# Patient Record
Sex: MALE | Race: OTHER | NOT HISPANIC OR LATINO | Employment: FULL TIME | URBAN - METROPOLITAN AREA
[De-identification: names, ages, dates, MRNs, and addresses within clinical notes are randomized per-mention and may not be internally consistent; named-entity substitution may affect disease eponyms.]

---

## 2021-07-17 ENCOUNTER — APPOINTMENT (OUTPATIENT)
Dept: RADIOLOGY | Facility: MEDICAL CENTER | Age: 56
End: 2021-07-17
Attending: EMERGENCY MEDICINE
Payer: COMMERCIAL

## 2021-07-17 ENCOUNTER — HOSPITAL ENCOUNTER (EMERGENCY)
Facility: MEDICAL CENTER | Age: 56
End: 2021-07-17
Attending: EMERGENCY MEDICINE
Payer: COMMERCIAL

## 2021-07-17 VITALS
HEIGHT: 73 IN | TEMPERATURE: 98 F | DIASTOLIC BLOOD PRESSURE: 99 MMHG | WEIGHT: 213.85 LBS | BODY MASS INDEX: 28.34 KG/M2 | HEART RATE: 71 BPM | SYSTOLIC BLOOD PRESSURE: 133 MMHG | OXYGEN SATURATION: 95 % | RESPIRATION RATE: 16 BRPM

## 2021-07-17 DIAGNOSIS — E86.0 DEHYDRATION: ICD-10-CM

## 2021-07-17 DIAGNOSIS — R10.10 PAIN OF UPPER ABDOMEN: ICD-10-CM

## 2021-07-17 LAB
ALBUMIN SERPL BCP-MCNC: 3.9 G/DL (ref 3.2–4.9)
ALBUMIN/GLOB SERPL: 1.1 G/DL
ALP SERPL-CCNC: 112 U/L (ref 30–99)
ALT SERPL-CCNC: 83 U/L (ref 2–50)
ANION GAP SERPL CALC-SCNC: 11 MMOL/L (ref 7–16)
APPEARANCE UR: CLEAR
AST SERPL-CCNC: 71 U/L (ref 12–45)
BASOPHILS # BLD AUTO: 0.5 % (ref 0–1.8)
BASOPHILS # BLD: 0.04 K/UL (ref 0–0.12)
BILIRUB SERPL-MCNC: 0.9 MG/DL (ref 0.1–1.5)
BILIRUB UR QL STRIP.AUTO: NEGATIVE
BUN SERPL-MCNC: 7 MG/DL (ref 8–22)
CALCIUM SERPL-MCNC: 9.4 MG/DL (ref 8.4–10.2)
CHLORIDE SERPL-SCNC: 96 MMOL/L (ref 96–112)
CO2 SERPL-SCNC: 26 MMOL/L (ref 20–33)
COLOR UR: YELLOW
CREAT SERPL-MCNC: 1.01 MG/DL (ref 0.5–1.4)
CRP SERPL HS-MCNC: 0.7 MG/DL (ref 0–0.75)
EKG IMPRESSION: NORMAL
EOSINOPHIL # BLD AUTO: 0.13 K/UL (ref 0–0.51)
EOSINOPHIL NFR BLD: 1.6 % (ref 0–6.9)
ERYTHROCYTE [DISTWIDTH] IN BLOOD BY AUTOMATED COUNT: 47.7 FL (ref 35.9–50)
ERYTHROCYTE [SEDIMENTATION RATE] IN BLOOD BY WESTERGREN METHOD: 4 MM/HOUR (ref 0–20)
GLOBULIN SER CALC-MCNC: 3.5 G/DL (ref 1.9–3.5)
GLUCOSE SERPL-MCNC: 109 MG/DL (ref 65–99)
GLUCOSE UR STRIP.AUTO-MCNC: NEGATIVE MG/DL
HAV IGM SERPL QL IA: NORMAL
HBV CORE IGM SER QL: NORMAL
HBV SURFACE AG SER QL: NORMAL
HCT VFR BLD AUTO: 49.1 % (ref 42–52)
HCV AB SER QL: NORMAL
HGB BLD-MCNC: 17 G/DL (ref 14–18)
IMM GRANULOCYTES # BLD AUTO: 0.02 K/UL (ref 0–0.11)
IMM GRANULOCYTES NFR BLD AUTO: 0.2 % (ref 0–0.9)
KETONES UR STRIP.AUTO-MCNC: 15 MG/DL
LEUKOCYTE ESTERASE UR QL STRIP.AUTO: NEGATIVE
LIPASE SERPL-CCNC: 59 U/L (ref 7–58)
LYMPHOCYTES # BLD AUTO: 0.82 K/UL (ref 1–4.8)
LYMPHOCYTES NFR BLD: 10.2 % (ref 22–41)
MCH RBC QN AUTO: 33.7 PG (ref 27–33)
MCHC RBC AUTO-ENTMCNC: 34.6 G/DL (ref 33.7–35.3)
MCV RBC AUTO: 97.4 FL (ref 81.4–97.8)
MICRO URNS: ABNORMAL
MONOCYTES # BLD AUTO: 0.9 K/UL (ref 0–0.85)
MONOCYTES NFR BLD AUTO: 11.2 % (ref 0–13.4)
NEUTROPHILS # BLD AUTO: 6.16 K/UL (ref 1.82–7.42)
NEUTROPHILS NFR BLD: 76.3 % (ref 44–72)
NITRITE UR QL STRIP.AUTO: NEGATIVE
NRBC # BLD AUTO: 0 K/UL
NRBC BLD-RTO: 0 /100 WBC
PH UR STRIP.AUTO: 7 [PH] (ref 5–8)
PLATELET # BLD AUTO: 267 K/UL (ref 164–446)
PMV BLD AUTO: 9 FL (ref 9–12.9)
POTASSIUM SERPL-SCNC: 4.1 MMOL/L (ref 3.6–5.5)
PROT SERPL-MCNC: 7.4 G/DL (ref 6–8.2)
PROT UR QL STRIP: NEGATIVE MG/DL
RBC # BLD AUTO: 5.04 M/UL (ref 4.7–6.1)
RBC UR QL AUTO: NEGATIVE
SODIUM SERPL-SCNC: 133 MMOL/L (ref 135–145)
SP GR UR STRIP.AUTO: 1.02
TROPONIN T SERPL-MCNC: 7 NG/L (ref 6–19)
WBC # BLD AUTO: 8.1 K/UL (ref 4.8–10.8)

## 2021-07-17 PROCEDURE — 36415 COLL VENOUS BLD VENIPUNCTURE: CPT

## 2021-07-17 PROCEDURE — A9270 NON-COVERED ITEM OR SERVICE: HCPCS | Performed by: EMERGENCY MEDICINE

## 2021-07-17 PROCEDURE — 80074 ACUTE HEPATITIS PANEL: CPT

## 2021-07-17 PROCEDURE — 86140 C-REACTIVE PROTEIN: CPT

## 2021-07-17 PROCEDURE — 700105 HCHG RX REV CODE 258: Performed by: EMERGENCY MEDICINE

## 2021-07-17 PROCEDURE — 80053 COMPREHEN METABOLIC PANEL: CPT

## 2021-07-17 PROCEDURE — 85652 RBC SED RATE AUTOMATED: CPT

## 2021-07-17 PROCEDURE — 71045 X-RAY EXAM CHEST 1 VIEW: CPT

## 2021-07-17 PROCEDURE — 93005 ELECTROCARDIOGRAM TRACING: CPT

## 2021-07-17 PROCEDURE — 700102 HCHG RX REV CODE 250 W/ 637 OVERRIDE(OP): Performed by: EMERGENCY MEDICINE

## 2021-07-17 PROCEDURE — 84484 ASSAY OF TROPONIN QUANT: CPT

## 2021-07-17 PROCEDURE — 83690 ASSAY OF LIPASE: CPT

## 2021-07-17 PROCEDURE — 96376 TX/PRO/DX INJ SAME DRUG ADON: CPT

## 2021-07-17 PROCEDURE — 700117 HCHG RX CONTRAST REV CODE 255: Performed by: EMERGENCY MEDICINE

## 2021-07-17 PROCEDURE — 85025 COMPLETE CBC W/AUTO DIFF WBC: CPT

## 2021-07-17 PROCEDURE — 81003 URINALYSIS AUTO W/O SCOPE: CPT

## 2021-07-17 PROCEDURE — 700111 HCHG RX REV CODE 636 W/ 250 OVERRIDE (IP): Performed by: EMERGENCY MEDICINE

## 2021-07-17 PROCEDURE — 93005 ELECTROCARDIOGRAM TRACING: CPT | Performed by: EMERGENCY MEDICINE

## 2021-07-17 PROCEDURE — 96375 TX/PRO/DX INJ NEW DRUG ADDON: CPT

## 2021-07-17 PROCEDURE — 99285 EMERGENCY DEPT VISIT HI MDM: CPT

## 2021-07-17 PROCEDURE — 96374 THER/PROPH/DIAG INJ IV PUSH: CPT

## 2021-07-17 PROCEDURE — 74177 CT ABD & PELVIS W/CONTRAST: CPT

## 2021-07-17 RX ORDER — TESTOSTERONE CYPIONATE 200 MG/ML
200 INJECTION, SOLUTION INTRAMUSCULAR
COMMUNITY

## 2021-07-17 RX ORDER — IBUPROFEN 200 MG
400 TABLET ORAL EVERY 8 HOURS PRN
Status: ON HOLD | COMMUNITY
End: 2023-04-04

## 2021-07-17 RX ORDER — ACETAMINOPHEN 500 MG
1000 TABLET ORAL EVERY 8 HOURS PRN
Status: SHIPPED | COMMUNITY
End: 2023-05-19

## 2021-07-17 RX ORDER — CETIRIZINE HYDROCHLORIDE 10 MG/1
10 TABLET ORAL
Status: ON HOLD | COMMUNITY
End: 2023-04-04

## 2021-07-17 RX ORDER — CALCIUM CARBONATE 750 MG/1
2-4 TABLET ORAL
Status: SHIPPED | COMMUNITY
End: 2022-02-02

## 2021-07-17 RX ORDER — ZINC 25 MG
25 TABLET ORAL DAILY
Status: ON HOLD | COMMUNITY
End: 2023-04-04

## 2021-07-17 RX ORDER — SODIUM CHLORIDE, SODIUM LACTATE, POTASSIUM CHLORIDE, CALCIUM CHLORIDE 600; 310; 30; 20 MG/100ML; MG/100ML; MG/100ML; MG/100ML
1000 INJECTION, SOLUTION INTRAVENOUS ONCE
Status: COMPLETED | OUTPATIENT
Start: 2021-07-17 | End: 2021-07-17

## 2021-07-17 RX ORDER — MORPHINE SULFATE 4 MG/ML
4 INJECTION, SOLUTION INTRAMUSCULAR; INTRAVENOUS ONCE
Status: COMPLETED | OUTPATIENT
Start: 2021-07-17 | End: 2021-07-17

## 2021-07-17 RX ORDER — ONDANSETRON 4 MG/1
4 TABLET, ORALLY DISINTEGRATING ORAL EVERY 6 HOURS PRN
Qty: 10 TABLET | Refills: 0 | Status: SHIPPED | OUTPATIENT
Start: 2021-07-17 | End: 2022-02-02

## 2021-07-17 RX ORDER — OMEPRAZOLE 20 MG/1
20-40 CAPSULE, DELAYED RELEASE ORAL
Status: SHIPPED | COMMUNITY
End: 2023-12-21 | Stop reason: SDUPTHER

## 2021-07-17 RX ORDER — ANASTROZOLE 1 MG/1
1-2 TABLET ORAL
Status: SHIPPED | COMMUNITY
End: 2022-02-02

## 2021-07-17 RX ORDER — USTEKINUMAB 90 MG/ML
90 INJECTION, SOLUTION SUBCUTANEOUS
Status: SHIPPED | COMMUNITY
End: 2023-01-31 | Stop reason: SDUPTHER

## 2021-07-17 RX ORDER — ONDANSETRON 2 MG/ML
4 INJECTION INTRAMUSCULAR; INTRAVENOUS ONCE
Status: COMPLETED | OUTPATIENT
Start: 2021-07-17 | End: 2021-07-17

## 2021-07-17 RX ORDER — HYDROCODONE BITARTRATE AND ACETAMINOPHEN 5; 325 MG/1; MG/1
1 TABLET ORAL EVERY 6 HOURS PRN
Qty: 16 TABLET | Refills: 0 | Status: SHIPPED | OUTPATIENT
Start: 2021-07-17 | End: 2021-07-21

## 2021-07-17 RX ADMIN — MORPHINE SULFATE 4 MG: 4 INJECTION INTRAVENOUS at 14:38

## 2021-07-17 RX ADMIN — SODIUM CHLORIDE, POTASSIUM CHLORIDE, SODIUM LACTATE AND CALCIUM CHLORIDE 1000 ML: 600; 310; 30; 20 INJECTION, SOLUTION INTRAVENOUS at 14:37

## 2021-07-17 RX ADMIN — ONDANSETRON 4 MG: 2 INJECTION INTRAMUSCULAR; INTRAVENOUS at 17:25

## 2021-07-17 RX ADMIN — MORPHINE SULFATE 4 MG: 4 INJECTION INTRAVENOUS at 17:24

## 2021-07-17 RX ADMIN — ONDANSETRON 4 MG: 2 INJECTION INTRAMUSCULAR; INTRAVENOUS at 14:38

## 2021-07-17 RX ADMIN — SODIUM CHLORIDE, POTASSIUM CHLORIDE, SODIUM LACTATE AND CALCIUM CHLORIDE 1000 ML: 600; 310; 30; 20 INJECTION, SOLUTION INTRAVENOUS at 17:24

## 2021-07-17 RX ADMIN — LIDOCAINE HYDROCHLORIDE 30 ML: 20 SOLUTION OROPHARYNGEAL at 17:23

## 2021-07-17 RX ADMIN — IOHEXOL 100 ML: 350 INJECTION, SOLUTION INTRAVENOUS at 15:16

## 2021-07-17 ASSESSMENT — PAIN DESCRIPTION - DESCRIPTORS: DESCRIPTORS: ACHING;SHARP

## 2021-07-17 NOTE — ED PROVIDER NOTES
ED Provider Note    CHIEF COMPLAINT  Chief Complaint   Patient presents with   • Chest Pain       HPI  Tom Quan is a 55 y.o. male with a history of ulcerative colitis, currently on Stelara, who presents with complaints of epigastric and left upper abdominal pain, along with nausea, vomiting, diarrhea for the past week.  Patient says he has been having for 5 episodes of watery diarrhea with occasional blood.  He says this is similar to when he said flares of his ulcerative colitis.  He says he was switched to Stelara about 2 months ago but has not has not been helping much.  In triage he was noted be complaining of chest pain, but it appears that the pain is mainly in his abdomen and radiates into his lower chest area.  He denies any fever, shaking chills, sore throat, cough, congestion, any difficulty breathing.  He has had no urinary symptoms.  He has had some watery diarrhea as noted above.  [facility] is [facility]      REVIEW OF SYSTEMS  See HPI for further details. All other systems are negative.     PAST MEDICAL HISTORY  History reviewed. No pertinent past medical history.    FAMILY HISTORY  History reviewed. No pertinent family history.    SOCIAL HISTORY  Social History     Tobacco Use   • Smoking status: Never Smoker   • Smokeless tobacco: Never Used   Substance Use Topics   • Alcohol use: Yes     Comment: Occasionally   • Drug use: Never      Social History     Substance and Sexual Activity   Drug Use Never       SURGICAL HISTORY  History reviewed. No pertinent surgical history.    CURRENT MEDICATIONS  Home Medications     Reviewed by Jennifer Yang (Pharmacy Tech) on 07/17/21 at 1555  Med List Status: Complete    Medication Last Dose Status    acetaminophen (TYLENOL) 500 MG Tab 7/14/2021 Active    anastrozole (ARIMIDEX) 1 MG Tab 7/7/2021 Active    calcium carbonate (TUMS E-X 750) 750 MG chewable tablet 7/14/2021 Active    cetirizine (KLS ALLER-GABRIELLA) 10 MG Tab  ">2 weeks Active    Cholecalciferol (VITAMIN D3) 125 MCG (5000 UT) Cap 7/16/2021 Active    ibuprofen (MOTRIN) 200 MG Tab 7/14/2021 Active    Omega-3 Fatty Acids (FISH OIL PO) >5 days Active    omeprazole (PRILOSEC) 20 MG delayed-release capsule 7/15/2021 Active    Probiotic Cap 7/15/2021 Active    testosterone cypionate (DEPO-TESTOSTERONE) 200 MG/ML Solution injection 7/7/2021 Active    Ustekinumab (STELARA) 90 MG/ML Solution Prefilled Syringe 6/27/2021 Active    Zinc 25 MG Tab >5 days Active                ALLERGIES  No Known Allergies    PHYSICAL EXAM0  VITAL SIGNS: Blood Pressure 133/99   Pulse 71   Temperature 36.7 °C (98 °F) (Temporal)   Respiration 16   Height 1.854 m (6' 1\")   Weight 97 kg (213 lb 13.5 oz)   Oxygen Saturation 95%   Body Mass Index 28.21 kg/m²   Constitutional: Awake, alert, in no acute distress, Non-toxic appearance.   HENT: Atraumatic. Bilateral external ears normal, mucous membranes mildly dry, throat nonerythematous without exudates, nose is normal.  Eyes: PERRL, EOMI, conjunctiva moist, noninjected.  Neck: Nontender, Normal range of motion, No nuchal rigidity, No stridor.   Lymphatic: No lymphadenopathy noted.   Cardiovascular: Regular rate and rhythm, no murmurs, rubs, gallops.  Thorax & Lungs:  Good breath sounds bilaterally, no wheezes, rales, or retractions.  No chest tenderness.  Abdomen: Bowel sounds normal, Soft, nondistended, there is mild tenderness to the epigastrium, increase tenderness to the left upper quadrant, no tenderness to the lower abdomen, no rebound, guarding, or masses.  Back: No CVA or spinal tenderness.  Extremities: Intact distal pulses, No edema, No tenderness.   Skin: Warm, Dry, No rashes.   Musculoskeletal: No joint swelling or tenderness.  Neurologic: Alert & oriented x 3, sensory and motor function normal. No focal deficits.   Psychiatric: Affect normal, Judgment normal, Mood normal.     EKG  EKG Interpretation:  Interpreted by me    Rhythm:  Normal " sinus rhythm   Rate: 74  Intervals: normal  Axis: normal  Ectopy: none  Conduction: normal  ST Segments: no evidence of elevation or depression  T Waves: no acute change  Q Waves: none  Clinical Impression: No acute injury or ischemic pattern.   Comparison to previous EKG: None available      LABS  Labs Reviewed   CBC WITH DIFFERENTIAL - Abnormal; Notable for the following components:       Result Value    MCH 33.7 (*)     Neutrophils-Polys 76.30 (*)     Lymphocytes 10.20 (*)     Lymphs (Absolute) 0.82 (*)     Monos (Absolute) 0.90 (*)     All other components within normal limits   COMP METABOLIC PANEL - Abnormal; Notable for the following components:    Sodium 133 (*)     Glucose 109 (*)     Bun 7 (*)     AST(SGOT) 71 (*)     ALT(SGPT) 83 (*)     Alkaline Phosphatase 112 (*)     All other components within normal limits   URINALYSIS - Abnormal; Notable for the following components:    Ketones 15 (*)     All other components within normal limits   LIPASE - Abnormal; Notable for the following components:    Lipase 59 (*)     All other components within normal limits   TROPONIN   ESTIMATED GFR   SED RATE   CRP QUANTITIVE (NON-CARDIAC)   HEPATITIS PANEL ACUTE(4 COMPONENTS)       All labs reviewed by me.      RADIOLOGY/PROCEDURES  CT-ABDOMEN-PELVIS WITH   Final Result      1.  Hepatic steatosis.      2.  Aortic atherosclerosis without aneurysm.      3.  Appendix not visualized      DX-CHEST-PORTABLE (1 VIEW)   Final Result      No evidence of acute cardiopulmonary process.          The radiologist's interpretations of all radiological studies have been reviewed by me.        COURSE & MEDICAL DECISION MAKING  Pertinent Labs & Imaging studies reviewed. (See chart for details)  The patient presents with above complaints.  Pain seems to be in the epigastrium and left upper quadrant, suspect this more likely gastritis or early ulcer disease rather than a flare of his ulcerative colitis.  Clinically patient appears  dehydrated with dry mucous membranes and poor p.o. intake.  IV is placed, he is given a bolus of lactated Ringer's, morphine, and Zofran.  CBC is normal with white to 76% polys, sedimentation rate 4, C-reactive protein 0.7, chemistry shows a sodium of 133, glucose 109, BUN 7, SGOT 71, SGPT 83, total bilirubin 0.9, lipase 59.  CT scan of the abdomen/pelvis with IV contrast was obtained which shows hepatic steatosis, but no acute inflammatory changes or abnormalities.  The patient does not show any acute flare of his ulcerative colitis.  His white count is normal, inflammatory markers are negative, and no evidence on CAT scan.  He does have some mild elevation in his liver function test, which I think is likely secondary to his hepatic steatosis.  He did have some ketones in his urine, consistent with dehydration.  Patient reported relief with the morphine but said the pain was coming back.  He was given a GI cocktail without much improvement.  He was given additional dose of morphine and Zofran and a second liter of lactated Ringer's.  Patient was able to tolerate oral fluids.  I suspect the patient has gastritis or early peptic ulcer disease.  He is already on Prilosec.  I have asked him to follow-up with Dr. Sylvester of gastroenterology on Monday.  He is given a prescription for Zofran and short course of Norco.  He is told to eat bland foods, avoid any alcohol, or spicy foods.  He is to return to ER for worsening pain, fever, vomiting, bloody stools or black stools, or any other problems.    FINAL IMPRESSION  1. Pain of upper abdomen    2. Dehydration          Electronically signed by: Jesse Perez M.D., 7/17/2021 3:33 PM

## 2021-07-17 NOTE — ED NOTES
Med rec completed per Pt at bedside and Pt's pharmacy [facility] [address] [facility] ([phone]).  Allergies reviewed with Pt. No known drug allergies.  Per Cher, Pt is prescribed anastrozole 1 mg with directions to take 1 tablet 2 times per week; Pt reports that he takes 1 to 2 tablets (amount varies) once per week, with his testosterone injections.  No oral antibiotics in last 30 days.

## 2021-07-17 NOTE — ED TRIAGE NOTES
"Presents complaining of acute onset of left chest pain without any associated dyspnea, and worsening for approximately a week.  Chief Complaint   Patient presents with   • Chest Pain     /98   Pulse 90   Temp 36.1 °C (96.9 °F) (Temporal)   Resp 20   Ht 1.854 m (6' 1\")   Wt 97 kg (213 lb 13.5 oz)   SpO2 99%   BMI 28.21 kg/m²      "

## 2021-07-18 NOTE — ED NOTES
Discharge instructions given and discussed. RX for norco and zofran  given and pt educated. Pt educated to come back to ER for new or worsening symptoms and follow up with PCP as instructed. Pt verbalized understanding. VSS. Pt  Discharged in stable condition.

## 2021-09-14 ENCOUNTER — TELEPHONE (OUTPATIENT)
Dept: SCHEDULING | Facility: IMAGING CENTER | Age: 56
End: 2021-09-14

## 2022-01-17 ENCOUNTER — TELEPHONE (OUTPATIENT)
Dept: SCHEDULING | Facility: IMAGING CENTER | Age: 57
End: 2022-01-17

## 2022-02-02 ENCOUNTER — OFFICE VISIT (OUTPATIENT)
Dept: MEDICAL GROUP | Facility: LAB | Age: 57
End: 2022-02-02
Payer: COMMERCIAL

## 2022-02-02 VITALS
DIASTOLIC BLOOD PRESSURE: 86 MMHG | TEMPERATURE: 97.1 F | BODY MASS INDEX: 29.03 KG/M2 | HEART RATE: 90 BPM | HEIGHT: 73 IN | OXYGEN SATURATION: 97 % | SYSTOLIC BLOOD PRESSURE: 116 MMHG | RESPIRATION RATE: 12 BRPM | WEIGHT: 219 LBS

## 2022-02-02 DIAGNOSIS — Z78.9 ALCOHOL USE: ICD-10-CM

## 2022-02-02 DIAGNOSIS — K51.00 ULCERATIVE PANCOLITIS WITHOUT COMPLICATION (HCC): ICD-10-CM

## 2022-02-02 DIAGNOSIS — F90.2 ATTENTION DEFICIT HYPERACTIVITY DISORDER (ADHD), COMBINED TYPE: ICD-10-CM

## 2022-02-02 PROBLEM — M87.051 AVASCULAR NECROSIS OF BONES OF BOTH HIPS (HCC): Status: ACTIVE | Noted: 2019-08-05

## 2022-02-02 PROBLEM — M87.052 AVASCULAR NECROSIS OF BONES OF BOTH HIPS (HCC): Status: ACTIVE | Noted: 2019-08-05

## 2022-02-02 PROBLEM — I10 ESSENTIAL (PRIMARY) HYPERTENSION: Status: ACTIVE | Noted: 2019-08-05

## 2022-02-02 PROBLEM — M16.0 BILATERAL PRIMARY OSTEOARTHRITIS OF HIP: Status: ACTIVE | Noted: 2018-07-03

## 2022-02-02 PROBLEM — K21.9 GERD (GASTROESOPHAGEAL REFLUX DISEASE): Status: ACTIVE | Noted: 2019-08-05

## 2022-02-02 PROCEDURE — 99204 OFFICE O/P NEW MOD 45 MIN: CPT | Performed by: FAMILY MEDICINE

## 2022-02-02 RX ORDER — SYRINGE WITH NEEDLE, 1 ML 25GX5/8"
SYRINGE, EMPTY DISPOSABLE MISCELLANEOUS
COMMUNITY
Start: 2021-12-24 | End: 2023-05-19

## 2022-02-02 RX ORDER — LISDEXAMFETAMINE DIMESYLATE 40 MG/1
40 CAPSULE ORAL DAILY
Qty: 30 CAPSULE | Refills: 0 | Status: SHIPPED | OUTPATIENT
Start: 2022-02-02 | End: 2022-03-02 | Stop reason: SDUPTHER

## 2022-02-02 RX ORDER — NALTREXONE HYDROCHLORIDE 50 MG/1
50 TABLET, FILM COATED ORAL DAILY
Qty: 30 TABLET | Refills: 0 | Status: SHIPPED | OUTPATIENT
Start: 2022-02-02 | End: 2022-03-03

## 2022-02-02 ASSESSMENT — PATIENT HEALTH QUESTIONNAIRE - PHQ9
CLINICAL INTERPRETATION OF PHQ2 SCORE: 4
5. POOR APPETITE OR OVEREATING: 2 - MORE THAN HALF THE DAYS
SUM OF ALL RESPONSES TO PHQ QUESTIONS 1-9: 11

## 2022-02-02 ASSESSMENT — ANXIETY QUESTIONNAIRES
7. FEELING AFRAID AS IF SOMETHING AWFUL MIGHT HAPPEN: SEVERAL DAYS
5. BEING SO RESTLESS THAT IT IS HARD TO SIT STILL: NOT AT ALL
1. FEELING NERVOUS, ANXIOUS, OR ON EDGE: MORE THAN HALF THE DAYS
GAD7 TOTAL SCORE: 8
2. NOT BEING ABLE TO STOP OR CONTROL WORRYING: MORE THAN HALF THE DAYS
6. BECOMING EASILY ANNOYED OR IRRITABLE: NOT AT ALL
4. TROUBLE RELAXING: SEVERAL DAYS
3. WORRYING TOO MUCH ABOUT DIFFERENT THINGS: MORE THAN HALF THE DAYS

## 2022-02-02 ASSESSMENT — FIBROSIS 4 INDEX: FIB4 SCORE: 1.61

## 2022-02-02 NOTE — PROGRESS NOTES
Tom Quan is a 56 y.o. male here to establish care and discuss   Depression, ADHD, alcohol use.    Previously followed with psychiatrist in the  [facility] for depression but psychiatrist has since retired.  He had tried multiple SSRIs as well as Wellbutrin in the past with no improvement or intolerable side effects.  He ended up on Vyvanse 40 mg daily which helps with ADHD and seem to control his depression symptoms as well.  He wants to try to come off of all medications as and has been off of this for over 6 months but noticing increasingly worsening symptoms with depression and ADHD.    He is also concerned about increased alcohol use that he noted during Covid.  Does not drink every night but would find that some nights he would end up drinking more than planned.  At this point he would like to avoid drinking most nights of the week or stop drinking completely.  He saw an advertisement for naltrexone has questions regarding starting this for help with alcohol cessation.    [facility], has been stable on Stelara.    Current medicines (including changes today)  Current Outpatient Medications   Medication Sig Dispense Refill   • Ustekinumab (STELARA) 90 MG/ML Solution Prefilled Syringe Inject 90 mg under the skin Every 60 days.     • cetirizine (KLS ALLER-GABRIELLA) 10 MG Tab Take 10 mg by mouth 1 time a day as needed for Allergies.     • ibuprofen (MOTRIN) 200 MG Tab Take 400 mg by mouth every 8 hours as needed for Mild Pain. 2 tablets = 400 mg.     • acetaminophen (TYLENOL) 500 MG Tab Take 1,000 mg by mouth every 8 hours as needed for Mild Pain. 2 tablets = 1,000 mg.     • Probiotic Cap Take 1 capsule by mouth every day.     • omeprazole (PRILOSEC) 20 MG delayed-release capsule Take 20 mg by mouth 1 time a day as needed (Heartburn).     • testosterone cypionate (DEPO-TESTOSTERONE) 200 MG/ML Solution injection Inject 200 mg into the shoulder, thigh, or buttocks  "every 7 days.     • Omega-3 Fatty Acids (FISH OIL PO) Take 2 Capsules by mouth every day.     • Zinc 25 MG Tab Take 25 mg by mouth every day.     • Cholecalciferol (VITAMIN D3) 125 MCG (5000 UT) Cap Take 5,000 Units by mouth every day.     • B-D 3CC LUER-RONNA SYR 22GX1\" 22G X 1\" 3 ML Misc USE FOR TESTOSTERONE INJECTIONS       No current facility-administered medications for this visit.     He  has a past medical history of Depression and Hyperlipidemia.  He  has a past surgical history that includes hip replacement, partial.  Social History     Tobacco Use   • Smoking status: Never Smoker   • Smokeless tobacco: Never Used   Vaping Use   • Vaping Use: Former   Substance Use Topics   • Alcohol use: Yes     Comment: Occasionally   • Drug use: Never     Social History     Social History Narrative   • Not on file     Family History   Problem Relation Age of Onset   • Diabetes Mother    • Cancer Paternal Uncle         Prostate     Family Status   Relation Name Status   • Mo  (Not Specified)   • PUnc  (Not Specified)       ROS  See HPI     Objective:     Physical Exam:  /86 (BP Location: Right arm, Patient Position: Sitting, BP Cuff Size: Adult)   Pulse 90   Temp 36.2 °C (97.1 °F)   Resp 12   Ht 1.854 m (6' 1\")   Wt 99.3 kg (219 lb)   SpO2 97%  Body mass index is 28.89 kg/m².  Constitutional: Alert. Well appearing. No distress.  Skin: Warm, dry, good turgor, no visible rashes.  Eye: Equal, round and reactive to light, conjunctiva clear, lids normal.  ENMT: Moist mucous membranes. .  Neck: Trachea midline, no masses, no thyromegaly  Respiratory: Normal effort. Lungs are clear to auscultation bilaterally.  Cardiovascular: Regular rate and rhythm. Normal S1/S2. No murmurs, rubs or gallops.   Neuro: Moves all four extremities. No facial droop.  Psych: Answers questions appropriately. Normal affect and mood.    Assessment and Plan:     1. Attention deficit hyperactivity disorder (ADHD), combined type  Previously " followed with psychiatry for depression and ADHD.  Psychiatrist retired and he would like referral to establish with a new psychiatrist here.  Did not see improvement with multiple different antidepressants and ended up seeing improvement in both ADHD and depression symptoms with Vyvanse.  Have been off of this for 6 months but did notice that symptoms have been worsening.  Will restart Vyvanse, PDMP reviewed, referral sent to establish with psychiatry.  Follow-up 1 month.  - Lisdexamfetamine Dimesylate (VYVANSE) 40 MG Cap; Take 1 Capsule by mouth every day for 30 days.  Dispense: 30 Capsule; Refill: 0  - Controlled Substance Treatment Agreement  - Referral to Psychiatry    2. Alcohol use  Noted increasing alcohol use during Covid, not drinking nightly but he would like to cut back or completely stop use.  He request starting naltrexone to help with alcohol cravings, will start 50 mg, can increase to 100 mg if needed.  - naltrexone (DEPADE) 50 MG Tab; Take 1 Tablet by mouth every day.  Dispense: 30 Tablet; Refill: 0    3. Ulcerative pancolitis without complication (HCC)  Stable on Stelara, [facility].    Records requested from previous PCP.  Follow up: Return in about 4 weeks (around 3/2/2022).         PLEASE NOTE: This note was created using voice recognition software.

## 2022-02-02 NOTE — LETTER
[facility]  Arvind Geiger M.D.  [address]  [facility] [address]  Fax: [phone]   Authorization for Release/Disclosure of   Protected Health Information   Name: TOM WITT : 1965 SSN: [id number]   Address: [address] [place] [id number] Phone:    [phone] (home)    I authorize the entity listed below to release/disclose the PHI below to:   [facility]/Arvind Geiger M.D. and Arvind Geiger M.D.   Provider or Entity Name:  [facility]   Address   City, State, [facility]   Phone:      Fax:  [phone]   Reason for request: continuity of care   Information to be released:    [  ] LAST COLONOSCOPY,  including any PATH REPORT and follow-up  [  ] LAST FIT/COLOGUARD RESULT [  ] LAST DEXA  [  ] LAST MAMMOGRAM  [  ] LAST PAP  [  ] LAST LABS [  ] RETINA EXAM REPORT  [  ] IMMUNIZATION RECORDS  [XXXXX] Release all info      [XXXXX] Check here and initial the line next to each item to release ALL health information INCLUDING  _XXXXX_ Care and treatment for drug and / or alcohol abuse  _XXXXX_ HIV testing, infection status, or AIDS  _XXXXX_ Genetic Testing    DATES OF SERVICE OR TIME PERIOD TO BE DISCLOSED: _____________  I understand and acknowledge that:  * This Authorization may be revoked at any time by you in writing, except if your health information has already been used or disclosed.  * Your health information that will be used or disclosed as a result of you signing this authorization could be re-disclosed by the recipient. If this occurs, your re-disclosed health information may no longer be protected by State or Federal laws.  * You may refuse to sign this Authorization. Your refusal will not affect your ability to obtain treatment.  * This Authorization becomes effective upon signing and will  on (date) __________.      If no date is indicated, this Authorization will  one (1) year from the signature date.    Name: Tom BUNDY  Avelina    Signature: Continuity of care   Date:     2/2/2022       PLEASE FAX REQUESTED RECORDS BACK TO: [phone]

## 2022-02-02 NOTE — LETTER
[facility]  Arvind Geiger M.D.  [address]  [facility] [address]  Fax: [phone]   Authorization for Release/Disclosure of   Protected Health Information   Name: TOM WITT : 1965 SSN: [id number]   Address: [address] [place] [address] Phone:    [phone] (home)    I authorize the entity listed below to release/disclose the PHI below to:   [facility]/Arvind Geiger M.D. and Arvind Geiger M.D.   Provider or Entity Name:  [facility]   Address   City, [facility]   Phone:      Fax:  [phone]   Reason for request: continuity of care   Information to be released:    [  ] LAST COLONOSCOPY,  including any PATH REPORT and follow-up  [  ] LAST FIT/COLOGUARD RESULT [  ] LAST DEXA  [  ] LAST MAMMOGRAM  [  ] LAST PAP  [  ] LAST LABS [  ] RETINA EXAM REPORT  [  ] IMMUNIZATION RECORDS  [XXXXX] Release all info      [XXXXX] Check here and initial the line next to each item to release ALL health information INCLUDING  _XXXXX_ Care and treatment for drug and / or alcohol abuse  _XXXXX_ HIV testing, infection status, or AIDS  _XXXXX_ Genetic Testing    DATES OF SERVICE OR TIME PERIOD TO BE DISCLOSED: _____________  I understand and acknowledge that:  * This Authorization may be revoked at any time by you in writing, except if your health information has already been used or disclosed.  * Your health information that will be used or disclosed as a result of you signing this authorization could be re-disclosed by the recipient. If this occurs, your re-disclosed health information may no longer be protected by State or Federal laws.  * You may refuse to sign this Authorization. Your refusal will not affect your ability to obtain treatment.  * This Authorization becomes effective upon signing and will  on (date) __________.      If no date is indicated, this Authorization will  one (1) year from the signature date.    Name: Tom BUNDY  Avelina    Signature: Continuity of care   Date:     2/2/2022       PLEASE FAX REQUESTED RECORDS BACK TO: [phone]

## 2022-03-02 ENCOUNTER — OFFICE VISIT (OUTPATIENT)
Dept: MEDICAL GROUP | Facility: LAB | Age: 57
End: 2022-03-02
Payer: COMMERCIAL

## 2022-03-02 VITALS
OXYGEN SATURATION: 97 % | WEIGHT: 221 LBS | TEMPERATURE: 96.9 F | DIASTOLIC BLOOD PRESSURE: 76 MMHG | RESPIRATION RATE: 12 BRPM | HEIGHT: 73 IN | SYSTOLIC BLOOD PRESSURE: 114 MMHG | BODY MASS INDEX: 29.29 KG/M2 | HEART RATE: 100 BPM

## 2022-03-02 DIAGNOSIS — Z78.9 ALCOHOL USE: ICD-10-CM

## 2022-03-02 DIAGNOSIS — F90.2 ATTENTION DEFICIT HYPERACTIVITY DISORDER (ADHD), COMBINED TYPE: ICD-10-CM

## 2022-03-02 DIAGNOSIS — E78.00 PURE HYPERCHOLESTEROLEMIA: ICD-10-CM

## 2022-03-02 PROCEDURE — 99214 OFFICE O/P EST MOD 30 MIN: CPT | Performed by: FAMILY MEDICINE

## 2022-03-02 RX ORDER — LISDEXAMFETAMINE DIMESYLATE 40 MG/1
40 CAPSULE ORAL DAILY
Qty: 30 CAPSULE | Refills: 0 | Status: SHIPPED | OUTPATIENT
Start: 2022-03-02 | End: 2022-04-01

## 2022-03-02 ASSESSMENT — FIBROSIS 4 INDEX: FIB4 SCORE: 1.61

## 2022-03-02 NOTE — PROGRESS NOTES
"Subjective:     CC: Follow up ADHD, alcohol use    HPI:   Tom BUNDY presents today to follow-up after establishing care 1 month ago.  Started naltrexone for alcohol cravings 1 month ago as he is attempted decrease alcohol use and not drink most days of the week.  He does think this has been helpful but has noticed some mild nausea with this.    He also has a history of ADHD.  He had previously followed with psychiatry for depression and ADHD and ended up not seeing much improvement with multiple antidepressants but saw improvement in both mood and ADHD symptoms with Vyvanse.  This was restarted at last visit he has been doing relatively well.  No issues with sleep or agitation.  He has not been able to set up a new psychiatrist yet.    Medications, past medical history, allergies, and social history have been reviewed and updated.      Objective:       Exam:  /76 (BP Location: Right arm, Patient Position: Sitting, BP Cuff Size: Adult)   Pulse 100   Temp 36.1 °C (96.9 °F)   Resp 12   Ht 1.854 m (6' 1\")   Wt 100 kg (221 lb)   SpO2 97%   BMI 29.16 kg/m²  Body mass index is 29.16 kg/m².    Constitutional: Alert. Well appearing. No distress.  Skin: Warm, dry, good turgor, no visible rashes.  Eye: Equal, round and reactive to light, conjunctiva clear, lids normal.  ENMT: Moist mucous membranes. Normal dentition.  Respiratory: Normal effort.   Neuro: Moves all four extremities. No facial droop.  Psych: Answers questions appropriately. Normal affect and mood.      Assessment & Plan:     56 y.o. male with the following -     1. Attention deficit hyperactivity disorder (ADHD), combined type  Chronic issue, still trying to establish with psychiatry here.  PDMP reviewed.  Doing well on Vyvanse 40 mg daily and will continue this dose for now.  - Lisdexamfetamine Dimesylate (VYVANSE) 40 MG Cap; Take 1 Capsule by mouth every day for 30 days.  Dispense: 30 Capsule; Refill: 0    2. Alcohol use  Naltrexone is helping with " cravings, he is having some mild nausea.  He may try taking 25 mg twice a day instead of 50 mg once daily.    3. Pure hypercholesterolemia  History of significantly elevated LDL, records show LDL greater than 180.  Recheck labs.  Would likely consider statin.  - CBC WITH DIFFERENTIAL; Future  - Comp Metabolic Panel; Future  - Lipid Profile; Future           Please note that this note was created using voice recognition software.

## 2022-03-04 ENCOUNTER — TELEPHONE (OUTPATIENT)
Dept: MEDICAL GROUP | Facility: LAB | Age: 57
End: 2022-03-04
Payer: COMMERCIAL

## 2022-03-04 NOTE — TELEPHONE ENCOUNTER
1. Caller Name: Tom Quan                        Call Back Number: [phone]      How would the patient prefer to be contacted with a response: Phone call do NOT leave a detailed message    Patient called and stated that he needs MMR for his job but is not sure if and when he got it. Patient also asked about Tdap and will be coming in today to get this completed. Can patient do a lab test for MMR or can we give him the MMR here?     Please advise.

## 2022-04-11 ENCOUNTER — PATIENT MESSAGE (OUTPATIENT)
Dept: MEDICAL GROUP | Facility: LAB | Age: 57
End: 2022-04-11
Payer: COMMERCIAL

## 2022-04-11 DIAGNOSIS — F90.2 ATTENTION DEFICIT HYPERACTIVITY DISORDER (ADHD), COMBINED TYPE: ICD-10-CM

## 2022-04-13 RX ORDER — LISDEXAMFETAMINE DIMESYLATE 40 MG/1
40 CAPSULE ORAL DAILY
Qty: 30 CAPSULE | Refills: 0 | Status: SHIPPED | OUTPATIENT
Start: 2022-05-13 | End: 2022-04-27

## 2022-04-13 RX ORDER — LISDEXAMFETAMINE DIMESYLATE 40 MG/1
40 CAPSULE ORAL DAILY
Qty: 30 CAPSULE | Refills: 0 | Status: SHIPPED | OUTPATIENT
Start: 2022-04-13 | End: 2022-04-27

## 2022-04-27 ENCOUNTER — TELEMEDICINE (OUTPATIENT)
Dept: BEHAVIORAL HEALTH | Facility: CLINIC | Age: 57
End: 2022-04-27
Payer: COMMERCIAL

## 2022-04-27 DIAGNOSIS — F33.1 MAJOR DEPRESSIVE DISORDER, RECURRENT EPISODE, MODERATE (HCC): ICD-10-CM

## 2022-04-27 DIAGNOSIS — F90.2 ADHD (ATTENTION DEFICIT HYPERACTIVITY DISORDER), COMBINED TYPE: ICD-10-CM

## 2022-04-27 PROCEDURE — 99204 OFFICE O/P NEW MOD 45 MIN: CPT | Mod: 95 | Performed by: PSYCHIATRY & NEUROLOGY

## 2022-04-27 RX ORDER — LISDEXAMFETAMINE DIMESYLATE 50 MG
50 CAPSULE ORAL EVERY MORNING
Qty: 30 CAPSULE | Refills: 0 | Status: SHIPPED | OUTPATIENT
Start: 2022-04-27 | End: 2022-05-27

## 2022-04-27 NOTE — PROGRESS NOTES
[facility]   Therapy Progress Note      This visit was conducted via Zoom using secure and encrypted videoconferencing technology.  The patient was in a private location in the [facility].  The patient's identity was confirmed and verbal consent was obtained for this virtual visit.    This provider informed the patient their medical records are totally confidential except for the use by other providers involved in their care, or if the patient signs a release, or to report instances of child or elder abuse, or if it is determined they are an immediate risk to harm themselves or others.      Name: Tom Quan  MRN: [id number]  : 1965  Age: 56 y.o.  Date of assessment: 2022  PCP: Arvind Geiger M.D.      Present Illness:   Chart reviewed prior to the virtual visit in his car.  He is 56,  a second time for 1-1/2 years, and has 2 sons 5 and 15, and 2 daughters 19 and 21.  He moved from [place] to [place] in 2020.  He works in sales for orthopedic equipment.  He is referred for evaluation of depression and ADHD.  His depressive symptoms include insomnia with frequent awakening, and decreased appetite and energy.  Short-term memory is okay.  He denies being suicidal and has never made an attempt.  He is not sure if he is restless but is very distractible and at times impulsive.  He has trouble completing projects.  He is currently on Vyvanse 40 mg a.m. but would like to try a stronger dose.    Past Psych History: He has never been in a psychiatric hospital.  He has seen for 5 other psychiatrists, but no one in the last year.  His PCP has been providing his Vyvanse.      Substance Abuse History:  He drinks wine with dinner every night.  He denies any substance abuse.    Family History:   His oldest daughter has bipolar disorder and ADHD.  Additional information will need to be obtained about his family.    Medical History:  Ulcerative colitis, GERD, both hips have been  replaced because of osteoarthritis, hypertension has been treated.    Psych Medications:  He previously tried Wellbutrin, Strattera, Ritalin, and Adderall.    Allergies:   Thimerosol    Mental Status:   He is alert, oriented, and cooperative.  Relatedness is good.  Grooming is good.  Speech is normal rate.  Anxious.  Memory is fairly good.  Insight and judgment are fairly good.  No indication of psychotic thinking.    Current Risk:       Suicidal: Not suicidal       Homicidal: Not homicidal       Self-Harm: No plan to harm self       Relapse (Low/Moderate/High): Moderate       Crisis Safety Plan Reviewed: Discussed with patient    Diagnosis:  ADHD  Major depressive disorder, recurrent    Treatment Plan:  The current treatment plan consists of monthly psychiatric and psychotherapy sessions designed to evaluate his ADHD and depression.    Duration will be for a minimum of 12 months and will be reviewed at each visit.    Improvement in ADHD symptoms and remission of depression in order to prevent relapse due to the chronic nature of his behavioral health problems and mental illness.  Vyvanse will be increased to 50 mg daily.      Tom Medina M.D.     This note was created using voice recognition software (Dragon). The accuracy of the dictation is limited by the abilities of the software. I have reviewed the note prior to signing, however some errors in grammar and context are still possible. If you have any questions related to this note please do not hesitate to contact our office.

## 2022-05-03 ENCOUNTER — PATIENT MESSAGE (OUTPATIENT)
Dept: MEDICAL GROUP | Facility: LAB | Age: 57
End: 2022-05-03
Payer: COMMERCIAL

## 2022-05-03 DIAGNOSIS — R11.0 NAUSEA: ICD-10-CM

## 2022-05-04 RX ORDER — ONDANSETRON 4 MG/1
4 TABLET, FILM COATED ORAL EVERY 8 HOURS PRN
Qty: 30 TABLET | Refills: 1 | Status: SHIPPED | OUTPATIENT
Start: 2022-05-04 | End: 2022-06-03

## 2022-06-16 ENCOUNTER — TELEMEDICINE (OUTPATIENT)
Dept: BEHAVIORAL HEALTH | Facility: CLINIC | Age: 57
End: 2022-06-16
Payer: COMMERCIAL

## 2022-06-16 DIAGNOSIS — F90.2 ADHD (ATTENTION DEFICIT HYPERACTIVITY DISORDER), COMBINED TYPE: ICD-10-CM

## 2022-06-16 DIAGNOSIS — F33.1 MAJOR DEPRESSIVE DISORDER, RECURRENT EPISODE, MODERATE (HCC): ICD-10-CM

## 2022-06-16 PROCEDURE — 99214 OFFICE O/P EST MOD 30 MIN: CPT | Mod: 95 | Performed by: PSYCHIATRY & NEUROLOGY

## 2022-06-16 RX ORDER — LISDEXAMFETAMINE DIMESYLATE 50 MG
50 CAPSULE ORAL EVERY MORNING
Qty: 30 CAPSULE | Refills: 0 | Status: SHIPPED | OUTPATIENT
Start: 2022-06-16 | End: 2022-08-12 | Stop reason: SDUPTHER

## 2022-06-16 NOTE — PROGRESS NOTES
This evaluation was conducted via Zoom using secure and encrypted videoconferencing technology. The patient was in their home in the [facility].    The patient's identity was confirmed and verbal consent was obtained for this virtual visit.        [facility]   Follow Up Assessment  This visit was conducted via Zoom using secure and encrypted videoconferencing technology.  The patient was in a private location in the [facility].  The patient's identity was confirmed and verbal consent was obtained for this virtual visit.    This provider informed the patient their medical records are totally confidential except for the use by other providers involved in their care, or if the patient signs a release, or to report instances of child or elder abuse, or if it is determined they are an immediate risk to harm themselves or others.    Name: Tom Quan  MRN: [id number]  : 1965  Age: 56 y.o.  Date of assessment: 2022  PCP: Arvind Geiger M.D.    Subjective:  Chart reviewed before the virtual interview at his home.  Vyvanse 50 mg daily is helping his ADHD symptoms and he prefers not to change the dosage.  We again talked about the need for him to contact one of my assistants before he runs out of medication.    Objective:  He is alert, oriented, and cooperative.  Relatedness is good.  Grooming is good.  Speech is normal rate.  Memory is fairly good.  Insight and judgment are fairly good.  No indication of psychotic thinking.    Current Risk:       Suicidal: Not suicide       homicidal: Not homicidal.       Self-Harm: No plan to harm       Relapse(Low/Moderate/High):: Moderate       Crisis Safety Plan Reviewed: Discussed with patient    Diagnosis:   ADHD  Major depressive disorder, recurrent    Treatment Plan:  The current treatment plan consists of quarterly psychiatric sessions designed to evaluate his ADHD and depression.    Duration will be for a minimum of 12 months and will be  reviewed at each visit.    Goals: Improvement in ADHD symptoms and remission of depression in order to prevent relapse due to the chronic nature of his behavioral health problems and mental illness.  Continue Vyvanse 50 mg a.m.      Tom Medina M.D.    This note was created using voice recognition software (Dragon). The accuracy of the dictation is limited by the abilities of the software. I have reviewed the note prior to signing, however some errors in grammar and context are still possible. If you have any questions related to this note please do not hesitate to contact our office.

## 2022-08-12 DIAGNOSIS — F90.2 ADHD (ATTENTION DEFICIT HYPERACTIVITY DISORDER), COMBINED TYPE: ICD-10-CM

## 2022-08-12 RX ORDER — LISDEXAMFETAMINE DIMESYLATE 50 MG
50 CAPSULE ORAL EVERY MORNING
Qty: 30 CAPSULE | Refills: 0 | Status: SHIPPED | OUTPATIENT
Start: 2022-08-12 | End: 2022-09-28 | Stop reason: SDUPTHER

## 2022-09-26 DIAGNOSIS — F90.2 ADHD (ATTENTION DEFICIT HYPERACTIVITY DISORDER), COMBINED TYPE: ICD-10-CM

## 2022-09-26 RX ORDER — LISDEXAMFETAMINE DIMESYLATE 50 MG
50 CAPSULE ORAL EVERY MORNING
Qty: 30 CAPSULE | Refills: 0 | OUTPATIENT
Start: 2022-09-26 | End: 2022-10-26

## 2022-09-26 NOTE — TELEPHONE ENCOUNTER
Patient has appointment 10/12/22.    Received request via: Pharmacy    Was the patient seen in the last year in this department? Yes    Does the patient have an active prescription (recently filled or refills available) for medication(s) requested? No

## 2022-09-27 DIAGNOSIS — F90.2 ADHD (ATTENTION DEFICIT HYPERACTIVITY DISORDER), COMBINED TYPE: ICD-10-CM

## 2022-09-27 RX ORDER — LISDEXAMFETAMINE DIMESYLATE 50 MG
50 CAPSULE ORAL EVERY MORNING
Qty: 30 CAPSULE | Refills: 0 | OUTPATIENT
Start: 2022-09-27 | End: 2022-10-27

## 2022-09-27 NOTE — TELEPHONE ENCOUNTER
Patient is asking for a 15 day supply while he waits for his appointment on 10/12/22. Patient states he is not doing well without his medication. Please advise.

## 2022-09-28 DIAGNOSIS — F90.2 ADHD (ATTENTION DEFICIT HYPERACTIVITY DISORDER), COMBINED TYPE: ICD-10-CM

## 2022-09-28 RX ORDER — LISDEXAMFETAMINE DIMESYLATE 50 MG
50 CAPSULE ORAL EVERY MORNING
Qty: 30 CAPSULE | Refills: 0 | OUTPATIENT
Start: 2022-09-28 | End: 2022-10-28

## 2022-09-28 RX ORDER — LISDEXAMFETAMINE DIMESYLATE 50 MG
50 CAPSULE ORAL EVERY MORNING
Qty: 30 CAPSULE | Refills: 0 | Status: SHIPPED | OUTPATIENT
Start: 2022-09-28 | End: 2022-10-12 | Stop reason: SDUPTHER

## 2022-09-29 ENCOUNTER — OFFICE VISIT (OUTPATIENT)
Dept: BEHAVIORAL HEALTH | Facility: CLINIC | Age: 57
End: 2022-09-29
Payer: COMMERCIAL

## 2022-09-29 DIAGNOSIS — F90.2 ADHD (ATTENTION DEFICIT HYPERACTIVITY DISORDER), COMBINED TYPE: ICD-10-CM

## 2022-09-29 DIAGNOSIS — F34.1 PERSISTENT DEPRESSIVE DISORDER: ICD-10-CM

## 2022-09-29 PROCEDURE — 90791 PSYCH DIAGNOSTIC EVALUATION: CPT | Performed by: PSYCHOLOGIST

## 2022-09-30 PROBLEM — F34.1 PERSISTENT DEPRESSIVE DISORDER: Status: ACTIVE | Noted: 2022-09-30

## 2022-09-30 ASSESSMENT — ANXIETY QUESTIONNAIRES
6. BECOMING EASILY ANNOYED OR IRRITABLE: SEVERAL DAYS
5. BEING SO RESTLESS THAT IT IS HARD TO SIT STILL: SEVERAL DAYS
3. WORRYING TOO MUCH ABOUT DIFFERENT THINGS: SEVERAL DAYS
1. FEELING NERVOUS, ANXIOUS, OR ON EDGE: SEVERAL DAYS
7. FEELING AFRAID AS IF SOMETHING AWFUL MIGHT HAPPEN: SEVERAL DAYS
GAD7 TOTAL SCORE: 7
2. NOT BEING ABLE TO STOP OR CONTROL WORRYING: SEVERAL DAYS
4. TROUBLE RELAXING: SEVERAL DAYS
IF YOU CHECKED OFF ANY PROBLEMS ON THIS QUESTIONNAIRE, HOW DIFFICULT HAVE THESE PROBLEMS MADE IT FOR YOU TO DO YOUR WORK, TAKE CARE OF THINGS AT HOME, OR GET ALONG WITH OTHER PEOPLE: SOMEWHAT DIFFICULT

## 2022-09-30 ASSESSMENT — PATIENT HEALTH QUESTIONNAIRE - PHQ9
SUM OF ALL RESPONSES TO PHQ QUESTIONS 1-9: 15
CLINICAL INTERPRETATION OF PHQ2 SCORE: 4
5. POOR APPETITE OR OVEREATING: 2 - MORE THAN HALF THE DAYS
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
## 2022-10-12 ENCOUNTER — TELEMEDICINE (OUTPATIENT)
Dept: BEHAVIORAL HEALTH | Facility: CLINIC | Age: 57
End: 2022-10-12
Payer: COMMERCIAL

## 2022-10-12 DIAGNOSIS — F90.2 ADHD (ATTENTION DEFICIT HYPERACTIVITY DISORDER), COMBINED TYPE: ICD-10-CM

## 2022-10-12 DIAGNOSIS — F33.1 MAJOR DEPRESSIVE DISORDER, RECURRENT EPISODE, MODERATE (HCC): ICD-10-CM

## 2022-10-12 PROCEDURE — 99214 OFFICE O/P EST MOD 30 MIN: CPT | Mod: 95 | Performed by: PSYCHIATRY & NEUROLOGY

## 2022-10-12 RX ORDER — DEXTROAMPHETAMINE SACCHARATE, AMPHETAMINE ASPARTATE, DEXTROAMPHETAMINE SULFATE AND AMPHETAMINE SULFATE 2.5; 2.5; 2.5; 2.5 MG/1; MG/1; MG/1; MG/1
10 TABLET ORAL DAILY
Qty: 30 TABLET | Refills: 0 | Status: SHIPPED | OUTPATIENT
Start: 2022-10-12 | End: 2023-04-20

## 2022-10-12 RX ORDER — LISDEXAMFETAMINE DIMESYLATE 50 MG
50 CAPSULE ORAL EVERY MORNING
Qty: 30 CAPSULE | Refills: 0 | Status: SHIPPED | OUTPATIENT
Start: 2022-10-12 | End: 2022-11-11

## 2022-10-12 NOTE — PROGRESS NOTES
[facility]   Follow Up Assessment  This evaluation was conducted via Zoom using secure and encrypted videoconferencing technology. The patient was in their home in the [facility].    The patient's identity was confirmed and verbal consent was obtained for this virtual visit..t  This visit was conducted via Zoom using secure and encrypted videoconferencing technology.  The patient was in a private location in the [facility].  The patient's identity was confirmed and verbal consent was obtained for this virtual visit.    This provider informed the patient their medical records are totally confidential except for the use by other providers involved in their care, or if the patient signs a release, or to report instances of child or elder abuse, or if it is determined they are an immediate risk to harm themselves or others.    Name: Tom Quan  MRN: [id number]  : 1965  Age: 57 y.o.  Date of assessment: 10/12/2022  PCP: Arvind Geiger M.D.    Subjective: Prior to the virtual visit in his home.  He was last seen on .  He is currently on Vyvanse 50 mg a.m. but indicates that it wears off in the afternoon and would like to have plain Adderall in the afternoon.  We discussed dosage options.  He prefers to not take Adderall on weekends.    Objective: He is alert, oriented, and cooperative.  Relatedness is good.  Grooming is good.  Speech is normal rate.  Anxious.  Memory is fairly good.  Insight and judgment are fairly good.  No indication of psychotic thinking.    Current Risk:       Suicidal: Not suicidal       Homicidal: Not homicidal       Self-Harm: No plan to harm self       Relapse(Low/Moderate/High):: Moderate       Crisis Safety Plan Reviewed: Discussed with patient    Diagnosis:   ADHD, combined  Major depressive disorder, recurrent    Treatment Plan:  The current treatment plan consists of quarterly psychiatric sessions designed to evaluate his ADHD and  depression.    Duration will be for a minimum of 12 months and will be reviewed at each visit.    Goals: Improvement in ADHD symptoms with remission of depression in order to prevent relapse due to the chronic nature of his behavioral health problems and mental illness.  Continue Vyvanse 50 mg a.m. add plain Adderall 10 mg in afternoon.      Tom Medina M.D.    This note was created using voice recognition software (Dragon). The accuracy of the dictation is limited by the abilities of the software. I have reviewed the note prior to signing, however some errors in grammar and context are still possible. If you have any questions related to this note please do not hesitate to contact our office.

## 2022-10-27 ENCOUNTER — OFFICE VISIT (OUTPATIENT)
Dept: BEHAVIORAL HEALTH | Facility: CLINIC | Age: 57
End: 2022-10-27
Payer: COMMERCIAL

## 2022-10-27 DIAGNOSIS — F34.1 PERSISTENT DEPRESSIVE DISORDER: ICD-10-CM

## 2022-10-27 DIAGNOSIS — F90.2 ADHD (ATTENTION DEFICIT HYPERACTIVITY DISORDER), COMBINED TYPE: ICD-10-CM

## 2022-10-27 PROCEDURE — 90834 PSYTX W PT 45 MINUTES: CPT | Performed by: PSYCHOLOGIST

## 2022-10-31 NOTE — PROGRESS NOTES
Name: Tom Quan Date: 10/27/22  [birth year] Time in session 45 minutes   [] Initial Dx Eval [] Family [] Cancelled/Rescheduled [] Office visit   [x] Individual [] Group [] Late Cancellation [] Virtual Session   [] Couple [] Testing [] No call/No show [x] Late: 2:15 PM   [] Discharge [] Phone [] On time:  []  (2)   Attended: Mr. Quan (He wore a mask)   Observations/ Appearance/ Affect: Mr. Quan appeared clean, well-groomed, and dressed in casual clothes. He was oriented to person, place, time, and purpose, was pleasant and cooperative, lucid, and articulate. His affect was anxious, and mood was sad. No suicidal or homicidal ideation described or reported. No reports of hallucinations or confusions. He participated well in this session.   Content/ Topics: Mr. Quan reported that he was experienced marked stress. He reported that he got busy and did not return to [facility] after a few weeks. He reported that his son Karsten has been belligerent and defiant toward his stepmother. He reported that they used to get along well. He reported that the divorce was hard on him. He reported that his mother tried to coerce him to side with her, but it did not work. He reported that she did coerce his daughters and that they do not talk to him at all. He reported that she still wanted to use EMDR. Discussed how difficult it was to manage Karsten's behavior. Discussed how he may benefit from a supportive psychotherapy. This psychologist recommended [facility]. Discussed how he may require a residential treatment program. Discussed how it might take some time to research an appropriate program. Discussed how in the meantime he will need to intervene, try to connect with Karsten, to discover what might trigger his particularly destructive behavior over the recent months.    Risk issues assessed: Discussed with Mr. Quan how there are shifts in physiological and behavioral states that distort social awareness and  establish habitual defensive reactions that replace appropriate spontaneous social behavior. Discussed with him how over the next several sessions he will learn how the body is wired for survival and connection. Discussed how the autonomic nervous system works as our personal surveillance system, always on guard, always alert to the question: “is it safe?” The work of the autonomic nervous system is to protect us by looking for cues safety and risk, sensing moment to moment of what is happening in and around our bodies and in the connections, we have to others. Discussed how over the next several sessions we will talk more about how this system works and can inform us about how the state of the nervous system sets the table for how we will respond, act, and feel. He reported that he would not harm himself and did not intend to harm himself or another.   Psychosocial and environmental problems addressed: Mr. Quan agreed to keep a journal during the week to document any related material that may arise and to remind him of the self-calming activities that he would use. Discussed creating a “safe place” in his mind where he could safely imagine ridding himself of any disturbance where he would feel 100% safe. He will provide a word that would identify this place. Discussed how EMDR treatment ensures processing of all related historical events, current incidents that elicit distress and future scenarios that will require different responses. The overall goal is producing the most comprehensive and profound treatment effects in the shortest period of time, while simultaneously maintaining a stable client within a balanced system. Discussed how after EMDR processing, clients generally report that the emotional distress related to the memory has been eliminated, or greatly decreased, and that they have gained important cognitive insights. Importantly, these emotional and cognitive changes usually result in spontaneous  behavioral and personal change, which are further enhanced with standard EMDR procedures.   Current GAF: 55   Current medications: Vyvanse, Depade, Stelara, Zyrtec, Motrin, Tylenol, Prilosec, Depo-Testosterone, Fish Oil PO, Zinc 25, Vitamin D3   Significant/ Recent Events: Discussed how the work of the autonomic nervous system is to protect us by looking for cues safety and risk, sensing moment to moment of what is happening in and around our bodies and in the connections, we have to others. Discussed how this system works and can inform us about how the state of the nervous system sets the table for how we will respond, act, and feel. Discussed how the vagus nerve helps to regulate the heart and works to speed up or slow down our heart rate and effectively respond to any given moment. Discussed how this is called the vagal brake. It is this interaction between the parasympathetic and sympathetic nervous systems. The parasympathetic keeps the nervous system charged and the brake allows for more of the sympathetic energy into the system. Discussed how our breath helps us manage this brake. Using guided imagery to find and access a Safe Place is a way of exercising control of the vagal brake through relaxing breathing. Shaneka Armando reminds us that every breath cycle is the vagal brake at work. By using your Safe Place, you are connecting to your connection with this brake and its built-in pattern of relaxation on the inhalation and reengagement on the exhalation. This braking action brings flexibility to our responses and a sense of ease to transitions. Discussed with Mr. Quan how affirmations were helpful and skillful thoughts that constructively and effectively change the tone and purpose of an action. Described how affirmations were brief, positively worded statements in the “present tense.” Discussed how by staying calm he was able to safely connect to others. The work of the autonomic nervous system is to protect  us by looking for cues safety and risk, sensing moment to moment of what is happening in and around our bodies and in the connections, we have to others. Discussed the work of Kylah Suarez and encouraged him to think of food as medicine and encouraged him to look at some of her recipes in her book, “Eat Right, Feel Right.” Discussed the work of Joann Walker and encourage him to read her workbook, “Mindful Self-Compassion” and discussed strategies to exercise mindful self-compassion as needed. Encouraged him to look at some of the writers describing Polyvagal Theory and to consider the work of Shaneka Armando. Encouraged him to contain his anxious and depressive thoughts and set them aside to discuss in these sessions and to engage in positive and creative activities to distract him from any disturbing material. Discussed looking at the [employer] and consider nutritional supplements to increase the nutritional values in his diet. Discussed how Polyvagal Theory, Positive Psychology (Mindful Self-Compassion), Information Processing Theory and nutritional health would inform this psychotherapy and how he will develop Cognitive Behavioral Skills and Strategies to decrease and manage anxieties and fears, lift depression, increase motivation and action and help him to regain control over his life, develop a healthier lifestyle, increase restful sleep and find meaning and balance in his life. He asked to return to [facility] weekly to develop this supportive psychotherapy. Discussed how this psychotherapy would focus on his health, wellbeing, and progress at his pace and in a positive direction.   Informed consent issues discussed: Mr. Quan reported that he understood the process of this evaluation agreed to return to [facility]. Discussed how he expressed his desire to change and was willing to start the process. He reported that he was willing to make changes to his life toward a healthy lifestyle. He reported that he was aware of the  problems he experienced and expressed the desire to solve those problems safely. He reported that he had a positive outlook for change. He reported that he had family and friends that were supportive and knew he needed to gain and maintain a healthy network of support.    Assignments/ Homework: Mr. Quan agreed to initiate some of the strategies discussed today to decrease anxieties and increase restful sleep and increase nutritional values in his diet.    Plan: Mr. Quan agreed to monitor closely his mood and behavior.    Diagnoses: F34.1 Persistent Depressive Disorder; F90.0 Attention-Deficit/ Hyperactivity Disorder [] Provisional   Treatment Plan: [x] Continued [] New [] Replaced Referral:   Suicidal [] Yes [x] No [] Medical:    Violence: [] Yes [x] No [] Psychiatric:    Next session:     [] Neurological:            Lele Dos Santos Psy.D.  Clinical Psychologist  [facility]  NPI: [id number]

## 2022-11-09 ENCOUNTER — OFFICE VISIT (OUTPATIENT)
Dept: BEHAVIORAL HEALTH | Facility: CLINIC | Age: 57
End: 2022-11-09
Payer: COMMERCIAL

## 2022-11-09 DIAGNOSIS — F90.2 ADHD (ATTENTION DEFICIT HYPERACTIVITY DISORDER), COMBINED TYPE: ICD-10-CM

## 2022-11-09 DIAGNOSIS — F34.1 PERSISTENT DEPRESSIVE DISORDER: ICD-10-CM

## 2022-11-09 PROCEDURE — 90834 PSYTX W PT 45 MINUTES: CPT | Performed by: PSYCHOLOGIST

## 2022-11-14 NOTE — PROGRESS NOTES
Name: Tom Quan. Date: 22  [birth year] Time in session 45 minutes   [] Initial Dx Eval [] Family [] Cancelled/Rescheduled [] Office visit   [x] Individual [] Group [] Late Cancellation [] Virtual Session   [] Couple [] Testing [] No call/No show [x] Late: 11:15 AM   [] Discharge [] Phone [] On time:  []  (3)   Attended: Mr. Quan (He wore a mask)   Observations/ Appearance/ Affect: Mr. Quan appeared clean, well-groomed, and dressed in casual clothes. He was oriented to person, place, time, and purpose, was pleasant and cooperative, lucid, and articulate. His affect was anxious, and mood was sad. No suicidal or homicidal ideation described or reported. No reports of hallucinations or confusions. He participated well in this session.   Content/ Topics: Mr. Quan reported that the stress at home between his son and wife continues. He reported that he struggled to connect with his son. Discussed how difficult it was to manage Karsten's behavior. Discussed how he may benefit from a supportive psychotherapy. This psychologist recommended [facility]. He reported that he still wanted to use EMDR.    Risk issues assessed: Discussed with Mr. Quan how there are shifts in physiological and behavioral states that distort social awareness and establish habitual defensive reactions that replace appropriate spontaneous social behavior. Discussed with him how the body is wired for survival and connection. Discussed how the autonomic nervous system works as our personal surveillance system, always on guard, always alert to the question: “is it safe?” The work of the autonomic nervous system is to protect us by looking for cues safety and risk, sensing moment to moment of what is happening in and around our bodies and in the connections, we have to others. Discussed how this system works and can inform us about how the state of the nervous system sets the table for how we will respond, act, and feel.  He reported that he would not harm himself and did not intend to harm himself or another.   Psychosocial and environmental problems addressed: He expressed his interest in using Eye Movement Desensitization and Reprocessing (EMDR). Discussed his readiness to use EMDR. He reported that his life was stable and secure. He reported that he had close friends and felt safe in his community. He reported that he is able to identify and express feelings confidently. He reported that he has an easy temperament, was flexible and has self-confidence and self-worth. He reported that he felt safe and generally zane well with adversity. He reported that he does use alcohol socially and sometimes uses alcohol to cope with problems. He reported that he was in good health. Using the Negative Cognition Questionnaire, he identified what Negative Cognitions describe negative beliefs he had about himself. He identified several: “I am a disappointment,” “I am insignificant,” “I am permanently damaged,” “I am not good enough,” “I cannot succeed,” “I cannot stand it,” and “I cannot let it out.” He reported that when he thought about losing his contract, the words that go best with that incident that expresses his negative beliefs about himself now were, “I am a disappointment” and “I am insignificant.” He reported that the emotions he feels now when he brings up that incident and those words are being devalued and used. He reported that when he brings up that incident and those words and those emotions, he feels them in his head and heart. He reported that on a scale of 0 to 10, where 0 is no disturbance or neutral and 10 is the highest disturbance that he can imagine, how disturbing this feels to him right now, he reported that it was a 10. He reported that those words, “I am a disappointment” and “I am insignificant” reminded him of how he is not the provider he wanted to be. He reported that the emotions he feels now when he brings up  that feeling, and those words were feelings of sadness and worthlessness. He reported that when he brings up that up and those words and those emotions, he feels them all over. He reported that on a scale of 0 to 10, where 0 is no disturbance or neutral and 10 is the highest disturbance that he can imagine, how disturbing this feels to him right now, he reported that it was a 10. He reported that when he thinks about his divorce, the words that go best with getting  that expresses his negative belief about himself now were, “I am permanently damaged.” He reported that the emotions he feels now when he thinks about the divorce and those words are those of anger, frustration, the need for redemption, embarrassment, sadness, and shame. He reported that when he thinks about the divorce and those words and those emotions, he feels them in his gut. He reported that on a scale of 0 to 10, where 0 is no disturbance or neutral and 10 is the highest disturbance that he can imagine, how disturbing this feels to him right now, he reported that it was a 10. He reported that those words, “I am permanently damaged,” reminded him of his ex-wife's sense of entitlement. He reported that the emotions he feels now when he brings that up and those words are those of anger, disrespect and wronged. He reported that when he brings up that incident and those words and those emotions, he feels them all over. He reported that on a scale of 0 to 10, where 0 is no disturbance or neutral and 10 is the highest disturbance that he can imagine, how disturbing this feels to him right now, he reported that it was a 10. He asked to stop here. Discussed how he worked hard to identify these events.   Current GAF: 55   Current medications: Vyvanse, Depade, Stelara, Zyrtec, Motrin, Tylenol, Prilosec, Depo-Testosterone, Fish Oil PO, Zinc 25, Vitamin D3   Significant/ Recent Events: Discussed a list of activities of self-soothing, calming, relaxing  activities he can engage in to effectively distract himself from any disturbing material. He reported that he would exercise, go snowboarding, practice jujitsu, watch [employer], and watch a movie. He reported that he had a beach in mind that would be his safe place. He used bilateral stimulation to install that imagery. Discussed with Mr. Quan how affirmations were helpful and skillful thoughts that constructively and effectively change the tone and purpose of an action. Discussed the work of Kylah Suarez and encouraged him to think of food as medicine and encouraged him to look at some of her recipes in her book, “Eat Right, Feel Right.” Discussed the work of Joann Walker and encourage him to read her workbook, “Mindful Self-Compassion” and discussed strategies to exercise mindful self-compassion as needed. Encouraged him to look at some of the writers describing Polyvagal Theory and to consider the work of Shaneka Armando. Encouraged him to contain his anxious and depressive thoughts and set them aside to discuss in these sessions and to engage in positive and creative activities to distract him from any disturbing material. Discussed looking at the [employer] and consider nutritional supplements to increase the nutritional values in his diet. Discussed how Polyvagal Theory, Positive Psychology (Mindful Self-Compassion), Information Processing Theory and nutritional health would inform this psychotherapy and how he will develop Cognitive Behavioral Skills and Strategies to decrease and manage anxieties and fears, lift depression, increase motivation and action and help him to regain control over his life, develop a healthier lifestyle, increase restful sleep and find meaning and balance in his life. Discussed how this psychotherapy would focus on his health, wellbeing, and progress at his pace and in a positive direction.   Informed consent issues discussed: Mr. Quan reported that he understood the process of this  evaluation agreed to return to [facility]. Discussed how he expressed his desire to change and was willing to start the process. He reported that he was willing to make changes to his life toward a healthy lifestyle. He reported that he was aware of the problems he experienced and expressed the desire to solve those problems safely. He reported that he had a positive outlook for change. He reported that he had family and friends that were supportive and knew he needed to gain and maintain a healthy network of support.    Assignments/ Homework: Mr. Quan agreed to initiate some of the strategies discussed today to decrease anxieties and increase restful sleep and increase nutritional values in his diet.    Plan: Mr. Quan agreed to monitor closely his mood and behavior.    Diagnoses: F34.1 Persistent Depressive Disorder; F90.0 Attention-Deficit/ Hyperactivity Disorder [] Provisional   Treatment Plan: [x] Continued [] New [] Replaced Referral:   Suicidal [] Yes [x] No [] Medical:    Violence: [] Yes [x] No [] Psychiatric:    Next session:     [] Neurological:            Lele Dos Santos Psy.D.  Clinical Psychologist  [facility]  NPI: [id number]

## 2022-11-16 ENCOUNTER — APPOINTMENT (OUTPATIENT)
Dept: BEHAVIORAL HEALTH | Facility: CLINIC | Age: 57
End: 2022-11-16
Payer: COMMERCIAL

## 2022-11-23 ENCOUNTER — APPOINTMENT (OUTPATIENT)
Dept: BEHAVIORAL HEALTH | Facility: CLINIC | Age: 57
End: 2022-11-23
Payer: COMMERCIAL

## 2022-12-14 ENCOUNTER — OFFICE VISIT (OUTPATIENT)
Dept: BEHAVIORAL HEALTH | Facility: CLINIC | Age: 57
End: 2022-12-14
Payer: COMMERCIAL

## 2022-12-14 DIAGNOSIS — F34.1 PERSISTENT DEPRESSIVE DISORDER: ICD-10-CM

## 2022-12-14 DIAGNOSIS — F90.2 ADHD (ATTENTION DEFICIT HYPERACTIVITY DISORDER), COMBINED TYPE: ICD-10-CM

## 2022-12-14 PROCEDURE — 90834 PSYTX W PT 45 MINUTES: CPT | Performed by: PSYCHOLOGIST

## 2022-12-15 LAB
ALBUMIN SERPL-MCNC: 4.6 G/DL (ref 3.8–4.9)
ALBUMIN/GLOB SERPL: 1.5 {RATIO} (ref 1.2–2.2)
ALP SERPL-CCNC: 103 IU/L (ref 44–121)
ALT SERPL-CCNC: 125 IU/L (ref 0–44)
AST SERPL-CCNC: 137 IU/L (ref 0–40)
BASOPHILS # BLD AUTO: 0.1 X10E3/UL (ref 0–0.2)
BASOPHILS NFR BLD AUTO: 1 %
BILIRUB SERPL-MCNC: 1 MG/DL (ref 0–1.2)
BUN SERPL-MCNC: 11 MG/DL (ref 6–24)
BUN/CREAT SERPL: 11 (ref 9–20)
CALCIUM SERPL-MCNC: 9.6 MG/DL (ref 8.7–10.2)
CHLORIDE SERPL-SCNC: 98 MMOL/L (ref 96–106)
CHOLEST SERPL-MCNC: 328 MG/DL (ref 100–199)
CO2 SERPL-SCNC: 25 MMOL/L (ref 20–29)
CREAT SERPL-MCNC: 1.04 MG/DL (ref 0.76–1.27)
EGFRCR SERPLBLD CKD-EPI 2021: 84 ML/MIN/1.73
EOSINOPHIL # BLD AUTO: 0.2 X10E3/UL (ref 0–0.4)
EOSINOPHIL NFR BLD AUTO: 3 %
ERYTHROCYTE [DISTWIDTH] IN BLOOD BY AUTOMATED COUNT: 15.7 % (ref 11.6–15.4)
GLOBULIN SER CALC-MCNC: 3 G/DL (ref 1.5–4.5)
GLUCOSE SERPL-MCNC: 102 MG/DL (ref 70–99)
HCT VFR BLD AUTO: 51.2 % (ref 37.5–51)
HDLC SERPL-MCNC: 81 MG/DL
HGB BLD-MCNC: 17 G/DL (ref 13–17.7)
IMM GRANULOCYTES # BLD AUTO: 0 X10E3/UL (ref 0–0.1)
IMM GRANULOCYTES NFR BLD AUTO: 0 %
IMMATURE CELLS  115398: ABNORMAL
LABORATORY COMMENT REPORT: ABNORMAL
LDLC SERPL CALC-MCNC: 226 MG/DL (ref 0–99)
LYMPHOCYTES # BLD AUTO: 1.3 X10E3/UL (ref 0.7–3.1)
LYMPHOCYTES NFR BLD AUTO: 18 %
MCH RBC QN AUTO: 29.5 PG (ref 26.6–33)
MCHC RBC AUTO-ENTMCNC: 33.2 G/DL (ref 31.5–35.7)
MCV RBC AUTO: 89 FL (ref 79–97)
MONOCYTES # BLD AUTO: 0.8 X10E3/UL (ref 0.1–0.9)
MONOCYTES NFR BLD AUTO: 11 %
MORPHOLOGY BLD-IMP: ABNORMAL
NEUTROPHILS # BLD AUTO: 4.6 X10E3/UL (ref 1.4–7)
NEUTROPHILS NFR BLD AUTO: 67 %
NRBC BLD AUTO-RTO: ABNORMAL %
PLATELET # BLD AUTO: 281 X10E3/UL (ref 150–450)
POTASSIUM SERPL-SCNC: 4 MMOL/L (ref 3.5–5.2)
PROT SERPL-MCNC: 7.6 G/DL (ref 6–8.5)
RBC # BLD AUTO: 5.76 X10E6/UL (ref 4.14–5.8)
SODIUM SERPL-SCNC: 139 MMOL/L (ref 134–144)
TRIGL SERPL-MCNC: 121 MG/DL (ref 0–149)
VLDLC SERPL CALC-MCNC: 21 MG/DL (ref 5–40)
WBC # BLD AUTO: 6.9 X10E3/UL (ref 3.4–10.8)

## 2022-12-16 NOTE — PROGRESS NOTES
Name: Tom Quan. Date: [birth year]  [birth year] Time in session 45 minutes   [] Initial Dx Eval [] Family [] Cancelled/Rescheduled [] Office visit   [x] Individual [] Group [] Late Cancellation [] Virtual Session   [] Couple [] Testing [] No call/No show [] Late:    [] Discharge [] Phone [] On time: 8:00 AM []  (5)   Attended: Mr. Quan (He wore a mask)   Observations/ Appearance/ Affect: Mr. Quan appeared clean, well-groomed, and dressed in warm clothes. He was oriented to person, place, time, and purpose, was pleasant and cooperative, lucid, and articulate. His affect was anxious, and mood was sad. No suicidal or homicidal ideation described or reported. No reports of hallucinations or confusions. He participated well in this session.   Content/ Topics: Mr. Quan reported that the stress at home between his son and wife continues. He reported that he struggled to connect with his son. Discussed how difficult it was to manage Karsten's behavior. He reported that he was trying to get Karsten into the [facility].    Risk issues assessed: Discussed with Mr. Quan how there are shifts in physiological and behavioral states that distort social awareness and establish habitual defensive reactions that replace appropriate spontaneous social behavior. Discussed with him how the body is wired for survival and connection. Discussed how the autonomic nervous system works as our personal surveillance system, always on guard, always alert to the question: “is it safe?” The work of the autonomic nervous system is to protect us by looking for cues safety and risk, sensing moment to moment of what is happening in and around our bodies and in the connections, we have to others. Discussed how this system works and can inform us about how the state of the nervous system sets the table for how we will respond, act, and feel. He reported that he would not harm himself and did not intend to harm himself or another.    Psychosocial and environmental problems addressed: Mr. Quan reported that he wanted to use Eye Movement Desensitization and Reprocessing (EMDR). He followed the EMDR worksheet protocol. He was reminded to use his hand to signal and say stop and to use his safe place (his beach) to rid himself of any disturbance and feel 100% safe. He indicated that he wanted to use EMDR to rid himself of the disturbance associated with losing contracts that he felt he should not have lost. He identified the picture or image that represented the worst part of this incident was looking at the people who did not support him. He identified the negative cognition that expressed his negative beliefs about himself now as, “I am a disappointment.” He identified what he would like to believe about himself now (Positive Cognition) was, “I am okay the way I am.” He indicated that the Validation of Cognition, (on a scale from 1 to 7 where one felt completely false and a seven felt completely true) the positive cognitions felt completely false: 2.  He identified the emotions he felt now, when he brings up that picture and those negative cognitions were betrayal, depressed, sad, and disrespected. He reported that on a scale of 0 to 10, where 0 is no disturbance or neutral and 10 is the highest disturbance that he can imagine (Subjective Unit of Disturbance), how disturbing does this feel to him right now, he reported that it was a 7. He reported that when he brings it up, he feels it in his chest. Mr. Quan completed several sets using EMDR. He reported that he realized that there were many episodes of disappointment. He reported that he realized he was disappointed in how he managed his stress. He reported that his FRANCIS rating went down to a 5. He reported that the Validity of his positive cognition, “I am okay just the way I am,” went from a 2 to a 5. This issue remains unresolved. He completed a Body Scan to rid himself of any tension,  tightness, or unusual sensations. As a relaxation exercise, we used the imagery of the cleansing vortex and of his safe place. The closure debriefing of the experiences was recited and discussed. He agreed to stay safe. He reported that he felt good about using EMDR today. Discussed how well he did in this session.   Current GAF: 55   Current medications: Vyvanse, Depade, Stelara, Zyrtec, Motrin, Tylenol, Prilosec, Depo-Testosterone, Fish Oil PO, Zinc 25, Vitamin D3   Significant/ Recent Events: He reported that he would exercise, go snowboarding, practice [employer], watch You-tube, and watch a movie. Discussed with Mr. Quan how affirmations were helpful and skillful thoughts that constructively and effectively change the tone and purpose of an action. Discussed the work of Kylah Suarez and encouraged him to think of food as medicine and encouraged him to look at some of her recipes in her book, “Eat Right, Feel Right.” Discussed the work of Joann Walker and encourage him to read her workbook, “Mindful Self-Compassion” and discussed strategies to exercise mindful self-compassion as needed. Encouraged him to look at some of the writers describing Polyvagal Theory and to consider the work of Shaneka Armando. Encouraged him to contain his anxious and depressive thoughts and set them aside to discuss in these sessions and to engage in positive and creative activities to distract him from any disturbing material. Discussed looking at the [employer] and consider nutritional supplements to increase the nutritional values in his diet. Discussed how Polyvagal Theory, Positive Psychology (Mindful Self-Compassion), Information Processing Theory and nutritional health would inform this psychotherapy and how he will develop Cognitive Behavioral Skills and Strategies to decrease and manage anxieties and fears, lift depression, increase motivation and action and help him to regain control over his life, develop a healthier lifestyle,  increase restful sleep and find meaning and balance in his life. Discussed how this psychotherapy would focus on his health, wellbeing, and progress at his pace and in a positive direction.   Informed consent issues discussed: Mr. Quan reported that he understood the process of this evaluation agreed to return to [facility]. Discussed how he expressed his desire to change and was willing to start the process. He reported that he was willing to make changes to his life toward a healthy lifestyle. He reported that he was aware of the problems he experienced and expressed the desire to solve those problems safely. He reported that he had a positive outlook for change. He reported that he had family and friends that were supportive and knew he needed to gain and maintain a healthy network of support.    Assignments/ Homework: Mr. Quan agreed to initiate some of the strategies discussed today to decrease anxieties and increase restful sleep and increase nutritional values in his diet.    Plan: Mr. Quan agreed to monitor closely his mood and behavior.    Diagnoses: F34.1 Persistent Depressive Disorder; F90.0 Attention-Deficit/ Hyperactivity Disorder [] Provisional   Treatment Plan: [x] Continued [] New [] Replaced Referral:   Suicidal [] Yes [x] No [] Medical:    Violence: [] Yes [x] No [] Psychiatric:    Next session:     [] Neurological:            Lele Dos Santos Psy.D.  Clinical Psychologist  [facility]  NPI: [id number]

## 2022-12-21 ENCOUNTER — OFFICE VISIT (OUTPATIENT)
Dept: MEDICAL GROUP | Facility: LAB | Age: 57
End: 2022-12-21
Payer: COMMERCIAL

## 2022-12-21 VITALS
TEMPERATURE: 97.5 F | SYSTOLIC BLOOD PRESSURE: 100 MMHG | WEIGHT: 222.2 LBS | HEART RATE: 84 BPM | RESPIRATION RATE: 12 BRPM | HEIGHT: 73 IN | DIASTOLIC BLOOD PRESSURE: 76 MMHG | OXYGEN SATURATION: 95 % | BODY MASS INDEX: 29.45 KG/M2

## 2022-12-21 DIAGNOSIS — R79.89 ELEVATED LFTS: ICD-10-CM

## 2022-12-21 DIAGNOSIS — Z91.89 OTHER SPECIFIED PERSONAL RISK FACTORS, NOT ELSEWHERE CLASSIFIED: ICD-10-CM

## 2022-12-21 DIAGNOSIS — E78.2 MIXED HYPERLIPIDEMIA: ICD-10-CM

## 2022-12-21 PROCEDURE — 99214 OFFICE O/P EST MOD 30 MIN: CPT | Performed by: FAMILY MEDICINE

## 2022-12-21 RX ORDER — LISDEXAMFETAMINE DIMESYLATE 50 MG
50 CAPSULE ORAL EVERY MORNING
COMMUNITY
Start: 2022-11-18 | End: 2023-01-31

## 2022-12-21 ASSESSMENT — FIBROSIS 4 INDEX: FIB4 SCORE: 2.44

## 2022-12-21 NOTE — PROGRESS NOTES
"Subjective:     CC: LFTs, cholesterol    HPI:   Tom BUNDY is a 57-year-old male with a history of ulcerative colitis who presents to follow-up on recent labs notable for elevated LFTs and LDL at 226.    Per chart review his LFTs have been an intermittent issue for years, he has had work-up with gastroenterology in the past including ultrasound, iron/hepatitis screenings. Recurrent epigastric pain, not R sided.  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 Alcohol - 3 drinks daily    Chart review also shows history of intermittent significant LDL elevation that did seem to respond to diet in the past.  LDL had been over 200.  No specific medications for this in the past.    Medications, past medical history, allergies, and social history have been reviewed and updated.      Objective:       Exam:  /76 (BP Location: Left arm, Patient Position: Sitting, BP Cuff Size: Large adult)   Pulse 84   Temp 36.4 °C (97.5 °F)   Resp 12   Ht 1.854 m (6' 1\")   Wt 101 kg (222 lb 3.2 oz)   SpO2 95%   BMI 29.32 kg/m²  Body mass index is 29.32 kg/m².    Constitutional: Alert. Well appearing. No distress.  Skin: Warm, dry, good turgor, no visible rashes.  Eye: Equal, round and reactive to light, conjunctiva clear, lids normal.  Respiratory: Normal effort. Lungs are clear to auscultation bilaterally.  Cardiovascular: Regular rate and rhythm. Normal S1/S2. No murmurs, rubs or gallops.   Abdomen: Soft, no significant tenderness.  Nondistended.  Neuro: Moves all four extremities. No facial droop.  Psych: Answers questions appropriately. Normal affect and mood.      Assessment & Plan:     57 y.o. male with the following -     1. Elevated LFTs  Per chart review his LFTs have been an intermittent issue for years, he has had work-up with gastroenterology in the past including ultrasound, iron/hepatitis screenings. 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  Does have about 3 drinks daily but does not think this " is the sole cause.  Certainly could be secondary to ulcerative colitis.  He is following with [facility] with has not had regular follow-up recently, I encouraged him to schedule a visit with them and ensure that there is no further work-up needed for his LFTs.    2. Mixed hyperlipidemia  3. Other specified personal risk factors, not elsewhere classified  .  On chart review has been intermittent for years and has seemed to respond to diet/lifestyle measures in the past.  He is hesitant to try medications and unclear if statin is a good idea with his LFTs.  We will do CT cardiac scoring.  Zetia might be an option.  - CT-CARDIAC SCORING; Future      Please note that this note was created using voice recognition software.

## 2022-12-22 ENCOUNTER — HOSPITAL ENCOUNTER (OUTPATIENT)
Dept: RADIOLOGY | Facility: MEDICAL CENTER | Age: 57
End: 2022-12-22
Attending: FAMILY MEDICINE
Payer: COMMERCIAL

## 2022-12-22 ENCOUNTER — PATIENT MESSAGE (OUTPATIENT)
Dept: MEDICAL GROUP | Facility: LAB | Age: 57
End: 2022-12-22
Payer: COMMERCIAL

## 2022-12-22 DIAGNOSIS — Z91.89 OTHER SPECIFIED PERSONAL RISK FACTORS, NOT ELSEWHERE CLASSIFIED: ICD-10-CM

## 2022-12-22 DIAGNOSIS — G43.809 OTHER MIGRAINE WITHOUT STATUS MIGRAINOSUS, NOT INTRACTABLE: ICD-10-CM

## 2022-12-22 PROCEDURE — 4410556 CT-CARDIAC SCORING (SELF PAY ONLY)

## 2022-12-22 RX ORDER — RIZATRIPTAN BENZOATE 5 MG/1
5-10 TABLET ORAL
Qty: 20 TABLET | Refills: 3 | Status: SHIPPED | OUTPATIENT
Start: 2022-12-22 | End: 2023-09-28 | Stop reason: SDUPTHER

## 2022-12-23 ENCOUNTER — PATIENT MESSAGE (OUTPATIENT)
Dept: MEDICAL GROUP | Facility: LAB | Age: 57
End: 2022-12-23
Payer: COMMERCIAL

## 2022-12-23 DIAGNOSIS — I71.21 ANEURYSM OF ASCENDING AORTA WITHOUT RUPTURE (HCC): ICD-10-CM

## 2022-12-23 DIAGNOSIS — E78.2 MIXED HYPERLIPIDEMIA: ICD-10-CM

## 2023-01-03 ENCOUNTER — PATIENT MESSAGE (OUTPATIENT)
Dept: MEDICAL GROUP | Facility: LAB | Age: 58
End: 2023-01-03
Payer: COMMERCIAL

## 2023-01-03 DIAGNOSIS — E78.2 MIXED HYPERLIPIDEMIA: ICD-10-CM

## 2023-01-03 DIAGNOSIS — I71.21 ANEURYSM OF ASCENDING AORTA WITHOUT RUPTURE (HCC): ICD-10-CM

## 2023-01-04 ENCOUNTER — TELEPHONE (OUTPATIENT)
Dept: VASCULAR LAB | Facility: MEDICAL CENTER | Age: 58
End: 2023-01-04
Payer: COMMERCIAL

## 2023-01-05 NOTE — TELEPHONE ENCOUNTER
"Pt called back in regards to seeing Vascular MD soon then scheduled time. Pt states he wants theses comments documented:    \" I have been to the [facility] for High BP/ HR. I don't want to keep going there so they can tell me the same thing. I need to be seeing sooner. This is URGENT \".    Advised pt to talk to PCP to send URGENT referral. Advised he is on waiting list and will call if any sooner available day.      "

## 2023-01-06 ENCOUNTER — OFFICE VISIT (OUTPATIENT)
Dept: MEDICAL GROUP | Facility: LAB | Age: 58
End: 2023-01-06
Payer: COMMERCIAL

## 2023-01-06 VITALS
DIASTOLIC BLOOD PRESSURE: 78 MMHG | TEMPERATURE: 97.4 F | HEART RATE: 109 BPM | BODY MASS INDEX: 29.32 KG/M2 | RESPIRATION RATE: 14 BRPM | OXYGEN SATURATION: 98 % | HEIGHT: 73 IN | SYSTOLIC BLOOD PRESSURE: 110 MMHG

## 2023-01-06 DIAGNOSIS — R07.9 CHEST PAIN, UNSPECIFIED TYPE: ICD-10-CM

## 2023-01-06 DIAGNOSIS — K51.00 ULCERATIVE PANCOLITIS WITHOUT COMPLICATION (HCC): ICD-10-CM

## 2023-01-06 PROCEDURE — 99214 OFFICE O/P EST MOD 30 MIN: CPT | Performed by: FAMILY MEDICINE

## 2023-01-07 NOTE — PROGRESS NOTES
"Subjective:   Tom Quan is a 57 y.o. male here today for   No chief complaint on file.      #Chest pain   -Patient presents today with concerns regarding chest pain that has been ongoing for the last 3 to 5 days.  He states that the pain is an \"strange sensation\" but located specifically on chest slightly right of midline.  He states that it is worse with deep breaths.  He is also noticed recently that his heart rate will occasionally get up into the 90s to 100s.  He is also noticed that his resting heart rate, according to his fitness device, is slightly higher than normal.  -Patient recently had CT calcium score done due to elevated cholesterol.  CT calcium score was 0; however, they did find a small a sending thoracic aorta measuring 4.8 cm.  Patient has been very nervous about this aorta and the fact that his blood pressure could be having on the aorta.  -Patient did state during interview that he is very anxious and generally and stated \"I am a little bit of a hypochondriac\".  He is very concerned that yearly screening of aortic aneurysm is not sufficient especially given these new symptoms.  -Patient does have significant comorbidities of ADHD, hypergonadism but has discontinued testosterone injections as well as Vyvanse given concerns for elevated blood pressure and cardiac effects.  -Significant family history of elevated cholesterol.  No significant family history of aneurysm.  -Denies any shortness of breath, neck pain, shoulder pain, arm pain, sweating, dizziness, lightheadedness, changes in vision.  -Patient is very active, exercising regularly.  Has not noted any chest pain or worsening of chest pain with exercise.    #Ulcerative colitis:  -Patient has history of ulcerative colitis currently treating with Chris Quinones.  Is compliant with medication. Patient denies any significant symptoms at this time.  Denies any abdominal pain, fevers, diarrhea, blood in stool, changes in stool.    Allergies " "  Allergen Reactions    Thimerosal Unspecified     Eye medication reaction  Eye medication reaction           Current medicines (including changes today)  Current Outpatient Medications   Medication Sig Dispense Refill    naltrexone (DEPADE) 50 MG Tab TAKE 1 TABLET BY MOUTH EVERY DAY (Patient not taking: Reported on 12/21/2022) 30 Tablet 5    rizatriptan (MAXALT) 5 MG tablet Take 1-2 Tablets by mouth one time as needed for Migraine for up to 1 dose. 20 Tablet 3    VYVANSE 50 MG capsule Take 50 mg by mouth every morning.      B-D 3CC LUER-RONNA SYR 22GX1\" 22G X 1\" 3 ML Misc USE FOR TESTOSTERONE INJECTIONS      Ustekinumab (STELARA) 90 MG/ML Solution Prefilled Syringe Inject 90 mg under the skin Every 60 days.      cetirizine (ZYRTEC) 10 MG Tab Take 10 mg by mouth 1 time a day as needed for Allergies.      ibuprofen (MOTRIN) 200 MG Tab Take 400 mg by mouth every 8 hours as needed for Mild Pain. 2 tablets = 400 mg.      acetaminophen (TYLENOL) 500 MG Tab Take 1,000 mg by mouth every 8 hours as needed for Mild Pain. 2 tablets = 1,000 mg.      Probiotic Cap Take 1 capsule by mouth every day.      omeprazole (PRILOSEC) 20 MG delayed-release capsule Take 20 mg by mouth 1 time a day as needed (Heartburn).      testosterone cypionate (DEPO-TESTOSTERONE) 200 MG/ML Solution injection Inject 200 mg into the shoulder, thigh, or buttocks every 7 days.      Omega-3 Fatty Acids (FISH OIL PO) Take 2 Capsules by mouth every day.      Zinc 25 MG Tab Take 25 mg by mouth every day.      Cholecalciferol (VITAMIN D3) 125 MCG (5000 UT) Cap Take 5,000 Units by mouth every day.       No current facility-administered medications for this visit.     He  has a past medical history of Depression and Hyperlipidemia.    ROS   -See HPI       Objective:     Physical Exam:  /78   Pulse (!) 109   Temp 36.3 °C (97.4 °F) (Temporal)   Resp 14   Ht 1.854 m (6' 0.99\")   SpO2 98%  Body mass index is 29.32 kg/m².   Constitutional: Alert, no " distress.  Skin: Warm, dry, good turgor, no rashes in visible areas.  Eye: Equal, round and reactive, conjunctiva clear, lids normal.  Respiratory: Unlabored respiratory effort, lungs clear to auscultation, no wheezes, no rhonchi.  Cardiovascular: Normal S1, S2, no murmur, no edema.  Abdomen: Soft, non-tender, no masses, no hepatosplenomegaly.  Psych: Alert and oriented x3, slightly pressured speech, occasional tangential thinking.  Very anxious and fidgety during interview.    Assessment and Plan:     1. Chest pain, unspecified type  -Spent a significant time going over patient's symptoms.  He is extremely anxious about the symptoms and findings on his test.  We went over the studies and typical screening criteria for small a sending aortic aneurysms like the one found on his CT calcium score.  We will plan on yearly evaluations with ultrasound of aneurysm.  I do not believe this chest pain however is cardiac in nature given that it is in a specific area, slightly tender with palpation on physical exam.  I did discuss the role that anxiety could be playing in his chest pain and does note some worsening of chest pain with increased anxiety.  At this time I think continued watchful monitoring of symptoms is needed.  If symptoms worsen or persist he will follow-up for further extensive work-up.  Patient understood and agree with current treatment plan.    2. Ulcerative pancolitis without complication (HCC)  -Status: Stable, no concerns.  Will refer to gastroenterology for continued monitoring of patient's symptoms, and treatment.  - Referral to Gastroenterology    My total time spent caring for the patient on the day of the encounter was 30 minutes.   This does not include time spent on separately billable procedures/tests.    Followup: No follow-ups on file.         PLEASE NOTE: This dictation was created using voice recognition software. I have made every reasonable attempt to correct obvious errors, but I expect  that there are errors of grammar and possibly content that I did not discover before finalizing the note.

## 2023-01-11 ENCOUNTER — PATIENT MESSAGE (OUTPATIENT)
Dept: MEDICAL GROUP | Facility: LAB | Age: 58
End: 2023-01-11
Payer: COMMERCIAL

## 2023-01-11 DIAGNOSIS — F41.9 ANXIETY: ICD-10-CM

## 2023-01-12 ENCOUNTER — PATIENT MESSAGE (OUTPATIENT)
Dept: MEDICAL GROUP | Facility: LAB | Age: 58
End: 2023-01-12

## 2023-01-12 ENCOUNTER — OFFICE VISIT (OUTPATIENT)
Dept: VASCULAR LAB | Facility: MEDICAL CENTER | Age: 58
End: 2023-01-12
Attending: FAMILY MEDICINE
Payer: COMMERCIAL

## 2023-01-12 VITALS
WEIGHT: 221 LBS | DIASTOLIC BLOOD PRESSURE: 76 MMHG | SYSTOLIC BLOOD PRESSURE: 131 MMHG | HEART RATE: 107 BPM | HEIGHT: 73 IN | BODY MASS INDEX: 29.29 KG/M2

## 2023-01-12 DIAGNOSIS — F41.9 ANXIETY: ICD-10-CM

## 2023-01-12 DIAGNOSIS — I10 ESSENTIAL (PRIMARY) HYPERTENSION: ICD-10-CM

## 2023-01-12 DIAGNOSIS — K51.00 ULCERATIVE PANCOLITIS WITHOUT COMPLICATION (HCC): ICD-10-CM

## 2023-01-12 DIAGNOSIS — K76.0 HEPATIC STEATOSIS: ICD-10-CM

## 2023-01-12 DIAGNOSIS — Z79.890 LONG-TERM CURRENT USE OF TESTOSTERONE REPLACEMENT THERAPY: ICD-10-CM

## 2023-01-12 DIAGNOSIS — E78.01 FAMILIAL HYPERCHOLESTEREMIA: ICD-10-CM

## 2023-01-12 DIAGNOSIS — I71.21 ANEURYSM OF ASCENDING AORTA WITHOUT RUPTURE (HCC): ICD-10-CM

## 2023-01-12 DIAGNOSIS — E29.1 TESTICULAR HYPOFUNCTION: ICD-10-CM

## 2023-01-12 DIAGNOSIS — I70.0 ABDOMINAL AORTIC ATHEROSCLEROSIS (HCC): ICD-10-CM

## 2023-01-12 PROCEDURE — 99205 OFFICE O/P NEW HI 60 MIN: CPT | Performed by: FAMILY MEDICINE

## 2023-01-12 PROCEDURE — 99417 PROLNG OP E/M EACH 15 MIN: CPT | Performed by: FAMILY MEDICINE

## 2023-01-12 PROCEDURE — 99212 OFFICE O/P EST SF 10 MIN: CPT

## 2023-01-12 RX ORDER — ROSUVASTATIN CALCIUM 20 MG/1
20 TABLET, COATED ORAL EVERY EVENING
Qty: 90 TABLET | Refills: 3 | Status: SHIPPED | OUTPATIENT
Start: 2023-01-12 | End: 2023-12-21 | Stop reason: SDUPTHER

## 2023-01-12 RX ORDER — LOSARTAN POTASSIUM 25 MG/1
25 TABLET ORAL DAILY
Qty: 90 TABLET | Refills: 3 | Status: SHIPPED | OUTPATIENT
Start: 2023-01-12 | End: 2023-05-19

## 2023-01-12 RX ORDER — SYRINGE WITH NEEDLE, 1 ML 25GX5/8"
SYRINGE, EMPTY DISPOSABLE MISCELLANEOUS
COMMUNITY
Start: 2022-12-22 | End: 2023-04-02

## 2023-01-12 RX ORDER — NEEDLES, DISPOSABLE 25GX5/8"
NEEDLE, DISPOSABLE MISCELLANEOUS
COMMUNITY
Start: 2022-12-17 | End: 2023-04-02

## 2023-01-12 ASSESSMENT — ENCOUNTER SYMPTOMS
COUGH: 0
HEMOPTYSIS: 0
TREMORS: 0
PALPITATIONS: 0
HEADACHES: 0
ORTHOPNEA: 0
DIZZINESS: 0
CLAUDICATION: 0
SEIZURES: 0
FEVER: 0
CHILLS: 0
WEAKNESS: 0
WHEEZING: 0
SHORTNESS OF BREATH: 0
FOCAL WEAKNESS: 0

## 2023-01-12 ASSESSMENT — FIBROSIS 4 INDEX: FIB4 SCORE: 2.44

## 2023-01-12 NOTE — PROGRESS NOTES
[facility] - Initial Visit  Date of Service: 01/12/23     Tom Quan has been referred for evaluation and management of dyslipidemia    Referral Source: No ref. provider found     Subjective   Current/interval symptoms or concerns:  severe elevated LDL and recent CACS showing AsAA   Change in weight: Stable  BMI Readings from Last 5 Encounters:   01/12/23 29.16 kg/m²   01/06/23 29.32 kg/m²   12/21/22 29.32 kg/m²   03/02/22 29.16 kg/m²   02/02/22 28.89 kg/m²     Exercise habits: moderate regular exercise program  Dietary patterns: Med style diet in general   Etoh: Yes, Details: limited   Reported barriers to care: none   History of ASCVD: None  Secondary causes of dyslipidemia:  Endocrine/Hypothyroidism:  none reported   Liver disease: none reported   Renal disease/nephrotic syndrome:  none reported  Medications: Testosterone   Other Established (non-atherosclerotic) Vascular Disease, if Present:     # Ascending AA - no current sx  _____Pertinent pmhx:  Initially noted on Noted on CACS 12/2022     Hx of HTN: mild high   hx of anabolic steroid use - testosterone due to testicular hypofunction   Hx of tobacco:   reports that he has never smoked. He has never used smokeless tobacco.  No hx of Connective tissue disease such as Marfans, Joselito-Danlos, Loeys-Em  No hx of inflammatory/autoimmune vasculitis (Takayasu's arteritis, Giant Cell arteritis)  No hx of infective aortitis, HIV, syphilis   No hx of MVA, chest wall trauma, deceleration injuries  _____Pertinent fhx:  Son has Takayasu's arteritis age 17yo - dx at [facility]   No fhx of connective tissue disease   No fhx of aneurysmal disease or known familial thoracic aortic aneurysm syndrome     Age at Initial Diagnosis of Dyslipidemia: whole life     Current Prescription Lipid Lowering Medications - including dose:   Statin: None  Non-Statin: None  Current Lipid Lowering and Related Supplements: None  Any Current Side Effects Potentially Related to Lipid  "Lowering therapy? No  Current Adherence to Lipid Lowering Therapies: Complete  Previously Attempted Interventions for Lipids - including outcome  Statin: None   Outcome: N/A  Non-Statin: None  Outcome: N/A  Any Previous History of Statin Intolerance? No  Baseline Lipids Prior to Treatment:   Lab Results   Component Value Date/Time    CHOLSTRLTOT 328 (H) 12/14/2022 07:31 AM    HDL 81 12/14/2022 07:31 AM    TRIGLYCERIDE 121 12/14/2022 07:31 AM        Latest Reference Range & Units Most Recent   LDL Chol Calc ([facility]) 0 - 99 mg/dL 226 (H)  12/14/22 07:31     Anticoagulation/antiplats: No    HTN:  No prior dx or meds   Home BP log: normal     T2D: No     PAST MEDICAL HISTORY:  has a past medical history of Depression and Hyperlipidemia.    PAST SURGICAL HISTORY:  has a past surgical history that includes hip replacement, partial.      Current Outpatient Medications:     BD Hypodermic Needle, USE AS DIRECTED, Taking    B-D 3CC LUER-RONNA SYR 04FK7-3/2, USE AS DIRECTED, Taking    rosuvastatin, 20 mg, Oral, Q EVENING    losartan, 25 mg, Oral, DAILY    rizatriptan, 5-10 mg, Oral, Once PRN, Taking    Vyvanse, 50 mg, Oral, QAM, Taking    B-D 3CC LUER-RONNA SYR 22GX1\", USE FOR TESTOSTERONE INJECTIONS, Taking    Stelara, 90 mg, Subcutaneous, Q60 DAYS, Taking    cetirizine, 10 mg, Oral, QDAY PRN, Taking    ibuprofen, 400 mg, Oral, Q8HRS PRN, Taking    acetaminophen, 1,000 mg, Oral, Q8HRS PRN, Taking    Probiotic, 1 Capsule, Oral, DAILY, Taking    omeprazole, 20 mg, Oral, QDAY PRN, Taking    testosterone cypionate, 200 mg, Intramuscular, Q7 DAYS, Taking    Omega-3 Fatty Acids (FISH OIL PO), 2 Capsule, Oral, DAILY, Taking    Zinc, 25 mg, Oral, DAILY, Taking    vitamin D3, 5,000 Units, Oral, DAILY, Taking    ALLERGIES: Thimerosal    Family History   Problem Relation Age of Onset    Diabetes Mother     Other Mother         smoker+, unclear cause of death    Hyperlipidemia Sister     Hyperlipidemia Brother     Cancer Paternal Uncle         " "Prostate    Heart Attack Maternal Grandfather          55, tobacco+    Other Son         takayasu's arteritis    Abdominal aortic aneurysm Neg Hx     Arterial Aneurysm Neg Hx        Social History     Tobacco Use    Smoking status: Never    Smokeless tobacco: Never   Vaping Use    Vaping Use: Former   Substance Use Topics    Alcohol use: Yes     Comment: Occasionally    Drug use: Never       Review of Systems   Constitutional:  Negative for chills, fever and malaise/fatigue.   Respiratory:  Negative for cough, hemoptysis, shortness of breath and wheezing.    Cardiovascular:  Negative for chest pain, palpitations, orthopnea, claudication and leg swelling.   Neurological:  Negative for dizziness, tremors, focal weakness, seizures, weakness and headaches.       Objective    Vitals:    23 1019   BP: 131/76   BP Location: Left arm   Patient Position: Sitting   BP Cuff Size: Adult   Pulse: (!) 107   Weight: 100 kg (221 lb)   Height: 1.854 m (6' 1\")      BP Readings from Last 5 Encounters:   23 131/76   23 110/78   22 100/76   22 114/76   22 116/86     Physical Exam  Constitutional:       Appearance: Normal appearance. He is well-developed.   HENT:      Head: Normocephalic and atraumatic.   Eyes:      Conjunctiva/sclera: Conjunctivae normal.      Pupils: Pupils are equal, round, and reactive to light.      Comments: No corneal arcus   Neck:      Thyroid: No thyromegaly.      Vascular: No JVD.   Cardiovascular:      Rate and Rhythm: Normal rate and regular rhythm.      Pulses:           Radial pulses are 2+ on the right side and 2+ on the left side.        Dorsalis pedis pulses are 2+ on the right side and 2+ on the left side.        Posterior tibial pulses are 2+ on the right side and 2+ on the left side.      Heart sounds: Normal heart sounds. No murmur heard.    No friction rub. No gallop.   Pulmonary:      Effort: Pulmonary effort is normal. No respiratory distress.      Breath " sounds: Normal breath sounds.   Musculoskeletal:      Cervical back: Normal range of motion and neck supple.      Comments: No achilles tendon thickening  No extensor surface xanthomas at dorsal hands, patellae, achilles   Lymphadenopathy:      Cervical: No cervical adenopathy.   Skin:     General: Skin is warm and dry.      Comments: No periocular xanthelasma   Neurological:      General: No focal deficit present.      Mental Status: He is alert and oriented to person, place, and time.      Cranial Nerves: No cranial nerve deficit.       DATA REVIEW:  Most Recent Lipid Panel:   Lab Results   Component Value Date    CHOLSTRLTOT 328 (H) 12/14/2022    TRIGLYCERIDE 121 12/14/2022    HDL 81 12/14/2022      Latest Reference Range & Units 12/14/22 07:31   Cholesterol,Tot 100 - 199 mg/dL 328 (H)   Triglycerides 0 - 149 mg/dL 121   HDL >39 mg/dL 81   LDL Chol Calc ([facility]) 0 - 99 mg/dL 226 (H)   VLDL Cholesterol Calc 5 - 40 mg/dL 21     No results found for: LIPOPROTA   No results found for: APOB      Other Pertinent Blood Work:   Lab Results   Component Value Date    SODIUM 139 12/14/2022    SODIUM 133 (L) 07/17/2021    POTASSIUM 4.0 12/14/2022    POTASSIUM 4.1 07/17/2021    CHLORIDE 98 12/14/2022    CHLORIDE 96 07/17/2021    CO2 25 12/14/2022    CO2 26 07/17/2021    ANION 11.0 07/17/2021    GLUCOSE 102 (H) 12/14/2022    GLUCOSE 109 (H) 07/17/2021    BUN 11 12/14/2022    BUN 7 (L) 07/17/2021    CREATININE 1.04 12/14/2022    CREATININE 1.01 07/17/2021    CALCIUM 9.6 12/14/2022    CALCIUM 9.4 07/17/2021    ASTSGOT 137 (H) 12/14/2022    ASTSGOT 71 (H) 07/17/2021    ALTSGPT 125 (H) 12/14/2022    ALTSGPT 83 (H) 07/17/2021    ALKPHOSPHAT 103 12/14/2022    ALKPHOSPHAT 112 (H) 07/17/2021    TBILIRUBIN 1.0 12/14/2022    TBILIRUBIN 0.9 07/17/2021    ALBUMIN 4.6 12/14/2022    ALBUMIN 3.9 07/17/2021    AGRATIO 1.5 12/14/2022    AGRATIO 1.1 07/17/2021    CREACTPROT 0.70 07/17/2021     VASCULAR IMAGING:       CT abd/pelvis  2021  Vasculature: The mineralization coils identified in the left retroperitoneal region extending to the left renal region. There are some calcified plaque in the aorta without aneurysm.  IMPRESSION:   1.  Hepatic steatosis.   2.  Aortic atherosclerosis without aneurysm.   3.  Appendix not visualized    CACS 12/2022  Coronary calcification:  LMA - 0.0  LCX - 0.0  LAD - 0.0  RCA - 0.0  PDA - 0.0   Total Calcium Score: 0.0   Percentile: Calcium score is below the 25th percentile for the patient's age and sex.   Other findings:  Heart: Normal size.  Lungs: Clear.  Mediastinum: Ascending thoracic aorta measures 4.8 cm in diameter.  Upper abdomen: There is hepatic steatosis.          ASSESSMENT AND PLAN  1. Familial hypercholesteremia  APOLIPOPROTEIN B    Comp Metabolic Panel    Lipid Profile    Lipoprotein (a)    CRP HIGH SENSITIVE (CARDIAC)    MICROALBUMIN CREAT RATIO URINE    CBC WITHOUT DIFFERENTIAL    URINALYSIS    TSH WITH REFLEX TO FT4    rosuvastatin (CRESTOR) 20 MG Tab      2. Aneurysm of ascending aorta without rupture  rosuvastatin (CRESTOR) 20 MG Tab      3. Abdominal aortic atherosclerosis (HCC)        4. Essential (primary) hypertension  APOLIPOPROTEIN B    Comp Metabolic Panel    CRP HIGH SENSITIVE (CARDIAC)    MICROALBUMIN CREAT RATIO URINE    CBC WITHOUT DIFFERENTIAL    URINALYSIS    TSH WITH REFLEX TO FT4    losartan (COZAAR) 25 MG Tab      5. Ulcerative pancolitis without complication (HCC)        6. Hepatic steatosis        7. Testicular hypofunction        8. Long-term current use of testosterone replacement therapy            Patient Type, check all that apply: Primary Prevention    Established Atherosclerotic Cardiovascular Disease (ASCVD): yes    1) Non-aneurysmal aortic atherosclerosis - abdomen on prior CT, no symptoms or prior known associated clinical manifestations  -Serves as general indicator of systemic atherosclerosis with higher potential atherosclerosis is other vascular beds  including coronaries, carotids, cerebral aa.   -Higher overall ASCVD risk and clinical manifestations related to thromboembolic and, less commonly, atheroembolic potential of plaque if becomes complex, >4mm, or associated plaque ulceration or instrumentation of the aorta  - no further imaging surveillance, continue med mgmt      Other Established (non-atherosclerotic) Vascular Disease, if Present:     1) Ascending aortic aneurysm:   - 4.8cm on most recent imaging, no symptoms.   This was non-contrast CT, so likely overestimated size, will need more definitive contrast CT in future for accurate sizing   -Presumed cystic medial degeneration with sporadic development.   -No fhx or pmhx of connective tissue disease but son dx with Takayasu's and there is hx of other vascular disease in 2nd degree relatives  - no prob FTAAD but needs genetic analysis due to strong fhx of vasculopathy and early age onset of moderate aortic aneurysm   Plan:   - check aortopathy genotyping due to strong fhx of vasculopathy and early age for moderate ascending AA  ([facility] order # [id number]. )   - reviewed anatomy, risks, emergency s/s requiring immediate attention and gave handouts.   - recommend 1st degree relatives get screened with echo for TAA  - check echo to eval for bicuspid AV  - check CTA complete Ao for definitive sizing and r/o AAA   - if normal exams, then repeat annual echo surveillance, if indications of rapid expansion then repeat CTA and consider CTA Q3-5yrs   - focus on ARB and BB for BP control  - activity restrictions including no extreme endurance activities, smoking, stimulants, and extreme weight lifting >20lbs     Evidence of Heterozygous Familial Hypercholesterolemia (FH): Yes   LDL-C baseline = 226  Grandfather  MI age 51 (see fhx)   Sibs with high cholesterol on meds    FH genotyping:  yes , ordered [employer] # [id number].  Benefits/limitations/risks for dyslipidemic genetic testing reviewed.  Further resources  for review available at [email or web address] site    Indications and rationale for dyslipidemia genetic testing:  -Strong clinical suspicion of a genetic dyslipidemia.  - assist with definitive diagnosis and aid in treatment decision-making  -Strong family history of dyslipidemia or its complications.  -Presence of related syndromic features  -Evidence that testing might .  -Available and effective early interventions exist.  -Eligibility for new or investigational drugs.  -Patient preference.  -Family planning  - informing need for cascade screening of family members     ACC/AHA Indication for Statin Therapy, shukri all that apply:  Primary Severe HLD / FH (baseline LDL-C >190)    Calculated Risk for ASCVD, if applicable    The ASCVD Risk score (Tonya ORTEZ, et al., 2019) failed to calculate., baseline LDL-C >190 so inaccurate     Other Significant Risk Markers, if any, shukri all that apply   Other: CACS = 0 - confers less risk over short/long-term though does no preclude need for treatment based upon extreme high LDL-C >>190  - update apoB, lp(a), hsCRP for additional risk     Risk-enhancers: Famhx of premature ASCVD and Persistently elevated LDL-C >159    Goal LDL-C and nonHDL-C based on Clinic Protocol  reduce LDL-C >50%  and LDL-C <100 (consider non-HDL-C <130, apoB <90)  At goal? no    LIFESTYLE INTERVENTIONS:    SMOKING:    reports that he has never smoked. He has never used smokeless tobacco.   - continued complete avoidance of all tobacco products     PHYSICAL ACTIVITY: continue healthy activity to improve CV fitness.  In general, targeting >150min/week of moderate-level activity or as much as tolerated in light of functional status and co-morbidities     WEIGHT MANAGEMENT AND NUTRITION: Mediterranean style dietary approach  and Provide specific dietary approach handouts      LIPID LOWERING MEDICATION MANAGEMENT:     Statin Therapy Recommendations from Today’s Visit:   - start rosuva 20mg daily      Non-Statin Medications Recommendations from Today’s Visit:   Not indicated     Indication for PCSK9 Inhibitor, if applicable: Not currently indicated    Supplements Recommended at this visit: None     RECOMMENDATIONS FOR OTHER CV RISK FACTORS:    1) BLOOD PRESSURE MANAGEMENT:  ACC/AHA (2017) goal <130/80  Home BP at goal:  yes  Office BP at goal:  yes  24h ABPM: not ordered to date  Indicated medications for reduction of expansion rate of TAA:   -Beta-blocker (decreases pulsatile wall stress)   -Losartan (inhibits tissue growth factor beta, decreasing aneurysmal growth rate or rate of expansion)   Plan:   Monitoring:   - start/continue home BP monitoring, reviewed correct technique, provide BP log and instructions  - order 24h ABPM:  no  - monitor lytes/gfr routinely   - contact office if BP consistently >140/>90 for discussion of tx adjustments   Medications:  - start losartan 25mg daily     2) Glycemic Status: Normal    ANTITHROMBOTIC THERAPY not indicated at this time, consider ASA 81mg in future     OTHER ISSUES:     # testicular hypofunction on TRT - potential bearing on current HLD and aortic status   - pt aware of benefits/risks and will continue TRT  - defer to prescribing MD     Studies Ordered at Todays Visit: echo, CTA complete Ao - order at next visit for 5/2023   Blood Work Ordered At Today’s visit: as noted above   Follow-Up: 2 months    Total time: 76min - chart review/prep, review of other providers' records, imaging/lab review, face-to-face time for history/examination, ordering, prescribing,  review of results/meds/ treatment plan with patient/family/caregiver, documentation in EMR, care coordination (as needed)     Noble Drummond M.D.  [facility], Board-certified clinical lipidologist   [facility]   [facility]   [phone]     CC:  Arvind Geiger M.D.

## 2023-01-13 NOTE — PATIENT INSTRUCTIONS
I have ordered Dyslipidemia panel genotyping to review for the most common genetic mutations that lead to FH or other similar genetic dyslipidemic conditions   You will receive a test kit requiring a saliva specimen from [facility] in the mail.    Please thoroughly review the following website to learn more about why this testing is important:     [email or web address]    Benefits/limitations/risks for dyslipidemic genetic testing reviewed.  Further resources for review available at [email or web address] site    Indications and rationale for dyslipidemia genetic testing:  -Strong clinical suspicion of a genetic dyslipidemia.  - assist with definitive diagnosis and aid in treatment decision-making  -Strong family history of dyslipidemia or its complications.  -Presence of related syndromic features  -Evidence that testing might .  -Available and effective early interventions exist.  -Eligibility for new or investigational drugs.  -Patient preference.  -Family planning  - informing need for cascade screening of family members     Benefits include:   -Confirmation of a clinical diagnosis of FH, especially in cases where it is not clear whether the person has FH or not.  -Provides more information about one’s risk or diagnosis, since not all individuals with FH present the same.  -Often results in initiation and intensification of therapy by a healthcare provider. Studies have also shown that individuals with FH are more willing to start, intensify or continue taking prescribed medications when given genetic confirmation.  -Provides information regarding why a healthy lifestyle and diet have not been able to control cholesterol levels on their own.  -Helps other family members to be screened.  -Determines whether or not FH has been passed down to a child, since everyone with -FH has a 50% chance of doing so.    Limitations include:   -It does not always provide a simple “yes” or “no”  answer about FH.  -A negative test result does not always mean someone does not have FH--it simply means that their genetic cause(s) were not identified with current knowledge and genetic testing technologies. About 30-40% of people with clinically diagnosed FH may test negative. These results may be “false negatives” or the person might have a gene variant that has not yet been identified to be pathogenic, or disease causing. They may also have polygenic hypercholesterolemia.  -Testing can be expensive if not covered by insurance, although financial aid and low-cost options may be available.  -Privacy and discrimination concerns. While the Genetic Information Non-discrimination Act ([place]) protects most people from discrimination when it comes to employment or health insurance coverage, it does not protect everyone or address other kinds of insurance, such as life, disability, or long-term care insurance.  -You should understand these limitations and carefully consider what it might mean for you and your children before you undergo genetic testing. These concerns may also apply  for other kinds of test results such as lipid panels.    Genetic Testing, Privacy, and Discrimination Protection  Your genetic information cannot be used in employment decisions or to determine eligibility for health insurance, thanks to the Genetic Information Nondiscrimination Act of 2008 ([place]).   -However, REGINA does not apply to companies with fewer than 15 employees, to families covered by  health services, the [facility], [facility], or to decisions related to life, long-term care, or disability insurance.   -Federal employees covered by the Federal Employees Health Benefit Plan are not covered by REGINA, but are protected by an Executive Order.   -Any of these protections can be reversed if REGINA or the Executive Order are reversed. -If you feel you have been discriminated against, your option is to  complain to the State  for health insurance or the Equal Employment Opportunity Commission for employment discrimination. Learn more about REGINA and protection against genetic discrimination.

## 2023-01-17 ENCOUNTER — TELEPHONE (OUTPATIENT)
Dept: CARDIOLOGY | Facility: MEDICAL CENTER | Age: 58
End: 2023-01-17
Payer: COMMERCIAL

## 2023-01-17 ENCOUNTER — PATIENT MESSAGE (OUTPATIENT)
Dept: MEDICAL GROUP | Facility: LAB | Age: 58
End: 2023-01-17
Payer: COMMERCIAL

## 2023-01-17 DIAGNOSIS — F41.9 ANXIETY: ICD-10-CM

## 2023-01-17 RX ORDER — DESVENLAFAXINE 25 MG/1
1 TABLET, EXTENDED RELEASE ORAL DAILY
Qty: 30 TABLET | Refills: 1 | Status: SHIPPED | OUTPATIENT
Start: 2023-01-17 | End: 2023-01-31

## 2023-01-17 RX ORDER — DESVENLAFAXINE 25 MG/1
1 TABLET, EXTENDED RELEASE ORAL DAILY
Qty: 30 TABLET | Refills: 1 | Status: SHIPPED | OUTPATIENT
Start: 2023-01-17 | End: 2023-01-17 | Stop reason: SDUPTHER

## 2023-01-18 NOTE — TELEPHONE ENCOUNTER
His wife called our oncall line due to concerns after recent medications after a visit with Dr. Drummond.     Losartan 25mg and Crestor 20mg daily were started 3 days ago. Been feeling tired and wife concerned about him being disoriented after he woke up from his nap this afternoon - improved. He also gets dizzy and LH with orthostatic changes. Been spending most of his time in bed ever since due to his symptoms. No stroke symptoms. No fevers, vomiting. He feels congested. Denies typical URI symptoms.    She checked his BP as we were talking over the phone: 98/68mmHg and HR 81bpm.     Most likely he is having low BP with the losartan. Advised them to stop losartan and Crestor until they hear back from Dr. Drummond's office, and for them to come in if he continues to feel bad over next few hours. If medication related, he should start feeling better by tomorrow.    Advised them for him to stay well hydrated and consider non-flavored Pedialyte as an alternative to just water for electrolyte supplementation.    They were appreciative of the call.    Dr. Drummond notified via Epic.

## 2023-01-25 ENCOUNTER — OFFICE VISIT (OUTPATIENT)
Dept: BEHAVIORAL HEALTH | Facility: CLINIC | Age: 58
End: 2023-01-25
Payer: COMMERCIAL

## 2023-01-25 DIAGNOSIS — F34.1 PERSISTENT DEPRESSIVE DISORDER: ICD-10-CM

## 2023-01-25 PROCEDURE — 90834 PSYTX W PT 45 MINUTES: CPT | Performed by: PSYCHOLOGIST

## 2023-01-26 NOTE — PROGRESS NOTES
Name: Tom Quan Date: [birth year]  [birth year] Time in session 45 minutes   [] Initial Dx Eval [] Family [] Cancelled/Rescheduled [] Office visit   [x] Individual [] Group [] Late Cancellation [] Virtual Session   [] Couple [] Testing [] No call/No show [] Late:    [] Discharge [] Phone [] On time: 9:00 AM []  (6)   Attended: Mr. Quan (He wore a mask)   Observations/ Appearance/ Affect: Mr. Quan appeared clean, well-groomed, and dressed in warm clothes. He was oriented to person, place, time, and purpose, was pleasant and cooperative, lucid, and articulate. His affect was anxious, and mood was sad. No suicidal or homicidal ideation described or reported. No reports of hallucinations or confusions. He participated well in this session.   Content/ Topics: Mr. Quan reported that he was being evaluated for a heart condition and an aortic aneurysm was discovered. He reported that the doctor was not too concerned about it because of its size. He reported that he asked for a cardiologist for a second opinion. He reported that the provided a heart specialist who was an ER doc and he wanted to take another look at it and also wanted a larger scope on the next scan studies. He reported that he recommended medications to lower his blood pressure and for him to go onto a Mediterranean Diet. Discussed how he was trying to cut down on his alcohol use. He reported that he noticed that Karsten and Melanie were getting long better. He reported that he told Karsten that if anything happened to him, he would be better off getting emancipated rather that going to live with his mother. He reported that tried to get Karsten into the [facility], but they wanted him to start with an individual therapist instead of group.    Risk issues assessed: Discussed with Mr. Quan how there are shifts in physiological and behavioral states that distort social awareness and establish habitual defensive reactions that replace appropriate  spontaneous social behavior. Discussed with him how the body is wired for survival and connection. Discussed how the autonomic nervous system works as our personal surveillance system, always on guard, always alert to the question: “is it safe?” The work of the autonomic nervous system is to protect us by looking for cues safety and risk, sensing moment to moment of what is happening in and around our bodies and in the connections, we have to others. Discussed how this system works and can inform us about how the state of the nervous system sets the table for how we will respond, act, and feel. He reported that he would not harm himself and did not intend to harm himself or another.   Psychosocial and environmental problems addressed: Discussed with Mr. Quan how the focus of this psychotherapy was on skill building, on supporting her physical health, strengthening her mental health, and increasing function. Discussed with him how this psychotherapy encouraged him to increasing nutritional values in his diet to increase the healing process. Discussed how he learned the power of safety and connection in his relationships with his family and coworkers. Discussed how in this supportive psychotherapy he was able to self-regulate, self-sooth, increase mindful self-compassion and has learned to use skills of observation to recognize signs of danger and threat as well as signs of safety and connection.   Current GAF: 55   Current medications: Vyvanse, Depade, Stelara, Zyrtec, Motrin, Tylenol, Prilosec, Depo-Testosterone, Fish Oil PO, Zinc 25, Vitamin D3   Significant/ Recent Events: Discussed with Mr. Quan how affirmations were helpful and skillful thoughts that constructively and effectively change the tone and purpose of an action. He agreed to continue to connect with his family and to reassure them he would take care of himself and how their cooperation and compassion was helpful. He reported that he would use  EMDR at the next session. Discussed strategies to lower blood pressure, lower his heart rate and use self-soothing activities. Discussed the work of Kylah Suarez and encouraged him to think of food as medicine and encouraged him to look at some of her recipes in her book, “Eat Right, Feel Right.” Discussed the work of Joann Walker and encourage him to read her workbook, “Mindful Self-Compassion” and discussed strategies to exercise mindful self-compassion as needed. Encouraged him to look at some of the writers describing Polyvagal Theory and to consider the work of Shaneka Armando. Encouraged him to contain his anxious and depressive thoughts and set them aside to discuss in these sessions and to engage in positive and creative activities to distract him from any disturbing material. Discussed looking at the [place] and consider nutritional supplements to increase the nutritional values in his diet. Discussed how Polyvagal Theory, Positive Psychology (Mindful Self-Compassion), Information Processing Theory and nutritional health would inform this psychotherapy and how he will develop Cognitive Behavioral Skills and Strategies to decrease and manage anxieties and fears, lift depression, increase motivation and action and help him to regain control over his life, develop a healthier lifestyle, increase restful sleep and find meaning and balance in his life. Discussed how this psychotherapy would focus on his health, wellbeing, and progress at his pace and in a positive direction.   Informed consent issues discussed: Mr. Quan reported that he understood the process of this evaluation agreed to return to [facility]. Discussed how he expressed his desire to change and was willing to start the process. He reported that he was willing to make changes to his life toward a healthy lifestyle. He reported that he was aware of the problems he experienced and expressed the desire to solve those problems safely. He reported that he had  a positive outlook for change. He reported that he had family and friends that were supportive and knew he needed to gain and maintain a healthy network of support.    Assignments/ Homework: Mr. Quan agreed to initiate some of the strategies discussed today to decrease anxieties and increase restful sleep and increase nutritional values in his diet.    Plan: Mr. Quan agreed to monitor closely his mood and behavior.    Diagnoses: F34.1 Persistent Depressive Disorder; F90.0 Attention-Deficit/ Hyperactivity Disorder [] Provisional   Treatment Plan: [x] Continued [] New [] Replaced Referral:   Suicidal [] Yes [x] No [] Medical:    Violence: [] Yes [x] No [] Psychiatric:    Next session:     [] Neurological:            Lele Dos Santos Psy.D.  Clinical Psychologist  [facility]  NPI: [id number]

## 2023-01-31 ENCOUNTER — OFFICE VISIT (OUTPATIENT)
Dept: MEDICAL GROUP | Facility: LAB | Age: 58
End: 2023-01-31
Payer: COMMERCIAL

## 2023-01-31 VITALS
SYSTOLIC BLOOD PRESSURE: 110 MMHG | HEART RATE: 112 BPM | HEIGHT: 73 IN | OXYGEN SATURATION: 96 % | BODY MASS INDEX: 29.37 KG/M2 | DIASTOLIC BLOOD PRESSURE: 82 MMHG | RESPIRATION RATE: 14 BRPM | WEIGHT: 221.6 LBS | TEMPERATURE: 97.4 F

## 2023-01-31 DIAGNOSIS — R21 RASH: ICD-10-CM

## 2023-01-31 PROCEDURE — 99213 OFFICE O/P EST LOW 20 MIN: CPT | Performed by: NURSE PRACTITIONER

## 2023-01-31 RX ORDER — USTEKINUMAB 90 MG/ML
90 INJECTION, SOLUTION SUBCUTANEOUS
Qty: 1 ML | Refills: 6 | Status: SHIPPED | OUTPATIENT
Start: 2023-01-31 | End: 2023-05-19

## 2023-01-31 RX ORDER — TRIAMCINOLONE ACETONIDE 1 MG/G
1 OINTMENT TOPICAL 2 TIMES DAILY
Qty: 30 G | Refills: 0 | Status: SHIPPED | OUTPATIENT
Start: 2023-01-31 | End: 2023-04-02

## 2023-01-31 ASSESSMENT — FIBROSIS 4 INDEX: FIB4 SCORE: 2.44

## 2023-01-31 NOTE — PROGRESS NOTES
"Subjective:     CC:   Chief Complaint   Patient presents with    Rash     Lower back        HPI:   Tom presents today with the following:    Rash  Itchy bump at the sacrum started about a month ago.   Has tried putting lotion on it, also tried hydrocortisone cream with mild relief.   Rash is not painful. Not precipitated by sun exposure.   No new lotion, soap, detergent, sheets, towels, topical or oral medicine. No similarly affected contacts. No pets in household. No known insect infestations in household.    ROS:   As documented in history of present illness above    Objective:     Exam: /82 (BP Location: Right arm, Patient Position: Sitting, BP Cuff Size: Adult)   Pulse (!) 112   Temp 36.3 °C (97.4 °F)   Resp 14   Ht 1.854 m (6' 1\")   Wt 101 kg (221 lb 9.6 oz)   SpO2 96%  Body mass index is 29.24 kg/m².    Constitutional: Alert, no distress, well-groomed.  Back: 2 cm area of hyperpigmentation with scabbing at the sacrum.  Eye: Equal, round and reactive, conjunctiva clear, lids normal.  ENMT: Lips without lesions, good dentition, moist mucous membranes.  Neck: Trachea midline, no masses, no thyromegaly.  Respiratory: Unlabored respiratory effort, no cough.  MSK: Normal gait, moves all extremities.  Neuro: Grossly non-focal.   Psych: Alert and oriented x3, normal affect and mood.    Assessment & Plan:     57 y.o. male with the following -     1. Rash  Hypoallergenic skin care reviewed:  Patient to use medication as prescribed.   Referral placed to dermatology.   Use unscented gentle cleanser such as Dove Unscented or Cetaphil when bathing.  Water should be warm but not hot  Rinse well after cleaning.  Pat dry and don't rub skin  Use OTC nonsedating antihistamine such as Claritin or Allegra daily for 7-14 days.  Use OTC H2 blocker such as Ranitidine or pepcid daily for 7-14 days.  Use OTC Benadryl if needed to help sleep.  - triamcinolone acetonide (KENALOG) 0.1 % Ointment; Apply 1 Application topically " 2 times a day.  Dispense: 30 g; Refill: 0  - Referral to Dermatology

## 2023-02-08 ENCOUNTER — APPOINTMENT (OUTPATIENT)
Dept: BEHAVIORAL HEALTH | Facility: CLINIC | Age: 58
End: 2023-02-08
Payer: COMMERCIAL

## 2023-02-15 DIAGNOSIS — F90.2 ADHD (ATTENTION DEFICIT HYPERACTIVITY DISORDER), COMBINED TYPE: ICD-10-CM

## 2023-02-15 RX ORDER — DEXTROAMPHETAMINE SACCHARATE, AMPHETAMINE ASPARTATE, DEXTROAMPHETAMINE SULFATE AND AMPHETAMINE SULFATE 2.5; 2.5; 2.5; 2.5 MG/1; MG/1; MG/1; MG/1
10 TABLET ORAL DAILY
Qty: 30 TABLET | Refills: 0 | OUTPATIENT
Start: 2023-02-15 | End: 2023-03-17

## 2023-03-08 ENCOUNTER — APPOINTMENT (OUTPATIENT)
Dept: BEHAVIORAL HEALTH | Facility: CLINIC | Age: 58
End: 2023-03-08
Payer: COMMERCIAL

## 2023-04-02 ENCOUNTER — HOSPITAL ENCOUNTER (INPATIENT)
Facility: MEDICAL CENTER | Age: 58
LOS: 2 days | DRG: 897 | End: 2023-04-04
Attending: EMERGENCY MEDICINE | Admitting: INTERNAL MEDICINE

## 2023-04-02 ENCOUNTER — APPOINTMENT (OUTPATIENT)
Dept: RADIOLOGY | Facility: MEDICAL CENTER | Age: 58
DRG: 897 | End: 2023-04-02
Attending: EMERGENCY MEDICINE

## 2023-04-02 ENCOUNTER — APPOINTMENT (OUTPATIENT)
Dept: RADIOLOGY | Facility: MEDICAL CENTER | Age: 58
DRG: 897 | End: 2023-04-02

## 2023-04-02 ENCOUNTER — APPOINTMENT (OUTPATIENT)
Dept: RADIOLOGY | Facility: MEDICAL CENTER | Age: 58
DRG: 897 | End: 2023-04-02
Attending: INTERNAL MEDICINE

## 2023-04-02 DIAGNOSIS — I10 HYPERTENSION, UNSPECIFIED TYPE: ICD-10-CM

## 2023-04-02 DIAGNOSIS — F10.939 ALCOHOL WITHDRAWAL SYNDROME WITH COMPLICATION (HCC): ICD-10-CM

## 2023-04-02 DIAGNOSIS — R07.9 CHEST PAIN, UNSPECIFIED TYPE: ICD-10-CM

## 2023-04-02 PROBLEM — K70.10 ACUTE ALCOHOLIC HEPATITIS: Status: ACTIVE | Noted: 2023-04-02

## 2023-04-02 PROBLEM — F10.930 ALCOHOL WITHDRAWAL SYNDROME WITHOUT COMPLICATION (HCC): Status: ACTIVE | Noted: 2023-04-02

## 2023-04-02 PROBLEM — F10.931 DELIRIUM, WITHDRAWAL, ALCOHOLIC (HCC): Status: ACTIVE | Noted: 2023-04-02

## 2023-04-02 PROBLEM — E87.20 METABOLIC ACIDOSIS: Status: ACTIVE | Noted: 2023-04-02

## 2023-04-02 LAB
ALBUMIN SERPL BCP-MCNC: 4.1 G/DL (ref 3.2–4.9)
ALBUMIN/GLOB SERPL: 1.3 G/DL
ALP SERPL-CCNC: 97 U/L (ref 30–99)
ALT SERPL-CCNC: 96 U/L (ref 2–50)
ANION GAP SERPL CALC-SCNC: 17 MMOL/L (ref 7–16)
AST SERPL-CCNC: 141 U/L (ref 12–45)
BASOPHILS # BLD AUTO: 1.2 % (ref 0–1.8)
BASOPHILS # BLD: 0.1 K/UL (ref 0–0.12)
BILIRUB SERPL-MCNC: 0.3 MG/DL (ref 0.1–1.5)
BUN SERPL-MCNC: 9 MG/DL (ref 8–22)
CALCIUM ALBUM COR SERPL-MCNC: 9.1 MG/DL (ref 8.5–10.5)
CALCIUM SERPL-MCNC: 9.2 MG/DL (ref 8.4–10.2)
CHLORIDE SERPL-SCNC: 99 MMOL/L (ref 96–112)
CO2 SERPL-SCNC: 21 MMOL/L (ref 20–33)
CREAT SERPL-MCNC: 1.06 MG/DL (ref 0.5–1.4)
EKG IMPRESSION: NORMAL
EOSINOPHIL # BLD AUTO: 0.07 K/UL (ref 0–0.51)
EOSINOPHIL NFR BLD: 0.8 % (ref 0–6.9)
ERYTHROCYTE [DISTWIDTH] IN BLOOD BY AUTOMATED COUNT: 50.7 FL (ref 35.9–50)
GFR SERPLBLD CREATININE-BSD FMLA CKD-EPI: 82 ML/MIN/1.73 M 2
GLOBULIN SER CALC-MCNC: 3.1 G/DL (ref 1.9–3.5)
GLUCOSE SERPL-MCNC: 118 MG/DL (ref 65–99)
HCT VFR BLD AUTO: 49.4 % (ref 42–52)
HGB BLD-MCNC: 17.2 G/DL (ref 14–18)
IMM GRANULOCYTES # BLD AUTO: 0.04 K/UL (ref 0–0.11)
IMM GRANULOCYTES NFR BLD AUTO: 0.5 % (ref 0–0.9)
LYMPHOCYTES # BLD AUTO: 1.7 K/UL (ref 1–4.8)
LYMPHOCYTES NFR BLD: 20.2 % (ref 22–41)
MCH RBC QN AUTO: 31.2 PG (ref 27–33)
MCHC RBC AUTO-ENTMCNC: 34.8 G/DL (ref 33.7–35.3)
MCV RBC AUTO: 89.5 FL (ref 81.4–97.8)
MONOCYTES # BLD AUTO: 0.73 K/UL (ref 0–0.85)
MONOCYTES NFR BLD AUTO: 8.7 % (ref 0–13.4)
NEUTROPHILS # BLD AUTO: 5.79 K/UL (ref 1.82–7.42)
NEUTROPHILS NFR BLD: 68.6 % (ref 44–72)
NRBC # BLD AUTO: 0 K/UL
NRBC BLD-RTO: 0 /100 WBC
PLATELET # BLD AUTO: 265 K/UL (ref 164–446)
PMV BLD AUTO: 9.1 FL (ref 9–12.9)
POTASSIUM SERPL-SCNC: 3.6 MMOL/L (ref 3.6–5.5)
PROT SERPL-MCNC: 7.2 G/DL (ref 6–8.2)
RBC # BLD AUTO: 5.52 M/UL (ref 4.7–6.1)
SODIUM SERPL-SCNC: 137 MMOL/L (ref 135–145)
TROPONIN T SERPL-MCNC: 13 NG/L (ref 6–19)
TROPONIN T SERPL-MCNC: 8 NG/L (ref 6–19)
WBC # BLD AUTO: 8.4 K/UL (ref 4.8–10.8)

## 2023-04-02 PROCEDURE — 93005 ELECTROCARDIOGRAM TRACING: CPT | Performed by: EMERGENCY MEDICINE

## 2023-04-02 PROCEDURE — 700101 HCHG RX REV CODE 250: Performed by: EMERGENCY MEDICINE

## 2023-04-02 PROCEDURE — 700117 HCHG RX CONTRAST REV CODE 255: Performed by: EMERGENCY MEDICINE

## 2023-04-02 PROCEDURE — 700101 HCHG RX REV CODE 250

## 2023-04-02 PROCEDURE — 71045 X-RAY EXAM CHEST 1 VIEW: CPT

## 2023-04-02 PROCEDURE — 96374 THER/PROPH/DIAG INJ IV PUSH: CPT

## 2023-04-02 PROCEDURE — 700111 HCHG RX REV CODE 636 W/ 250 OVERRIDE (IP): Performed by: EMERGENCY MEDICINE

## 2023-04-02 PROCEDURE — 80053 COMPREHEN METABOLIC PANEL: CPT

## 2023-04-02 PROCEDURE — 76705 ECHO EXAM OF ABDOMEN: CPT

## 2023-04-02 PROCEDURE — 96376 TX/PRO/DX INJ SAME DRUG ADON: CPT

## 2023-04-02 PROCEDURE — 770020 HCHG ROOM/CARE - TELE (206)

## 2023-04-02 PROCEDURE — 74175 CTA ABDOMEN W/CONTRAST: CPT

## 2023-04-02 PROCEDURE — 99285 EMERGENCY DEPT VISIT HI MDM: CPT

## 2023-04-02 PROCEDURE — 700105 HCHG RX REV CODE 258: Performed by: INTERNAL MEDICINE

## 2023-04-02 PROCEDURE — 700102 HCHG RX REV CODE 250 W/ 637 OVERRIDE(OP): Performed by: INTERNAL MEDICINE

## 2023-04-02 PROCEDURE — 36415 COLL VENOUS BLD VENIPUNCTURE: CPT

## 2023-04-02 PROCEDURE — HZ2ZZZZ DETOXIFICATION SERVICES FOR SUBSTANCE ABUSE TREATMENT: ICD-10-PCS | Performed by: INTERNAL MEDICINE

## 2023-04-02 PROCEDURE — 84484 ASSAY OF TROPONIN QUANT: CPT

## 2023-04-02 PROCEDURE — 96375 TX/PRO/DX INJ NEW DRUG ADDON: CPT

## 2023-04-02 PROCEDURE — 80074 ACUTE HEPATITIS PANEL: CPT

## 2023-04-02 PROCEDURE — A9270 NON-COVERED ITEM OR SERVICE: HCPCS | Performed by: INTERNAL MEDICINE

## 2023-04-02 PROCEDURE — 93005 ELECTROCARDIOGRAM TRACING: CPT

## 2023-04-02 PROCEDURE — 85025 COMPLETE CBC W/AUTO DIFF WBC: CPT

## 2023-04-02 PROCEDURE — 70450 CT HEAD/BRAIN W/O DYE: CPT

## 2023-04-02 PROCEDURE — 99223 1ST HOSP IP/OBS HIGH 75: CPT | Performed by: INTERNAL MEDICINE

## 2023-04-02 RX ORDER — VENLAFAXINE 37.5 MG/1
37.5 TABLET ORAL 2 TIMES DAILY
Status: DISCONTINUED | OUTPATIENT
Start: 2023-04-03 | End: 2023-04-04 | Stop reason: HOSPADM

## 2023-04-02 RX ORDER — LORAZEPAM 1 MG/1
3 TABLET ORAL
Status: DISCONTINUED | OUTPATIENT
Start: 2023-04-02 | End: 2023-04-04 | Stop reason: HOSPADM

## 2023-04-02 RX ORDER — CLONIDINE HYDROCHLORIDE 0.1 MG/1
0.1 TABLET ORAL
Status: DISCONTINUED | OUTPATIENT
Start: 2023-04-02 | End: 2023-04-04 | Stop reason: HOSPADM

## 2023-04-02 RX ORDER — LORAZEPAM 2 MG/ML
1 INJECTION INTRAMUSCULAR
Status: DISCONTINUED | OUTPATIENT
Start: 2023-04-02 | End: 2023-04-04 | Stop reason: HOSPADM

## 2023-04-02 RX ORDER — LORAZEPAM 2 MG/ML
1.5 INJECTION INTRAMUSCULAR
Status: DISCONTINUED | OUTPATIENT
Start: 2023-04-02 | End: 2023-04-04 | Stop reason: HOSPADM

## 2023-04-02 RX ORDER — FOLIC ACID 1 MG/1
1 TABLET ORAL DAILY
Status: DISCONTINUED | OUTPATIENT
Start: 2023-04-03 | End: 2023-04-04 | Stop reason: HOSPADM

## 2023-04-02 RX ORDER — PROCHLORPERAZINE EDISYLATE 5 MG/ML
5-10 INJECTION INTRAMUSCULAR; INTRAVENOUS EVERY 4 HOURS PRN
Status: DISCONTINUED | OUTPATIENT
Start: 2023-04-02 | End: 2023-04-04 | Stop reason: HOSPADM

## 2023-04-02 RX ORDER — LORAZEPAM 1 MG/1
4 TABLET ORAL
Status: DISCONTINUED | OUTPATIENT
Start: 2023-04-02 | End: 2023-04-04 | Stop reason: HOSPADM

## 2023-04-02 RX ORDER — SODIUM CHLORIDE 9 MG/ML
INJECTION, SOLUTION INTRAVENOUS CONTINUOUS
Status: DISCONTINUED | OUTPATIENT
Start: 2023-04-02 | End: 2023-04-03

## 2023-04-02 RX ORDER — AMOXICILLIN 250 MG
2 CAPSULE ORAL 2 TIMES DAILY
Status: DISCONTINUED | OUTPATIENT
Start: 2023-04-02 | End: 2023-04-04 | Stop reason: HOSPADM

## 2023-04-02 RX ORDER — LORAZEPAM 2 MG/ML
2 INJECTION INTRAMUSCULAR
Status: DISCONTINUED | OUTPATIENT
Start: 2023-04-02 | End: 2023-04-04 | Stop reason: HOSPADM

## 2023-04-02 RX ORDER — ACETAMINOPHEN 325 MG/1
650 TABLET ORAL EVERY 6 HOURS PRN
Status: DISCONTINUED | OUTPATIENT
Start: 2023-04-02 | End: 2023-04-02

## 2023-04-02 RX ORDER — POLYETHYLENE GLYCOL 3350 17 G/17G
1 POWDER, FOR SOLUTION ORAL
Status: DISCONTINUED | OUTPATIENT
Start: 2023-04-02 | End: 2023-04-04 | Stop reason: HOSPADM

## 2023-04-02 RX ORDER — PROMETHAZINE HYDROCHLORIDE 25 MG/1
12.5-25 SUPPOSITORY RECTAL EVERY 4 HOURS PRN
Status: DISCONTINUED | OUTPATIENT
Start: 2023-04-02 | End: 2023-04-04 | Stop reason: HOSPADM

## 2023-04-02 RX ORDER — LOSARTAN POTASSIUM 25 MG/1
25 TABLET ORAL DAILY
Status: DISCONTINUED | OUTPATIENT
Start: 2023-04-03 | End: 2023-04-04 | Stop reason: HOSPADM

## 2023-04-02 RX ORDER — ONDANSETRON 2 MG/ML
4 INJECTION INTRAMUSCULAR; INTRAVENOUS EVERY 4 HOURS PRN
Status: DISCONTINUED | OUTPATIENT
Start: 2023-04-02 | End: 2023-04-04 | Stop reason: HOSPADM

## 2023-04-02 RX ORDER — ONDANSETRON 2 MG/ML
4 INJECTION INTRAMUSCULAR; INTRAVENOUS ONCE
Status: COMPLETED | OUTPATIENT
Start: 2023-04-02 | End: 2023-04-02

## 2023-04-02 RX ORDER — ROSUVASTATIN CALCIUM 10 MG/1
20 TABLET, COATED ORAL EVERY EVENING
Status: DISCONTINUED | OUTPATIENT
Start: 2023-04-02 | End: 2023-04-04 | Stop reason: HOSPADM

## 2023-04-02 RX ORDER — LORAZEPAM 2 MG/ML
1 INJECTION INTRAMUSCULAR ONCE
Status: COMPLETED | OUTPATIENT
Start: 2023-04-02 | End: 2023-04-02

## 2023-04-02 RX ORDER — ACETAMINOPHEN 325 MG/1
650 TABLET ORAL EVERY 6 HOURS PRN
Status: DISCONTINUED | OUTPATIENT
Start: 2023-04-02 | End: 2023-04-04 | Stop reason: HOSPADM

## 2023-04-02 RX ORDER — HYDROXYZINE PAMOATE 25 MG/1
25 CAPSULE ORAL 3 TIMES DAILY PRN
Status: ON HOLD | COMMUNITY
End: 2023-04-04

## 2023-04-02 RX ORDER — LORAZEPAM 2 MG/ML
0.5 INJECTION INTRAMUSCULAR EVERY 4 HOURS PRN
Status: DISCONTINUED | OUTPATIENT
Start: 2023-04-02 | End: 2023-04-04 | Stop reason: HOSPADM

## 2023-04-02 RX ORDER — GAUZE BANDAGE 2" X 2"
100 BANDAGE TOPICAL DAILY
Status: DISCONTINUED | OUTPATIENT
Start: 2023-04-03 | End: 2023-04-04 | Stop reason: HOSPADM

## 2023-04-02 RX ORDER — ONDANSETRON 4 MG/1
4 TABLET, ORALLY DISINTEGRATING ORAL EVERY 4 HOURS PRN
Status: DISCONTINUED | OUTPATIENT
Start: 2023-04-02 | End: 2023-04-04 | Stop reason: HOSPADM

## 2023-04-02 RX ORDER — HYDROXYZINE HYDROCHLORIDE 25 MG/1
25 TABLET, FILM COATED ORAL 3 TIMES DAILY PRN
Status: DISCONTINUED | OUTPATIENT
Start: 2023-04-02 | End: 2023-04-04 | Stop reason: HOSPADM

## 2023-04-02 RX ORDER — ENOXAPARIN SODIUM 100 MG/ML
40 INJECTION SUBCUTANEOUS DAILY
Status: DISCONTINUED | OUTPATIENT
Start: 2023-04-02 | End: 2023-04-04 | Stop reason: HOSPADM

## 2023-04-02 RX ORDER — IBUPROFEN 600 MG/1
600 TABLET ORAL EVERY 6 HOURS PRN
Status: DISCONTINUED | OUTPATIENT
Start: 2023-04-02 | End: 2023-04-04 | Stop reason: HOSPADM

## 2023-04-02 RX ORDER — RIZATRIPTAN BENZOATE 5 MG/1
5-10 TABLET ORAL
Status: DISCONTINUED | OUTPATIENT
Start: 2023-04-02 | End: 2023-04-02

## 2023-04-02 RX ORDER — LORAZEPAM 1 MG/1
2 TABLET ORAL
Status: DISCONTINUED | OUTPATIENT
Start: 2023-04-02 | End: 2023-04-04 | Stop reason: HOSPADM

## 2023-04-02 RX ORDER — HYDROMORPHONE HYDROCHLORIDE 1 MG/ML
0.5 INJECTION, SOLUTION INTRAMUSCULAR; INTRAVENOUS; SUBCUTANEOUS ONCE
Status: COMPLETED | OUTPATIENT
Start: 2023-04-02 | End: 2023-04-02

## 2023-04-02 RX ORDER — OMEPRAZOLE 20 MG/1
20 CAPSULE, DELAYED RELEASE ORAL
Status: DISCONTINUED | OUTPATIENT
Start: 2023-04-02 | End: 2023-04-03

## 2023-04-02 RX ORDER — LABETALOL HYDROCHLORIDE 5 MG/ML
10 INJECTION, SOLUTION INTRAVENOUS ONCE
Status: COMPLETED | OUTPATIENT
Start: 2023-04-02 | End: 2023-04-02

## 2023-04-02 RX ORDER — LORAZEPAM 0.5 MG/1
0.5 TABLET ORAL EVERY 4 HOURS PRN
Status: DISCONTINUED | OUTPATIENT
Start: 2023-04-02 | End: 2023-04-04 | Stop reason: HOSPADM

## 2023-04-02 RX ORDER — BISACODYL 10 MG
10 SUPPOSITORY, RECTAL RECTAL
Status: DISCONTINUED | OUTPATIENT
Start: 2023-04-02 | End: 2023-04-04 | Stop reason: HOSPADM

## 2023-04-02 RX ORDER — PROMETHAZINE HYDROCHLORIDE 25 MG/1
12.5-25 TABLET ORAL EVERY 4 HOURS PRN
Status: DISCONTINUED | OUTPATIENT
Start: 2023-04-02 | End: 2023-04-04 | Stop reason: HOSPADM

## 2023-04-02 RX ORDER — LORAZEPAM 1 MG/1
1 TABLET ORAL EVERY 4 HOURS PRN
Status: DISCONTINUED | OUTPATIENT
Start: 2023-04-02 | End: 2023-04-04 | Stop reason: HOSPADM

## 2023-04-02 RX ADMIN — IOHEXOL 100 ML: 350 INJECTION, SOLUTION INTRAVENOUS at 17:55

## 2023-04-02 RX ADMIN — ONDANSETRON 4 MG: 2 INJECTION INTRAMUSCULAR; INTRAVENOUS at 17:25

## 2023-04-02 RX ADMIN — THIAMINE HYDROCHLORIDE: 100 INJECTION, SOLUTION INTRAMUSCULAR; INTRAVENOUS at 21:51

## 2023-04-02 RX ADMIN — LORAZEPAM 1 MG: 2 INJECTION INTRAMUSCULAR; INTRAVENOUS at 16:30

## 2023-04-02 RX ADMIN — Medication: at 21:51

## 2023-04-02 RX ADMIN — LORAZEPAM 1 MG: 2 INJECTION INTRAMUSCULAR; INTRAVENOUS at 19:24

## 2023-04-02 RX ADMIN — LABETALOL HYDROCHLORIDE 10 MG: 5 INJECTION INTRAVENOUS at 18:10

## 2023-04-02 RX ADMIN — SODIUM CHLORIDE: 9 INJECTION, SOLUTION INTRAVENOUS at 21:46

## 2023-04-02 RX ADMIN — HYDROMORPHONE HYDROCHLORIDE 0.5 MG: 1 INJECTION, SOLUTION INTRAMUSCULAR; INTRAVENOUS; SUBCUTANEOUS at 17:25

## 2023-04-02 RX ADMIN — LORAZEPAM 1 MG: 1 TABLET ORAL at 21:41

## 2023-04-02 ASSESSMENT — ENCOUNTER SYMPTOMS
CHILLS: 0
BLURRED VISION: 0
NERVOUS/ANXIOUS: 1
NECK PAIN: 0
VOMITING: 0
ORTHOPNEA: 0
COUGH: 0
MYALGIAS: 0
WEIGHT LOSS: 0
PALPITATIONS: 0
ABDOMINAL PAIN: 0
CLAUDICATION: 0
DOUBLE VISION: 0
PHOTOPHOBIA: 0
SPEECH CHANGE: 0
CONSTIPATION: 0
DEPRESSION: 1
HEMOPTYSIS: 0
DIZZINESS: 1
WEAKNESS: 0
FEVER: 0
DIARRHEA: 0
NAUSEA: 0
BACK PAIN: 0

## 2023-04-02 ASSESSMENT — LIFESTYLE VARIABLES
TOTAL SCORE: 1
ON A TYPICAL DAY WHEN YOU DRINK ALCOHOL HOW MANY DRINKS DO YOU HAVE: 0
HAVE YOU EVER FELT YOU SHOULD CUT DOWN ON YOUR DRINKING: NO
EVER HAD A DRINK FIRST THING IN THE MORNING TO STEADY YOUR NERVES TO GET RID OF A HANGOVER: NO
TOTAL SCORE: 1
HAVE PEOPLE ANNOYED YOU BY CRITICIZING YOUR DRINKING: NO
ORIENTATION AND CLOUDING OF SENSORIUM: ORIENTED AND CAN DO SERIAL ADDITIONS
TOTAL SCORE: 0
HEADACHE, FULLNESS IN HEAD: MODERATE
TREMOR: *
HOW MANY TIMES IN THE PAST YEAR HAVE YOU HAD 5 OR MORE DRINKS IN A DAY: 0
AVERAGE NUMBER OF DAYS PER WEEK YOU HAVE A DRINK CONTAINING ALCOHOL: 7
VISUAL DISTURBANCES: NOT PRESENT
CONSUMPTION TOTAL: POSITIVE
EVER FELT BAD OR GUILTY ABOUT YOUR DRINKING: NO
HAVE YOU EVER FELT YOU SHOULD CUT DOWN ON YOUR DRINKING: NO
DO YOU DRINK ALCOHOL: NO
HOW MANY TIMES IN THE PAST YEAR HAVE YOU HAD 5 OR MORE DRINKS IN A DAY: 0
AVERAGE NUMBER OF DAYS PER WEEK YOU HAVE A DRINK CONTAINING ALCOHOL: 0
ALCOHOL_USE: YES
TOTAL SCORE: 1
ON A TYPICAL DAY WHEN YOU DRINK ALCOHOL HOW MANY DRINKS DO YOU HAVE: 7
TOTAL SCORE: 10
TOTAL SCORE: 0
EVER HAD A DRINK FIRST THING IN THE MORNING TO STEADY YOUR NERVES TO GET RID OF A HANGOVER: YES
PAROXYSMAL SWEATS: BARELY PERCEPTIBLE SWEATING. PALMS MOIST
CONSUMPTION TOTAL: NEGATIVE
NAUSEA AND VOMITING: NO NAUSEA AND NO VOMITING
ANXIETY: *
HAVE PEOPLE ANNOYED YOU BY CRITICIZING YOUR DRINKING: NO
EVER FELT BAD OR GUILTY ABOUT YOUR DRINKING: NO
AGITATION: SOMEWHAT MORE THAN NORMAL ACTIVITY
AUDITORY DISTURBANCES: NOT PRESENT
TOTAL SCORE: 0

## 2023-04-02 ASSESSMENT — COGNITIVE AND FUNCTIONAL STATUS - GENERAL
SUGGESTED CMS G CODE MODIFIER DAILY ACTIVITY: CH
DAILY ACTIVITIY SCORE: 24
MOBILITY SCORE: 24
SUGGESTED CMS G CODE MODIFIER MOBILITY: CH

## 2023-04-02 ASSESSMENT — PAIN DESCRIPTION - PAIN TYPE: TYPE: ACUTE PAIN

## 2023-04-02 ASSESSMENT — PATIENT HEALTH QUESTIONNAIRE - PHQ9
1. LITTLE INTEREST OR PLEASURE IN DOING THINGS: NOT AT ALL
SUM OF ALL RESPONSES TO PHQ9 QUESTIONS 1 AND 2: 0
2. FEELING DOWN, DEPRESSED, IRRITABLE, OR HOPELESS: NOT AT ALL

## 2023-04-02 ASSESSMENT — FIBROSIS 4 INDEX
FIB4 SCORE: 2.44
FIB4 SCORE: 3.04

## 2023-04-02 NOTE — ED NOTES
Pt ambulated on his own back to ED 7A. Pt changed into gown and placed on monitor. Pt is updated on POC. [place] in the lowest position, side rails are up and call light within reach. Pt educated to let RN know if condition gets worst or if the pt needs anything.

## 2023-04-02 NOTE — ED TRIAGE NOTES
"Bib self for above complaints.     Chief Complaint   Patient presents with    Hypertension     Generally unwell feeling. Unable to give me much else. Pt states left arm pain as well. Endorses headache last night. BP at home been 150/100 despite BP meds. Endorses sob, endorses some chest tightness.      BP (!) 154/91   Pulse (!) 119   Temp 36.4 °C (97.5 °F) (Temporal)   Resp 20   Ht 1.854 m (6' 1\")   Wt 101 kg (223 lb 12.3 oz)   SpO2 95%   BMI 29.52 kg/m²     "

## 2023-04-03 ENCOUNTER — APPOINTMENT (OUTPATIENT)
Dept: CARDIOLOGY | Facility: MEDICAL CENTER | Age: 58
DRG: 897 | End: 2023-04-03
Attending: FAMILY MEDICINE

## 2023-04-03 PROBLEM — E87.6 HYPOKALEMIA: Status: ACTIVE | Noted: 2023-04-03

## 2023-04-03 LAB
ALBUMIN SERPL BCP-MCNC: 3.5 G/DL (ref 3.2–4.9)
ALBUMIN/GLOB SERPL: 1.3 G/DL
ALP SERPL-CCNC: 83 U/L (ref 30–99)
ALT SERPL-CCNC: 79 U/L (ref 2–50)
ANION GAP SERPL CALC-SCNC: 10 MMOL/L (ref 7–16)
AST SERPL-CCNC: 101 U/L (ref 12–45)
BASOPHILS # BLD AUTO: 1.1 % (ref 0–1.8)
BASOPHILS # BLD: 0.1 K/UL (ref 0–0.12)
BILIRUB SERPL-MCNC: 0.8 MG/DL (ref 0.1–1.5)
BUN SERPL-MCNC: 8 MG/DL (ref 8–22)
CALCIUM ALBUM COR SERPL-MCNC: 9 MG/DL (ref 8.5–10.5)
CALCIUM SERPL-MCNC: 8.6 MG/DL (ref 8.4–10.2)
CHLORIDE SERPL-SCNC: 100 MMOL/L (ref 96–112)
CO2 SERPL-SCNC: 27 MMOL/L (ref 20–33)
CREAT SERPL-MCNC: 0.96 MG/DL (ref 0.5–1.4)
EOSINOPHIL # BLD AUTO: 0.06 K/UL (ref 0–0.51)
EOSINOPHIL NFR BLD: 0.7 % (ref 0–6.9)
ERYTHROCYTE [DISTWIDTH] IN BLOOD BY AUTOMATED COUNT: 52 FL (ref 35.9–50)
GFR SERPLBLD CREATININE-BSD FMLA CKD-EPI: 92 ML/MIN/1.73 M 2
GLOBULIN SER CALC-MCNC: 2.8 G/DL (ref 1.9–3.5)
GLUCOSE SERPL-MCNC: 129 MG/DL (ref 65–99)
HAV IGM SERPL QL IA: NORMAL
HBV CORE IGM SER QL: NORMAL
HBV SURFACE AG SER QL: NORMAL
HCT VFR BLD AUTO: 47 % (ref 42–52)
HCV AB SER QL: NORMAL
HGB BLD-MCNC: 15.9 G/DL (ref 14–18)
IMM GRANULOCYTES # BLD AUTO: 0.03 K/UL (ref 0–0.11)
IMM GRANULOCYTES NFR BLD AUTO: 0.3 % (ref 0–0.9)
LIPASE SERPL-CCNC: 41 U/L (ref 7–58)
LV EJECT FRACT  99904: 55
LV EJECT FRACT MOD 2C 99903: 60.66
LV EJECT FRACT MOD 4C 99902: 49.53
LV EJECT FRACT MOD BP 99901: 53.02
LYMPHOCYTES # BLD AUTO: 0.98 K/UL (ref 1–4.8)
LYMPHOCYTES NFR BLD: 11 % (ref 22–41)
MAGNESIUM SERPL-MCNC: 2.1 MG/DL (ref 1.5–2.5)
MCH RBC QN AUTO: 30.8 PG (ref 27–33)
MCHC RBC AUTO-ENTMCNC: 33.8 G/DL (ref 33.7–35.3)
MCV RBC AUTO: 91.1 FL (ref 81.4–97.8)
MONOCYTES # BLD AUTO: 0.81 K/UL (ref 0–0.85)
MONOCYTES NFR BLD AUTO: 9.1 % (ref 0–13.4)
NEUTROPHILS # BLD AUTO: 6.89 K/UL (ref 1.82–7.42)
NEUTROPHILS NFR BLD: 77.8 % (ref 44–72)
NRBC # BLD AUTO: 0 K/UL
NRBC BLD-RTO: 0 /100 WBC
PHOSPHATE SERPL-MCNC: 2.6 MG/DL (ref 2.5–4.5)
PLATELET # BLD AUTO: 235 K/UL (ref 164–446)
PMV BLD AUTO: 9.6 FL (ref 9–12.9)
POTASSIUM SERPL-SCNC: 3.5 MMOL/L (ref 3.6–5.5)
PROT SERPL-MCNC: 6.3 G/DL (ref 6–8.2)
RBC # BLD AUTO: 5.16 M/UL (ref 4.7–6.1)
SODIUM SERPL-SCNC: 137 MMOL/L (ref 135–145)
TROPONIN T SERPL-MCNC: 9 NG/L (ref 6–19)
WBC # BLD AUTO: 8.9 K/UL (ref 4.8–10.8)

## 2023-04-03 PROCEDURE — 94760 N-INVAS EAR/PLS OXIMETRY 1: CPT

## 2023-04-03 PROCEDURE — 99232 SBSQ HOSP IP/OBS MODERATE 35: CPT | Performed by: FAMILY MEDICINE

## 2023-04-03 PROCEDURE — 84100 ASSAY OF PHOSPHORUS: CPT

## 2023-04-03 PROCEDURE — 700111 HCHG RX REV CODE 636 W/ 250 OVERRIDE (IP): Performed by: INTERNAL MEDICINE

## 2023-04-03 PROCEDURE — A9270 NON-COVERED ITEM OR SERVICE: HCPCS | Performed by: INTERNAL MEDICINE

## 2023-04-03 PROCEDURE — 84484 ASSAY OF TROPONIN QUANT: CPT

## 2023-04-03 PROCEDURE — 770020 HCHG ROOM/CARE - TELE (206)

## 2023-04-03 PROCEDURE — 83690 ASSAY OF LIPASE: CPT

## 2023-04-03 PROCEDURE — 93306 TTE W/DOPPLER COMPLETE: CPT

## 2023-04-03 PROCEDURE — 700101 HCHG RX REV CODE 250: Performed by: FAMILY MEDICINE

## 2023-04-03 PROCEDURE — 85025 COMPLETE CBC W/AUTO DIFF WBC: CPT

## 2023-04-03 PROCEDURE — 700102 HCHG RX REV CODE 250 W/ 637 OVERRIDE(OP): Performed by: FAMILY MEDICINE

## 2023-04-03 PROCEDURE — 83735 ASSAY OF MAGNESIUM: CPT

## 2023-04-03 PROCEDURE — A9270 NON-COVERED ITEM OR SERVICE: HCPCS | Performed by: FAMILY MEDICINE

## 2023-04-03 PROCEDURE — 93306 TTE W/DOPPLER COMPLETE: CPT | Mod: 26 | Performed by: INTERNAL MEDICINE

## 2023-04-03 PROCEDURE — 700105 HCHG RX REV CODE 258: Performed by: FAMILY MEDICINE

## 2023-04-03 PROCEDURE — 700105 HCHG RX REV CODE 258: Performed by: INTERNAL MEDICINE

## 2023-04-03 PROCEDURE — 80053 COMPREHEN METABOLIC PANEL: CPT

## 2023-04-03 PROCEDURE — 36415 COLL VENOUS BLD VENIPUNCTURE: CPT

## 2023-04-03 PROCEDURE — 700102 HCHG RX REV CODE 250 W/ 637 OVERRIDE(OP): Performed by: INTERNAL MEDICINE

## 2023-04-03 RX ORDER — SUCRALFATE ORAL 1 G/10ML
1 SUSPENSION ORAL EVERY 6 HOURS
Status: DISCONTINUED | OUTPATIENT
Start: 2023-04-03 | End: 2023-04-04 | Stop reason: HOSPADM

## 2023-04-03 RX ORDER — ASPIRIN 81 MG/1
81 TABLET, CHEWABLE ORAL DAILY
Status: DISCONTINUED | OUTPATIENT
Start: 2023-04-03 | End: 2023-04-04 | Stop reason: HOSPADM

## 2023-04-03 RX ORDER — SODIUM CHLORIDE, SODIUM LACTATE, POTASSIUM CHLORIDE, CALCIUM CHLORIDE 600; 310; 30; 20 MG/100ML; MG/100ML; MG/100ML; MG/100ML
INJECTION, SOLUTION INTRAVENOUS CONTINUOUS
Status: DISCONTINUED | OUTPATIENT
Start: 2023-04-03 | End: 2023-04-04 | Stop reason: HOSPADM

## 2023-04-03 RX ORDER — OMEPRAZOLE 20 MG/1
20 CAPSULE, DELAYED RELEASE ORAL ONCE
Status: COMPLETED | OUTPATIENT
Start: 2023-04-03 | End: 2023-04-03

## 2023-04-03 RX ORDER — OMEPRAZOLE 20 MG/1
20 CAPSULE, DELAYED RELEASE ORAL DAILY
Status: DISCONTINUED | OUTPATIENT
Start: 2023-04-03 | End: 2023-04-03

## 2023-04-03 RX ORDER — LIDOCAINE 50 MG/G
1 PATCH TOPICAL DAILY
Status: DISCONTINUED | OUTPATIENT
Start: 2023-04-03 | End: 2023-04-04 | Stop reason: HOSPADM

## 2023-04-03 RX ORDER — POTASSIUM CHLORIDE 20 MEQ/1
40 TABLET, EXTENDED RELEASE ORAL ONCE
Status: COMPLETED | OUTPATIENT
Start: 2023-04-03 | End: 2023-04-03

## 2023-04-03 RX ORDER — OMEPRAZOLE 20 MG/1
20 CAPSULE, DELAYED RELEASE ORAL 2 TIMES DAILY
Status: DISCONTINUED | OUTPATIENT
Start: 2023-04-03 | End: 2023-04-04 | Stop reason: HOSPADM

## 2023-04-03 RX ADMIN — SUCRALFATE 1 G: 1 SUSPENSION ORAL at 13:11

## 2023-04-03 RX ADMIN — ASPIRIN 81 MG 81 MG: 81 TABLET ORAL at 13:11

## 2023-04-03 RX ADMIN — LORAZEPAM 1 MG: 2 INJECTION INTRAMUSCULAR; INTRAVENOUS at 11:23

## 2023-04-03 RX ADMIN — SODIUM CHLORIDE: 9 INJECTION, SOLUTION INTRAVENOUS at 11:51

## 2023-04-03 RX ADMIN — VENLAFAXINE HYDROCHLORIDE 37.5 MG: 37.5 TABLET ORAL at 05:02

## 2023-04-03 RX ADMIN — ONDANSETRON 4 MG: 2 INJECTION INTRAMUSCULAR; INTRAVENOUS at 08:02

## 2023-04-03 RX ADMIN — LORAZEPAM 1 MG: 1 TABLET ORAL at 20:24

## 2023-04-03 RX ADMIN — LORAZEPAM 0.5 MG: 0.5 TABLET ORAL at 01:11

## 2023-04-03 RX ADMIN — THIAMINE HCL TAB 100 MG 100 MG: 100 TAB at 05:02

## 2023-04-03 RX ADMIN — POTASSIUM CHLORIDE 40 MEQ: 1500 TABLET, EXTENDED RELEASE ORAL at 11:00

## 2023-04-03 RX ADMIN — OMEPRAZOLE 20 MG: 20 CAPSULE, DELAYED RELEASE ORAL at 13:11

## 2023-04-03 RX ADMIN — THERA TABS 1 TABLET: TAB at 05:02

## 2023-04-03 RX ADMIN — FOLIC ACID 1 MG: 1 TABLET ORAL at 05:02

## 2023-04-03 RX ADMIN — VENLAFAXINE HYDROCHLORIDE 37.5 MG: 37.5 TABLET ORAL at 18:17

## 2023-04-03 RX ADMIN — LIDOCAINE 1 PATCH: 50 PATCH TOPICAL at 13:11

## 2023-04-03 RX ADMIN — ROSUVASTATIN 20 MG: 10 TABLET, FILM COATED ORAL at 18:17

## 2023-04-03 RX ADMIN — HYDROXYZINE HYDROCHLORIDE 25 MG: 25 TABLET, FILM COATED ORAL at 08:02

## 2023-04-03 RX ADMIN — SODIUM CHLORIDE, POTASSIUM CHLORIDE, SODIUM LACTATE AND CALCIUM CHLORIDE: 600; 310; 30; 20 INJECTION, SOLUTION INTRAVENOUS at 13:16

## 2023-04-03 RX ADMIN — PSYLLIUM HUSK 1 PACKET: 3.4 POWDER ORAL at 18:18

## 2023-04-03 RX ADMIN — LOSARTAN POTASSIUM 25 MG: 25 TABLET, FILM COATED ORAL at 05:02

## 2023-04-03 RX ADMIN — LORAZEPAM 1 MG: 1 TABLET ORAL at 05:03

## 2023-04-03 RX ADMIN — SUCRALFATE 1 G: 1 SUSPENSION ORAL at 18:21

## 2023-04-03 ASSESSMENT — LIFESTYLE VARIABLES
NAUSEA AND VOMITING: *
HEADACHE, FULLNESS IN HEAD: MILD
TOTAL SCORE: 8
HEADACHE, FULLNESS IN HEAD: MILD
VISUAL DISTURBANCES: NOT PRESENT
ORIENTATION AND CLOUDING OF SENSORIUM: ORIENTED AND CAN DO SERIAL ADDITIONS
NAUSEA AND VOMITING: MILD NAUSEA WITH NO VOMITING
AGITATION: NORMAL ACTIVITY
ANXIETY: *
ANXIETY: MODERATELY ANXIOUS OR GUARDED, SO ANXIETY IS INFERRED
ORIENTATION AND CLOUDING OF SENSORIUM: ORIENTED AND CAN DO SERIAL ADDITIONS
AUDITORY DISTURBANCES: NOT PRESENT
AGITATION: NORMAL ACTIVITY
NAUSEA AND VOMITING: NO NAUSEA AND NO VOMITING
TOTAL SCORE: 7
AGITATION: NORMAL ACTIVITY
PAROXYSMAL SWEATS: NO SWEAT VISIBLE
AUDITORY DISTURBANCES: NOT PRESENT
PAROXYSMAL SWEATS: NO SWEAT VISIBLE
TREMOR: NO TREMOR
HEADACHE, FULLNESS IN HEAD: MILD
HEADACHE, FULLNESS IN HEAD: MILD
ANXIETY: *
AGITATION: NORMAL ACTIVITY
TOTAL SCORE: 8
HEADACHE, FULLNESS IN HEAD: MODERATE
TREMOR: TREMOR NOT VISIBLE BUT CAN BE FELT, FINGERTIP TO FINGERTIP
PAROXYSMAL SWEATS: NO SWEAT VISIBLE
VISUAL DISTURBANCES: NOT PRESENT
AUDITORY DISTURBANCES: NOT PRESENT
NAUSEA AND VOMITING: MILD NAUSEA WITH NO VOMITING
AUDITORY DISTURBANCES: NOT PRESENT
ORIENTATION AND CLOUDING OF SENSORIUM: ORIENTED AND CAN DO SERIAL ADDITIONS
ORIENTATION AND CLOUDING OF SENSORIUM: ORIENTED AND CAN DO SERIAL ADDITIONS
TOTAL SCORE: 10
TREMOR: TREMOR NOT VISIBLE BUT CAN BE FELT, FINGERTIP TO FINGERTIP
AUDITORY DISTURBANCES: NOT PRESENT
ORIENTATION AND CLOUDING OF SENSORIUM: ORIENTED AND CAN DO SERIAL ADDITIONS
ORIENTATION AND CLOUDING OF SENSORIUM: ORIENTED AND CAN DO SERIAL ADDITIONS
VISUAL DISTURBANCES: NOT PRESENT
PAROXYSMAL SWEATS: NO SWEAT VISIBLE
TREMOR: TREMOR NOT VISIBLE BUT CAN BE FELT, FINGERTIP TO FINGERTIP
NAUSEA AND VOMITING: MILD NAUSEA WITH NO VOMITING
VISUAL DISTURBANCES: NOT PRESENT
PAROXYSMAL SWEATS: NO SWEAT VISIBLE
ANXIETY: *
TREMOR: TREMOR NOT VISIBLE BUT CAN BE FELT, FINGERTIP TO FINGERTIP
NAUSEA AND VOMITING: NO NAUSEA AND NO VOMITING
TOTAL SCORE: 11
TREMOR: *
AGITATION: NORMAL ACTIVITY
VISUAL DISTURBANCES: NOT PRESENT
PAROXYSMAL SWEATS: NO SWEAT VISIBLE
AGITATION: NORMAL ACTIVITY
ANXIETY: MODERATELY ANXIOUS OR GUARDED, SO ANXIETY IS INFERRED
TOTAL SCORE: 6
VISUAL DISTURBANCES: NOT PRESENT
AUDITORY DISTURBANCES: NOT PRESENT
HEADACHE, FULLNESS IN HEAD: MILD
ANXIETY: MODERATELY ANXIOUS OR GUARDED, SO ANXIETY IS INFERRED

## 2023-04-03 ASSESSMENT — ENCOUNTER SYMPTOMS
ABDOMINAL PAIN: 0
SEIZURES: 0
CHILLS: 0
FEVER: 0
TREMORS: 1
VOMITING: 0
SHORTNESS OF BREATH: 0
NAUSEA: 0
COUGH: 0

## 2023-04-03 ASSESSMENT — FIBROSIS 4 INDEX: FIB4 SCORE: 2.71

## 2023-04-03 ASSESSMENT — PATIENT HEALTH QUESTIONNAIRE - PHQ9
2. FEELING DOWN, DEPRESSED, IRRITABLE, OR HOPELESS: NOT AT ALL
SUM OF ALL RESPONSES TO PHQ9 QUESTIONS 1 AND 2: 0
1. LITTLE INTEREST OR PLEASURE IN DOING THINGS: NOT AT ALL

## 2023-04-03 ASSESSMENT — PAIN DESCRIPTION - PAIN TYPE
TYPE: ACUTE PAIN
TYPE: ACUTE PAIN

## 2023-04-03 NOTE — ASSESSMENT & PLAN NOTE
With tachycardia, hypertension and anxiety  Two glasses of wine and ~3oz hard liquor daily.  IV fluid with CIWA protocol  Monitor magnesium and phosphorus  Encouraged the patient to quit drinking - he is currently unsure of whether he will return to drinking or not. Discussed the risk benefits of detoxing depending on his intentions to quit  Last drink was 4/1 per patient

## 2023-04-03 NOTE — ASSESSMENT & PLAN NOTE
Denied any SI or HI  Continue home medication hydroxyzine and venlafaxine  Encouraged the patient to follow-up with his psychiatrist as outpatient

## 2023-04-03 NOTE — ASSESSMENT & PLAN NOTE
Uncontrolled, likely related to withdrawal alcohol  Continue losartan and start with clonidine as needed  IV labetalol if needed

## 2023-04-03 NOTE — PROGRESS NOTES
Hospital Medicine Daily Progress Note    Date of Service  4/3/2023    Chief Complaint  Tom Quan is a 57 y.o. male admitted 4/2/2023 with alcohol withdrawal    Hospital Course  This is a 58yo with a history of HLD, AAA, ulcerative colitis, HTN, and alcoholism who presented to hospital with uncontrolled hypertension to 190/100 at home with associated chest pain and sore L arm. Workup in the ED for his chest pain including troponins x 3, CXR, CTA aorta and EKG were all negative (other than his known stable thoracic aneurysm of 4.8x4.7cm. RUQ u/s for LFT elevation demonstrated hepatomegaly and echogenic liver with fatty change vs fibrosis.  His chest pain is likely secondary to elevated Bps/hypertensive emergency with alcohol withdrawal.     Interval Problem Update  4/3 - CIWA of 8,7,10, Bps better on Losartan started last night. Pt tremulous today, hands and lips - states he has two glasses of wine and one glass of vodka daily, but the vodka appears to be about 3 shots worth in one glass. He denies history of withdrawal but doesn't tend to go long without EtOH. He has mild right sided chest pain that he attributes to his telemetry box but states that his left sided chest pain that radiated into his arm resolved. He denies melena or hematochezia. He is unsure if he wishes to quit drinking, states he will think about it and let me know after we discussed the detox process.     I have discussed this patient's plan of care and discharge plan at IDT rounds today with Case Management, Nursing, Nursing leadership, and other members of the IDT team.    Consultants/Specialty  none    Code Status  Full Code    Disposition  Patient is not medically cleared for discharge.   Anticipate discharge to to home with close outpatient follow-up.  I have placed the appropriate orders for post-discharge needs.    Review of Systems  Review of Systems   Constitutional:  Negative for chills and fever.   Respiratory:  Negative for cough  and shortness of breath.    Cardiovascular:  Positive for chest pain (Occasional right sided). Negative for leg swelling.   Gastrointestinal:  Negative for abdominal pain, nausea and vomiting.   Neurological:  Positive for tremors. Negative for seizures.   All other systems reviewed and are negative.     Physical Exam  Temp:  [36.4 °C (97.5 °F)-36.8 °C (98.2 °F)] 36.6 °C (97.8 °F)  Pulse:  [] 82  Resp:  [14-21] 18  BP: (113-154)/() 123/84  SpO2:  [91 %-96 %] 96 %    Physical Exam  Vitals and nursing note reviewed.   Constitutional:       Comments: Tremulous     HENT:      Head: Normocephalic and atraumatic.      Mouth/Throat:      Mouth: Mucous membranes are moist.   Cardiovascular:      Rate and Rhythm: Normal rate and regular rhythm.   Pulmonary:      Effort: Pulmonary effort is normal. No respiratory distress.      Breath sounds: Normal breath sounds. No wheezing or rales.   Abdominal:      General: Abdomen is flat. Bowel sounds are normal. There is no distension.      Palpations: Abdomen is soft.      Tenderness: There is no abdominal tenderness. There is no guarding.   Musculoskeletal:         General: No swelling. Normal range of motion.   Skin:     General: Skin is warm and dry.      Coloration: Skin is pale. Skin is not jaundiced.   Neurological:      General: No focal deficit present.      Mental Status: He is alert.      Motor: Tremor present.   Psychiatric:         Mood and Affect: Mood normal.         Behavior: Behavior normal.       Fluids  No intake or output data in the 24 hours ending 04/03/23 0809    Laboratory  Recent Labs     04/02/23  1553 04/03/23  0305   WBC 8.4 8.9   RBC 5.52 5.16   HEMOGLOBIN 17.2 15.9   HEMATOCRIT 49.4 47.0   MCV 89.5 91.1   MCH 31.2 30.8   MCHC 34.8 33.8   RDW 50.7* 52.0*   PLATELETCT 265 235   MPV 9.1 9.6     Recent Labs     04/02/23  1553 04/03/23  0305   SODIUM 137 137   POTASSIUM 3.6 3.5*   CHLORIDE 99 100   CO2 21 27   GLUCOSE 118* 129*   BUN 9 8    CREATININE 1.06 0.96   CALCIUM 9.2 8.6                   Imaging  US-RUQ   Final Result         1.  Hepatomegaly and echogenic liver, compatible with fatty change versus fibrosis.      CT-CTA COMPLETE THORACOABDOMINAL AORTA   Final Result      1.  There is a stable ascending thoracic aortic aneurysm.   2.  No evidence of thoracic or abdominal aortic dissection.   3.  Normal caliber abdominal aorta.   4.  There is hepatic steatosis.            CT-HEAD W/O   Final Result      1.  No evidence of acute hemorrhage or infarct.      2.  There is cerebral atrophy.         DX-CHEST-PORTABLE (1 VIEW)   Final Result      1.  There is no acute cardiopulmonary process.           Assessment/Plan  * Alcohol withdrawal syndrome without complication (HCC)  Assessment & Plan  With tachycardia, hypertension and anxiety  Two glasses of wine and ~3oz hard liquor daily.  IV fluid with CIWA protocol  Monitor magnesium and phosphorus  Encouraged the patient to quit drinking - he is currently unsure of whether he will return to drinking or not. Discussed the risk benefits of detoxing depending on his intentions to quit  Last drink was 4/1 per patient      Hypokalemia  Assessment & Plan  - Replace    Metabolic acidosis  Assessment & Plan  With anion gap   related to alcoholism  Resolved with IVFs     Pain in the chest  Assessment & Plan  Atypical chest pain with normal EKG and troponin  Telemetry and trending troponin  Less likely to be coronary artery disease, no need for stress test at this time  CTA negative for dissection or change in aneurysm, trops negative, not anemic to indicate bleeding ulcer  ? Alcoholic gastritis/esophagitis? Change Omeprazole to BID and add carafate. No s/s blood loss.   Check lipase, though pain is primarily right sided   Consider stress testing when pt's withdrawals have improved, though normal CT coronary calcium score of 0 four months ago very reassuring   PTE very unlikely given resolution of tachycardia  with CIWA and lack of hypoxia. Check echo.     Acute alcoholic hepatitis- (present on admission)  Assessment & Plan  AST more than ALT  History of drinking alcohol daily.  Viral hepatitis panel negative,RUQ u/s with fatty liver/fibrosis   Encouraged the patient to quit drinking    Aneurysm of ascending aorta without rupture (HCC)- (present on admission)  Assessment & Plan  CT scan on admission showed stable ascending thoracic aorta measures 4.8 x 4.7 cm no change in than previous exam  Follow-up with PCP for following images    Familial hypercholesteremia- (present on admission)  Assessment & Plan  Continue rosuvastatin    Abdominal aortic atherosclerosis (HCC)- (present on admission)  Assessment & Plan  CT scan on admission did not show any dissection    Major depressive disorder, recurrent episode, moderate (HCC)- (present on admission)  Assessment & Plan  Denied any SI or HI  Continue home medication hydroxyzine and venlafaxine  Encouraged the patient to follow-up with his psychiatrist as outpatient    Ulcerative colitis (HCC)- (present on admission)  Assessment & Plan  Stable with no significant abdominal pain or diarrhea - bowel appears normal on CTA on admit  Follow-up with GI as outpatient    GERD (gastroesophageal reflux disease)- (present on admission)  Assessment & Plan  Continue omeprazole, add carafate for right sided pain    Essential (primary) hypertension- (present on admission)  Assessment & Plan  Uncontrolled, likely related to withdrawal alcohol  Continue losartan and start with clonidine as needed  IV labetalol if needed         VTE prophylaxis: SCDs/TEDs and enoxaparin ppx    I have performed a physical exam and reviewed and updated ROS and Plan today (4/3/2023). In review of yesterday's note (4/2/2023), there are no changes except as documented above.

## 2023-04-03 NOTE — ED PROVIDER NOTES
ED Provider Note    CHIEF COMPLAINT  Chief Complaint   Patient presents with    Hypertension     Generally unwell feeling. Unable to give me much else. Pt states left arm pain as well. Endorses headache last night. BP at home been 150/100 despite BP meds. Endorses sob, endorses some chest tightness.        HPI/ROS    Tom Quan is a 57 y.o. male who presents to the emergency department with a number of different complaints.  The patient tells me that for the last hour he has been having headache feeling some mild to moderate left-sided chest discomfort which seems to radiate to the left arm.  He is also noted that his blood pressure has been high despite taking his antihypertensive medicines his blood pressure this evening was 150/100 at home.  The patient also states that he has been feeling anxious and somewhat depressed he has been spending a lot of time in bed, the patient drinks several alcoholic beverages every night and his last drink was last night, he says when he stops he does occasionally crave alcohol but does not recognize that he develops other symptoms.  The patient says that for 5 weeks ago he was told that he had an ascending thoracic aneurysm and he was started on antihypertensive medications at that time and the plan was to repeat his scan in the near future.    Review of systems: No fever no hemoptysis no vomiting or diarrhea.  No abdominal pain.    PAST MEDICAL HISTORY   has a past medical history of Depression and Hyperlipidemia.    SURGICAL HISTORY   has a past surgical history that includes hip replacement, partial.    FAMILY HISTORY  Family History   Problem Relation Age of Onset    Diabetes Mother     Other Mother         smoker+, unclear cause of death    Hyperlipidemia Sister     Hyperlipidemia Brother     Cancer Paternal Uncle         Prostate    Heart Attack Maternal Grandfather          55, tobacco+    Other Son         takayasu's arteritis    Abdominal aortic aneurysm Neg Hx   "   Arterial Aneurysm Neg Hx        SOCIAL HISTORY  Social History     Tobacco Use    Smoking status: Never    Smokeless tobacco: Never   Vaping Use    Vaping Use: Former   Substance and Sexual Activity    Alcohol use: Yes     Comment: Occasionally    Drug use: Never    Sexual activity: Not on file       CURRENT MEDICATIONS  Home Medications       Reviewed by Meseret Ryder, PharmD (Pharmacist) on 04/02/23 at 1932  Med List Status: Complete     Medication Last Dose Status   acetaminophen (TYLENOL) 500 MG Tab 4/1/2023 Active   B-D 3CC LUER-RONNA SYR 22GX1\" 22G X 1\" 3 ML Misc  Active   cetirizine (ZYRTEC) 10 MG Tab 1 week ago Active   Cholecalciferol (VITAMIN D3) 125 MCG (5000 UT) Cap 4/1/2023 Active   ibuprofen (MOTRIN) 200 MG Tab 3 days ago Active   losartan (COZAAR) 25 MG Tab 4/2/2023 Active   Omega-3 Fatty Acids (FISH OIL PO) 5 days ago Active   omeprazole (PRILOSEC) 20 MG delayed-release capsule 4 days ago Active   rizatriptan (MAXALT) 5 MG tablet 3 days ago Active   rosuvastatin (CRESTOR) 20 MG Tab 4/1/2023 Active   testosterone cypionate (DEPO-TESTOSTERONE) 200 MG/ML Solution injection 10 days ago Active   Ustekinumab (STELARA) 90 MG/ML Solution Prefilled Syringe > 30 days ago Active   Zinc 25 MG Tab 1 week ago Active                    ALLERGIES  Allergies   Allergen Reactions    Thimerosal Unspecified     Eye medication reaction  Eye medication reaction         PHYSICAL EXAM  VITAL SIGNS: BP (!) 139/99   Pulse 89   Temp 36.4 °C (97.5 °F) (Temporal)   Resp 19   Ht 1.854 m (6' 1\")   Wt 101 kg (223 lb 12.3 oz)   SpO2 92%   BMI 29.52 kg/m²    Constitutional: The patient is awake he is verbal he is slightly slow to respond to questioning and occasionally appears distracted  HENT: No sign of acute trauma to the head  Eyes: No erythema discharge or jaundice  Neck: Trachea midline no JVD  Cardiovascular: Regular rate and rhythm  Respiratory: Clear bilaterally with no apparent difficulty breathing  Abdomen: Soft " nontender nondistended no rebound guarding or peritoneal findings  Skin: Warm and dry  Musculoskeletal: No leg edema or calf tenderness  Neurologic: Awake verbal moving all extremities  Vascular:.  The upper extremities are warm and well-perfused with easily palpable radial pulses bilaterally      DIAGNOSTIC STUDIES / PROCEDURES  EKG  I have independently interpreted this EKG  Twelve-lead EKG shows sinus tachycardia 107 bpm no pathologic ST elevation or depression or ectopy NM interval is 157 ms QTc interval 439 ms    LABS  CBC shows white blood cell count of 8.4 hemoglobin is concentrated at 17.2 basic metabolic panel showed a slightly elevated blood glucose of 118 there were slight elevations of the liver functions with AST of 141 ALT of 96 and findings are similar to the EKG from December 14, 2022.  The initial troponin is normal at 8 and a 2-hour repeat troponin remains within the normal range but has gone up to 13.    RADIOLOGY  Radiologist interpretation:   CT-CTA COMPLETE THORACOABDOMINAL AORTA   Final Result      1.  There is a stable ascending thoracic aortic aneurysm.   2.  No evidence of thoracic or abdominal aortic dissection.   3.  Normal caliber abdominal aorta.   4.  There is hepatic steatosis.            CT-HEAD W/O   Final Result      1.  No evidence of acute hemorrhage or infarct.      2.  There is cerebral atrophy.         DX-CHEST-PORTABLE (1 VIEW)   Final Result      1.  There is no acute cardiopulmonary process.      US-RUQ    (Results Pending)         COURSE & MEDICAL DECISION MAKING  In the emergency department an IV was established and patient placed on the cardiac monitor.  Initially he was given intravenous Ativan because of his complaint of anxiety and this really did not seem to make any difference in the way he was feeling or his vital signs.  He continues to complain of headache and was given a small dose of intravenous Dilaudid which was helpful.  Subsequently the patient was given 1  dose of intravenous labetalol for persistent hypertension.  I have reviewed all of the findings with the patient and his significant other and I am concerned about potential alcohol withdrawal, with the findings of hypertension tachycardia and elevated LFTs and history of several alcoholic beverages nightly, and the patient has a persistent diastolic hypertension.  I have administered an additional dose of Ativan.  I have reviewed the case with the hospitalist and the patient is referred to the hospitalist service for further evaluation and treatment    FINAL DIAGNOSIS  1. Hypertension, unspecified type    2. Chest pain, unspecified type    3. Alcohol withdrawal syndrome with complication (HCC)    4.      Stable thoracic arctic aneurysm       Electronically signed by: Serge Schwartz M.D., 4/2/2023 8:20 PM

## 2023-04-03 NOTE — PROGRESS NOTES
Monitor Summary:    Rhythm:  SR/ST   Rate Range:    Ectopy: R/oPVC, rPAC    Measurements:  0.18/0.08/0.32  (Measured by monitor tech)

## 2023-04-03 NOTE — ED NOTES
Medication history reviewed with patient and wife. Med rec is complete   Allergies reviewed.   Patient admits to being non-compliant with his medication because he forgets to take them.   Patient takes wife's Hydroxyzine prescription when he begins to feel anxious. Patient does not have prescription for this medication.   Preferred pharmacy - [facility] on [address], [place]

## 2023-04-03 NOTE — DISCHARGE PLANNING
Patient admitted for Acute alcoholic hepatitis, withdrawal, right side chest pain. He drinks vodka. Denies seizures, withdrawal.  On CIWA protocol score: 8. Lives with spouse, IND prior to admisison. He does see a therapist regularly and takes psychotropic medication.  Patient not medically stable for d/c yet.  SW will follow and assist with d/c planning needs as needed.                                                                   Care Transition Team Assessment    Information Source  Orientation Level: Oriented to person, Oriented to place  Information Given By: Other (Comments) (medical record)  Who is responsible for making decisions for patient? : Spouse  Name(s) of Primary Decision Maker: Spouse if patient unable to make decisions/Melanie Quan    Readmission Evaluation  Is this a readmission?: No    Elopement Risk  Legal Hold: No  Ambulatory or Self Mobile in Wheelchair: No-Not an Elopement Risk  Elopement Risk: Not at Risk for Elopement    Interdisciplinary Discharge Planning  Does Admitting Nurse Feel This Could be a Complex Discharge?: No  Lives with - Patient's Self Care Capacity: Spouse  Patient or legal guardian wants to designate a caregiver: No  Support Systems: Spouse / Significant Other  Housing / Facility: Unable To Determine At This Time  Do You Take your Prescribed Medications Regularly: Yes  Able to Return to Previous ADL's: Yes  Mobility Issues: No  Prior Services: Other (Comments) (outpatient therapy)  Assistance Needed: Unknown at this Time  Durable Medical Equipment: Not Applicable    Discharge Preparedness  What is your plan after discharge?: Uncertain - pending medical team collaboration  What are your discharge supports?: Spouse  Prior Functional Level: Ambulatory  Difficulity with ADLs: None  Difficulity with IADLs: None    Functional Assesment  Prior Functional Level: Ambulatory    Finances  Financial Barriers to Discharge: No  Prescription Coverage: Yes    Vision / Hearing  Impairment  Vision Impairment : No  Right Eye Vision: Wears Glasses  Left Eye Vision: Wears Glasses  Hearing Impairment : No    Values / Beliefs / Concerns  Values / Beliefs Concerns : No    Advance Directive  Advance Directive?: None    Domestic Abuse  Have you ever been the victim of abuse or violence?: No  Physical Abuse or Sexual Abuse: No  Verbal Abuse or Emotional Abuse: No  Possible Abuse/Neglect Reported to:: Not Applicable    Psychological Assessment  History of Substance Abuse: Alcohol  Date Last Used - Alcohol: unknown  History of Psychiatric Problems: Yes  Non-compliant with Treatment: No  Newly Diagnosed Illness: No    Discharge Risks or Barriers  Discharge risks or barriers?: No    Anticipated Discharge Information  Discharge Disposition: Discharged to home/self care (01)

## 2023-04-03 NOTE — ASSESSMENT & PLAN NOTE
Stable with no significant abdominal pain or diarrhea - bowel appears normal on CTA on admit  Follow-up with GI as outpatient

## 2023-04-03 NOTE — ED NOTES
Called CT to check on my PT. CT tech stated that they just finished and the pt is heading back over to the ED right now.

## 2023-04-03 NOTE — ASSESSMENT & PLAN NOTE
Atypical chest pain with normal EKG and troponin  Telemetry and trending troponin  Less likely to be coronary artery disease, no need for stress test at this time  CTA negative for dissection or change in aneurysm, trops negative, not anemic to indicate bleeding ulcer  ? Alcoholic gastritis/esophagitis? Change Omeprazole to BID and add carafate. No s/s blood loss.   Check lipase, though pain is primarily right sided   Consider stress testing when pt's withdrawals have improved, though normal CT coronary calcium score of 0 four months ago very reassuring   PTE very unlikely given resolution of tachycardia with CIWA and lack of hypoxia. Check echo.

## 2023-04-03 NOTE — DISCHARGE PLANNING
HTH/SCP TCN chart review completed. The most current review of medical record, knowledge of pt's PLOF and social support, LACE+ score of 61, 6 clicks scores of 24 ADL and 24 mobility were considered.      Collaborated with CHRISTINA King, and treatment team during IDT. Noted CIWA protocol is currently initiated. Given this, TCN will continue to follow and collaborate with discharge planning team and visit with patient as indicated. Thank you.

## 2023-04-03 NOTE — ASSESSMENT & PLAN NOTE
AST more than ALT  History of drinking alcohol daily.  Viral hepatitis panel negative,RUQ u/s with fatty liver/fibrosis   Encouraged the patient to quit drinking

## 2023-04-03 NOTE — PROGRESS NOTES
4 Eyes Skin Assessment Completed by TERESITA Simons and TERESITA Montero.    Head WDL  Ears WDL  Nose WDL  Mouth WDL  Neck WDL  Breast/Chest scar on upper left chest  Shoulder Blades Redness and Blanching  Spine WDL  (R) Arm/Elbow/Hand WDL  (L) Arm/Elbow/Hand WDL  Abdomen WDL  Groin WDL  Scrotum/Coccyx/Buttocks WDL  (R) Leg Bruising  (L) Leg Bruising  (R) Heel/Foot/Toe WDL  (L) Heel/Foot/Toe WDL          Devices In Places Tele Box      Interventions In Place Pillows    Possible Skin Injury No    Pictures Uploaded Into Epic N/A  Wound Consult Placed N/A  RN Wound Prevention Protocol Ordered No

## 2023-04-03 NOTE — ASSESSMENT & PLAN NOTE
CT scan on admission showed stable ascending thoracic aorta measures 4.8 x 4.7 cm no change in than previous exam  Follow-up with PCP for following images

## 2023-04-03 NOTE — H&P
Hospital Medicine History & Physical Note    Date of Service  4/2/2023    Primary Care Physician  Arvind Geiger M.D.    Consultants  None    Code Status  Full Code    Chief Complaint  Chief Complaint   Patient presents with    Hypertension     Generally unwell feeling. Unable to give me much else. Pt states left arm pain as well. Endorses headache last night. BP at home been 150/100 despite BP meds. Endorses sob, endorses some chest tightness.        History of Presenting Illness    57-year-old male with history of depression, ascending aortic aneurysm, ulcerative colitis, hypertension, dyslipidemia and alcoholism who presented 4/2 with uncontrolled blood pressure.  Patient has been very anxious recently with depression.  He checked his blood pressure at home and was elevated 190/100.  Patient has chronic chest pain comes and goes however he noticed some numbness on his left arm.  No significant chest pain however he has some discomfort.  Not related to exertion.  No significant shortness of breath no diarrhea or constipation or other symptoms.  On admission patient was anxious with tremor and anxiety.  His blood pressure was 150/90 with tachycardia.  Labs showed elevated liver enzymes AST more than ALT.  CT scan for chest did not show any dissection.  EKG did not show any ischemic changes and troponin was normal for 2 times.  Patient will be admitted to the hospital for alcohol withdrawal.  CIWA protocol with IV fluid were started.    Patient has history of drinking alcohol every day hard liquor for years.    I discussed the plan of care with patient, family, bedside RN, and ED physician .    Review of Systems  Review of Systems   Constitutional:  Positive for malaise/fatigue. Negative for chills, fever and weight loss.   HENT:  Negative for ear pain, hearing loss and tinnitus.    Eyes:  Negative for blurred vision, double vision and photophobia.   Respiratory:  Negative for cough and hemoptysis.   "  Cardiovascular:  Positive for chest pain. Negative for palpitations, orthopnea and claudication.   Gastrointestinal:  Negative for abdominal pain, constipation, diarrhea, nausea and vomiting.   Genitourinary:  Negative for dysuria, frequency and urgency.   Musculoskeletal:  Negative for back pain, myalgias and neck pain.   Skin:  Negative for rash.   Neurological:  Positive for dizziness. Negative for speech change and weakness.   Psychiatric/Behavioral:  Positive for depression. The patient is nervous/anxious.      Past Medical History   has a past medical history of Depression and Hyperlipidemia.    Surgical History   has a past surgical history that includes hip replacement, partial.     Family History  family history includes Cancer in his paternal uncle; Diabetes in his mother; Heart Attack in his maternal grandfather; Hyperlipidemia in his brother and sister; Other in his mother and son.   Family history reviewed with patient. There is no family history that is pertinent to the chief complaint.     Social History   reports that he has never smoked. He has never used smokeless tobacco. He reports current alcohol use. He reports that he does not use drugs.    Allergies  Allergies   Allergen Reactions    Thimerosal Unspecified     Eye medication reaction  Eye medication reaction         Medications  Prior to Admission Medications   Prescriptions Last Dose Informant Patient Reported? Taking?   B-D 3CC LUER-RONNA SYR 22GX1\" 22G X 1\" 3 ML Misc   Yes No   Sig: USE FOR TESTOSTERONE INJECTIONS   Cholecalciferol (VITAMIN D3) 125 MCG (5000 UT) Cap 4/1/2023 Patient Yes No   Sig: Take 5,000 Units by mouth every day.   DESVENLAFAXINE ER PO 4/2/2023  Yes Yes   Sig: Take 25 mg by mouth every day.   Omega-3 Fatty Acids (FISH OIL PO) 5 days ago at prn Patient Yes No   Sig: Take 2 Capsules by mouth every day.   Ustekinumab (STELARA) 90 MG/ML Solution Prefilled Syringe > 30 days ago  No No   Sig: Inject 1 mL under the skin Every " 60 days.   Zinc 25 MG Tab 1 week ago Patient Yes No   Sig: Take 25 mg by mouth every day.   acetaminophen (TYLENOL) 500 MG Tab 4/1/2023 at PM Patient Yes No   Sig: Take 1,000 mg by mouth every 8 hours as needed for Mild Pain. 2 tablets = 1,000 mg.   cetirizine (ZYRTEC) 10 MG Tab 1 week ago at PRN Patient Yes No   Sig: Take 10 mg by mouth 1 time a day as needed for Allergies.   hydrOXYzine pamoate (VISTARIL) 25 MG Cap 3 days ago at prn  Yes Yes   Sig: Take 25 mg by mouth 3 times a day as needed for Itching or Anxiety.   ibuprofen (MOTRIN) 200 MG Tab 3 days ago at PRN Patient Yes No   Sig: Take 400 mg by mouth every 8 hours as needed for Mild Pain. 2 tablets = 400 mg.   losartan (COZAAR) 25 MG Tab 4/2/2023 at 1700  No No   Sig: Take 1 Tablet by mouth every day.   omeprazole (PRILOSEC) 20 MG delayed-release capsule 4 days ago at prn Patient Yes No   Sig: Take 20 mg by mouth 1 time a day as needed (Heartburn).   rizatriptan (MAXALT) 5 MG tablet 3 days ago at prn  No No   Sig: Take 1-2 Tablets by mouth one time as needed for Migraine for up to 1 dose.   rosuvastatin (CRESTOR) 20 MG Tab 4/1/2023 at pm  No No   Sig: Take 1 Tablet by mouth every evening.   testosterone cypionate (DEPO-TESTOSTERONE) 200 MG/ML Solution injection 10 days ago Patient Yes No   Sig: Inject 200 mg into the shoulder, thigh, or buttocks every 7 days.      Facility-Administered Medications: None       Physical Exam  Temp:  [36.4 °C (97.5 °F)] 36.4 °C (97.5 °F)  Pulse:  [] 89  Resp:  [14-20] 19  BP: (139-154)/() 139/99  SpO2:  [91 %-95 %] 92 %  Blood Pressure: (!) 139/99   Temperature: 36.4 °C (97.5 °F)   Pulse: 89   Respiration: 19   Pulse Oximetry: 92 %       Physical Exam  Constitutional:       Appearance: He is ill-appearing.   Eyes:      General: No scleral icterus.  Cardiovascular:      Rate and Rhythm: Tachycardia present.      Heart sounds: No murmur heard.    No gallop.   Pulmonary:      Effort: No respiratory distress.       Breath sounds: No wheezing.   Abdominal:      General: There is no distension.      Tenderness: There is no abdominal tenderness. There is no right CVA tenderness, left CVA tenderness or guarding.   Musculoskeletal:      Right lower leg: No edema.      Left lower leg: No edema.   Lymphadenopathy:      Cervical: No cervical adenopathy.   Skin:     Coloration: Skin is not jaundiced.      Findings: No bruising, lesion or rash.   Neurological:      General: No focal deficit present.      Mental Status: He is alert and oriented to person, place, and time. Mental status is at baseline.      Cranial Nerves: No cranial nerve deficit.      Motor: No weakness.      Gait: Gait normal.   Psychiatric:      Comments: Anxiety and depression       Laboratory:  Recent Labs     04/02/23  1553   WBC 8.4   RBC 5.52   HEMOGLOBIN 17.2   HEMATOCRIT 49.4   MCV 89.5   MCH 31.2   MCHC 34.8   RDW 50.7*   PLATELETCT 265   MPV 9.1     Recent Labs     04/02/23  1553   SODIUM 137   POTASSIUM 3.6   CHLORIDE 99   CO2 21   GLUCOSE 118*   BUN 9   CREATININE 1.06   CALCIUM 9.2     Recent Labs     04/02/23  1553   ALTSGPT 96*   ASTSGOT 141*   ALKPHOSPHAT 97   TBILIRUBIN 0.3   GLUCOSE 118*         No results for input(s): NTPROBNP in the last 72 hours.      Recent Labs     04/02/23  1553 04/02/23  1758   TROPONINT 8 13       Imaging:  CT-CTA COMPLETE THORACOABDOMINAL AORTA   Final Result      1.  There is a stable ascending thoracic aortic aneurysm.   2.  No evidence of thoracic or abdominal aortic dissection.   3.  Normal caliber abdominal aorta.   4.  There is hepatic steatosis.            CT-HEAD W/O   Final Result      1.  No evidence of acute hemorrhage or infarct.      2.  There is cerebral atrophy.         DX-CHEST-PORTABLE (1 VIEW)   Final Result      1.  There is no acute cardiopulmonary process.      US-RUQ    (Results Pending)       X-Ray:  I have personally reviewed the images and compared with prior images.  EKG:  I have personally reviewed  the images and compared with prior images.    Assessment/Plan:  Justification for Admission Status  I anticipate this patient will require at least two midnights for appropriate medical management, necessitating inpatient admission because patient needs IV fluid, IV Ativan for withdrawal and IV blood pressure medication    Patient will need a Telemetry bed on CARDIOLOGY service .  The need is secondary to uncontrolled hypertension and chest pain.    * Alcohol withdrawal syndrome without complication (HCC)  Assessment & Plan  With tachycardia, hypertension and anxiety  drinking, hard liquor daily.  IV fluid with CIWA protocol  Monitor magnesium and phosphorus  Encouraged the patient to quit drinking        Metabolic acidosis  Assessment & Plan  With anion gap   related to alcoholism  IV fluid and monitor with labs    Pain in the chest  Assessment & Plan  Atypical chest pain with normal EKG and troponin  Telemetry and trending troponin  Less likely to be coronary artery disease, no need for stress test at this time    Acute alcoholic hepatitis- (present on admission)  Assessment & Plan  AST more than ALT  History of drinking alcohol daily.  Check ultrasound and viral hepatitis panel  Encouraged the patient to quit drinking    Familial hypercholesteremia- (present on admission)  Assessment & Plan  Continue rosuvastatin    Abdominal aortic atherosclerosis (HCC)- (present on admission)  Assessment & Plan  CT scan on admission did not show any dissection    Major depressive disorder, recurrent episode, moderate (HCC)- (present on admission)  Assessment & Plan  Denied any SI or HI  Continue home medication hydroxyzine and venlafaxine  Encouraged the patient to follow-up with his psychiatrist as outpatient    Ulcerative colitis (HCC)- (present on admission)  Assessment & Plan  Stable with no significant abdominal pain or diarrhea  Follow-up with GI as outpatient    GERD (gastroesophageal reflux disease)- (present on  admission)  Assessment & Plan  Continue omeprazole    Essential (primary) hypertension- (present on admission)  Assessment & Plan  Uncontrolled, likely related to withdrawal alcohol  Continue losartan and start with clonidine as needed  IV labetalol if needed    Aneurysm of ascending aorta without rupture (HCC)- (present on admission)  Assessment & Plan  CT scan on admission showed stable ascending thoracic aorta measures 4.8 x 4.7 cm no change in than previous exam  Follow-up with PCP for following images        VTE prophylaxis: SCDs/TEDs and enoxaparin ppx

## 2023-04-03 NOTE — PROGRESS NOTES
0800: MERLE 8. Medicated with atarax. Will reassess at 9am. Has head ache, declines NSAIDS, stated he received dilaudid yesterday in ED for it.     1000: on reassessment patient is sleeping.     1600: Sept most of the day. Not showing any signs of acute withdrawal- only presenting with a headache and his normal but exasperated anxiety. Was educated on how to develop more healthy coping strategies.     1700: PT asked about UC drug [facility] pharm said this is not on formulary.

## 2023-04-03 NOTE — CARE PLAN
The patient is Stable - Low risk of patient condition declining or worsening    Shift Goals  Clinical Goals: CIWA to decreses.  Patient Goals: to have less anxiety  Family Goals: BETHEL    Progress made toward(s) clinical / shift goals:  Educated on withdrawal and his SSRI that might help him with his feelings of anxiety     Patient is not progressing towards the following goals:   Problem: Knowledge Deficit - Standard  Goal: Patient and family/care givers will demonstrate understanding of plan of care, disease process/condition, diagnostic tests and medications  Outcome: Progressing     Problem: Optimal Care for Alcohol Withdrawal  Goal: Optimal Care for the alcohol withdrawal patient  Outcome: Progressing     Problem: Seizure Precautions  Goal: Implementation of seizure precautions  Outcome: Progressing     Problem: Lifestyle Changes  Goal: Patient's ability to identify lifestyle changes and available resources to help reduce recurrence of condition will improve  Outcome: Progressing     Problem: Psychosocial  Goal: Patient's level of anxiety will decrease  Outcome: Progressing  Goal: Spiritual and cultural needs incorporated into hospitalization  Outcome: Progressing     Problem: Risk for Aspiration  Goal: Patient's risk for aspiration will be absent or decrease  Outcome: Progressing     Problem: Pain - Standard  Goal: Alleviation of pain or a reduction in pain to the patient’s comfort goal  Outcome: Progressing

## 2023-04-03 NOTE — ED NOTES
Pt medicated per MAR, pt keeps asking about his blood pressure and HR. I let the pt know that he is in a safe place and we are monitoring him.

## 2023-04-03 NOTE — CARE PLAN
The patient is Watcher - Medium risk of patient condition declining or worsening    Shift Goals  Clinical Goals: CIWA, detox bag  Patient Goals: go home  Family Goals: BETHEL    Progress made toward(s) clinical / shift goals:  Seizure precautions in place, CIWA and ativan given per protocol, detox bag given, anxiety is managed, call light within reach    Patient is not progressing towards the following goals:      Problem: Knowledge Deficit - Standard  Goal: Patient and family/care givers will demonstrate understanding of plan of care, disease process/condition, diagnostic tests and medications  Outcome: Progressing     Problem: Optimal Care for Alcohol Withdrawal  Goal: Optimal Care for the alcohol withdrawal patient  Outcome: Progressing     Problem: Seizure Precautions  Goal: Implementation of seizure precautions  Outcome: Progressing

## 2023-04-04 ENCOUNTER — PATIENT OUTREACH (OUTPATIENT)
Dept: SCHEDULING | Facility: IMAGING CENTER | Age: 58
End: 2023-04-04
Payer: COMMERCIAL

## 2023-04-04 VITALS
DIASTOLIC BLOOD PRESSURE: 107 MMHG | WEIGHT: 228.18 LBS | RESPIRATION RATE: 16 BRPM | HEIGHT: 73 IN | SYSTOLIC BLOOD PRESSURE: 145 MMHG | HEART RATE: 98 BPM | BODY MASS INDEX: 30.24 KG/M2 | TEMPERATURE: 97.5 F | OXYGEN SATURATION: 95 %

## 2023-04-04 LAB
ALBUMIN SERPL BCP-MCNC: 3.5 G/DL (ref 3.2–4.9)
ALBUMIN/GLOB SERPL: 1.2 G/DL
ALP SERPL-CCNC: 90 U/L (ref 30–99)
ALT SERPL-CCNC: 75 U/L (ref 2–50)
ANION GAP SERPL CALC-SCNC: 10 MMOL/L (ref 7–16)
AST SERPL-CCNC: 97 U/L (ref 12–45)
BILIRUB SERPL-MCNC: 0.7 MG/DL (ref 0.1–1.5)
BUN SERPL-MCNC: 5 MG/DL (ref 8–22)
CALCIUM ALBUM COR SERPL-MCNC: 9.1 MG/DL (ref 8.5–10.5)
CALCIUM SERPL-MCNC: 8.7 MG/DL (ref 8.4–10.2)
CHLORIDE SERPL-SCNC: 101 MMOL/L (ref 96–112)
CO2 SERPL-SCNC: 25 MMOL/L (ref 20–33)
CREAT SERPL-MCNC: 0.98 MG/DL (ref 0.5–1.4)
ERYTHROCYTE [DISTWIDTH] IN BLOOD BY AUTOMATED COUNT: 51.6 FL (ref 35.9–50)
GFR SERPLBLD CREATININE-BSD FMLA CKD-EPI: 90 ML/MIN/1.73 M 2
GLOBULIN SER CALC-MCNC: 2.9 G/DL (ref 1.9–3.5)
GLUCOSE SERPL-MCNC: 96 MG/DL (ref 65–99)
HCT VFR BLD AUTO: 46.6 % (ref 42–52)
HGB BLD-MCNC: 15.7 G/DL (ref 14–18)
MAGNESIUM SERPL-MCNC: 1.8 MG/DL (ref 1.5–2.5)
MCH RBC QN AUTO: 31 PG (ref 27–33)
MCHC RBC AUTO-ENTMCNC: 33.7 G/DL (ref 33.7–35.3)
MCV RBC AUTO: 91.9 FL (ref 81.4–97.8)
PHOSPHATE SERPL-MCNC: 2.4 MG/DL (ref 2.5–4.5)
PLATELET # BLD AUTO: 222 K/UL (ref 164–446)
PMV BLD AUTO: 9.5 FL (ref 9–12.9)
POTASSIUM SERPL-SCNC: 3.6 MMOL/L (ref 3.6–5.5)
PROT SERPL-MCNC: 6.4 G/DL (ref 6–8.2)
RBC # BLD AUTO: 5.07 M/UL (ref 4.7–6.1)
SODIUM SERPL-SCNC: 136 MMOL/L (ref 135–145)
WBC # BLD AUTO: 9.6 K/UL (ref 4.8–10.8)

## 2023-04-04 PROCEDURE — A9270 NON-COVERED ITEM OR SERVICE: HCPCS | Performed by: FAMILY MEDICINE

## 2023-04-04 PROCEDURE — 700102 HCHG RX REV CODE 250 W/ 637 OVERRIDE(OP): Performed by: INTERNAL MEDICINE

## 2023-04-04 PROCEDURE — 99239 HOSP IP/OBS DSCHRG MGMT >30: CPT | Performed by: HOSPITALIST

## 2023-04-04 PROCEDURE — 700102 HCHG RX REV CODE 250 W/ 637 OVERRIDE(OP): Performed by: FAMILY MEDICINE

## 2023-04-04 PROCEDURE — 85027 COMPLETE CBC AUTOMATED: CPT

## 2023-04-04 PROCEDURE — 84100 ASSAY OF PHOSPHORUS: CPT

## 2023-04-04 PROCEDURE — A9270 NON-COVERED ITEM OR SERVICE: HCPCS | Performed by: INTERNAL MEDICINE

## 2023-04-04 PROCEDURE — 700101 HCHG RX REV CODE 250: Performed by: FAMILY MEDICINE

## 2023-04-04 PROCEDURE — 80053 COMPREHEN METABOLIC PANEL: CPT

## 2023-04-04 PROCEDURE — 83735 ASSAY OF MAGNESIUM: CPT

## 2023-04-04 PROCEDURE — 36415 COLL VENOUS BLD VENIPUNCTURE: CPT

## 2023-04-04 PROCEDURE — 94760 N-INVAS EAR/PLS OXIMETRY 1: CPT

## 2023-04-04 RX ORDER — ONDANSETRON 4 MG/1
4 TABLET, ORALLY DISINTEGRATING ORAL EVERY 4 HOURS PRN
Qty: 10 TABLET | Refills: 0 | Status: SHIPPED | OUTPATIENT
Start: 2023-04-04

## 2023-04-04 RX ORDER — CHLORDIAZEPOXIDE HYDROCHLORIDE 10 MG/1
CAPSULE, GELATIN COATED ORAL
Qty: 30 CAPSULE | Refills: 0 | Status: SHIPPED | OUTPATIENT
Start: 2023-04-04 | End: 2023-04-18

## 2023-04-04 RX ORDER — LANOLIN ALCOHOL/MO/W.PET/CERES
100 CREAM (GRAM) TOPICAL DAILY
Qty: 30 TABLET | Refills: 1 | Status: SHIPPED | OUTPATIENT
Start: 2023-04-05 | End: 2023-05-19

## 2023-04-04 RX ORDER — FOLIC ACID 1 MG/1
1 TABLET ORAL DAILY
Qty: 30 TABLET | Refills: 1 | Status: SHIPPED | OUTPATIENT
Start: 2023-04-05 | End: 2023-06-21

## 2023-04-04 RX ORDER — POTASSIUM CHLORIDE 20 MEQ/1
20 TABLET, EXTENDED RELEASE ORAL DAILY
Qty: 5 TABLET | Refills: 0 | Status: SHIPPED | OUTPATIENT
Start: 2023-04-04 | End: 2023-04-09

## 2023-04-04 RX ORDER — PROPRANOLOL HYDROCHLORIDE 10 MG/1
10 TABLET ORAL 3 TIMES DAILY
Qty: 90 TABLET | Refills: 11 | Status: SHIPPED | OUTPATIENT
Start: 2023-04-04 | End: 2023-05-19

## 2023-04-04 RX ORDER — LANOLIN ALCOHOL/MO/W.PET/CERES
400 CREAM (GRAM) TOPICAL DAILY
Qty: 10 TABLET | Refills: 0 | Status: SHIPPED | OUTPATIENT
Start: 2023-04-04 | End: 2023-04-14

## 2023-04-04 RX ADMIN — LORAZEPAM 0.5 MG: 0.5 TABLET ORAL at 05:32

## 2023-04-04 RX ADMIN — SUCRALFATE 1 G: 1 SUSPENSION ORAL at 05:32

## 2023-04-04 RX ADMIN — VENLAFAXINE HYDROCHLORIDE 37.5 MG: 37.5 TABLET ORAL at 05:32

## 2023-04-04 RX ADMIN — LORAZEPAM 1 MG: 1 TABLET ORAL at 00:16

## 2023-04-04 RX ADMIN — LOSARTAN POTASSIUM 25 MG: 25 TABLET, FILM COATED ORAL at 05:32

## 2023-04-04 RX ADMIN — THIAMINE HCL TAB 100 MG 100 MG: 100 TAB at 05:32

## 2023-04-04 RX ADMIN — OMEPRAZOLE 20 MG: 20 CAPSULE, DELAYED RELEASE ORAL at 05:33

## 2023-04-04 RX ADMIN — PSYLLIUM HUSK 1 PACKET: 3.4 POWDER ORAL at 05:33

## 2023-04-04 RX ADMIN — FOLIC ACID 1 MG: 1 TABLET ORAL at 05:32

## 2023-04-04 RX ADMIN — ASPIRIN 81 MG 81 MG: 81 TABLET ORAL at 05:32

## 2023-04-04 RX ADMIN — THERA TABS 1 TABLET: TAB at 05:32

## 2023-04-04 ASSESSMENT — LIFESTYLE VARIABLES
PAROXYSMAL SWEATS: NO SWEAT VISIBLE
AGITATION: NORMAL ACTIVITY
TREMOR: *
PAROXYSMAL SWEATS: NO SWEAT VISIBLE
TOTAL SCORE: 2
NAUSEA AND VOMITING: NO NAUSEA AND NO VOMITING
ANXIETY: NO ANXIETY (AT EASE)
VISUAL DISTURBANCES: NOT PRESENT
AGITATION: NORMAL ACTIVITY
HEADACHE, FULLNESS IN HEAD: NOT PRESENT
VISUAL DISTURBANCES: NOT PRESENT
ANXIETY: *
AUDITORY DISTURBANCES: NOT PRESENT
ORIENTATION AND CLOUDING OF SENSORIUM: ORIENTED AND CAN DO SERIAL ADDITIONS
ORIENTATION AND CLOUDING OF SENSORIUM: ORIENTED AND CAN DO SERIAL ADDITIONS
HEADACHE, FULLNESS IN HEAD: NOT PRESENT
ORIENTATION AND CLOUDING OF SENSORIUM: ORIENTED AND CAN DO SERIAL ADDITIONS
VISUAL DISTURBANCES: NOT PRESENT
TOTAL SCORE: 8
TOTAL SCORE: 6
AUDITORY DISTURBANCES: NOT PRESENT
TREMOR: *
AGITATION: NORMAL ACTIVITY
PAROXYSMAL SWEATS: NO SWEAT VISIBLE
HEADACHE, FULLNESS IN HEAD: VERY MILD
TREMOR: TREMOR NOT VISIBLE BUT CAN BE FELT, FINGERTIP TO FINGERTIP
AUDITORY DISTURBANCES: NOT PRESENT
NAUSEA AND VOMITING: NO NAUSEA AND NO VOMITING
NAUSEA AND VOMITING: NO NAUSEA AND NO VOMITING
ANXIETY: *

## 2023-04-04 ASSESSMENT — PAIN DESCRIPTION - PAIN TYPE: TYPE: ACUTE PAIN

## 2023-04-04 NOTE — PROGRESS NOTES
Discharge instructions given and discussed, signed copy in chart. Pt verbalized understanding and all questions answered. New prescriptions discussed. Home meds returned to pt. Pt discharged home in stable condition escorted by self, transport refused. Personal belongings with patient. IV removed and tolerated well. Tele box removed, monitor tech notified.

## 2023-04-04 NOTE — CARE PLAN
The patient is Stable - Low risk of patient condition declining or worsening    Shift Goals  Clinical Goals: Monitor CIWA and BP.  Patient Goals: Go home  Family Goals: BETHEL    Progress made toward(s) clinical / shift goals:    Problem: Optimal Care for Alcohol Withdrawal  Goal: Optimal Care for the alcohol withdrawal patient  Outcome: Progressing  Note: Monitoring CIWA and medicating according to MAR.     Problem: Seizure Precautions  Goal: Implementation of seizure precautions  Outcome: Progressing  Note: Precautions have been implemented.       Patient is not progressing towards the following goals:

## 2023-04-04 NOTE — DISCHARGE SUMMARY
Discharge Summary    CHIEF COMPLAINT ON ADMISSION  Chief Complaint   Patient presents with    Hypertension     Generally unwell feeling. Unable to give me much else. Pt states left arm pain as well. Endorses headache last night. BP at home been 150/100 despite BP meds. Endorses sob, endorses some chest tightness.        Reason for Admission  High Blood Pressure, Arm Pain      Admission Date  4/2/2023    CODE STATUS  Full Code    HPI & HOSPITAL COURSE  Mr. Tom Quan is a 57-year-old gentleman comes into the hospital complaining of generalized malaise along with a headache.  He also reported that his blood pressure at home was high.  The patient does report excessive alcohol consumption.  The patient apparently was drinking about a bottle of wine and about 9 shots of vodka per day.  This was a consistent every day thing for him.  The patient developed alcohol withdrawal and alcohol withdrawal management was undertaken.  The patient was placed on thiamine folic acid multivitamin.  He was also given electrolyte supplementation and replacement.  The patient initially was also complaining of chest pain and had uncontrolled hypertension.  The patient underwent 3 sets of cardiac markers which were negative.  EKG was in the normal state.  No ischemic events were identified on telemetric monitoring.  Liver functions were elevated and his ultrasound of his liver did show hepatomegaly with a fatty liver.  Patient's blood pressure is now under control.  His CIWA protocol was continued and initially his CIWA score was 10 and then slowly went down this morning it is down to 1.  The patient I talked about his overall prognosis and status.  He says that he is able to stop drinking but of course the medications do help thus at this point Librium will be prescribed for him in tapering fashion.  I have also placed him on thiamine folic acid multivitamin along with magnesium and potassium for short course.  The patient will also be  on propranolol.  The patient otherwise at this point will resume his previous outpatient management for ulcerative colitis as well as for hypertension as well as for depression and his cholesterol disorder.  The patient's overall condition at this point has been stabilized and can discharge home with outpatient follow-ups and monitoring.    Therefore, he is discharged in good and stable condition to home with close outpatient follow-up.    The patient met 2-midnight criteria for an inpatient stay at the time of discharge.    Discharge Date  4/4/2023    FOLLOW UP ITEMS POST DISCHARGE  Follow-up with the primary care physician in 2 to 3 days    DISCHARGE DIAGNOSES  Principal Problem:    Alcohol withdrawal syndrome without complication (HCC) POA: Unknown  Active Problems:    Essential (primary) hypertension POA: Yes    GERD (gastroesophageal reflux disease) POA: Yes    Ulcerative colitis (HCC) POA: Yes      Overview: Formatting of this note might be different from the original.      Was previously on infliximab, steroids and low dose azathioprine. He       reports he has been off all these medications for months now. Controlling       his ulcerative colitis with ketogenic diet. Last flare was last summer per       patient.    Major depressive disorder, recurrent episode, moderate (HCC) POA: Yes    Abdominal aortic atherosclerosis (HCC) POA: Yes    Familial hypercholesteremia POA: Yes    Aneurysm of ascending aorta without rupture (HCC) POA: Yes    Acute alcoholic hepatitis POA: Yes    Pain in the chest POA: Unknown    Metabolic acidosis POA: Unknown    Delirium, withdrawal, alcoholic (HCC) POA: Yes    Hypokalemia POA: Unknown  Resolved Problems:    * No resolved hospital problems. *      FOLLOW UP  Future Appointments   Date Time Provider Department Center   4/14/2023  1:20 PM Noble Drummond M.D. [facility] None   4/19/2023 10:00 AM [facility]     No follow-up provider specified.    MEDICATIONS ON  "DISCHARGE     Medication List        START taking these medications        Instructions   chlordiazepoxide 10 MG capsule  Commonly known as: LIBRIUM   Take 1 tablet 3 times daily for 5 days than take 1 tablet twice daily for 5 days than take 1 tablet daily for 5 days than stop     folic acid 1 MG Tabs  Start taking on: April 5, 2023  Commonly known as: FOLVITE   Take 1 Tablet by mouth every day.  Dose: 1 mg     magnesium oxide 400 (240 Mg) MG Tabs   Take 1 Tablet by mouth every day for 10 days.  Dose: 400 mg     multivitamin Tabs  Start taking on: April 5, 2023   Take 1 Tablet by mouth every day.  Dose: 1 Tablet     ondansetron 4 MG Tbdp  Commonly known as: ZOFRAN ODT   Take 1 Tablet by mouth every four hours as needed for Nausea/Vomiting (give PO if no IV route available).  Dose: 4 mg     potassium chloride SA 20 MEQ Tbcr  Commonly known as: Kdur   Take 1 Tablet by mouth every day for 5 days.  Dose: 20 mEq     propranolol 10 MG Tabs  Commonly known as: INDERAL   Take 1 Tablet by mouth 3 times a day.  Dose: 10 mg     thiamine 100 MG tablet  Start taking on: April 5, 2023  Commonly known as: THIAMINE   Take 1 Tablet by mouth every day.  Dose: 100 mg            CONTINUE taking these medications        Instructions   acetaminophen 500 MG Tabs  Commonly known as: TYLENOL   Take 1,000 mg by mouth every 8 hours as needed for Mild Pain. 2 tablets = 1,000 mg.  Dose: 1,000 mg     B-D 3CC LUER-RONNA SYR 22GX1\" 22G X 1\" 3 ML Misc  Generic drug: SYRINGE-NEEDLE (DISP) 3 ML   USE FOR TESTOSTERONE INJECTIONS     DESVENLAFAXINE ER PO   Take 25 mg by mouth every day.  Dose: 25 mg     FISH OIL PO   Take 2 Capsules by mouth every day.  Dose: 2 Capsule     losartan 25 MG Tabs  Commonly known as: COZAAR   Take 1 Tablet by mouth every day.  Dose: 25 mg     omeprazole 20 MG delayed-release capsule  Commonly known as: PRILOSEC   Take 20 mg by mouth 1 time a day as needed (Heartburn).  Dose: 20 mg     rizatriptan 5 MG tablet  Commonly known " as: MAXALT   Take 1-2 Tablets by mouth one time as needed for Migraine for up to 1 dose.  Dose: 5-10 mg     rosuvastatin 20 MG Tabs  Commonly known as: CRESTOR   Take 1 Tablet by mouth every evening.  Dose: 20 mg     Stelara 90 MG/ML Sosy  Generic drug: Ustekinumab   Inject 1 mL under the skin Every 60 days.  Dose: 90 mg     testosterone cypionate 200 MG/ML Soln injection  Commonly known as: DEPO-TESTOSTERONE   Inject 200 mg into the shoulder, thigh, or buttocks every 7 days.  Dose: 200 mg     vitamin D3 125 MCG (5000 UT) Caps   Take 5,000 Units by mouth every day.  Dose: 5,000 Units            STOP taking these medications      cetirizine 10 MG Tabs  Commonly known as: ZYRTEC     hydrOXYzine pamoate 25 MG Caps  Commonly known as: VISTARIL     ibuprofen 200 MG Tabs  Commonly known as: MOTRIN     Zinc 25 MG Tabs              Allergies  Allergies   Allergen Reactions    Thimerosal Unspecified     Eye medication reaction  Eye medication reaction         DIET  Orders Placed This Encounter   Procedures    Diet Order Diet: Cardiac     Standing Status:   Standing     Number of Occurrences:   1     Order Specific Question:   Diet:     Answer:   Cardiac [6]       ACTIVITY  As tolerated.  Weight bearing as tolerated    CONSULTATIONS  None    PROCEDURES  None    LABORATORY  Lab Results   Component Value Date    SODIUM 136 04/04/2023    POTASSIUM 3.6 04/04/2023    CHLORIDE 101 04/04/2023    CO2 25 04/04/2023    GLUCOSE 96 04/04/2023    BUN 5 (L) 04/04/2023    CREATININE 0.98 04/04/2023        Lab Results   Component Value Date    WBC 9.6 04/04/2023    HEMOGLOBIN 15.7 04/04/2023    HEMATOCRIT 46.6 04/04/2023    PLATELETCT 222 04/04/2023        Total time of the discharge process exceeds 39 minutes.

## 2023-04-04 NOTE — CARE PLAN
The patient is Stable - Low risk of patient condition declining or worsening    Shift Goals  Problem: Knowledge Deficit - Standard  Goal: Patient and family/care givers will demonstrate understanding of plan of care, disease process/condition, diagnostic tests and medications  Outcome: Progressing     Problem: Psychosocial  Goal: Patient's level of anxiety will decrease  Outcome: Progressing     Problem: Fall Risk  Goal: Patient will remain free from falls  Outcome: Progressing     Clinical Goals: CIWA  Patient Goals: Comfort  Family Goals: BETHEL    Progress made toward(s) clinical / shift goals:      Patient is not progressing towards the following goals:

## 2023-04-04 NOTE — PROGRESS NOTES
Telemetry Shift Summary     Rhythm: SR  Rate: 70s-90s  Measurements: 0.18/0.08/0.34  Ectopy (reported by Monitor Tech): rare to occasional PVC     Normal Values  Rhythm: Sinus  HR:   Measurements: 0.12-0.20/0.06-0.10/0.30-0.52

## 2023-04-04 NOTE — DISCHARGE PLANNING
[facility] TCN chart review completed. Collaborated with CHRISTINA King, prior to meeting with the patient. The most current review of medical record, knowledge of pt's PLOF and social support, LACE+ score of 75, 6 clicks scores of 24 ADL and 24 mobility were considered.      TCN met with patient at bedside. Introduced TCN program. Provided education regarding post acute levels of care. Discussed [facility] plan benefits (Meds to Beds and [facility] transitional care). Patient verbalized understanding.    Patient endorsed independence in ADLs, IADLs and is currently employed as a rep for medical device sales. Patient endorsed no concerns regarding his mobility or return to his previous level of function upon discharge from the acute hospital.      Given this, no provider consults or choice for post acute levels of care indicated at this time. In discussion regarding transitional care through [facility], patient stated preference for outpatient follow-up with his primary care physician, verified to Dr. Geiger. Patient amendable to TCN assistance in scheduling follow-up visit with call placed to [phone].     TCN will continue to follow and collaborate with discharge planning team should additional post acute needs arise; although no further TCN needs anticipated during patient current hospital admission. Thank you.     Completed today  Choice obtained: choice for post acute levels of care not indicated at time of TCN visit  [facility] referral (N); TCN assisted in scheduled follow-up visit with primary care physician through [phone]

## 2023-04-04 NOTE — PROGRESS NOTES
Telemetry Shift Summary    Rhythm SR  HR Range 80-90  Ectopy R-PVC  Measurements 0.14/0.08/0.32        Normal Values  Rhythm SR  HR Range    Measurements 0.12-0.20 / 0.06-0.10  / 0.30-0.52

## 2023-04-14 ENCOUNTER — APPOINTMENT (OUTPATIENT)
Dept: VASCULAR LAB | Facility: MEDICAL CENTER | Age: 58
End: 2023-04-14
Attending: FAMILY MEDICINE
Payer: COMMERCIAL

## 2023-04-14 ENCOUNTER — DOCUMENTATION (OUTPATIENT)
Dept: VASCULAR LAB | Facility: MEDICAL CENTER | Age: 58
End: 2023-04-14
Payer: COMMERCIAL

## 2023-04-14 NOTE — PROGRESS NOTES
Tom Quan has made a same day cancellation for f/u appt with [facility] med clinic     Scheduling staff to contact to reschedule follow-up WITH NEXT AVAILABLE PROVIDER and remind patient to give 24h notice in the future for cancellations.     Has ongoing severely elevated LDL, presumed FH, and high risk for ASCVD events, highly recommended for f/u.      [facility]   [facility]   [phone]

## 2023-04-14 NOTE — PROGRESS NOTES
Left Vm to reschedule follow-up WITH NEXT AVAILABLE PROVIDER and remind patient to give 24h notice in the future for cancellations - Per Dr. Drummond

## 2023-04-19 ENCOUNTER — APPOINTMENT (OUTPATIENT)
Dept: RADIOLOGY | Facility: MEDICAL CENTER | Age: 58
End: 2023-04-19
Payer: COMMERCIAL

## 2023-04-20 ENCOUNTER — TELEMEDICINE (OUTPATIENT)
Dept: BEHAVIORAL HEALTH | Facility: CLINIC | Age: 58
End: 2023-04-20

## 2023-04-20 DIAGNOSIS — F33.1 MAJOR DEPRESSIVE DISORDER, RECURRENT EPISODE, MODERATE (HCC): ICD-10-CM

## 2023-04-20 DIAGNOSIS — F90.2 ADHD (ATTENTION DEFICIT HYPERACTIVITY DISORDER), COMBINED TYPE: Primary | ICD-10-CM

## 2023-04-20 DIAGNOSIS — F41.9 ANXIETY: ICD-10-CM

## 2023-04-20 PROCEDURE — 99214 OFFICE O/P EST MOD 30 MIN: CPT | Mod: 95 | Performed by: PSYCHIATRY & NEUROLOGY

## 2023-04-20 RX ORDER — DESVENLAFAXINE 25 MG/1
1 TABLET, EXTENDED RELEASE ORAL DAILY
Qty: 30 TABLET | Refills: 1 | Status: SHIPPED | OUTPATIENT
Start: 2023-04-20 | End: 2023-05-26

## 2023-04-20 RX ORDER — LISDEXAMFETAMINE DIMESYLATE 50 MG
50 CAPSULE ORAL EVERY MORNING
Qty: 30 CAPSULE | Refills: 0 | Status: SHIPPED | OUTPATIENT
Start: 2023-04-20 | End: 2023-05-20

## 2023-04-20 NOTE — TELEPHONE ENCOUNTER
Received request via: Patient    Was the patient seen in the last year in this department? Yes  1/31/23  Does the patient have an active prescription (recently filled or refills available) for medication(s) requested? No    Does the patient have senior living Plus and need 100 day supply (blood pressure, diabetes and cholesterol meds only)? Medication is not for cholesterol, blood pressure or diabetes

## 2023-04-20 NOTE — PROGRESS NOTES
[facility]   Follow Up Assessment  .This evaluation was conducted via Zoom using secure and encrypted videoconferencing technology. The patient was in their home in the [facility].    The patient's identity was confirmed and verbal consent was obtained for this virtual visit.    This visit was conducted via Zoom using secure and encrypted videoconferencing technology.  The patient was in a private location in the [facility].  The patient's identity was confirmed and verbal consent was obtained for this virtual visit.    This provider informed the patient their medical records are totally confidential except for the use by other providers involved in their care, or if the patient signs a release, or to report instances of child or elder abuse, or if it is determined they are an immediate risk to harm themselves or others.    Name: Tom Quan  MRN: [id number]  : 1965  Age: 57 y.o.  Date of assessment: 2023  PCP: Arvind Geiger M.D.    Subjective: Prior to the virtual visit in his home.  He was last seen on .  He likes the effect of Vyvanse 50 mg a.m. but was wondering if he could have a small amount of Adderall in the afternoon.  He does not want to increase the dosage of Vyvansse.  We talked about the importance of his keeping regular appointments while his dose of Vyvanse is being adjusted.    Objective:  He is alert, oriented, and cooperative.  Interrupts frequently and does not listen particularly well.  Grooming is fairly good.  Speech is normal rate.  Anxious.  Memory is good.  Insight and judgment are fairly good.  No indication of psychotic thinking.    Current Risk:       Suicidal: Not suicidal       Homicidal: Not homicidal       Self-Harm: No plan to harm self       Relapse(Low/Moderate/High):: Moderate.       Crisis Safety Plan Reviewed: Discussed with patient    Diagnosis:   ADHD  Major depressive disorder, recurrent    Treatment Plan:  Plan we will  question me for Adderall and Vyvanse but he does not listen particularly well keeps interrupting and I have not seen him in 6 months.    Duration will be for a minimum of 12 months and will be reviewed at each visit.    Goals: Improvement in ADHD symptoms and depression in order to prevent relapse due to the chonic nature of his behavioral health problems and mental illness. Continue Vyvanse 50 mg a.m.        Tom Medina M.D.    This note was created using voice recognition software (Dragon). The accuracy of the dictation is limited by the abilities of the software. I have reviewed the note prior to signing, however some errors in grammar and context are still possible. If you have any questions related to this note please do not hesitate to contact our office.

## 2023-05-19 ENCOUNTER — APPOINTMENT (OUTPATIENT)
Dept: RADIOLOGY | Facility: MEDICAL CENTER | Age: 58
DRG: 308 | End: 2023-05-19
Attending: EMERGENCY MEDICINE
Payer: COMMERCIAL

## 2023-05-19 ENCOUNTER — HOSPITAL ENCOUNTER (INPATIENT)
Facility: MEDICAL CENTER | Age: 58
LOS: 1 days | DRG: 308 | End: 2023-05-20
Attending: EMERGENCY MEDICINE | Admitting: HOSPITALIST
Payer: COMMERCIAL

## 2023-05-19 DIAGNOSIS — R07.89 OTHER CHEST PAIN: ICD-10-CM

## 2023-05-19 DIAGNOSIS — I48.92 ATRIAL FLUTTER WITH RAPID VENTRICULAR RESPONSE (HCC): ICD-10-CM

## 2023-05-19 DIAGNOSIS — I48.92 ATRIAL FLUTTER, UNSPECIFIED TYPE (HCC): ICD-10-CM

## 2023-05-19 DIAGNOSIS — K85.20 ALCOHOL-INDUCED ACUTE PANCREATITIS, UNSPECIFIED COMPLICATION STATUS: ICD-10-CM

## 2023-05-19 LAB
ALBUMIN SERPL BCP-MCNC: 4 G/DL (ref 3.2–4.9)
ALBUMIN/GLOB SERPL: 1.3 G/DL
ALP SERPL-CCNC: 103 U/L (ref 30–99)
ALT SERPL-CCNC: 167 U/L (ref 2–50)
ANION GAP SERPL CALC-SCNC: 15 MMOL/L (ref 7–16)
APTT PPP: 27.2 SEC (ref 24.7–36)
AST SERPL-CCNC: 168 U/L (ref 12–45)
BASOPHILS # BLD AUTO: 1.1 % (ref 0–1.8)
BASOPHILS # BLD: 0.08 K/UL (ref 0–0.12)
BILIRUB SERPL-MCNC: 0.7 MG/DL (ref 0.1–1.5)
BUN SERPL-MCNC: 7 MG/DL (ref 8–22)
CALCIUM ALBUM COR SERPL-MCNC: 9.8 MG/DL (ref 8.5–10.5)
CALCIUM SERPL-MCNC: 9.8 MG/DL (ref 8.4–10.2)
CHLORIDE SERPL-SCNC: 99 MMOL/L (ref 96–112)
CO2 SERPL-SCNC: 22 MMOL/L (ref 20–33)
CREAT SERPL-MCNC: 1.01 MG/DL (ref 0.5–1.4)
EKG IMPRESSION: NORMAL
EKG IMPRESSION: NORMAL
EOSINOPHIL # BLD AUTO: 0.04 K/UL (ref 0–0.51)
EOSINOPHIL NFR BLD: 0.5 % (ref 0–6.9)
ERYTHROCYTE [DISTWIDTH] IN BLOOD BY AUTOMATED COUNT: 56.3 FL (ref 35.9–50)
GFR SERPLBLD CREATININE-BSD FMLA CKD-EPI: 87 ML/MIN/1.73 M 2
GLOBULIN SER CALC-MCNC: 3.1 G/DL (ref 1.9–3.5)
GLUCOSE SERPL-MCNC: 111 MG/DL (ref 65–99)
HCT VFR BLD AUTO: 46.8 % (ref 42–52)
HGB BLD-MCNC: 16 G/DL (ref 14–18)
IMM GRANULOCYTES # BLD AUTO: 0.02 K/UL (ref 0–0.11)
IMM GRANULOCYTES NFR BLD AUTO: 0.3 % (ref 0–0.9)
INR PPP: 1.16 (ref 0.87–1.13)
LIPASE SERPL-CCNC: 115 U/L (ref 7–58)
LYMPHOCYTES # BLD AUTO: 1.49 K/UL (ref 1–4.8)
LYMPHOCYTES NFR BLD: 20.1 % (ref 22–41)
MAGNESIUM SERPL-MCNC: 1.6 MG/DL (ref 1.5–2.5)
MCH RBC QN AUTO: 31.3 PG (ref 27–33)
MCHC RBC AUTO-ENTMCNC: 34.2 G/DL (ref 33.7–35.3)
MCV RBC AUTO: 91.4 FL (ref 81.4–97.8)
MONOCYTES # BLD AUTO: 0.71 K/UL (ref 0–0.85)
MONOCYTES NFR BLD AUTO: 9.6 % (ref 0–13.4)
NEUTROPHILS # BLD AUTO: 5.07 K/UL (ref 1.82–7.42)
NEUTROPHILS NFR BLD: 68.4 % (ref 44–72)
NRBC # BLD AUTO: 0 K/UL
NRBC BLD-RTO: 0 /100 WBC
NT-PROBNP SERPL IA-MCNC: 583 PG/ML (ref 0–125)
NT-PROBNP SERPL IA-MCNC: 601 PG/ML (ref 0–125)
PLATELET # BLD AUTO: 302 K/UL (ref 164–446)
PMV BLD AUTO: 9.1 FL (ref 9–12.9)
POTASSIUM SERPL-SCNC: 4 MMOL/L (ref 3.6–5.5)
PROT SERPL-MCNC: 7.1 G/DL (ref 6–8.2)
PROTHROMBIN TIME: 14.6 SEC (ref 12–14.6)
RBC # BLD AUTO: 5.12 M/UL (ref 4.7–6.1)
SODIUM SERPL-SCNC: 136 MMOL/L (ref 135–145)
T4 FREE SERPL-MCNC: 1 NG/DL (ref 0.93–1.7)
TROPONIN T SERPL-MCNC: 13 NG/L (ref 6–19)
TROPONIN T SERPL-MCNC: 14 NG/L (ref 6–19)
TSH SERPL DL<=0.005 MIU/L-ACNC: 0.98 UIU/ML (ref 0.38–5.33)
TSH SERPL DL<=0.005 MIU/L-ACNC: 0.99 UIU/ML (ref 0.38–5.33)
WBC # BLD AUTO: 7.4 K/UL (ref 4.8–10.8)

## 2023-05-19 PROCEDURE — 96368 THER/DIAG CONCURRENT INF: CPT

## 2023-05-19 PROCEDURE — 85610 PROTHROMBIN TIME: CPT

## 2023-05-19 PROCEDURE — 85730 THROMBOPLASTIN TIME PARTIAL: CPT

## 2023-05-19 PROCEDURE — 700105 HCHG RX REV CODE 258: Performed by: INTERNAL MEDICINE

## 2023-05-19 PROCEDURE — 84484 ASSAY OF TROPONIN QUANT: CPT | Mod: 91

## 2023-05-19 PROCEDURE — 83880 ASSAY OF NATRIURETIC PEPTIDE: CPT

## 2023-05-19 PROCEDURE — 96365 THER/PROPH/DIAG IV INF INIT: CPT

## 2023-05-19 PROCEDURE — 85025 COMPLETE CBC W/AUTO DIFF WBC: CPT

## 2023-05-19 PROCEDURE — 84443 ASSAY THYROID STIM HORMONE: CPT | Mod: 91

## 2023-05-19 PROCEDURE — 99291 CRITICAL CARE FIRST HOUR: CPT | Performed by: HOSPITALIST

## 2023-05-19 PROCEDURE — 36415 COLL VENOUS BLD VENIPUNCTURE: CPT

## 2023-05-19 PROCEDURE — 770022 HCHG ROOM/CARE - ICU (200)

## 2023-05-19 PROCEDURE — 84439 ASSAY OF FREE THYROXINE: CPT

## 2023-05-19 PROCEDURE — 700101 HCHG RX REV CODE 250: Performed by: EMERGENCY MEDICINE

## 2023-05-19 PROCEDURE — A9270 NON-COVERED ITEM OR SERVICE: HCPCS

## 2023-05-19 PROCEDURE — 700105 HCHG RX REV CODE 258: Performed by: HOSPITALIST

## 2023-05-19 PROCEDURE — 83735 ASSAY OF MAGNESIUM: CPT

## 2023-05-19 PROCEDURE — 93005 ELECTROCARDIOGRAM TRACING: CPT | Performed by: EMERGENCY MEDICINE

## 2023-05-19 PROCEDURE — 700111 HCHG RX REV CODE 636 W/ 250 OVERRIDE (IP): Performed by: HOSPITALIST

## 2023-05-19 PROCEDURE — 700101 HCHG RX REV CODE 250: Performed by: INTERNAL MEDICINE

## 2023-05-19 PROCEDURE — 99291 CRITICAL CARE FIRST HOUR: CPT

## 2023-05-19 PROCEDURE — 700111 HCHG RX REV CODE 636 W/ 250 OVERRIDE (IP): Performed by: INTERNAL MEDICINE

## 2023-05-19 PROCEDURE — 96376 TX/PRO/DX INJ SAME DRUG ADON: CPT

## 2023-05-19 PROCEDURE — 96375 TX/PRO/DX INJ NEW DRUG ADDON: CPT

## 2023-05-19 PROCEDURE — 83690 ASSAY OF LIPASE: CPT

## 2023-05-19 PROCEDURE — 700102 HCHG RX REV CODE 250 W/ 637 OVERRIDE(OP): Performed by: INTERNAL MEDICINE

## 2023-05-19 PROCEDURE — 71045 X-RAY EXAM CHEST 1 VIEW: CPT

## 2023-05-19 PROCEDURE — 99222 1ST HOSP IP/OBS MODERATE 55: CPT | Performed by: INTERNAL MEDICINE

## 2023-05-19 PROCEDURE — 96366 THER/PROPH/DIAG IV INF ADDON: CPT

## 2023-05-19 PROCEDURE — 700102 HCHG RX REV CODE 250 W/ 637 OVERRIDE(OP)

## 2023-05-19 PROCEDURE — A9270 NON-COVERED ITEM OR SERVICE: HCPCS | Performed by: INTERNAL MEDICINE

## 2023-05-19 PROCEDURE — 99292 CRITICAL CARE ADDL 30 MIN: CPT | Performed by: INTERNAL MEDICINE

## 2023-05-19 PROCEDURE — 700111 HCHG RX REV CODE 636 W/ 250 OVERRIDE (IP): Performed by: EMERGENCY MEDICINE

## 2023-05-19 PROCEDURE — 80053 COMPREHEN METABOLIC PANEL: CPT

## 2023-05-19 PROCEDURE — 700105 HCHG RX REV CODE 258: Performed by: EMERGENCY MEDICINE

## 2023-05-19 RX ORDER — CHOLECALCIFEROL (VITAMIN D3) 50 MCG
4000 TABLET ORAL SEE ADMIN INSTRUCTIONS
COMMUNITY

## 2023-05-19 RX ORDER — ONDANSETRON 4 MG/1
4 TABLET, ORALLY DISINTEGRATING ORAL EVERY 4 HOURS PRN
Status: DISCONTINUED | OUTPATIENT
Start: 2023-05-19 | End: 2023-05-20 | Stop reason: HOSPADM

## 2023-05-19 RX ORDER — ONDANSETRON 2 MG/ML
4 INJECTION INTRAMUSCULAR; INTRAVENOUS ONCE
Status: COMPLETED | OUTPATIENT
Start: 2023-05-19 | End: 2023-05-19

## 2023-05-19 RX ORDER — METOPROLOL TARTRATE 1 MG/ML
5 INJECTION, SOLUTION INTRAVENOUS ONCE
Status: DISCONTINUED | OUTPATIENT
Start: 2023-05-19 | End: 2023-05-19

## 2023-05-19 RX ORDER — DEXMEDETOMIDINE HYDROCHLORIDE 4 UG/ML
.1-1 INJECTION, SOLUTION INTRAVENOUS CONTINUOUS
Status: DISCONTINUED | OUTPATIENT
Start: 2023-05-19 | End: 2023-05-20

## 2023-05-19 RX ORDER — OXYCODONE HYDROCHLORIDE AND ACETAMINOPHEN 5; 325 MG/1; MG/1
1 TABLET ORAL EVERY 4 HOURS PRN
Status: DISCONTINUED | OUTPATIENT
Start: 2023-05-19 | End: 2023-05-19

## 2023-05-19 RX ORDER — VENLAFAXINE HYDROCHLORIDE 37.5 MG/1
37.5 CAPSULE, EXTENDED RELEASE ORAL DAILY
Status: DISCONTINUED | OUTPATIENT
Start: 2023-05-20 | End: 2023-05-19

## 2023-05-19 RX ORDER — AMOXICILLIN 250 MG
2 CAPSULE ORAL 2 TIMES DAILY
Status: DISCONTINUED | OUTPATIENT
Start: 2023-05-19 | End: 2023-05-20 | Stop reason: HOSPADM

## 2023-05-19 RX ORDER — PROMETHAZINE HYDROCHLORIDE 25 MG/1
12.5-25 TABLET ORAL EVERY 4 HOURS PRN
Status: DISCONTINUED | OUTPATIENT
Start: 2023-05-19 | End: 2023-05-20 | Stop reason: HOSPADM

## 2023-05-19 RX ORDER — ROSUVASTATIN CALCIUM 10 MG/1
40 TABLET, COATED ORAL EVERY EVENING
Status: DISCONTINUED | OUTPATIENT
Start: 2023-05-19 | End: 2023-05-20 | Stop reason: HOSPADM

## 2023-05-19 RX ORDER — PROCHLORPERAZINE EDISYLATE 5 MG/ML
5-10 INJECTION INTRAMUSCULAR; INTRAVENOUS EVERY 4 HOURS PRN
Status: DISCONTINUED | OUTPATIENT
Start: 2023-05-19 | End: 2023-05-20 | Stop reason: HOSPADM

## 2023-05-19 RX ORDER — FOLIC ACID 1 MG/1
1 TABLET ORAL DAILY
Status: DISCONTINUED | OUTPATIENT
Start: 2023-05-19 | End: 2023-05-20 | Stop reason: HOSPADM

## 2023-05-19 RX ORDER — PROMETHAZINE HYDROCHLORIDE 25 MG/1
12.5-25 SUPPOSITORY RECTAL EVERY 4 HOURS PRN
Status: DISCONTINUED | OUTPATIENT
Start: 2023-05-19 | End: 2023-05-20 | Stop reason: HOSPADM

## 2023-05-19 RX ORDER — ACETAMINOPHEN 325 MG/1
650 TABLET ORAL EVERY 6 HOURS PRN
Status: DISCONTINUED | OUTPATIENT
Start: 2023-05-19 | End: 2023-05-20 | Stop reason: HOSPADM

## 2023-05-19 RX ORDER — METOPROLOL TARTRATE 1 MG/ML
5 INJECTION, SOLUTION INTRAVENOUS
Status: DISCONTINUED | OUTPATIENT
Start: 2023-05-19 | End: 2023-05-19

## 2023-05-19 RX ORDER — PREDNISONE 20 MG/1
20 TABLET ORAL DAILY
Status: ON HOLD | COMMUNITY
Start: 2023-05-14 | End: 2023-05-23

## 2023-05-19 RX ORDER — METOPROLOL TARTRATE 50 MG/1
50 TABLET, FILM COATED ORAL TWICE DAILY
Status: DISCONTINUED | OUTPATIENT
Start: 2023-05-19 | End: 2023-05-20 | Stop reason: HOSPADM

## 2023-05-19 RX ORDER — DEXMEDETOMIDINE HYDROCHLORIDE 4 UG/ML
.1-1.5 INJECTION, SOLUTION INTRAVENOUS CONTINUOUS
Status: DISCONTINUED | OUTPATIENT
Start: 2023-05-19 | End: 2023-05-19

## 2023-05-19 RX ORDER — SODIUM CHLORIDE, SODIUM LACTATE, POTASSIUM CHLORIDE, CALCIUM CHLORIDE 600; 310; 30; 20 MG/100ML; MG/100ML; MG/100ML; MG/100ML
INJECTION, SOLUTION INTRAVENOUS CONTINUOUS
Status: DISCONTINUED | OUTPATIENT
Start: 2023-05-19 | End: 2023-05-19

## 2023-05-19 RX ORDER — M-VIT,TX,IRON,MINS/CALC/FOLIC 27MG-0.4MG
1 TABLET ORAL DAILY
Status: DISCONTINUED | OUTPATIENT
Start: 2023-05-19 | End: 2023-05-20 | Stop reason: HOSPADM

## 2023-05-19 RX ORDER — PREDNISONE 20 MG/1
20 TABLET ORAL DAILY
Status: DISCONTINUED | OUTPATIENT
Start: 2023-05-19 | End: 2023-05-20 | Stop reason: HOSPADM

## 2023-05-19 RX ORDER — SODIUM CHLORIDE, SODIUM LACTATE, POTASSIUM CHLORIDE, AND CALCIUM CHLORIDE .6; .31; .03; .02 G/100ML; G/100ML; G/100ML; G/100ML
1000 INJECTION, SOLUTION INTRAVENOUS ONCE
Status: COMPLETED | OUTPATIENT
Start: 2023-05-19 | End: 2023-05-20

## 2023-05-19 RX ORDER — HYDROMORPHONE HYDROCHLORIDE 1 MG/ML
0.5 INJECTION, SOLUTION INTRAMUSCULAR; INTRAVENOUS; SUBCUTANEOUS
Status: DISCONTINUED | OUTPATIENT
Start: 2023-05-19 | End: 2023-05-20 | Stop reason: HOSPADM

## 2023-05-19 RX ORDER — SODIUM CHLORIDE 9 MG/ML
1000 INJECTION, SOLUTION INTRAVENOUS ONCE
Status: COMPLETED | OUTPATIENT
Start: 2023-05-19 | End: 2023-05-20

## 2023-05-19 RX ORDER — LOSARTAN POTASSIUM 25 MG/1
25 TABLET ORAL
Status: ON HOLD | COMMUNITY
End: 2023-05-23

## 2023-05-19 RX ORDER — OMEPRAZOLE 20 MG/1
20 CAPSULE, DELAYED RELEASE ORAL DAILY
Status: DISCONTINUED | OUTPATIENT
Start: 2023-05-19 | End: 2023-05-20 | Stop reason: HOSPADM

## 2023-05-19 RX ORDER — DESVENLAFAXINE 25 MG/1
25 TABLET, EXTENDED RELEASE ORAL DAILY
Status: DISCONTINUED | OUTPATIENT
Start: 2023-05-20 | End: 2023-05-20 | Stop reason: HOSPADM

## 2023-05-19 RX ORDER — DEXTROSE MONOHYDRATE 50 MG/ML
INJECTION, SOLUTION INTRAVENOUS CONTINUOUS
Status: DISCONTINUED | OUTPATIENT
Start: 2023-05-19 | End: 2023-05-19

## 2023-05-19 RX ORDER — DILTIAZEM HYDROCHLORIDE 5 MG/ML
15 INJECTION INTRAVENOUS ONCE
Status: COMPLETED | OUTPATIENT
Start: 2023-05-19 | End: 2023-05-19

## 2023-05-19 RX ORDER — METOPROLOL TARTRATE 1 MG/ML
5 INJECTION, SOLUTION INTRAVENOUS ONCE
Status: COMPLETED | OUTPATIENT
Start: 2023-05-19 | End: 2023-05-19

## 2023-05-19 RX ORDER — PROPRANOLOL HYDROCHLORIDE 10 MG/1
10 TABLET ORAL 3 TIMES DAILY PRN
Status: ON HOLD | COMMUNITY
End: 2023-05-23

## 2023-05-19 RX ORDER — SODIUM CHLORIDE 9 MG/ML
INJECTION, SOLUTION INTRAVENOUS CONTINUOUS
Status: DISCONTINUED | OUTPATIENT
Start: 2023-05-19 | End: 2023-05-19

## 2023-05-19 RX ORDER — POLYETHYLENE GLYCOL 3350 17 G/17G
1 POWDER, FOR SOLUTION ORAL
Status: DISCONTINUED | OUTPATIENT
Start: 2023-05-19 | End: 2023-05-20 | Stop reason: HOSPADM

## 2023-05-19 RX ORDER — ROSUVASTATIN CALCIUM 10 MG/1
20 TABLET, COATED ORAL EVERY EVENING
Status: DISCONTINUED | OUTPATIENT
Start: 2023-05-19 | End: 2023-05-19

## 2023-05-19 RX ORDER — BISACODYL 10 MG
10 SUPPOSITORY, RECTAL RECTAL
Status: DISCONTINUED | OUTPATIENT
Start: 2023-05-19 | End: 2023-05-20 | Stop reason: HOSPADM

## 2023-05-19 RX ORDER — LORAZEPAM 2 MG/ML
2 INJECTION INTRAMUSCULAR EVERY 4 HOURS
Status: DISCONTINUED | OUTPATIENT
Start: 2023-05-19 | End: 2023-05-20

## 2023-05-19 RX ORDER — OMEPRAZOLE 20 MG/1
20-40 CAPSULE, DELAYED RELEASE ORAL
Status: DISCONTINUED | OUTPATIENT
Start: 2023-05-19 | End: 2023-05-20 | Stop reason: HOSPADM

## 2023-05-19 RX ORDER — MORPHINE SULFATE 4 MG/ML
2 INJECTION INTRAVENOUS
Status: DISCONTINUED | OUTPATIENT
Start: 2023-05-19 | End: 2023-05-20 | Stop reason: HOSPADM

## 2023-05-19 RX ORDER — LISDEXAMFETAMINE DIMESYLATE CAPSULES 50 MG/1
50 CAPSULE ORAL EVERY MORNING
Status: DISCONTINUED | OUTPATIENT
Start: 2023-05-19 | End: 2023-05-19

## 2023-05-19 RX ORDER — DILTIAZEM HYDROCHLORIDE 5 MG/ML
25 INJECTION INTRAVENOUS ONCE
Status: COMPLETED | OUTPATIENT
Start: 2023-05-19 | End: 2023-05-19

## 2023-05-19 RX ORDER — MORPHINE SULFATE 4 MG/ML
1 INJECTION INTRAVENOUS
Status: DISCONTINUED | OUTPATIENT
Start: 2023-05-19 | End: 2023-05-19

## 2023-05-19 RX ORDER — LORAZEPAM 2 MG/ML
1-2 INJECTION INTRAMUSCULAR
Status: DISCONTINUED | OUTPATIENT
Start: 2023-05-19 | End: 2023-05-20 | Stop reason: HOSPADM

## 2023-05-19 RX ORDER — ONDANSETRON 2 MG/ML
4 INJECTION INTRAMUSCULAR; INTRAVENOUS EVERY 4 HOURS PRN
Status: DISCONTINUED | OUTPATIENT
Start: 2023-05-19 | End: 2023-05-20 | Stop reason: HOSPADM

## 2023-05-19 RX ORDER — MORPHINE SULFATE 4 MG/ML
4 INJECTION INTRAVENOUS ONCE
Status: COMPLETED | OUTPATIENT
Start: 2023-05-19 | End: 2023-05-19

## 2023-05-19 RX ADMIN — OMEPRAZOLE 20 MG: 20 CAPSULE, DELAYED RELEASE ORAL at 20:12

## 2023-05-19 RX ADMIN — ONDANSETRON HYDROCHLORIDE 4 MG: 2 INJECTION, SOLUTION INTRAMUSCULAR; INTRAVENOUS at 15:46

## 2023-05-19 RX ADMIN — OXYCODONE AND ACETAMINOPHEN 1 TABLET: 325; 5 TABLET ORAL at 18:27

## 2023-05-19 RX ADMIN — LORAZEPAM 2 MG: 2 INJECTION INTRAMUSCULAR; INTRAVENOUS at 16:39

## 2023-05-19 RX ADMIN — DILTIAZEM HYDROCHLORIDE 25 MG: 5 INJECTION INTRAVENOUS at 13:52

## 2023-05-19 RX ADMIN — DEXMEDETOMIDINE HYDROCHLORIDE 0.2 MCG/KG/HR: 4 INJECTION, SOLUTION INTRAVENOUS at 16:09

## 2023-05-19 RX ADMIN — ONDANSETRON HYDROCHLORIDE 4 MG: 2 INJECTION, SOLUTION INTRAMUSCULAR; INTRAVENOUS at 20:38

## 2023-05-19 RX ADMIN — DILTIAZEM HYDROCHLORIDE 10 MG/HR: 5 INJECTION INTRAVENOUS at 22:26

## 2023-05-19 RX ADMIN — SODIUM CHLORIDE: 9 INJECTION, SOLUTION INTRAVENOUS at 13:45

## 2023-05-19 RX ADMIN — MORPHINE SULFATE 1 MG: 4 INJECTION INTRAVENOUS at 20:09

## 2023-05-19 RX ADMIN — DILTIAZEM HYDROCHLORIDE 20 MG/HR: 5 INJECTION INTRAVENOUS at 16:08

## 2023-05-19 RX ADMIN — MORPHINE SULFATE 1 MG: 4 INJECTION INTRAVENOUS at 16:38

## 2023-05-19 RX ADMIN — METOPROLOL TARTRATE 50 MG: 50 TABLET, FILM COATED ORAL at 16:39

## 2023-05-19 RX ADMIN — LORAZEPAM 2 MG: 2 INJECTION INTRAMUSCULAR; INTRAVENOUS at 22:34

## 2023-05-19 RX ADMIN — RIVAROXABAN 20 MG: 20 TABLET, FILM COATED ORAL at 16:54

## 2023-05-19 RX ADMIN — METOPROLOL TARTRATE 5 MG: 1 INJECTION, SOLUTION INTRAVENOUS at 12:45

## 2023-05-19 RX ADMIN — DILTIAZEM HYDROCHLORIDE 10 MG/HR: 5 INJECTION INTRAVENOUS at 12:00

## 2023-05-19 RX ADMIN — DILTIAZEM HYDROCHLORIDE 15 MG: 5 INJECTION INTRAVENOUS at 11:25

## 2023-05-19 RX ADMIN — DEXMEDETOMIDINE HYDROCHLORIDE 0.2 MCG/KG/HR: 4 INJECTION, SOLUTION INTRAVENOUS at 15:46

## 2023-05-19 RX ADMIN — ONDANSETRON 4 MG: 2 INJECTION INTRAMUSCULAR; INTRAVENOUS at 12:28

## 2023-05-19 RX ADMIN — HYDROMORPHONE HYDROCHLORIDE 0.5 MG: 1 INJECTION, SOLUTION INTRAMUSCULAR; INTRAVENOUS; SUBCUTANEOUS at 22:30

## 2023-05-19 RX ADMIN — THIAMINE HYDROCHLORIDE 100 MG: 100 INJECTION, SOLUTION INTRAMUSCULAR; INTRAVENOUS at 20:32

## 2023-05-19 RX ADMIN — SODIUM CHLORIDE, POTASSIUM CHLORIDE, SODIUM LACTATE AND CALCIUM CHLORIDE 1000 ML: 600; 310; 30; 20 INJECTION, SOLUTION INTRAVENOUS at 16:54

## 2023-05-19 RX ADMIN — PREDNISONE 20 MG: 20 TABLET ORAL at 15:15

## 2023-05-19 RX ADMIN — SODIUM CHLORIDE 1000 ML: 9 INJECTION, SOLUTION INTRAVENOUS at 23:09

## 2023-05-19 RX ADMIN — MORPHINE SULFATE 4 MG: 4 INJECTION INTRAVENOUS at 12:34

## 2023-05-19 RX ADMIN — MULTIPLE VITAMINS W/ MINERALS TAB 1 TABLET: TAB at 20:12

## 2023-05-19 ASSESSMENT — LIFESTYLE VARIABLES
ORIENTATION AND CLOUDING OF SENSORIUM: ORIENTED AND CAN DO SERIAL ADDITIONS
SUBSTANCE_ABUSE: 0
ALCOHOL_USE: YES
AVERAGE NUMBER OF DAYS PER WEEK YOU HAVE A DRINK CONTAINING ALCOHOL: 5
VISUAL DISTURBANCES: NOT PRESENT
PAROXYSMAL SWEATS: BARELY PERCEPTIBLE SWEATING. PALMS MOIST
TOTAL SCORE: 2
HEADACHE, FULLNESS IN HEAD: NOT PRESENT
EVER FELT BAD OR GUILTY ABOUT YOUR DRINKING: YES
AGITATION: NORMAL ACTIVITY
NAUSEA AND VOMITING: NO NAUSEA AND NO VOMITING
EVER HAD A DRINK FIRST THING IN THE MORNING TO STEADY YOUR NERVES TO GET RID OF A HANGOVER: NO
TREMOR: *
HAVE PEOPLE ANNOYED YOU BY CRITICIZING YOUR DRINKING: NO
HOW MANY TIMES IN THE PAST YEAR HAVE YOU HAD 5 OR MORE DRINKS IN A DAY: 20
ON A TYPICAL DAY WHEN YOU DRINK ALCOHOL HOW MANY DRINKS DO YOU HAVE: 5
ANXIETY: MODERATELY ANXIOUS OR GUARDED, SO ANXIETY IS INFERRED
HAVE YOU EVER FELT YOU SHOULD CUT DOWN ON YOUR DRINKING: YES
CONSUMPTION TOTAL: POSITIVE
TOTAL SCORE: 7
AUDITORY DISTURBANCES: NOT PRESENT
TOTAL SCORE: 2
TOTAL SCORE: 2

## 2023-05-19 ASSESSMENT — PAIN DESCRIPTION - PAIN TYPE
TYPE: ACUTE PAIN

## 2023-05-19 ASSESSMENT — COGNITIVE AND FUNCTIONAL STATUS - GENERAL
SUGGESTED CMS G CODE MODIFIER DAILY ACTIVITY: CH
SUGGESTED CMS G CODE MODIFIER MOBILITY: CH
MOBILITY SCORE: 24
DAILY ACTIVITIY SCORE: 24

## 2023-05-19 ASSESSMENT — ENCOUNTER SYMPTOMS
ABDOMINAL PAIN: 0
EYE REDNESS: 0
FLANK PAIN: 0
POLYDIPSIA: 0
MUSCULOSKELETAL NEGATIVE: 1
DEPRESSION: 0
STRIDOR: 0
TINGLING: 0
HEADACHES: 1
ORTHOPNEA: 0
PALPITATIONS: 1
FALLS: 0
NERVOUS/ANXIOUS: 1
CLAUDICATION: 0
FOCAL WEAKNESS: 0
COUGH: 0
SINUS PAIN: 0
SEIZURES: 0
EYES NEGATIVE: 1
BACK PAIN: 0
MYALGIAS: 0
SENSORY CHANGE: 0
DOUBLE VISION: 0
FEVER: 0
WHEEZING: 0
BRUISES/BLEEDS EASILY: 0
HEMOPTYSIS: 0
BLOOD IN STOOL: 0
PND: 0
DIAPHORESIS: 1
HEARTBURN: 0
INSOMNIA: 0
SHORTNESS OF BREATH: 1
NAUSEA: 1
EYE DISCHARGE: 0
GASTROINTESTINAL NEGATIVE: 1
SPUTUM PRODUCTION: 0
SHORTNESS OF BREATH: 0
COUGH: 1
VOMITING: 0
CHILLS: 0
PHOTOPHOBIA: 0
DIZZINESS: 1
WEIGHT LOSS: 0

## 2023-05-19 ASSESSMENT — CHA2DS2 SCORE
CHF OR LEFT VENTRICULAR DYSFUNCTION: NO
VASCULAR DISEASE: NO
DIABETES: NO
HYPERTENSION: YES
AGE 65 TO 74: NO
CHA2DS2 VASC SCORE: 1
SEX: MALE
PRIOR STROKE OR TIA OR THROMBOEMBOLISM: NO
AGE 75 OR GREATER: NO

## 2023-05-19 ASSESSMENT — PATIENT HEALTH QUESTIONNAIRE - PHQ9
SUM OF ALL RESPONSES TO PHQ9 QUESTIONS 1 AND 2: 0
2. FEELING DOWN, DEPRESSED, IRRITABLE, OR HOPELESS: NOT AT ALL
1. LITTLE INTEREST OR PLEASURE IN DOING THINGS: NOT AT ALL

## 2023-05-19 ASSESSMENT — FIBROSIS 4 INDEX
FIB4 SCORE: 2.41
FIB4 SCORE: 2.41

## 2023-05-19 NOTE — ASSESSMENT & PLAN NOTE
Patient apparently was well controlled on Stelara, he switched insurances and thus had to go on a different regimen and since then has not been well controlled.  He also takes prednisone 20 mg daily.  He also was doing a ketogenic diet.  He has not had a flare but he says he has not been well controlled at all.  We do not carry Stelara and the significant other will try to bring it in.

## 2023-05-19 NOTE — ASSESSMENT & PLAN NOTE
Optimize blood pressure management keep systolic blood pressure less than 140 diastolic under 90  Currently patient was on propranolol at home however he stopped the propranolol and he was also on as needed losartan, he was not on any routine medications for blood pressure  The patient at this point with the diltiazem drip is with a systolic of 100.  Once he is off the diltiazem drip we will need to make changes to control his blood pressure adequately

## 2023-05-19 NOTE — ASSESSMENT & PLAN NOTE
Acute alcoholic pancreatitis with a lipase of 115  The patient will need at this point normal saline at 300 mL/h for 48 hours  I have discussed with him about alcohol cessation.  The patient apparently drinks four 4 to 8 ounce hard liquor drinks per day that he is admitting to that per the significant other the patient may be drinking a lot more than that

## 2023-05-19 NOTE — H&P
"Hospital Medicine History & Physical Note    Date of Service  5/19/2023    Primary Care Physician  Arvind Geiger M.D.    Consultants  cardiology and critical care    Specialist Names: Dr Vel Triplett of cardiology  Dr. Hernández of critical care    Code Status  Full Code    Chief Complaint  Chief Complaint   Patient presents with    Chest Pain     Present for months but became \"more so\" in the last 24 hours; pt c/o chest discomfort, rapid heart rate; hx of aortic aneurism         History of Presenting Illness  Tom Quan is a 57 y.o. male who presented 5/19/2023 with chest pain and palpitations.  Patient states the palpitations have been there for about 72 hours.  The patient's initial evaluation shows a heart rate of 150 with atrial flutter rhythm.  The patient at this point has been initially trialed on 15 mg of IV Cardizem unsuccessfully.  This was followed by a Cardizem infusion.  The patient was given 15 mg/h.  The patient's management was unsuccessful.  I then gave him a 25 mg IV dose of Cardizem and this slowed the rate down to about 75 from 150..  I spoke to cardiology at this point they do not want us to use amiodarone or cardiovert him given the fact that the patient does have a ascending aortic aneurysm of 4.8 cm but because the rate has been ongoing for 72 hours he could have potential embolic sources that can cause an acute CVA.  So at this point I will anticoagulate him and continue with rate control and admit him to the ICU.  The if the patient's condition worsens then certainly we could cardiovert him or give him amiodarone.  Right now the patient also has acute pancreatitis alcohol induced as the patient drinks four 4 to 8 ounce hard alcoholic beverages per day.  Patient may experience some withdrawals we will see if he does for right now the patient is not experiencing any withdrawals and he has not had any alcoholic beverages in a while.    Patient is critically ill.   The patient is in : " Atrial flutter with rapid ventricular response  The vital organ system that is effected is the: Cardiovascular  If untreated there is a high chance of deterioration into: death   The critical care that I am providing today is: Rate control with IV Cardizem that is titratable  The critical care that has been undertaken is medically complex.   There has been no overlap in critical care time.   Critical care time not including procedures, no overlap: 45 minutes      I discussed the plan of care with patient, family, bedside RN, charge RN, and emergency room physician, cardiologist, critical care specialist .    Review of Systems  Review of Systems   Constitutional:  Positive for diaphoresis and malaise/fatigue. Negative for chills and fever.   HENT: Negative.  Negative for nosebleeds and sinus pain.    Eyes: Negative.  Negative for double vision, photophobia, discharge and redness.   Respiratory:  Positive for shortness of breath. Negative for cough, sputum production, wheezing and stridor.    Cardiovascular:  Positive for chest pain and palpitations. Negative for orthopnea, leg swelling and PND.   Gastrointestinal: Negative.  Negative for abdominal pain, blood in stool and heartburn.   Genitourinary: Negative.  Negative for dysuria, flank pain and frequency.   Musculoskeletal: Negative.  Negative for back pain, falls and myalgias.   Skin: Negative.  Negative for itching.   Neurological:  Positive for dizziness and headaches. Negative for tingling, sensory change, focal weakness and seizures.   Endo/Heme/Allergies: Negative.  Negative for polydipsia. Does not bruise/bleed easily.   Psychiatric/Behavioral:  Negative for depression, substance abuse and suicidal ideas. The patient is nervous/anxious. The patient does not have insomnia.    All other systems reviewed and are negative.      Past Medical History   has a past medical history of Depression and Hyperlipidemia.    Surgical History   has a past surgical history  that includes hip replacement, partial.     Family History  family history includes Cancer in his paternal uncle; Diabetes in his mother; Heart Attack in his maternal grandfather; Hyperlipidemia in his brother and sister; Other in his mother and son.   Family history reviewed with patient. There is no family history that is pertinent to the chief complaint.     Social History   reports that he has never smoked. He has never used smokeless tobacco. He reports current alcohol use. He reports that he does not use drugs.    Allergies  Allergies   Allergen Reactions    Thimerosal Unspecified     Eye medication reaction  Eye medication reaction         Medications  Prior to Admission Medications   Prescriptions Last Dose Informant Patient Reported? Taking?   B Complex Vitamins (VITAMIN B COMPLEX PO) 5/18/2023 at AM Patient, Rx Bottle (For Med Information) Yes Yes   Sig: Take 1 Tablet by mouth every day.   Cholecalciferol (VITAMIN D) 2000 UNIT Tab FEW DAYS AGO at [facility] Patient, Rx Bottle (For Med Information) Yes Yes   Sig: Take 4,000 Units by mouth every day.   Desvenlafaxine Succinate ER 25 MG TABLET SR 24 HR 5/18/2023 at AM Patient, Rx Bottle (For Med Information) No No   Sig: Take 1 Tablet by mouth every day.   Omega-3 Fatty Acids (FISH OIL PO) FEW DAYS AGO at [facility] Patient, Rx Bottle (For Med Information) Yes No   Sig: Take 2 Capsules by mouth every 7 days.   Thiamine HCl (VITAMIN B-1) 250 MG Tab 5/19/2023 at AM Patient, Rx Bottle (For Med Information) Yes Yes   Sig: Take 250 mg by mouth every day.   Ustekinumab (STELARA SC) ABOUT 3 WEEKS AGO at [facility] Patient, Rx Bottle (For Med Information) Yes Yes   Sig: Inject 1 Dose under the skin every 30 (thirty) days.   folic acid (FOLVITE) 1 MG Tab 5/19/2023 at AM Patient, Rx Bottle (For Med Information) No No   Sig: Take 1 Tablet by mouth every day.   lisdexamfetamine (VYVANSE) 50 MG capsule ABOUT 3 WEEKS AGO at [facility] Patient, Rx Bottle (For Med Information) No No   Sig: Take 1  Capsule by mouth every morning for 30 days.   losartan (COZAAR) 25 MG Tab FEW DAYS AGO at [facility] Patient, Rx Bottle (For Med Information) Yes Yes   Sig: Take 25 mg by mouth 1 time a day as needed. Indications: High Blood Pressure Disorder   multivitamin Tab 5/18/2023 at AM Patient, Rx Bottle (For Med Information) Yes Yes   Sig: Take 2 Tablets by mouth every day.   omeprazole (PRILOSEC) 20 MG delayed-release capsule 5/18/2023 at PM Patient, Rx Bottle (For Med Information) Yes No   Sig: Take 20-40 mg by mouth 1 time a day as needed (Heartburn).   ondansetron (ZOFRAN ODT) 4 MG TABLET DISPERSIBLE PRN at PRN Patient, Rx Bottle (For Med Information) No No   Sig: Take 1 Tablet by mouth every four hours as needed for Nausea/Vomiting (give PO if no IV route available).   predniSONE (DELTASONE) 20 MG Tab 5/17/2023 at AM Patient, Rx Bottle (For Med Information) Yes Yes   Sig: Take 20 mg by mouth every day.   propranolol (INDERAL) 10 MG Tab FEW DAYS AGO at [facility] Patient, Rx Bottle (For Med Information) Yes Yes   Sig: Take 10 mg by mouth 3 times a day as needed. Indications: High Blood Pressure Disorder   rizatriptan (MAXALT) 5 MG tablet PRN at PRN Patient, Rx Bottle (For Med Information) No No   Sig: Take 1-2 Tablets by mouth one time as needed for Migraine for up to 1 dose.   rosuvastatin (CRESTOR) 20 MG Tab ABOUT 1 WEEK AGO at [facility] Patient, Rx Bottle (For Med Information) No No   Sig: Take 1 Tablet by mouth every evening.   testosterone cypionate (DEPO-TESTOSTERONE) 200 MG/ML Solution injection ABOUT 2 WEEKS AGO at [facility] Patient, Rx Bottle (For Med Information) Yes No   Sig: Inject 200 mg into the shoulder, thigh, or buttocks every 7 days.      Facility-Administered Medications: None       Physical Exam  Pulse:  [] 72  Resp:  [7-32] 9  BP: ()/() 96/73  SpO2:  [90 %-96 %] 90 %  Blood Pressure: 96/73       Pulse: 72   Respiration: (!) 9   Pulse Oximetry: 90 %       Physical Exam  Vitals and nursing note reviewed. Exam  conducted with a chaperone present.   Constitutional:       General: He is in acute distress.      Appearance: Normal appearance. He is obese. He is ill-appearing and diaphoretic.   HENT:      Head: Normocephalic and atraumatic.      Right Ear: External ear normal.      Left Ear: External ear normal.      Nose: Nose normal.      Mouth/Throat:      Mouth: Mucous membranes are moist.      Pharynx: Oropharynx is clear.   Eyes:      Extraocular Movements: Extraocular movements intact.      Conjunctiva/sclera: Conjunctivae normal.      Pupils: Pupils are equal, round, and reactive to light.   Neck:      Thyroid: No thyromegaly.      Vascular: No JVD.   Cardiovascular:      Rate and Rhythm: Tachycardia present. Rhythm irregular.      Heart sounds: No murmur heard.     No friction rub.   Pulmonary:      Effort: Pulmonary effort is normal.      Breath sounds: Normal breath sounds. No wheezing or rales.   Chest:      Chest wall: No tenderness.   Abdominal:      General: Abdomen is flat. Bowel sounds are normal. There is no distension.      Palpations: Abdomen is soft. There is no mass.      Tenderness: There is abdominal tenderness in the epigastric area and left upper quadrant. There is no guarding or rebound.   Musculoskeletal:         General: No tenderness. Normal range of motion.      Cervical back: Normal range of motion and neck supple.      Right lower leg: No edema.      Left lower leg: No edema.   Lymphadenopathy:      Cervical: No cervical adenopathy.   Skin:     General: Skin is warm.      Capillary Refill: Capillary refill takes 2 to 3 seconds.      Findings: No erythema or rash.   Neurological:      General: No focal deficit present.      Mental Status: He is alert and oriented to person, place, and time. Mental status is at baseline.      GCS: GCS eye subscore is 4. GCS verbal subscore is 5. GCS motor subscore is 6.      Cranial Nerves: No cranial nerve deficit.      Deep Tendon Reflexes: Reflexes are normal  and symmetric.   Psychiatric:         Attention and Perception: Attention normal.         Mood and Affect: Mood is anxious.         Speech: Speech normal.         Behavior: Behavior normal. Behavior is cooperative.         Thought Content: Thought content normal.         Judgment: Judgment normal.         Laboratory:  Recent Labs     05/19/23  1112   WBC 7.4   RBC 5.12   HEMOGLOBIN 16.0   HEMATOCRIT 46.8   MCV 91.4   MCH 31.3   MCHC 34.2   RDW 56.3*   PLATELETCT 302   MPV 9.1     Recent Labs     05/19/23  1112   SODIUM 136   POTASSIUM 4.0   CHLORIDE 99   CO2 22   GLUCOSE 111*   BUN 7*   CREATININE 1.01   CALCIUM 9.8     Recent Labs     05/19/23  1112   ALTSGPT 167*   ASTSGOT 168*   ALKPHOSPHAT 103*   TBILIRUBIN 0.7   LIPASE 115*   GLUCOSE 111*     Recent Labs     05/19/23  1112   APTT 27.2   INR 1.16*     Recent Labs     05/19/23  1112 05/19/23  1325   NTPROBNP 583* 601*         Recent Labs     05/19/23  1112 05/19/23  1325   TROPONINT 14 13       Imaging:  DX-CHEST-PORTABLE (1 VIEW)   Final Result      No evidence of acute cardiopulmonary process.      EC-ECHOCARDIOGRAM COMPLETE W/O CONT    (Results Pending)       X-Ray:  I have personally reviewed the images and compared with prior images.  EKG:  I have personally reviewed the images and compared with prior images.    Assessment/Plan:  Justification for Admission Status  I anticipate this patient will require at least two midnights for appropriate medical management, necessitating inpatient admission because patient has atrial flutter that is uncontrolled and will need at this point rate control as well as anticoagulation to be initiated.  Patient has also acute pancreatitis.  This will need fluid resuscitation and pain management.  The patient will need at least 48 hours of inpatient management to accomplish this.    Patient will need a ICU (Adult and Pediatrics) bed on MEDICAL service .  The need is secondary to acute pancreatitis with acute atrial fibrillation  with rapid ventricular response.    * Atrial flutter with rapid ventricular response (HCC)- (present on admission)  Assessment & Plan  Patient for the past 72 hours has been feeling palpitations as well as a very fast heart rate when he puts on his Apple Watch.  The patient came into the hospital for evaluation is found to have a heart rate of 150-160.  Initial Cardizem 50 mg IV push was given and then the patient was started on a Cardizem drip.  This did not however make a difference in the patient's heart rate continue to be 150-152.  Cardiology was consulted and they recommended against amiodarone as well as cardioversion due to the fact that the patient has been in atrial flutter for 3 days and he could have developed blood clots that could cause a severe stroke and before anticoagulating him.  Recommendation at this point is to continue Cardizem and possible digoxin loading and beta-blockers.  I have repeated his Cardizem weight-based loading dose with a with 25 mg of Cardizem IV push and this converted the patient into a heart rate that is now around 75.  The patient remains on a Cardizem infusion at 20 mg/hr. the patient at this point will need to go to the ICU and I have discussed this with the critical care intensivist and he agrees that the patient would best be monitored in the ICU given the fact that the patient will need diltiazem titration  Anticoagulation has been started on the patient  Patient is also very dehydrated and will need fluid resuscitation    Acute alcoholic pancreatitis  Assessment & Plan  Acute alcoholic pancreatitis with a lipase of 115  The patient will need at this point normal saline at 300 mL/h for 48 hours  I have discussed with him about alcohol cessation.  The patient apparently drinks four 4 to 8 ounce hard liquor drinks per day that he is admitting to that per the significant other the patient may be drinking a lot more than that    Familial hypercholesteremia- (present on  admission)  Assessment & Plan  Continue with Crestor    Major depressive disorder, recurrent episode, moderate (HCC)- (present on admission)  Assessment & Plan  Patient at home is on desvenlafaxine, formulary substitution is for venlafaxine which we have made    Ulcerative colitis (HCC)- (present on admission)  Assessment & Plan  Patient apparently was well controlled on Stelara, he switched insurances and thus had to go on a different regimen and since then has not been well controlled.  He also takes prednisone 20 mg daily.  He also was doing a ketogenic diet.  He has not had a flare but he says he has not been well controlled at all.  We do not carry Stelara and the significant other will try to bring it in.     GERD (gastroesophageal reflux disease)- (present on admission)  Assessment & Plan  PPI therapy with omeprazole    Essential (primary) hypertension- (present on admission)  Assessment & Plan  Optimize blood pressure management keep systolic blood pressure less than 140 diastolic under 90  Currently patient was on propranolol at home however he stopped the propranolol and he was also on as needed losartan, he was not on any routine medications for blood pressure  The patient at this point with the diltiazem drip is with a systolic of 100.  Once he is off the diltiazem drip we will need to make changes to control his blood pressure adequately        VTE prophylaxis: SCDs/TEDs

## 2023-05-19 NOTE — ASSESSMENT & PLAN NOTE
Patient for the past 72 hours has been feeling palpitations as well as a very fast heart rate when he puts on his Apple Watch.  The patient came into the hospital for evaluation is found to have a heart rate of 150-160.  Initial Cardizem 50 mg IV push was given and then the patient was started on a Cardizem drip.  This did not however make a difference in the patient's heart rate continue to be 150-152.  Cardiology was consulted and they recommended against amiodarone as well as cardioversion due to the fact that the patient has been in atrial flutter for 3 days and he could have developed blood clots that could cause a severe stroke and before anticoagulating him.  Recommendation at this point is to continue Cardizem and possible digoxin loading and beta-blockers.  I have repeated his Cardizem weight-based loading dose with a with 25 mg of Cardizem IV push and this converted the patient into a heart rate that is now around 75.  The patient remains on a Cardizem infusion at 20 mg/hr. the patient at this point will need to go to the ICU and I have discussed this with the critical care intensivist and he agrees that the patient would best be monitored in the ICU given the fact that the patient will need diltiazem titration  Anticoagulation has been started on the patient  Patient is also very dehydrated and will need fluid resuscitation

## 2023-05-19 NOTE — ED NOTES
Med Rec completed per patient and RX bottles (returned)   Allergies reviewed  No ORAL antibiotics in last 30 days    Patient states that he takes his BP meds as needed   Patient states that he STOPPED taking his Vyvanse about 3 weeks ago   Patient states that he STOPPED taking his Crestor about 1 week ago     Patient states that he started taking left over Prednisone 20 mg daily on 5/14/2023

## 2023-05-19 NOTE — CONSULTS
Critical Care Consultation    Date of consult: 5/19/2023    Referring Physician  Roopa Miller M.D.    Reason for Consultation  A flutter/fibrillation w/ RVR     History of Presenting Illness  57 y.o. male who presented 5/19/2023 is admitted to ICU with A flutter/fibrillation w/ RVR. Patient with significant history of alcohol abuse who presents with palpitation which started about three days ago. He also report having epigastric pain and diarrhea. Denies fever/chills, SOB, or sick contact. In ER, he was found to have 2:1 flutter which he was given 15 mg of IV cardiazem follow by 25 mg. HR improved but went back up to the 130s. Started on diltiazem gtt and xarelto. Admitted to ICU for further management.      Code Status  Full Code    Review of Systems  Review of Systems   Constitutional:  Positive for malaise/fatigue. Negative for chills, fever and weight loss.   HENT: Negative.     Respiratory:  Positive for cough. Negative for hemoptysis, sputum production and shortness of breath.    Cardiovascular:  Positive for palpitations. Negative for chest pain, orthopnea, claudication and leg swelling.   Gastrointestinal:  Positive for nausea. Negative for abdominal pain and vomiting.   Genitourinary: Negative.    Musculoskeletal: Negative.    Skin: Negative.    All other systems reviewed and are negative.      Past Medical History   has a past medical history of Depression and Hyperlipidemia.    Surgical History   has a past surgical history that includes hip replacement, partial.    Family History  family history includes Cancer in his paternal uncle; Diabetes in his mother; Heart Attack in his maternal grandfather; Hyperlipidemia in his brother and sister; Other in his mother and son.    Social History   reports that he has never smoked. He has never used smokeless tobacco. He reports current alcohol use. He reports that he does not use drugs.    Medications  Home Medications       Reviewed by Roopa Miller M.D.  (Physician) on 05/19/23 at 1309  Med List Status: Complete     Medication Last Dose Status   B Complex Vitamins (VITAMIN B COMPLEX PO) 5/18/2023 Active   Cholecalciferol (VITAMIN D) 2000 UNIT Tab FEW DAYS AGO Active   Desvenlafaxine Succinate ER 25 MG TABLET SR 24 HR 5/18/2023 Active   folic acid (FOLVITE) 1 MG Tab 5/19/2023 Active   lisdexamfetamine (VYVANSE) 50 MG capsule ABOUT 3 WEEKS AGO Active   losartan (COZAAR) 25 MG Tab FEW DAYS AGO Active   multivitamin Tab 5/18/2023 Active   Omega-3 Fatty Acids (FISH OIL PO) FEW DAYS AGO Active   omeprazole (PRILOSEC) 20 MG delayed-release capsule 5/18/2023 Active   ondansetron (ZOFRAN ODT) 4 MG TABLET DISPERSIBLE PRN Active   predniSONE (DELTASONE) 20 MG Tab 5/17/2023 Active   propranolol (INDERAL) 10 MG Tab FEW DAYS AGO Active   rizatriptan (MAXALT) 5 MG tablet PRN Active   rosuvastatin (CRESTOR) 20 MG Tab ABOUT 1 WEEK AGO Active   testosterone cypionate (DEPO-TESTOSTERONE) 200 MG/ML Solution injection ABOUT 2 WEEKS AGO Active   Thiamine HCl (VITAMIN B-1) 250 MG Tab 5/19/2023 Active   Ustekinumab (STELARA SC) ABOUT 3 WEEKS AGO Active                  Current Facility-Administered Medications   Medication Dose Route Frequency Provider Last Rate Last Admin    [START ON 5/20/2023] venlafaxine XR (EFFEXOR XR) capsule 37.5 mg  37.5 mg Oral DAILY Roopa Miller M.D.        omeprazole (PRILOSEC) capsule 20-40 mg  20-40 mg Oral QDAY PRN Roopa Miller M.D.        predniSONE (DELTASONE) tablet 20 mg  20 mg Oral DAILY Roopa Miller M.D.   20 mg at 05/19/23 1515    rosuvastatin (CRESTOR) tablet 20 mg  20 mg Oral Q EVENING Roopa Miller M.D.        senna-docusate (PERICOLACE or SENOKOT S) 8.6-50 MG per tablet 2 Tablet  2 Tablet Oral BID Roopa Miller M.D.        And    polyethylene glycol/lytes (MIRALAX) PACKET 1 Packet  1 Packet Oral QDAY PRN Roopa Miller M.D.        And    magnesium hydroxide (MILK OF MAGNESIA) suspension 30 mL  30 mL Oral QDAY PRN Roopa Miller M.D.         And    bisacodyl (DULCOLAX) suppository 10 mg  10 mg Rectal QDAY PRN Roopa Miller M.D.        ondansetron (ZOFRAN) syringe/vial injection 4 mg  4 mg Intravenous Q4HRS PRN Roopa Miller M.D.   4 mg at 05/19/23 1546    ondansetron (ZOFRAN ODT) dispertab 4 mg  4 mg Oral Q4HRS PRN Roopa Miller M.D.        promethazine (PHENERGAN) tablet 12.5-25 mg  12.5-25 mg Oral Q4HRS PRN Roopa Miller M.D.        promethazine (PHENERGAN) suppository 12.5-25 mg  12.5-25 mg Rectal Q4HRS PRN Roopa Miller M.D.        prochlorperazine (COMPAZINE) injection 5-10 mg  5-10 mg Intravenous Q4HRS PRN Roopa Miller M.D.        rivaroxaban (XARELTO) tablet 20 mg  20 mg Oral PM MEAL Roopa Miller M.D.        acetaminophen (Tylenol) tablet 650 mg  650 mg Oral Q6HRS PRN Roopa Miller M.D.        dilTIAZem (Cardizem) 100 mg in  mL Infusion  0-20 mg/hr Intravenous Continuous Doug Hernández M.D. 20 mL/hr at 05/19/23 1608 20 mg/hr at 05/19/23 1608    dexmedetomidine (PRECEDEX) 400 mcg/100mL NS premix infusion  0.1-1 mcg/kg/hr (Ideal) Intravenous Continuous Doug Hernández M.D. 4 mL/hr at 05/19/23 1609 0.2 mcg/kg/hr at 05/19/23 1609    LORazepam (ATIVAN) injection 2 mg  2 mg Intravenous Q4HRS Doug Hernández M.D.        LORazepam (ATIVAN) injection 1-2 mg  1-2 mg Intravenous Q2HRS PRN Doug Hernández M.D.        morphine 4 MG/ML injection 1 mg  1 mg Intravenous Q3HRS PRN Doug Hernández M.D.        oxyCODONE-acetaminophen (PERCOCET) 5-325 MG per tablet 1 Tablet  1 Tablet Oral Q4HRS PRN Doug Hernández M.D.        lactated ringers infusion   Intravenous Continuous Doug Hernández M.D.        metoprolol tartrate (LOPRESSOR) tablet 50 mg  50 mg Oral TWICE DAILY Doug Hernández M.D.         Current Outpatient Medications   Medication Sig Dispense Refill    Cholecalciferol (VITAMIN D) 2000 UNIT Tab Take 4,000 Units by mouth every day.      losartan (COZAAR) 25 MG Tab Take 25 mg by mouth 1 time a day as needed. Indications: High Blood Pressure Disorder      multivitamin Tab  Take 2 Tablets by mouth every day.      propranolol (INDERAL) 10 MG Tab Take 10 mg by mouth 3 times a day as needed. Indications: High Blood Pressure Disorder      Thiamine HCl (VITAMIN B-1) 250 MG Tab Take 250 mg by mouth every day.      Ustekinumab (STELARA SC) Inject 1 Dose under the skin every 30 (thirty) days.      B Complex Vitamins (VITAMIN B COMPLEX PO) Take 1 Tablet by mouth every day.      predniSONE (DELTASONE) 20 MG Tab Take 20 mg by mouth every day.      Desvenlafaxine Succinate ER 25 MG TABLET SR 24 HR Take 1 Tablet by mouth every day. 30 Tablet 1    lisdexamfetamine (VYVANSE) 50 MG capsule Take 1 Capsule by mouth every morning for 30 days. 30 Capsule 0    ondansetron (ZOFRAN ODT) 4 MG TABLET DISPERSIBLE Take 1 Tablet by mouth every four hours as needed for Nausea/Vomiting (give PO if no IV route available). 10 Tablet 0    folic acid (FOLVITE) 1 MG Tab Take 1 Tablet by mouth every day. 30 Tablet 1    rosuvastatin (CRESTOR) 20 MG Tab Take 1 Tablet by mouth every evening. 90 Tablet 3    rizatriptan (MAXALT) 5 MG tablet Take 1-2 Tablets by mouth one time as needed for Migraine for up to 1 dose. 20 Tablet 3    omeprazole (PRILOSEC) 20 MG delayed-release capsule Take 20-40 mg by mouth 1 time a day as needed (Heartburn).      testosterone cypionate (DEPO-TESTOSTERONE) 200 MG/ML Solution injection Inject 200 mg into the shoulder, thigh, or buttocks every 7 days.      Omega-3 Fatty Acids (FISH OIL PO) Take 2 Capsules by mouth every 7 days.         Allergies  Allergies   Allergen Reactions    Thimerosal Unspecified     Eye medication reaction  Eye medication reaction         Vital Signs last 24 hours  Pulse:  [] 135  Resp:  [7-32] 9  BP: ()/() 113/78  SpO2:  [90 %-96 %] 90 %    Physical Exam  Physical Exam  Constitutional:       Appearance: Normal appearance. He is obese.   HENT:      Head: Normocephalic.      Nose: Nose normal.   Cardiovascular:      Rate and Rhythm: Tachycardia present.  Rhythm irregular.      Pulses: Normal pulses.   Pulmonary:      Effort: Pulmonary effort is normal. No respiratory distress.      Breath sounds: Normal breath sounds.   Abdominal:      General: Abdomen is flat. Bowel sounds are normal.      Palpations: Abdomen is soft.      Comments: Mild guarding on deep palpation in LUQ    Musculoskeletal:         General: Normal range of motion.      Cervical back: Normal range of motion.      Right lower leg: No edema.      Left lower leg: No edema.   Skin:     General: Skin is warm.   Neurological:      Mental Status: He is alert and oriented to person, place, and time.      Cranial Nerves: No cranial nerve deficit.   Psychiatric:         Mood and Affect: Mood normal.         Fluids  No intake or output data in the 24 hours ending 23 1633    Laboratory  Recent Results (from the past 48 hour(s))   EKG    Collection Time: 23 10:49 AM   Result Value Ref Range    Report       [facility] Emergency Dept.    Test Date:  2023  Pt Name:    SALVATORE WITT                  Department: [facility]  MRN:        [id number]                      [facility]  Gender:     Male                         Technician: LEYDA  :        1965                   Requested By:KENROY SCHUMACHER  Order #:    [id number]                    Reading MD: KENROY SCHUMACHER. AMD    Measurements  Intervals                                Axis  Rate:       155                          P:          104  CO:         77                           QRS:        11  QRSD:       143                          T:          -78  QT:         338  QTc:        543    Interpretive Statements  Sinus tachycardia  Nonspecific intraventricular conduction delay  Abnormal T, consider ischemia, inferior leads  Compared to ECG 2023 15:37:15  Intraventricular conduction delay now present  T-wave abnormality now present  Possible ischemia now present  Electronically Signed On 2023 11 :46:32 PDT  by KENROY SCHUMACHER. [facility]     CBC with Differential    Collection Time: 05/19/23 11:12 AM   Result Value Ref Range    WBC 7.4 4.8 - 10.8 K/uL    RBC 5.12 4.70 - 6.10 M/uL    Hemoglobin 16.0 14.0 - 18.0 g/dL    Hematocrit 46.8 42.0 - 52.0 %    MCV 91.4 81.4 - 97.8 fL    MCH 31.3 27.0 - 33.0 pg    MCHC 34.2 33.7 - 35.3 g/dL    RDW 56.3 (H) 35.9 - 50.0 fL    Platelet Count 302 164 - 446 K/uL    MPV 9.1 9.0 - 12.9 fL    Neutrophils-Polys 68.40 44.00 - 72.00 %    Lymphocytes 20.10 (L) 22.00 - 41.00 %    Monocytes 9.60 0.00 - 13.40 %    Eosinophils 0.50 0.00 - 6.90 %    Basophils 1.10 0.00 - 1.80 %    Immature Granulocytes 0.30 0.00 - 0.90 %    Nucleated RBC 0.00 /100 WBC    Neutrophils (Absolute) 5.07 1.82 - 7.42 K/uL    Lymphs (Absolute) 1.49 1.00 - 4.80 K/uL    Monos (Absolute) 0.71 0.00 - 0.85 K/uL    Eos (Absolute) 0.04 0.00 - 0.51 K/uL    Baso (Absolute) 0.08 0.00 - 0.12 K/uL    Immature Granulocytes (abs) 0.02 0.00 - 0.11 K/uL    NRBC (Absolute) 0.00 K/uL   Complete Metabolic Panel (CMP)    Collection Time: 05/19/23 11:12 AM   Result Value Ref Range    Sodium 136 135 - 145 mmol/L    Potassium 4.0 3.6 - 5.5 mmol/L    Chloride 99 96 - 112 mmol/L    Co2 22 20 - 33 mmol/L    Anion Gap 15.0 7.0 - 16.0    Glucose 111 (H) 65 - 99 mg/dL    Bun 7 (L) 8 - 22 mg/dL    Creatinine 1.01 0.50 - 1.40 mg/dL    Calcium 9.8 8.4 - 10.2 mg/dL    AST(SGOT) 168 (H) 12 - 45 U/L    ALT(SGPT) 167 (H) 2 - 50 U/L    Alkaline Phosphatase 103 (H) 30 - 99 U/L    Total Bilirubin 0.7 0.1 - 1.5 mg/dL    Albumin 4.0 3.2 - 4.9 g/dL    Total Protein 7.1 6.0 - 8.2 g/dL    Globulin 3.1 1.9 - 3.5 g/dL    A-G Ratio 1.3 g/dL   Troponins NOW    Collection Time: 05/19/23 11:12 AM   Result Value Ref Range    Troponin T 14 6 - 19 ng/L   proBrain Natriuretic Peptide, NT    Collection Time: 05/19/23 11:12 AM   Result Value Ref Range    NT-proBNP 583 (H) 0 - 125 pg/mL   APTT    Collection Time: 05/19/23 11:12 AM   Result Value Ref Range    APTT 27.2 24.7 - 36.0 sec    PROTHROMBIN TIME (INR)    Collection Time: 23 11:12 AM   Result Value Ref Range    PT 14.6 12.0 - 14.6 sec    INR 1.16 (H) 0.87 - 1.13   TSH WITH REFLEX TO FT4    Collection Time: 23 11:12 AM   Result Value Ref Range    TSH 0.995 0.380 - 5.330 uIU/mL   LIPASE    Collection Time: 23 11:12 AM   Result Value Ref Range    Lipase 115 (H) 7 - 58 U/L   CORRECTED CALCIUM    Collection Time: 23 11:12 AM   Result Value Ref Range    Correct Calcium 9.8 8.5 - 10.5 mg/dL   ESTIMATED GFR    Collection Time: 23 11:12 AM   Result Value Ref Range    GFR (CKD-EPI) 87 >60 mL/min/1.73 m 2   TSH    Collection Time: 23 11:12 AM   Result Value Ref Range    TSH 0.984 0.380 - 5.330 uIU/mL   Free Thyroxine (T4)    Collection Time: 23 11:12 AM   Result Value Ref Range    Free T-4 1.00 0.93 - 1.70 ng/dL   EKG (NOW)    Collection Time: 23  1:04 PM   Result Value Ref Range    Report       [facility] Emergency Dept.    Test Date:  2023  Pt Name:    SALVATORE WITT                  Department[facility]  MRN:        [id number]                      [facility]  Gender:     Male                         Technician: [id number]  :        1965                   Requested By:KENROY SCHUMACHER  Order #:    [id number]                    Reading MD:    Measurements  Intervals                                Axis  Rate:       150                          P:          86  NE:         83                           QRS:        25  QRSD:       146                          T:          -58  QT:         364  QTc:        576    Interpretive Statements  Sinus tachycardia  Nonspecific intraventricular conduction delay  Abnormal T, consider ischemia, inferior leads  Compared to ECG 2023 10:49:02  No significant changes     proBrain Natriuretic Peptide, NT    Collection Time: 23  1:25 PM   Result Value Ref Range    NT-proBNP 601 (H) 0 - 125 pg/mL   TROPONIN    Collection Time:  05/19/23  1:25 PM   Result Value Ref Range    Troponin T 13 6 - 19 ng/L       Imaging  DX-CHEST-PORTABLE (1 VIEW)   Final Result      No evidence of acute cardiopulmonary process.      EC-ECHOCARDIOGRAM COMPLETE W/O CONT    (Results Pending)       Assessment/Plan  NEURO:   # Alcohol abuse   -- At risk for alcohol withdrawal   -- Added ativan per [facility] protocol   -- On thiamine, folic acid, and MTV   -- On precedex gtt to seek RASS goal -1 to 0     RESP:   -- On room air   -- Encourage IS    -- Seek early mobility      CVS:   # A fib w/ RVR   -- Secondary to dehydration, holiday heart syndrome, and electrolyte derangement   -- On dilti gtt  -- On xarelto for CVA ppx   -- Pending TTE   -- Added metoprolol 50 mg BID   -- High lytes goal   -- Cardiology following   -- May need digoxin if HR fails to improve w/ calcium channel blocker and beta blocker   -- If all else fail then will need QUENTIN w/ cardioversion      GI:   # Abdominal pain   -- Possible related to pancreatitis vs functional dyspepsia  -- Cont IVF resuscitation   -- Check US abdomen    -- Pain control w/ morphine and percocet      # Abnormal LFTs   -- Secondary to alcohol abuse   -- Trend LFTs   -- Check RUQ US     ENDO:   -- Goal BS < 180     Discussed patient condition and risk of morbidity and/or mortality with Hospitalist, RN, Pharmacy, and Patient.    The patient remains critically ill.  Critical care time = 41 minutes in directly providing and coordinating critical care and extensive data review.  No time overlap and excludes procedures.

## 2023-05-19 NOTE — ED TRIAGE NOTES
"Chief Complaint   Patient presents with    Chest Pain     Present for months but became \"more so\" in the last 24 hours; pt c/o chest discomfort, rapid heart rate; hx of aortic aneurism       BP (!) 137/100   Pulse (!) 156   Resp 18   SpO2 96%     Pt brought back to room; EKG done in triage  "

## 2023-05-19 NOTE — CONSULTS
"Cardiology Initial Consult Note    Reason for Consult:  Asked by Dr Roopa Miller M.D. to see this patient with atrial flutter with rapid ventricular response  Patient's PCP: Arvind Geiger M.D.    CC:   Chief Complaint   Patient presents with    Chest Pain     Present for months but became \"more so\" in the last 24 hours; pt c/o chest discomfort, rapid heart rate; hx of aortic aneurism         HPI:    This is a 57-year-old man with past medical history significant for hypertension, familial hypercholesterolemia, ascending aortic aneurysm measuring 4.8 cm, ulcerative colitis, alcohol abuse who presented to the hospital with chest discomfort and palpitations.    His symptoms of palpitations have been going on for approximately 3 days.  He noticed that his heart rate was very rapid on his Apple Watch.  Had palpitations and associated chest pain. As such, he decided to seek medical attention.  While in the emergency department, he was noted to be tachycardic.  ECG was obtained and showed atrial flutter with 2-1 AV conduction.  He was started on a diltiazem infusion for rate control.  However despite this, his ventricular rates remain rapid in the 140s to 150s.  He received an additional bolus of IV diltiazem as well as IV Lopressor.    Labs including troponins were obtained.  Troponins were checked x2 and were within normal limits.  He was found to have a mildly elevated NT proBNP of 583.  Also found to have elevated lipase of 115.  Patient was admitted to the medical service for ongoing care.    Patient states that he has never been diagnosed with atrial fibrillation or atrial flutter in the past.  However, he has a longstanding history of palpitations that were always intermittent in nature but would generally resolve without intervention.  However, this episode, he has been experiencing palpitations consistently for the past 3 days.  No associated lightheadedness/dizziness or episodes of " syncope.      Medications / Drug list prior to admission:  No current facility-administered medications on file prior to encounter.     Current Outpatient Medications on File Prior to Encounter   Medication Sig Dispense Refill    Cholecalciferol (VITAMIN D) 2000 UNIT Tab Take 4,000 Units by mouth every day.      losartan (COZAAR) 25 MG Tab Take 25 mg by mouth 1 time a day as needed. Indications: High Blood Pressure Disorder      multivitamin Tab Take 2 Tablets by mouth every day.      propranolol (INDERAL) 10 MG Tab Take 10 mg by mouth 3 times a day as needed. Indications: High Blood Pressure Disorder      Thiamine HCl (VITAMIN B-1) 250 MG Tab Take 250 mg by mouth every day.      Ustekinumab (STELARA SC) Inject 1 Dose under the skin every 30 (thirty) days.      B Complex Vitamins (VITAMIN B COMPLEX PO) Take 1 Tablet by mouth every day.      predniSONE (DELTASONE) 20 MG Tab Take 20 mg by mouth every day.      Desvenlafaxine Succinate ER 25 MG TABLET SR 24 HR Take 1 Tablet by mouth every day. 30 Tablet 1    lisdexamfetamine (VYVANSE) 50 MG capsule Take 1 Capsule by mouth every morning for 30 days. 30 Capsule 0    ondansetron (ZOFRAN ODT) 4 MG TABLET DISPERSIBLE Take 1 Tablet by mouth every four hours as needed for Nausea/Vomiting (give PO if no IV route available). 10 Tablet 0    folic acid (FOLVITE) 1 MG Tab Take 1 Tablet by mouth every day. 30 Tablet 1    rosuvastatin (CRESTOR) 20 MG Tab Take 1 Tablet by mouth every evening. 90 Tablet 3    rizatriptan (MAXALT) 5 MG tablet Take 1-2 Tablets by mouth one time as needed for Migraine for up to 1 dose. 20 Tablet 3    omeprazole (PRILOSEC) 20 MG delayed-release capsule Take 20-40 mg by mouth 1 time a day as needed (Heartburn).      testosterone cypionate (DEPO-TESTOSTERONE) 200 MG/ML Solution injection Inject 200 mg into the shoulder, thigh, or buttocks every 7 days.      Omega-3 Fatty Acids (FISH OIL PO) Take 2 Capsules by mouth every 7 days.         Current list of  administered Medications:    Current Facility-Administered Medications:     [START ON 5/20/2023] venlafaxine XR (EFFEXOR XR) capsule 37.5 mg, 37.5 mg, Oral, DAILY, Roopa Miller M.D.    omeprazole (PRILOSEC) capsule 20-40 mg, 20-40 mg, Oral, QDAY PRN, Roopa Miller M.D.    predniSONE (DELTASONE) tablet 20 mg, 20 mg, Oral, DAILY, Roopa Miller M.D., 20 mg at 05/19/23 1515    rosuvastatin (CRESTOR) tablet 20 mg, 20 mg, Oral, Q EVENING, Roopa Miller M.D.    senna-docusate (PERICOLACE or SENOKOT S) 8.6-50 MG per tablet 2 Tablet, 2 Tablet, Oral, BID **AND** polyethylene glycol/lytes (MIRALAX) PACKET 1 Packet, 1 Packet, Oral, QDAY PRN **AND** magnesium hydroxide (MILK OF MAGNESIA) suspension 30 mL, 30 mL, Oral, QDAY PRN **AND** bisacodyl (DULCOLAX) suppository 10 mg, 10 mg, Rectal, QDAY PRN, Roopa Miller M.D.    ondansetron (ZOFRAN) syringe/vial injection 4 mg, 4 mg, Intravenous, Q4HRS PRN, Roopa Miller M.D., 4 mg at 05/19/23 1546    ondansetron (ZOFRAN ODT) dispertab 4 mg, 4 mg, Oral, Q4HRS PRN, Roopa Miller M.D.    promethazine (PHENERGAN) tablet 12.5-25 mg, 12.5-25 mg, Oral, Q4HRS PRN, Roopa Miller M.D.    promethazine (PHENERGAN) suppository 12.5-25 mg, 12.5-25 mg, Rectal, Q4HRS PRN, Roopa Miller M.D.    prochlorperazine (COMPAZINE) injection 5-10 mg, 5-10 mg, Intravenous, Q4HRS PRN, Roopa Miller M.D.    rivaroxaban (XARELTO) tablet 20 mg, 20 mg, Oral, PM MEAL, Rooap Miller M.D.    NS infusion, , Intravenous, Continuous, Roopa Miller M.D., Last Rate: 300 mL/hr at 05/19/23 1549, Restarted at 05/19/23 1549    acetaminophen (Tylenol) tablet 650 mg, 650 mg, Oral, Q6HRS PRN, Roopa Miller M.D.    dilTIAZem (Cardizem) 100 mg in  mL Infusion, 20 mg/hr, Intravenous, Continuous, Roopa Miller M.D.    dexmedetomidine (PRECEDEX) 400 mcg/100mL NS premix infusion, 0.1-1.5 mcg/kg/hr (Ideal), Intravenous, Continuous, Doug Hernández M.D., Last Rate: 4 mL/hr at 05/19/23 1546, 0.2 mcg/kg/hr at 05/19/23  1546    Current Outpatient Medications:     Cholecalciferol (VITAMIN D) 2000 UNIT Tab, Take 4,000 Units by mouth every day., Disp: , Rfl:     losartan (COZAAR) 25 MG Tab, Take 25 mg by mouth 1 time a day as needed. Indications: High Blood Pressure Disorder, Disp: , Rfl:     multivitamin Tab, Take 2 Tablets by mouth every day., Disp: , Rfl:     propranolol (INDERAL) 10 MG Tab, Take 10 mg by mouth 3 times a day as needed. Indications: High Blood Pressure Disorder, Disp: , Rfl:     Thiamine HCl (VITAMIN B-1) 250 MG Tab, Take 250 mg by mouth every day., Disp: , Rfl:     Ustekinumab (STELARA SC), Inject 1 Dose under the skin every 30 (thirty) days., Disp: , Rfl:     B Complex Vitamins (VITAMIN B COMPLEX PO), Take 1 Tablet by mouth every day., Disp: , Rfl:     predniSONE (DELTASONE) 20 MG Tab, Take 20 mg by mouth every day., Disp: , Rfl:     Desvenlafaxine Succinate ER 25 MG TABLET SR 24 HR, Take 1 Tablet by mouth every day., Disp: 30 Tablet, Rfl: 1    lisdexamfetamine (VYVANSE) 50 MG capsule, Take 1 Capsule by mouth every morning for 30 days., Disp: 30 Capsule, Rfl: 0    ondansetron (ZOFRAN ODT) 4 MG TABLET DISPERSIBLE, Take 1 Tablet by mouth every four hours as needed for Nausea/Vomiting (give PO if no IV route available)., Disp: 10 Tablet, Rfl: 0    folic acid (FOLVITE) 1 MG Tab, Take 1 Tablet by mouth every day., Disp: 30 Tablet, Rfl: 1    rosuvastatin (CRESTOR) 20 MG Tab, Take 1 Tablet by mouth every evening., Disp: 90 Tablet, Rfl: 3    rizatriptan (MAXALT) 5 MG tablet, Take 1-2 Tablets by mouth one time as needed for Migraine for up to 1 dose., Disp: 20 Tablet, Rfl: 3    omeprazole (PRILOSEC) 20 MG delayed-release capsule, Take 20-40 mg by mouth 1 time a day as needed (Heartburn)., Disp: , Rfl:     testosterone cypionate (DEPO-TESTOSTERONE) 200 MG/ML Solution injection, Inject 200 mg into the shoulder, thigh, or buttocks every 7 days., Disp: , Rfl:     Omega-3 Fatty Acids (FISH OIL PO), Take 2 Capsules by mouth  "every 7 days., Disp: , Rfl:     Past Medical History:   Diagnosis Date    Depression     Hyperlipidemia        Past Surgical History:   Procedure Laterality Date    HIP REPLACEMENT, PARTIAL         Family History   Problem Relation Age of Onset    Diabetes Mother     Other Mother         smoker+, unclear cause of death    Hyperlipidemia Sister     Hyperlipidemia Brother     Cancer Paternal Uncle         Prostate    Heart Attack Maternal Grandfather          55, tobacco+    Other Son         takayasu's arteritis    Abdominal aortic aneurysm Neg Hx     Arterial Aneurysm Neg Hx      Patient family history was personally reviewed, no pertinent family history to current presentation    Social History     Tobacco Use    Smoking status: Never    Smokeless tobacco: Never   Vaping Use    Vaping Use: Former   Substance Use Topics    Alcohol use: Yes     Comment: Occasionally    Drug use: Never       ALLERGIES:  Allergies   Allergen Reactions    Thimerosal Unspecified     Eye medication reaction  Eye medication reaction         Review of systems:  A complete review of symptoms was reviewed with the patient. This is reviewed in H&P and PMH. ALL OTHERS reviewed and negative    Physical exam:  Patient Vitals for the past 24 hrs:   BP Temp src Pulse Resp SpO2 Height Weight   23 1516 113/78 -- (!) 135 -- -- -- --   23 1415 -- -- 72 (!) 9 90 % -- --   23 1414 96/73 -- 60 -- 90 % -- --   23 1404 98/74 -- 75 -- 94 % -- --   23 1403 -- -- 72 17 92 % -- --   23 1401 106/84 -- (!) 109 -- -- -- --   23 1337 (!) 122/96 -- (!) 148 (!) 9 93 % -- --   23 1329 (!) 119/97 -- (!) 147 15 94 % -- --   23 1315 -- -- -- -- -- 1.854 m (6' 1\") 104 kg (229 lb 4.5 oz)   23 1259 (!) 123/101 -- (!) 148 (!) 7 93 % -- --   23 1245 (!) 133/109 -- (!) 156 -- -- -- --   23 1220 -- -- (!) 153 -- 95 % -- --   23 1219 -- -- (!) 154 12 96 % -- --   23 1159 (!) 128/92 -- (!) 156 " (!) 32 94 % -- --   23 1158 -- -- -- (!) 22 -- -- --   23 1132 (!) 136/100 -- (!) 154 -- 96 % -- --   23 1110 (!) 137/100 Temporal (!) 156 18 96 % -- --     General: Not in acute distress  EYES: No jaundice  HEENT: OP clear   Neck:  No carotid bruits, No JVD appreciated  CVS: Irregularly irregular, tachycardic, Normal S1, S2. No murmurs, rubs or gallops  Resp: Normal respiratory effort, lungs CTA bilaterally. No rales or rhonchi  Abdomen: Soft, non-distended, non-tender to palpation, no guarding or rigidity  Skin: No obvious rashes, no cyanosis  Neurological: Alert and oriented x3, moves all extremities, no focal neurologic deficits  Psychiatric: Appropriate affect  Extremities:   Extremities warm, pulses intact, no lower extremity edema    Data:  Laboratory studies personally reviewed by me:  Recent Results (from the past 24 hour(s))   EKG    Collection Time: 23 10:49 AM   Result Value Ref Range    Report       [facility] Emergency Dept.    Test Date:  2023  Pt Name:    SALVATORE WITT                  Department: [facility]  MRN:        [id number]                      [facility]  Gender:     Male                         Technician: [id number]  :        1965                   Requested By:KENROY SCHUMACHRE  Order #:    [id number]                    Reading MD: KENROY SCHUMACHER. AMD    Measurements  Intervals                                Axis  Rate:       155                          P:          104  AK:         77                           QRS:        11  QRSD:       143                          T:          -78  QT:         338  QTc:        543    Interpretive Statements  Sinus tachycardia  Nonspecific intraventricular conduction delay  Abnormal T, consider ischemia, inferior leads  Compared to ECG 2023 15:37:15  Intraventricular conduction delay now present  T-wave abnormality now present  Possible ischemia now present  Electronically Signed On 2023  11 :46:32 PDT by KENROY SCHUMACHER. AMD     CBC with Differential    Collection Time: 05/19/23 11:12 AM   Result Value Ref Range    WBC 7.4 4.8 - 10.8 K/uL    RBC 5.12 4.70 - 6.10 M/uL    Hemoglobin 16.0 14.0 - 18.0 g/dL    Hematocrit 46.8 42.0 - 52.0 %    MCV 91.4 81.4 - 97.8 fL    MCH 31.3 27.0 - 33.0 pg    MCHC 34.2 33.7 - 35.3 g/dL    RDW 56.3 (H) 35.9 - 50.0 fL    Platelet Count 302 164 - 446 K/uL    MPV 9.1 9.0 - 12.9 fL    Neutrophils-Polys 68.40 44.00 - 72.00 %    Lymphocytes 20.10 (L) 22.00 - 41.00 %    Monocytes 9.60 0.00 - 13.40 %    Eosinophils 0.50 0.00 - 6.90 %    Basophils 1.10 0.00 - 1.80 %    Immature Granulocytes 0.30 0.00 - 0.90 %    Nucleated RBC 0.00 /100 WBC    Neutrophils (Absolute) 5.07 1.82 - 7.42 K/uL    Lymphs (Absolute) 1.49 1.00 - 4.80 K/uL    Monos (Absolute) 0.71 0.00 - 0.85 K/uL    Eos (Absolute) 0.04 0.00 - 0.51 K/uL    Baso (Absolute) 0.08 0.00 - 0.12 K/uL    Immature Granulocytes (abs) 0.02 0.00 - 0.11 K/uL    NRBC (Absolute) 0.00 K/uL   Complete Metabolic Panel (CMP)    Collection Time: 05/19/23 11:12 AM   Result Value Ref Range    Sodium 136 135 - 145 mmol/L    Potassium 4.0 3.6 - 5.5 mmol/L    Chloride 99 96 - 112 mmol/L    Co2 22 20 - 33 mmol/L    Anion Gap 15.0 7.0 - 16.0    Glucose 111 (H) 65 - 99 mg/dL    Bun 7 (L) 8 - 22 mg/dL    Creatinine 1.01 0.50 - 1.40 mg/dL    Calcium 9.8 8.4 - 10.2 mg/dL    AST(SGOT) 168 (H) 12 - 45 U/L    ALT(SGPT) 167 (H) 2 - 50 U/L    Alkaline Phosphatase 103 (H) 30 - 99 U/L    Total Bilirubin 0.7 0.1 - 1.5 mg/dL    Albumin 4.0 3.2 - 4.9 g/dL    Total Protein 7.1 6.0 - 8.2 g/dL    Globulin 3.1 1.9 - 3.5 g/dL    A-G Ratio 1.3 g/dL   Troponins NOW    Collection Time: 05/19/23 11:12 AM   Result Value Ref Range    Troponin T 14 6 - 19 ng/L   proBrain Natriuretic Peptide, NT    Collection Time: 05/19/23 11:12 AM   Result Value Ref Range    NT-proBNP 583 (H) 0 - 125 pg/mL   APTT    Collection Time: 05/19/23 11:12 AM   Result Value Ref Range    APTT 27.2  24.7 - 36.0 sec   PROTHROMBIN TIME (INR)    Collection Time: 23 11:12 AM   Result Value Ref Range    PT 14.6 12.0 - 14.6 sec    INR 1.16 (H) 0.87 - 1.13   TSH WITH REFLEX TO FT4    Collection Time: 23 11:12 AM   Result Value Ref Range    TSH 0.995 0.380 - 5.330 uIU/mL   LIPASE    Collection Time: 23 11:12 AM   Result Value Ref Range    Lipase 115 (H) 7 - 58 U/L   CORRECTED CALCIUM    Collection Time: 23 11:12 AM   Result Value Ref Range    Correct Calcium 9.8 8.5 - 10.5 mg/dL   ESTIMATED GFR    Collection Time: 23 11:12 AM   Result Value Ref Range    GFR (CKD-EPI) 87 >60 mL/min/1.73 m 2   TSH    Collection Time: 23 11:12 AM   Result Value Ref Range    TSH 0.984 0.380 - 5.330 uIU/mL   Free Thyroxine (T4)    Collection Time: 23 11:12 AM   Result Value Ref Range    Free T-4 1.00 0.93 - 1.70 ng/dL   EKG (NOW)    Collection Time: 23  1:04 PM   Result Value Ref Range    Report       [facility] Emergency Dept.    Test Date:  2023  Pt Name:    SALVATORE WITT                  Department: [facility] [place]  MRN:        [id number]                      Room:       [place] [address]  Gender:     Male                         Technician: [id number]  :        1965                   Requested By:KENRYO SCHUMACHER  Order #:    [id number]                    Reading MD:    Measurements  Intervals                                Axis  Rate:       150                          P:          86  SD:         83                           QRS:        25  QRSD:       146                          T:          -58  QT:         364  QTc:        576    Interpretive Statements  Sinus tachycardia  Nonspecific intraventricular conduction delay  Abnormal T, consider ischemia, inferior leads  Compared to ECG 2023 10:49:02  No significant changes     proBrain Natriuretic Peptide, NT    Collection Time: 23  1:25 PM   Result Value Ref Range    NT-proBNP 601 (H) 0 - 125 pg/mL   TROPONIN     Collection Time: 05/19/23  1:25 PM   Result Value Ref Range    Troponin T 13 6 - 19 ng/L       Imaging:  DX-CHEST-PORTABLE (1 VIEW)   Final Result      No evidence of acute cardiopulmonary process.      EC-ECHOCARDIOGRAM COMPLETE W/O CONT    (Results Pending)       EKG tracings personally reviewed by me atrial flutter with 2-1 AV block    Coronary artery calcium score 12/20/2022:  Coronary calcification:  LMA - 0.0  LCX - 0.0  LAD - 0.0  RCA - 0.0  PDA - 0.0  Total Calcium Score: 0.0  Heart: Normal size.  Lungs: Clear.  Mediastinum: Ascending thoracic aorta measures 4.8 cm in diameter.  Upper abdomen: There is hepatic steatosis.    Echocardiogram 4/3/2023:  Personally reviewed echocardiogram images which shows normal LV systolic function, LVEF 55%, normal RV size and function and no significant valvular abnormalities.    All pertinent features of laboratory and imaging reviewed including primary images where applicable      Principal Problem:    Atrial flutter with rapid ventricular response (HCC) (POA: Yes)  Active Problems:    Essential (primary) hypertension (POA: Yes)    GERD (gastroesophageal reflux disease) (POA: Yes)    Ulcerative colitis (HCC) (POA: Yes)      Overview: Formatting of this note might be different from the original.      Was previously on infliximab, steroids and low dose azathioprine. He       reports he has been off all these medications for months now. Controlling       his ulcerative colitis with ketogenic diet. Last flare was last summer per       patient.    Major depressive disorder, recurrent episode, moderate (HCC) (POA: Yes)    Familial hypercholesteremia (POA: Yes)    Acute alcoholic pancreatitis (POA: Unknown)  Resolved Problems:    * No resolved hospital problems. *      Assessment / Plan:  This is a 57-year-old man with past medical history significant for hypertension, familial hypercholesterolemia, ascending aortic aneurysm measuring 4.8 cm, ulcerative colitis, alcohol abuse who  presented to the hospital with chest discomfort and palpitations and found to be in atrial flutter with rapid ventricular response.    Atrial flutter with RVR--2:1 AV block  Elevated lipase, concern for mild pancreatitis  Alcohol withdrawal syndrome  Alcohol abuse  Hypertension  Hypercholesterolemia--acutely elevated LDL concern for heterozygous FH  Ascending aortic aneurysm measuring 4.8 cm  Ulcerative colitis    Recommendations:  New onset atrial flutter with rapid ventricular response.  This is in the setting of suspected alcohol withdrawal syndrome given substantial EtOH consumption.  There are also signs of mild transaminitis and elevated lipase consistent with mild pancreatitis.  Currently hemodynamically stable without hypotension.  Recommend continuation of diltiazem drip at current rate.  Would benefit from initiation of beta-blocker therapy for additional rate control.  Start metoprolol tartrate p.o. 25 mg twice daily and uptitrate as tolerated.  If if ventricular rates are still suboptimal or hypotension limits ability to increase metoprolol, recommend digoxin loading 500 mcg x 1 followed by to 50 mcg every 6 hours x2. For a total of 1000 mcg. Can also consider amiodarone gtt.  Elevated [id number] score. Agree with Xarelto for systemic anticoagulation and long-term thromboembolism prophylaxis.  Treatment of alcohol withdrawal syndrome as per primary.  Would expect improvement in ventricular rates or conversion to sinus once his underlying alcohol withdrawal is controlled.  Regarding his markedly elevated lipid panel.  Review of recent LDL shows that it is greater than 190.  He was seen in lipid clinic due to concern for heterozygous FH.  Will need aggressive cardiac risk factor modification.  Review of coronary artery calcium score shows a CAC of 0.  Increase the rosuvastatin to 40 mg daily.  Goal LDL less than 100.  Please call cardiology with any additional questions over the weekend    I personally  discussed his case with  Dr Roopa Miller M.D.    It is my pleasure to participate in the care of Mr. Quan.  Please do not hesitate to contact me with questions or concerns.    Vel Massey MD, [facility]  Cardiologist, [facility]    5/19/2023    Please note that this dictation was created using voice recognition software. I have made every reasonable attempt to correct obvious errors, but it is possible there are errors of grammar and possibly content that I did not discover before finalizing the note.

## 2023-05-19 NOTE — ASSESSMENT & PLAN NOTE
Patient at home is on desvenlafaxine, formulary substitution is for venlafaxine which we have made

## 2023-05-19 NOTE — ED PROVIDER NOTES
"ED Provider Note    CHIEF COMPLAINT  Chief Complaint   Patient presents with    Chest Pain     Present for months but became \"more so\" in the last 24 hours; pt c/o chest discomfort, rapid heart rate; hx of aortic aneurism         EXTERNAL RECORDS REVIEWED  Reviewed recent hospitalization and baseline labs for comparison.    HPI/ROS  LIMITATION TO HISTORY   Select: : None  OUTSIDE HISTORIAN(S):  Family present at bedside.    Tom Quan is a 57 y.o. male who presents to the emergency department with a fast heartbeat.  Patient states he had some chest discomfort off and on.  He has had a calcium score and an outpatient CT that showed a aortic aneurysm.  Lately he has been feeling a rapid heartbeat.  Has been fast for the last several days at least 3 days may be more fast as 115 is unable to get it to slow down.  This is associate with mild shortness of breath.    The patient also has epigastric abdominal pain.  This been going for some time.  Denies any hematemesis or melena.  The patient states that he does drink alcohol at least 3 drinks a day sometimes more sometimes less.  He has been hospitalized for alcohol associated symptoms in the past.    The patient denies a history of PE or DVT.  As another aggravating alleviating factors or associated complaints.    PAST MEDICAL HISTORY   has a past medical history of Depression and Hyperlipidemia.    SURGICAL HISTORY   has a past surgical history that includes hip replacement, partial.    FAMILY HISTORY  Family History   Problem Relation Age of Onset    Diabetes Mother     Other Mother         smoker+, unclear cause of death    Hyperlipidemia Sister     Hyperlipidemia Brother     Cancer Paternal Uncle         Prostate    Heart Attack Maternal Grandfather          55, tobacco+    Other Son         takayasu's arteritis    Abdominal aortic aneurysm Neg Hx     Arterial Aneurysm Neg Hx        SOCIAL HISTORY  Social History     Tobacco Use    Smoking status: Never    " "Smokeless tobacco: Never   Vaping Use    Vaping Use: Former   Substance and Sexual Activity    Alcohol use: Yes     Comment: Occasionally    Drug use: Never    Sexual activity: Not on file       CURRENT MEDICATIONS  Home Medications       Reviewed by Yue Hollis R.N. (Registered Nurse) on 05/19/23 at 1111  Med List Status: Not Addressed     Medication Last Dose Status   acetaminophen (TYLENOL) 500 MG Tab  Active   B-D 3CC LUER-RONNA SYR 22GX1\" 22G X 1\" 3 ML Misc  Active   Cholecalciferol (VITAMIN D3) 125 MCG (5000 UT) Cap  Active   DESVENLAFAXINE ER PO  Active   Desvenlafaxine Succinate ER 25 MG TABLET SR 24 HR  Active   folic acid (FOLVITE) 1 MG Tab  Active   lisdexamfetamine (VYVANSE) 50 MG capsule  Active   losartan (COZAAR) 25 MG Tab  Active   multivitamin Tab  Active   Omega-3 Fatty Acids (FISH OIL PO)  Active   omeprazole (PRILOSEC) 20 MG delayed-release capsule  Active   ondansetron (ZOFRAN ODT) 4 MG TABLET DISPERSIBLE  Active   propranolol (INDERAL) 10 MG Tab  Active   rizatriptan (MAXALT) 5 MG tablet  Active   rosuvastatin (CRESTOR) 20 MG Tab  Active   testosterone cypionate (DEPO-TESTOSTERONE) 200 MG/ML Solution injection  Active   thiamine (THIAMINE) 100 MG tablet  Active   Ustekinumab (STELARA) 90 MG/ML Solution Prefilled Syringe  Active                    ALLERGIES  Allergies   Allergen Reactions    Thimerosal Unspecified     Eye medication reaction  Eye medication reaction         PHYSICAL EXAM  VITAL SIGNS: BP (!) 137/100   Pulse (!) 156   Resp 18   SpO2 96%    Constitutional: Awake alert in mild distress.  HENT: Normocephalic, Atraumatic, Bilateral external ears normal, Oropharynx moist, No oral exudates, Nose normal.   Eyes: PERRL, EOMI, Conjunctiva normal, No discharge.   Neck: Normal range of motion, No tenderness, Supple, No stridor.   Cardiovascular: Tachycardic but regular  Thorax & Lungs: Normal breath sounds, No respiratory distress,  Abdomen: Bowel sounds normal, Soft, under in the " epigastrium  Skin: Warm, Dry, No erythema, No rash.   Musculoskeletal: Good range of motion in all major joints.  Mild edema  Neurologic: Alert,No focal deficits noted.   Psychiatric: Affect anxious      DIAGNOSTIC STUDIES / PROCEDURES  Results for orders placed or performed during the hospital encounter of 05/19/23   CBC with Differential   Result Value Ref Range    WBC 7.4 4.8 - 10.8 K/uL    RBC 5.12 4.70 - 6.10 M/uL    Hemoglobin 16.0 14.0 - 18.0 g/dL    Hematocrit 46.8 42.0 - 52.0 %    MCV 91.4 81.4 - 97.8 fL    MCH 31.3 27.0 - 33.0 pg    MCHC 34.2 33.7 - 35.3 g/dL    RDW 56.3 (H) 35.9 - 50.0 fL    Platelet Count 302 164 - 446 K/uL    MPV 9.1 9.0 - 12.9 fL    Neutrophils-Polys 68.40 44.00 - 72.00 %    Lymphocytes 20.10 (L) 22.00 - 41.00 %    Monocytes 9.60 0.00 - 13.40 %    Eosinophils 0.50 0.00 - 6.90 %    Basophils 1.10 0.00 - 1.80 %    Immature Granulocytes 0.30 0.00 - 0.90 %    Nucleated RBC 0.00 /100 WBC    Neutrophils (Absolute) 5.07 1.82 - 7.42 K/uL    Lymphs (Absolute) 1.49 1.00 - 4.80 K/uL    Monos (Absolute) 0.71 0.00 - 0.85 K/uL    Eos (Absolute) 0.04 0.00 - 0.51 K/uL    Baso (Absolute) 0.08 0.00 - 0.12 K/uL    Immature Granulocytes (abs) 0.02 0.00 - 0.11 K/uL    NRBC (Absolute) 0.00 K/uL   Complete Metabolic Panel (CMP)   Result Value Ref Range    Sodium 136 135 - 145 mmol/L    Potassium 4.0 3.6 - 5.5 mmol/L    Chloride 99 96 - 112 mmol/L    Co2 22 20 - 33 mmol/L    Anion Gap 15.0 7.0 - 16.0    Glucose 111 (H) 65 - 99 mg/dL    Bun 7 (L) 8 - 22 mg/dL    Creatinine 1.01 0.50 - 1.40 mg/dL    Calcium 9.8 8.4 - 10.2 mg/dL    AST(SGOT) 168 (H) 12 - 45 U/L    ALT(SGPT) 167 (H) 2 - 50 U/L    Alkaline Phosphatase 103 (H) 30 - 99 U/L    Total Bilirubin 0.7 0.1 - 1.5 mg/dL    Albumin 4.0 3.2 - 4.9 g/dL    Total Protein 7.1 6.0 - 8.2 g/dL    Globulin 3.1 1.9 - 3.5 g/dL    A-G Ratio 1.3 g/dL   Troponins NOW   Result Value Ref Range    Troponin T 14 6 - 19 ng/L   proBrain Natriuretic Peptide, NT   Result Value Ref  Range    NT-proBNP 583 (H) 0 - 125 pg/mL   APTT   Result Value Ref Range    APTT 27.2 24.7 - 36.0 sec   PROTHROMBIN TIME (INR)   Result Value Ref Range    PT 14.6 12.0 - 14.6 sec    INR 1.16 (H) 0.87 - 1.13   TSH WITH REFLEX TO FT4   Result Value Ref Range    TSH 0.995 0.380 - 5.330 uIU/mL   LIPASE   Result Value Ref Range    Lipase 115 (H) 7 - 58 U/L   CORRECTED CALCIUM   Result Value Ref Range    Correct Calcium 9.8 8.5 - 10.5 mg/dL   ESTIMATED GFR   Result Value Ref Range    GFR (CKD-EPI) 87 >60 mL/min/1.73 m 2   proBrain Natriuretic Peptide, NT   Result Value Ref Range    NT-proBNP 601 (H) 0 - 125 pg/mL   TSH   Result Value Ref Range    TSH 0.984 0.380 - 5.330 uIU/mL   Free Thyroxine (T4)   Result Value Ref Range    Free T-4 1.00 0.93 - 1.70 ng/dL   TROPONIN   Result Value Ref Range    Troponin T 13 6 - 19 ng/L   EKG   Result Value Ref Range    Report       [facility] Emergency Dept.    Test Date:  2023  Pt Name:    SALVATORE WITT                  Department: [facility]  MRN:        [id number]                      [facility]  Gender:     Male                         Technician: LEYDA  :        1965                   Requested By:KENROY SCHUMACHER  Order #:    [id number]                    Reading MD: KENROY SCHUMACHER. AMD    Measurements  Intervals                                Axis  Rate:       155                          P:          104  OK:         77                           QRS:        11  QRSD:       143                          T:          -78  QT:         338  QTc:        543    Interpretive Statements  Sinus tachycardia  Nonspecific intraventricular conduction delay  Abnormal T, consider ischemia, inferior leads  Compared to ECG 2023 15:37:15  Intraventricular conduction delay now present  T-wave abnormality now present  Possible ischemia now present  Electronically Signed On 2023 11 :46:32 PDT by KENROY SCHUMACHER. AMD     EKG (NOW)   Result Value Ref Range     Report       [facility] Emergency Dept.    Test Date:  2023  Pt Name:    SALVATORE WITT                  Department: [facility]  MRN:        [id number]                      Room:       -ROOM 8  Gender:     Male                         Technician: [id number]  :        1965                   Requested By:KENROY SCHUMACHER  Order #:    [id number]                    Reading MD:    Measurements  Intervals                                Axis  Rate:       150                          P:          86  MT:         83                           QRS:        25  QRSD:       146                          T:          -58  QT:         364  QTc:        576    Interpretive Statements  Sinus tachycardia  Nonspecific intraventricular conduction delay  Abnormal T, consider ischemia, inferior leads  Compared to ECG 2023 10:49:02  No significant changes          RADIOLOGY  I have independently interpreted the diagnostic imaging associated with this visit and am waiting the final reading from the radiologist.   My preliminary interpretation is as follows: I do not see a pneumonia or pneumothorax on the chest x-ray  Radiologist interpretation: \    DX-CHEST-PORTABLE (1 VIEW)   Final Result      No evidence of acute cardiopulmonary process.      EC-ECHOCARDIOGRAM COMPLETE W/O CONT    (Results Pending)         COURSE & MEDICAL DECISION MAKING    ED Observation Status?  No the patient requires hospitalization for his cardiac arrhythmia.      INITIAL ASSESSMENT, COURSE AND PLAN  Care Narrative:     57-year-old presents to the emergency department with tachycardia.  EKG shows atrial flutter with 2-1 block.  He was brought back to the room and placed on a cardiac monitor.      Heart is Kristopher based on labs previous imaging previous cardiac work-up for comparison.    Labs reviewed.    Differential diagnosis considered includes but is not limited to cardiac arrhythmia, dehydration, electrolyte abnormality, cardiac  etiology, thyrotoxicosis pancreatitis, and sepsis.      Patient is worked up for the above differential diagnoses labs and imaging.    Patient started on IV diltiazem.  Diltiazem infusion and load were given with really no relief no improvement.  I consulted cardiology they will see the patient recommended pushes of Lopressor this is started.  Like to hold off on amiodarone or cardioversion because of risk of embolic phenomenon.  Recommends anticoagulation and rate control.    Labs were obtained.  Is minimally elevated lipase.  Troponin is not elevated BNP is mildly elevated.  TSH was normal.  Chest x-ray does not show any acute abnormalities.  LFTs are mildly elevated but not significant compared to previous.    Patient will require hospitalization for rate control.  Is given some morphine for his epigastric abdominal pain but he may require abdominal imaging because of his abnormal labs and discomfort however we need to get his rate under control first.    Discussed with Dr. Miller for the hospital service because he will require an infusion over the ICU.  Care is transferred to the ICU.  Cardiology will see him in consultation.  He is hospitalized in critical condition.                ADDITIONAL PROBLEM LIST  Alcohol use  Aortic aneurysm    DISPOSITION AND DISCUSSIONS  I have discussed management of the patient with the following physicians and GORGE's: Cardiology, Dr. Massey, hospitalist Dr. Miller    Discussion of management with other [facility] or appropriate source(s): Drugs with pharmacist and he          FINAL DIAGNOSIS  1. Other chest pain    2. Atrial flutter, unspecified type (HCC)    3. Alcohol-induced acute pancreatitis, unspecified complication status    4.  Critical care time 45 minutes no procedures       Electronically signed by: Ronak Zavala M.D., 5/19/2023 11:19 AM

## 2023-05-20 ENCOUNTER — HOSPITAL ENCOUNTER (INPATIENT)
Facility: MEDICAL CENTER | Age: 58
LOS: 1 days | DRG: 309 | End: 2023-05-21
Attending: INTERNAL MEDICINE | Admitting: INTERNAL MEDICINE
Payer: COMMERCIAL

## 2023-05-20 ENCOUNTER — APPOINTMENT (OUTPATIENT)
Dept: RADIOLOGY | Facility: MEDICAL CENTER | Age: 58
DRG: 308 | End: 2023-05-20
Attending: INTERNAL MEDICINE
Payer: COMMERCIAL

## 2023-05-20 ENCOUNTER — APPOINTMENT (OUTPATIENT)
Dept: CARDIOLOGY | Facility: MEDICAL CENTER | Age: 58
DRG: 308 | End: 2023-05-20
Attending: HOSPITALIST
Payer: COMMERCIAL

## 2023-05-20 VITALS
HEART RATE: 152 BPM | SYSTOLIC BLOOD PRESSURE: 112 MMHG | TEMPERATURE: 98 F | OXYGEN SATURATION: 89 % | HEIGHT: 73 IN | DIASTOLIC BLOOD PRESSURE: 74 MMHG | WEIGHT: 220.68 LBS | RESPIRATION RATE: 16 BRPM | BODY MASS INDEX: 29.25 KG/M2

## 2023-05-20 PROBLEM — I48.91 A-FIB (HCC): Status: ACTIVE | Noted: 2023-05-20

## 2023-05-20 LAB
ALBUMIN SERPL BCP-MCNC: 3.3 G/DL (ref 3.2–4.9)
AMPHET UR QL SCN: NEGATIVE
ANION GAP SERPL CALC-SCNC: 10 MMOL/L (ref 7–16)
BARBITURATES UR QL SCN: NEGATIVE
BENZODIAZ UR QL SCN: POSITIVE
BUN SERPL-MCNC: 8 MG/DL (ref 8–22)
BUN SERPL-MCNC: 8 MG/DL (ref 8–22)
BZE UR QL SCN: NEGATIVE
CALCIUM ALBUM COR SERPL-MCNC: 9.1 MG/DL (ref 8.5–10.5)
CALCIUM SERPL-MCNC: 8.1 MG/DL (ref 8.4–10.2)
CALCIUM SERPL-MCNC: 8.5 MG/DL (ref 8.4–10.2)
CANNABINOIDS UR QL SCN: POSITIVE
CHLORIDE SERPL-SCNC: 100 MMOL/L (ref 96–112)
CHLORIDE SERPL-SCNC: 100 MMOL/L (ref 96–112)
CHOLEST SERPL-MCNC: 260 MG/DL (ref 100–199)
CO2 SERPL-SCNC: 22 MMOL/L (ref 20–33)
CO2 SERPL-SCNC: 23 MMOL/L (ref 20–33)
CREAT SERPL-MCNC: 0.9 MG/DL (ref 0.5–1.4)
CREAT SERPL-MCNC: 0.94 MG/DL (ref 0.5–1.4)
ERYTHROCYTE [DISTWIDTH] IN BLOOD BY AUTOMATED COUNT: 58.2 FL (ref 35.9–50)
GFR SERPLBLD CREATININE-BSD FMLA CKD-EPI: 100 ML/MIN/1.73 M 2
GFR SERPLBLD CREATININE-BSD FMLA CKD-EPI: 95 ML/MIN/1.73 M 2
GLUCOSE SERPL-MCNC: 123 MG/DL (ref 65–99)
GLUCOSE SERPL-MCNC: 144 MG/DL (ref 65–99)
HCT VFR BLD AUTO: 47.4 % (ref 42–52)
HDLC SERPL-MCNC: 90 MG/DL
HGB BLD-MCNC: 16 G/DL (ref 14–18)
LDLC SERPL CALC-MCNC: 147 MG/DL
LV EJECT FRACT  99904: 55
LV EJECT FRACT MOD 4C 99902: 58.24
MAGNESIUM SERPL-MCNC: 1.3 MG/DL (ref 1.5–2.5)
MAGNESIUM SERPL-MCNC: 2.1 MG/DL (ref 1.5–2.5)
MCH RBC QN AUTO: 31.3 PG (ref 27–33)
MCHC RBC AUTO-ENTMCNC: 33.8 G/DL (ref 33.7–35.3)
MCV RBC AUTO: 92.8 FL (ref 81.4–97.8)
METHADONE UR QL SCN: NEGATIVE
OPIATES UR QL SCN: NEGATIVE
OXYCODONE UR QL SCN: POSITIVE
PCP UR QL SCN: NEGATIVE
PHOSPHATE SERPL-MCNC: 2.8 MG/DL (ref 2.5–4.5)
PLATELET # BLD AUTO: 268 K/UL (ref 164–446)
PMV BLD AUTO: 9.8 FL (ref 9–12.9)
POTASSIUM SERPL-SCNC: 4.4 MMOL/L (ref 3.6–5.5)
POTASSIUM SERPL-SCNC: 4.6 MMOL/L (ref 3.6–5.5)
PROPOXYPH UR QL SCN: NEGATIVE
RBC # BLD AUTO: 5.11 M/UL (ref 4.7–6.1)
SODIUM SERPL-SCNC: 133 MMOL/L (ref 135–145)
SODIUM SERPL-SCNC: 134 MMOL/L (ref 135–145)
TRIGL SERPL-MCNC: 113 MG/DL (ref 0–149)
WBC # BLD AUTO: 15.1 K/UL (ref 4.8–10.8)

## 2023-05-20 PROCEDURE — 80069 RENAL FUNCTION PANEL: CPT

## 2023-05-20 PROCEDURE — 36415 COLL VENOUS BLD VENIPUNCTURE: CPT

## 2023-05-20 PROCEDURE — 93306 TTE W/DOPPLER COMPLETE: CPT | Mod: 26 | Performed by: INTERNAL MEDICINE

## 2023-05-20 PROCEDURE — 700102 HCHG RX REV CODE 250 W/ 637 OVERRIDE(OP): Performed by: INTERNAL MEDICINE

## 2023-05-20 PROCEDURE — 80048 BASIC METABOLIC PNL TOTAL CA: CPT

## 2023-05-20 PROCEDURE — 74176 CT ABD & PELVIS W/O CONTRAST: CPT

## 2023-05-20 PROCEDURE — 700105 HCHG RX REV CODE 258: Performed by: INTERNAL MEDICINE

## 2023-05-20 PROCEDURE — 83735 ASSAY OF MAGNESIUM: CPT

## 2023-05-20 PROCEDURE — 99292 CRITICAL CARE ADDL 30 MIN: CPT | Performed by: INTERNAL MEDICINE

## 2023-05-20 PROCEDURE — 700111 HCHG RX REV CODE 636 W/ 250 OVERRIDE (IP): Performed by: INTERNAL MEDICINE

## 2023-05-20 PROCEDURE — 94760 N-INVAS EAR/PLS OXIMETRY 1: CPT

## 2023-05-20 PROCEDURE — 700102 HCHG RX REV CODE 250 W/ 637 OVERRIDE(OP): Performed by: HOSPITALIST

## 2023-05-20 PROCEDURE — 80307 DRUG TEST PRSMV CHEM ANLYZR: CPT

## 2023-05-20 PROCEDURE — 700105 HCHG RX REV CODE 258: Performed by: HOSPITALIST

## 2023-05-20 PROCEDURE — 99222 1ST HOSP IP/OBS MODERATE 55: CPT | Performed by: INTERNAL MEDICINE

## 2023-05-20 PROCEDURE — 80061 LIPID PANEL: CPT

## 2023-05-20 PROCEDURE — 97165 OT EVAL LOW COMPLEX 30 MIN: CPT

## 2023-05-20 PROCEDURE — A9270 NON-COVERED ITEM OR SERVICE: HCPCS | Performed by: HOSPITALIST

## 2023-05-20 PROCEDURE — 93306 TTE W/DOPPLER COMPLETE: CPT

## 2023-05-20 PROCEDURE — 85027 COMPLETE CBC AUTOMATED: CPT

## 2023-05-20 PROCEDURE — A9270 NON-COVERED ITEM OR SERVICE: HCPCS | Performed by: INTERNAL MEDICINE

## 2023-05-20 PROCEDURE — 700111 HCHG RX REV CODE 636 W/ 250 OVERRIDE (IP): Performed by: HOSPITALIST

## 2023-05-20 PROCEDURE — 99291 CRITICAL CARE FIRST HOUR: CPT | Performed by: INTERNAL MEDICINE

## 2023-05-20 PROCEDURE — 97535 SELF CARE MNGMENT TRAINING: CPT

## 2023-05-20 PROCEDURE — 770022 HCHG ROOM/CARE - ICU (200)

## 2023-05-20 RX ORDER — OMEPRAZOLE 20 MG/1
20 CAPSULE, DELAYED RELEASE ORAL DAILY
Status: CANCELLED | OUTPATIENT
Start: 2023-05-21

## 2023-05-20 RX ORDER — DIGOXIN 0.25 MG/ML
500 INJECTION INTRAMUSCULAR; INTRAVENOUS ONCE
Status: COMPLETED | OUTPATIENT
Start: 2023-05-20 | End: 2023-05-20

## 2023-05-20 RX ORDER — AMOXICILLIN 250 MG
2 CAPSULE ORAL 2 TIMES DAILY
Status: DISCONTINUED | OUTPATIENT
Start: 2023-05-20 | End: 2023-05-20

## 2023-05-20 RX ORDER — DIGOXIN 0.25 MG/ML
250 INJECTION INTRAMUSCULAR; INTRAVENOUS EVERY 6 HOURS
Status: DISCONTINUED | OUTPATIENT
Start: 2023-05-20 | End: 2023-05-20

## 2023-05-20 RX ORDER — BISACODYL 10 MG
10 SUPPOSITORY, RECTAL RECTAL
Status: DISCONTINUED | OUTPATIENT
Start: 2023-05-20 | End: 2023-05-20

## 2023-05-20 RX ORDER — ROSUVASTATIN CALCIUM 10 MG/1
40 TABLET, COATED ORAL EVERY EVENING
Status: DISCONTINUED | OUTPATIENT
Start: 2023-05-20 | End: 2023-05-21 | Stop reason: HOSPADM

## 2023-05-20 RX ORDER — DESVENLAFAXINE 25 MG/1
25 TABLET, EXTENDED RELEASE ORAL DAILY
Status: DISCONTINUED | OUTPATIENT
Start: 2023-05-21 | End: 2023-05-20

## 2023-05-20 RX ORDER — POLYETHYLENE GLYCOL 3350 17 G/17G
1 POWDER, FOR SOLUTION ORAL
Status: DISCONTINUED | OUTPATIENT
Start: 2023-05-20 | End: 2023-05-20

## 2023-05-20 RX ORDER — ACETAMINOPHEN 325 MG/1
650 TABLET ORAL EVERY 6 HOURS PRN
Status: DISCONTINUED | OUTPATIENT
Start: 2023-05-20 | End: 2023-05-21 | Stop reason: HOSPADM

## 2023-05-20 RX ORDER — SODIUM CHLORIDE, SODIUM LACTATE, POTASSIUM CHLORIDE, CALCIUM CHLORIDE 600; 310; 30; 20 MG/100ML; MG/100ML; MG/100ML; MG/100ML
INJECTION, SOLUTION INTRAVENOUS CONTINUOUS
Status: DISCONTINUED | OUTPATIENT
Start: 2023-05-20 | End: 2023-05-20 | Stop reason: HOSPADM

## 2023-05-20 RX ORDER — BISACODYL 10 MG
10 SUPPOSITORY, RECTAL RECTAL
Status: CANCELLED | OUTPATIENT
Start: 2023-05-20

## 2023-05-20 RX ORDER — POLYETHYLENE GLYCOL 3350 17 G/17G
1 POWDER, FOR SOLUTION ORAL
Status: CANCELLED | OUTPATIENT
Start: 2023-05-20

## 2023-05-20 RX ORDER — PROMETHAZINE HYDROCHLORIDE 25 MG/1
12.5-25 SUPPOSITORY RECTAL EVERY 4 HOURS PRN
Status: CANCELLED | OUTPATIENT
Start: 2023-05-20

## 2023-05-20 RX ORDER — FOLIC ACID 1 MG/1
1 TABLET ORAL DAILY
Status: DISCONTINUED | OUTPATIENT
Start: 2023-05-21 | End: 2023-05-21 | Stop reason: HOSPADM

## 2023-05-20 RX ORDER — DIGOXIN 125 MCG
125 TABLET ORAL DAILY
Status: CANCELLED | OUTPATIENT
Start: 2023-05-21

## 2023-05-20 RX ORDER — METOPROLOL TARTRATE 1 MG/ML
5 INJECTION, SOLUTION INTRAVENOUS ONCE
Status: DISCONTINUED | OUTPATIENT
Start: 2023-05-20 | End: 2023-05-20

## 2023-05-20 RX ORDER — SODIUM CHLORIDE, SODIUM LACTATE, POTASSIUM CHLORIDE, AND CALCIUM CHLORIDE .6; .31; .03; .02 G/100ML; G/100ML; G/100ML; G/100ML
1000 INJECTION, SOLUTION INTRAVENOUS ONCE
Status: COMPLETED | OUTPATIENT
Start: 2023-05-20 | End: 2023-05-20

## 2023-05-20 RX ORDER — ONDANSETRON 2 MG/ML
4 INJECTION INTRAMUSCULAR; INTRAVENOUS EVERY 4 HOURS PRN
Status: DISCONTINUED | OUTPATIENT
Start: 2023-05-20 | End: 2023-05-21 | Stop reason: HOSPADM

## 2023-05-20 RX ORDER — AMOXICILLIN 250 MG
2 CAPSULE ORAL 2 TIMES DAILY
Status: DISCONTINUED | OUTPATIENT
Start: 2023-05-20 | End: 2023-05-21 | Stop reason: HOSPADM

## 2023-05-20 RX ORDER — OMEPRAZOLE 20 MG/1
20-40 CAPSULE, DELAYED RELEASE ORAL
Status: CANCELLED | OUTPATIENT
Start: 2023-05-20

## 2023-05-20 RX ORDER — M-VIT,TX,IRON,MINS/CALC/FOLIC 27MG-0.4MG
1 TABLET ORAL DAILY
Status: DISCONTINUED | OUTPATIENT
Start: 2023-05-21 | End: 2023-05-21 | Stop reason: HOSPADM

## 2023-05-20 RX ORDER — MAGNESIUM SULFATE HEPTAHYDRATE 40 MG/ML
4 INJECTION, SOLUTION INTRAVENOUS ONCE
Status: COMPLETED | OUTPATIENT
Start: 2023-05-20 | End: 2023-05-20

## 2023-05-20 RX ORDER — PREDNISONE 20 MG/1
20 TABLET ORAL DAILY
Status: CANCELLED | OUTPATIENT
Start: 2023-05-21

## 2023-05-20 RX ORDER — DIGOXIN 125 MCG
125 TABLET ORAL DAILY
Status: DISCONTINUED | OUTPATIENT
Start: 2023-05-21 | End: 2023-05-21 | Stop reason: HOSPADM

## 2023-05-20 RX ORDER — HYDROMORPHONE HYDROCHLORIDE 1 MG/ML
0.5 INJECTION, SOLUTION INTRAMUSCULAR; INTRAVENOUS; SUBCUTANEOUS
Status: DISCONTINUED | OUTPATIENT
Start: 2023-05-20 | End: 2023-05-21 | Stop reason: HOSPADM

## 2023-05-20 RX ORDER — LORAZEPAM 2 MG/ML
1-2 INJECTION INTRAMUSCULAR
Status: DISCONTINUED | OUTPATIENT
Start: 2023-05-20 | End: 2023-05-21 | Stop reason: HOSPADM

## 2023-05-20 RX ORDER — ONDANSETRON 4 MG/1
4 TABLET, ORALLY DISINTEGRATING ORAL EVERY 4 HOURS PRN
Status: CANCELLED | OUTPATIENT
Start: 2023-05-20

## 2023-05-20 RX ORDER — SODIUM CHLORIDE, SODIUM LACTATE, POTASSIUM CHLORIDE, CALCIUM CHLORIDE 600; 310; 30; 20 MG/100ML; MG/100ML; MG/100ML; MG/100ML
INJECTION, SOLUTION INTRAVENOUS CONTINUOUS
Status: CANCELLED | OUTPATIENT
Start: 2023-05-20

## 2023-05-20 RX ORDER — HYDROMORPHONE HYDROCHLORIDE 1 MG/ML
0.5 INJECTION, SOLUTION INTRAMUSCULAR; INTRAVENOUS; SUBCUTANEOUS
Status: CANCELLED | OUTPATIENT
Start: 2023-05-20

## 2023-05-20 RX ORDER — SODIUM CHLORIDE, SODIUM LACTATE, POTASSIUM CHLORIDE, CALCIUM CHLORIDE 600; 310; 30; 20 MG/100ML; MG/100ML; MG/100ML; MG/100ML
INJECTION, SOLUTION INTRAVENOUS CONTINUOUS
Status: DISCONTINUED | OUTPATIENT
Start: 2023-05-20 | End: 2023-05-20

## 2023-05-20 RX ORDER — PROMETHAZINE HYDROCHLORIDE 25 MG/1
12.5-25 TABLET ORAL EVERY 4 HOURS PRN
Status: DISCONTINUED | OUTPATIENT
Start: 2023-05-20 | End: 2023-05-21 | Stop reason: HOSPADM

## 2023-05-20 RX ORDER — SODIUM CHLORIDE, SODIUM LACTATE, POTASSIUM CHLORIDE, CALCIUM CHLORIDE 600; 310; 30; 20 MG/100ML; MG/100ML; MG/100ML; MG/100ML
INJECTION, SOLUTION INTRAVENOUS CONTINUOUS
Status: DISCONTINUED | OUTPATIENT
Start: 2023-05-20 | End: 2023-05-21 | Stop reason: HOSPADM

## 2023-05-20 RX ORDER — ONDANSETRON 2 MG/ML
4 INJECTION INTRAMUSCULAR; INTRAVENOUS EVERY 4 HOURS PRN
Status: CANCELLED | OUTPATIENT
Start: 2023-05-20

## 2023-05-20 RX ORDER — METOPROLOL TARTRATE 50 MG/1
50 TABLET, FILM COATED ORAL TWICE DAILY
Status: DISCONTINUED | OUTPATIENT
Start: 2023-05-20 | End: 2023-05-20

## 2023-05-20 RX ORDER — DIGOXIN 125 MCG
125 TABLET ORAL DAILY
Status: DISCONTINUED | OUTPATIENT
Start: 2023-05-21 | End: 2023-05-20 | Stop reason: HOSPADM

## 2023-05-20 RX ORDER — ROSUVASTATIN CALCIUM 10 MG/1
40 TABLET, COATED ORAL EVERY EVENING
Status: DISCONTINUED | OUTPATIENT
Start: 2023-05-20 | End: 2023-05-20

## 2023-05-20 RX ORDER — DIGOXIN 0.25 MG/ML
250 INJECTION INTRAMUSCULAR; INTRAVENOUS EVERY 6 HOURS
Status: CANCELLED | OUTPATIENT
Start: 2023-05-20 | End: 2023-05-20

## 2023-05-20 RX ORDER — PROCHLORPERAZINE EDISYLATE 5 MG/ML
5-10 INJECTION INTRAMUSCULAR; INTRAVENOUS EVERY 4 HOURS PRN
Status: DISCONTINUED | OUTPATIENT
Start: 2023-05-20 | End: 2023-05-21 | Stop reason: HOSPADM

## 2023-05-20 RX ORDER — DESVENLAFAXINE 25 MG/1
25 TABLET, EXTENDED RELEASE ORAL DAILY
Status: DISCONTINUED | OUTPATIENT
Start: 2023-05-21 | End: 2023-05-21 | Stop reason: HOSPADM

## 2023-05-20 RX ORDER — ACETAMINOPHEN 325 MG/1
650 TABLET ORAL EVERY 6 HOURS PRN
Status: CANCELLED | OUTPATIENT
Start: 2023-05-20

## 2023-05-20 RX ORDER — FOLIC ACID 1 MG/1
1 TABLET ORAL DAILY
Status: CANCELLED | OUTPATIENT
Start: 2023-05-21

## 2023-05-20 RX ORDER — OMEPRAZOLE 20 MG/1
20-40 CAPSULE, DELAYED RELEASE ORAL
Status: DISCONTINUED | OUTPATIENT
Start: 2023-05-20 | End: 2023-05-21 | Stop reason: HOSPADM

## 2023-05-20 RX ORDER — LORAZEPAM 2 MG/ML
1-2 INJECTION INTRAMUSCULAR
Status: CANCELLED | OUTPATIENT
Start: 2023-05-20

## 2023-05-20 RX ORDER — AMOXICILLIN 250 MG
2 CAPSULE ORAL 2 TIMES DAILY
Status: CANCELLED | OUTPATIENT
Start: 2023-05-20

## 2023-05-20 RX ORDER — METOPROLOL TARTRATE 50 MG/1
50 TABLET, FILM COATED ORAL TWICE DAILY
Status: DISCONTINUED | OUTPATIENT
Start: 2023-05-20 | End: 2023-05-21 | Stop reason: HOSPADM

## 2023-05-20 RX ORDER — PREDNISONE 20 MG/1
20 TABLET ORAL DAILY
Status: DISCONTINUED | OUTPATIENT
Start: 2023-05-21 | End: 2023-05-21 | Stop reason: HOSPADM

## 2023-05-20 RX ORDER — MORPHINE SULFATE 4 MG/ML
2 INJECTION INTRAVENOUS
Status: DISCONTINUED | OUTPATIENT
Start: 2023-05-20 | End: 2023-05-21 | Stop reason: HOSPADM

## 2023-05-20 RX ORDER — MORPHINE SULFATE 4 MG/ML
2 INJECTION INTRAVENOUS
Status: CANCELLED | OUTPATIENT
Start: 2023-05-20

## 2023-05-20 RX ORDER — PROMETHAZINE HYDROCHLORIDE 25 MG/1
12.5-25 SUPPOSITORY RECTAL EVERY 4 HOURS PRN
Status: DISCONTINUED | OUTPATIENT
Start: 2023-05-20 | End: 2023-05-21 | Stop reason: HOSPADM

## 2023-05-20 RX ORDER — DESVENLAFAXINE 25 MG/1
25 TABLET, EXTENDED RELEASE ORAL DAILY
Status: CANCELLED | OUTPATIENT
Start: 2023-05-21

## 2023-05-20 RX ORDER — ONDANSETRON 4 MG/1
4 TABLET, ORALLY DISINTEGRATING ORAL EVERY 4 HOURS PRN
Status: DISCONTINUED | OUTPATIENT
Start: 2023-05-20 | End: 2023-05-21 | Stop reason: HOSPADM

## 2023-05-20 RX ORDER — PROMETHAZINE HYDROCHLORIDE 25 MG/1
12.5-25 TABLET ORAL EVERY 4 HOURS PRN
Status: CANCELLED | OUTPATIENT
Start: 2023-05-20

## 2023-05-20 RX ORDER — PROCHLORPERAZINE EDISYLATE 5 MG/ML
5-10 INJECTION INTRAMUSCULAR; INTRAVENOUS EVERY 4 HOURS PRN
Status: CANCELLED | OUTPATIENT
Start: 2023-05-20

## 2023-05-20 RX ORDER — DIGOXIN 0.25 MG/ML
250 INJECTION INTRAMUSCULAR; INTRAVENOUS ONCE
Status: COMPLETED | OUTPATIENT
Start: 2023-05-20 | End: 2023-05-20

## 2023-05-20 RX ORDER — SODIUM CHLORIDE 9 MG/ML
500 INJECTION, SOLUTION INTRAVENOUS ONCE
Status: COMPLETED | OUTPATIENT
Start: 2023-05-20 | End: 2023-05-20

## 2023-05-20 RX ORDER — DIGOXIN 0.25 MG/ML
250 INJECTION INTRAMUSCULAR; INTRAVENOUS EVERY 6 HOURS
Status: DISCONTINUED | OUTPATIENT
Start: 2023-05-20 | End: 2023-05-20 | Stop reason: HOSPADM

## 2023-05-20 RX ORDER — BISACODYL 10 MG
10 SUPPOSITORY, RECTAL RECTAL
Status: DISCONTINUED | OUTPATIENT
Start: 2023-05-20 | End: 2023-05-21 | Stop reason: HOSPADM

## 2023-05-20 RX ORDER — METOPROLOL TARTRATE 50 MG/1
50 TABLET, FILM COATED ORAL TWICE DAILY
Status: CANCELLED | OUTPATIENT
Start: 2023-05-20

## 2023-05-20 RX ORDER — OMEPRAZOLE 20 MG/1
20 CAPSULE, DELAYED RELEASE ORAL DAILY
Status: DISCONTINUED | OUTPATIENT
Start: 2023-05-21 | End: 2023-05-21 | Stop reason: HOSPADM

## 2023-05-20 RX ORDER — POLYETHYLENE GLYCOL 3350 17 G/17G
1 POWDER, FOR SOLUTION ORAL
Status: DISCONTINUED | OUTPATIENT
Start: 2023-05-20 | End: 2023-05-21 | Stop reason: HOSPADM

## 2023-05-20 RX ORDER — ROSUVASTATIN CALCIUM 10 MG/1
40 TABLET, COATED ORAL EVERY EVENING
Status: CANCELLED | OUTPATIENT
Start: 2023-05-20

## 2023-05-20 RX ORDER — M-VIT,TX,IRON,MINS/CALC/FOLIC 27MG-0.4MG
1 TABLET ORAL DAILY
Status: CANCELLED | OUTPATIENT
Start: 2023-05-21

## 2023-05-20 RX ADMIN — DESVENLAFAXINE 25 MG: 25 TABLET, EXTENDED RELEASE ORAL at 06:00

## 2023-05-20 RX ADMIN — HYDROMORPHONE HYDROCHLORIDE 0.5 MG: 1 INJECTION, SOLUTION INTRAMUSCULAR; INTRAVENOUS; SUBCUTANEOUS at 11:55

## 2023-05-20 RX ADMIN — SODIUM CHLORIDE 500 ML: 9 INJECTION, SOLUTION INTRAVENOUS at 03:00

## 2023-05-20 RX ADMIN — DIGOXIN 250 MCG: 0.25 INJECTION INTRAMUSCULAR; INTRAVENOUS at 18:32

## 2023-05-20 RX ADMIN — LORAZEPAM 1 MG: 2 INJECTION INTRAMUSCULAR; INTRAVENOUS at 14:06

## 2023-05-20 RX ADMIN — HYDROMORPHONE HYDROCHLORIDE 0.5 MG: 1 INJECTION, SOLUTION INTRAMUSCULAR; INTRAVENOUS; SUBCUTANEOUS at 16:43

## 2023-05-20 RX ADMIN — MAGNESIUM SULFATE HEPTAHYDRATE 4 G: 40 INJECTION, SOLUTION INTRAVENOUS at 04:38

## 2023-05-20 RX ADMIN — LORAZEPAM 1 MG: 2 INJECTION INTRAMUSCULAR; INTRAVENOUS at 22:01

## 2023-05-20 RX ADMIN — LORAZEPAM 2 MG: 2 INJECTION INTRAMUSCULAR; INTRAVENOUS at 02:58

## 2023-05-20 RX ADMIN — METOPROLOL TARTRATE 50 MG: 50 TABLET, FILM COATED ORAL at 18:32

## 2023-05-20 RX ADMIN — THIAMINE HYDROCHLORIDE 100 MG: 100 INJECTION, SOLUTION INTRAMUSCULAR; INTRAVENOUS at 05:57

## 2023-05-20 RX ADMIN — SODIUM CHLORIDE, POTASSIUM CHLORIDE, SODIUM LACTATE AND CALCIUM CHLORIDE 1000 ML: 600; 310; 30; 20 INJECTION, SOLUTION INTRAVENOUS at 08:10

## 2023-05-20 RX ADMIN — FOLIC ACID 1 MG: 1 TABLET ORAL at 05:35

## 2023-05-20 RX ADMIN — DILTIAZEM HYDROCHLORIDE 2.5 MG/HR: 5 INJECTION INTRAVENOUS at 15:01

## 2023-05-20 RX ADMIN — LIDOCAINE HYDROCHLORIDE 30 ML: 20 SOLUTION OROPHARYNGEAL at 00:33

## 2023-05-20 RX ADMIN — LORAZEPAM 2 MG: 2 INJECTION INTRAMUSCULAR; INTRAVENOUS at 06:35

## 2023-05-20 RX ADMIN — DIGOXIN 250 MCG: 0.25 INJECTION INTRAMUSCULAR; INTRAVENOUS at 11:48

## 2023-05-20 RX ADMIN — DIGOXIN 500 MCG: 0.25 INJECTION INTRAMUSCULAR; INTRAVENOUS at 05:47

## 2023-05-20 RX ADMIN — DILTIAZEM HYDROCHLORIDE 5 MG/HR: 5 INJECTION INTRAVENOUS at 17:29

## 2023-05-20 RX ADMIN — DILTIAZEM HYDROCHLORIDE 12.5 MG/HR: 5 INJECTION INTRAVENOUS at 06:40

## 2023-05-20 RX ADMIN — METOPROLOL TARTRATE 50 MG: 50 TABLET, FILM COATED ORAL at 08:13

## 2023-05-20 RX ADMIN — RIVAROXABAN 20 MG: 20 TABLET, FILM COATED ORAL at 18:32

## 2023-05-20 RX ADMIN — SODIUM CHLORIDE, POTASSIUM CHLORIDE, SODIUM LACTATE AND CALCIUM CHLORIDE: 600; 310; 30; 20 INJECTION, SOLUTION INTRAVENOUS at 11:45

## 2023-05-20 RX ADMIN — OMEPRAZOLE 20 MG: 20 CAPSULE, DELAYED RELEASE ORAL at 05:32

## 2023-05-20 RX ADMIN — SODIUM CHLORIDE, POTASSIUM CHLORIDE, SODIUM LACTATE AND CALCIUM CHLORIDE: 600; 310; 30; 20 INJECTION, SOLUTION INTRAVENOUS at 17:46

## 2023-05-20 RX ADMIN — MULTIPLE VITAMINS W/ MINERALS TAB 1 TABLET: TAB at 05:32

## 2023-05-20 RX ADMIN — PREDNISONE 20 MG: 20 TABLET ORAL at 05:35

## 2023-05-20 ASSESSMENT — PAIN DESCRIPTION - PAIN TYPE
TYPE: ACUTE PAIN

## 2023-05-20 ASSESSMENT — ACTIVITIES OF DAILY LIVING (ADL): TOILETING: INDEPENDENT

## 2023-05-20 ASSESSMENT — ENCOUNTER SYMPTOMS
CHILLS: 0
COUGH: 1
ABDOMINAL PAIN: 1
WEIGHT LOSS: 0
DIARRHEA: 0
PALPITATIONS: 1
BLOOD IN STOOL: 0
EYES NEGATIVE: 1
FEVER: 0
SHORTNESS OF BREATH: 0
VOMITING: 0
MUSCULOSKELETAL NEGATIVE: 1
HEMOPTYSIS: 0
SPUTUM PRODUCTION: 0
PALPITATIONS: 1
ABDOMINAL PAIN: 1
CLAUDICATION: 0
CONSTIPATION: 0
DIARRHEA: 1
ORTHOPNEA: 0
NAUSEA: 1

## 2023-05-20 ASSESSMENT — COGNITIVE AND FUNCTIONAL STATUS - GENERAL
DAILY ACTIVITIY SCORE: 24
SUGGESTED CMS G CODE MODIFIER DAILY ACTIVITY: CH

## 2023-05-20 ASSESSMENT — FIBROSIS 4 INDEX
FIB4 SCORE: 2.72
FIB4 SCORE: 2.72

## 2023-05-20 ASSESSMENT — PATIENT HEALTH QUESTIONNAIRE - PHQ9
SUM OF ALL RESPONSES TO PHQ9 QUESTIONS 1 AND 2: 0
2. FEELING DOWN, DEPRESSED, IRRITABLE, OR HOPELESS: NOT AT ALL
2. FEELING DOWN, DEPRESSED, IRRITABLE, OR HOPELESS: NOT AT ALL
1. LITTLE INTEREST OR PLEASURE IN DOING THINGS: NOT AT ALL
SUM OF ALL RESPONSES TO PHQ9 QUESTIONS 1 AND 2: 0
1. LITTLE INTEREST OR PLEASURE IN DOING THINGS: NOT AT ALL

## 2023-05-20 NOTE — PROGRESS NOTES
Report given to [place] RN via telephone call, all questions answered. Awaiting on transport. Patient diltiazem drip increased to 5 mg/hr per Dr. Hernández. Patient also medicated for pain per patient request and for transfer. Family at bedside, answered all questions.    1700 [address] picked up patient, report given, all questions answered.

## 2023-05-20 NOTE — DISCHARGE SUMMARY
"Discharge Summary    CHIEF COMPLAINT ON ADMISSION  Chief Complaint   Patient presents with    Chest Pain     Present for months but became \"more so\" in the last 24 hours; pt c/o chest discomfort, rapid heart rate; hx of aortic aneurism         Reason for Admission  Chest Pain:Abdominal Pain     Admission Date  5/19/2023     CODE STATUS  Full Code    HPI & HOSPITAL COURSE  57 y.o. male who presented 5/19/2023 is admitted to ICU with A flutter/fibrillation w/ RVR. Patient with significant history of alcohol abuse who presents with palpitation which started about three days ago. He also report having epigastric pain and diarrhea. Denies fever/chills, SOB, or sick contact. In ER, he was found to have 2:1 flutter which he was given 15 mg of IV cardiazem follow by 25 mg. HR improved but went back up to the 130s. Started on diltiazem gtt and xarelto. Admitted to ICU for further management.       In ICU, he was maxed out on diltiazem gtt. Started on metoprolol 50 overnight. Developed hypotension which diltiazem gtt titrated off and and added digoxin load. HR improved to the low 100s but after working with PT he went back in 2:1 flutter. Restarted back on diltiazem gtt but is limited by hypotension. Restarted back IVF and d/w Dr. Be from cardiology who [place] [facility] [place]/ [place]. D/w Dr. Bills whom graciously accepted patient [place] [facility] [place] further management.      Therefore, he is discharged in fair and stable condition [place] [facility] [place]    The patient met 2-midnight criteria for an inpatient stay at the time of discharge.        DISCHARGE DIAGNOSES  Principal Problem:    Atrial flutter with rapid ventricular response (HCC) (POA: Yes)  Active Problems:    Essential (primary) hypertension (POA: Yes)    GERD (gastroesophageal reflux disease) (POA: Yes)    Ulcerative colitis (HCC) (POA: Yes)      Overview: Formatting of this note might be different from the original.      Was previously on infliximab, " steroids and low dose azathioprine. He       reports he has been off all these medications for months now. Controlling       his ulcerative colitis with ketogenic diet. Last flare was last summer per       patient.    Major depressive disorder, recurrent episode, moderate (HCC) (POA: Yes)    Familial hypercholesteremia (POA: Yes)    Acute alcoholic pancreatitis (POA: Unknown)  Resolved Problems:    * No resolved hospital problems. *      FOLLOW UP  Future Appointments   Date Time Provider Department Center   6/1/2023 11:00 AM Arvind Geiger M.D. [facility]     No follow-up provider specified.    MEDICATIONS ON DISCHARGE     Medication List        ASK your doctor about these medications        Instructions   Desvenlafaxine Succinate ER 25 MG Tb24   Take 1 Tablet by mouth every day.  Dose: 1 Tablet     FISH OIL PO   Take 2 Capsules by mouth every 7 days.  Dose: 2 Capsule     folic acid 1 MG Tabs  Commonly known as: FOLVITE   Take 1 Tablet by mouth every day.  Dose: 1 mg     losartan 25 MG Tabs  Commonly known as: COZAAR  Ask about: Which instructions should I use?   Take 25 mg by mouth 1 time a day as needed. Indications: High Blood Pressure Disorder  Dose: 25 mg     multivitamin Tabs  Ask about: Which instructions should I use?   Take 2 Tablets by mouth every day.  Dose: 2 Tablet     omeprazole 20 MG delayed-release capsule  Commonly known as: PRILOSEC   Take 20-40 mg by mouth 1 time a day as needed (Heartburn).  Dose: 20-40 mg     ondansetron 4 MG Tbdp  Commonly known as: ZOFRAN ODT   Take 1 Tablet by mouth every four hours as needed for Nausea/Vomiting (give PO if no IV route available).  Dose: 4 mg     predniSONE 20 MG Tabs  Commonly known as: DELTASONE   Take 20 mg by mouth every day.  Dose: 20 mg     propranolol 10 MG Tabs  Commonly known as: INDERAL  Ask about: Which instructions should I use?   Take 10 mg by mouth 3 times a day as needed. Indications: High Blood Pressure Disorder  Dose: 10 mg      rizatriptan 5 MG tablet  Commonly known as: MAXALT   Take 1-2 Tablets by mouth one time as needed for Migraine for up to 1 dose.  Dose: 5-10 mg     rosuvastatin 20 MG Tabs  Commonly known as: CRESTOR   Take 1 Tablet by mouth every evening.  Dose: 20 mg     STELARA SC  Ask about: Which instructions should I use?   Inject 1 Dose under the skin every 30 (thirty) days.  Dose: 1 Dose     testosterone cypionate 200 MG/ML Soln injection  Commonly known as: DEPO-TESTOSTERONE   Inject 200 mg into the shoulder, thigh, or buttocks every 7 days.  Dose: 200 mg     VITAMIN B COMPLEX PO   Take 1 Tablet by mouth every day.  Dose: 1 Tablet     Vitamin B-1 250 MG Tabs  Ask about: Which instructions should I use?   Take 250 mg by mouth every day.  Dose: 250 mg     vitamin D 2000 UNIT Tabs  Ask about: Which instructions should I use?   Take 4,000 Units by mouth every day.  Dose: 4,000 Units     Vyvanse 50 MG capsule  Generic drug: lisdexamfetamine   Take 1 Capsule by mouth every morning for 30 days.  Dose: 50 mg              Allergies  Allergies   Allergen Reactions    Thimerosal Unspecified     Eye medication reaction  Eye medication reaction         DIET  Orders Placed This Encounter   Procedures    Diet Order Diet: Cardiac     Standing Status:   Standing     Number of Occurrences:   1     Order Specific Question:   Diet:     Answer:   Cardiac [6]       ACTIVITY  As tolerated.  Weight bearing as tolerated    LINES, DRAINS, AND WOUNDS  This is an automated list. Peripheral IVs will be removed prior to discharge.  Peripheral IV 05/19/23 20 G Left Antecubital (Active)   Site Assessment Clean;Dry;Intact 05/20/23 0800   Dressing Type Transparent 05/20/23 0800   Line Status Infusing 05/20/23 0800   Dressing Status Clean;Dry;Intact 05/20/23 0800   Dressing Intervention N/A 05/20/23 0000   [place]fil[place] Grading [place] 0 05/20/23 0800   Phlebitis Scale ([facility] Only) 0 05/20/23 0800       Peripheral IV 05/19/23 Anterior;Left Forearm  (Active)   Site Assessment Clean;Dry;Intact 05/20/23 0800   Dressing Type Transparent 05/20/23 0800   Line Status Scrubbed the hub prior to access;Flushed;Infusing 05/20/23 0800   Dressing Status Clean;Dry;Intact 05/20/23 0800   Dressing Intervention N/A 05/20/23 0000   Infiltration Grading ([facility], [facility]) 0 05/20/23 0800   Phlebitis Scale ([facility] Only) 0 05/20/23 0800          Peripheral IV 05/19/23 20 G Left Antecubital (Active)   Site Assessment Clean;Dry;Intact 05/20/23 0800   Dressing Type Transparent 05/20/23 0800   Line Status Infusing 05/20/23 0800   Dressing Status Clean;Dry;Intact 05/20/23 0800   Dressing Intervention N/A 05/20/23 0000   Infiltration Grading ([facility]) 0 05/20/23 0800   Phlebitis Scale ([facility] Only) 0 05/20/23 0800       Peripheral IV 05/19/23 Anterior;Left Forearm (Active)   Site Assessment Clean;Dry;Intact 05/20/23 0800   Dressing Type Transparent 05/20/23 0800   Line Status Scrubbed the hub prior to access;Flushed;Infusing 05/20/23 0800   Dressing Status Clean;Dry;Intact 05/20/23 0800   Dressing Intervention N/A 05/20/23 0000   Infiltration Grading ([facility]) 0 05/20/23 0800   Phlebitis Scale ([facility] Only) 0 05/20/23 0800               MENTAL STATUS ON TRANSFER             CONSULTATIONS  Cardiology     PROCEDURES  None     LABORATORY  Lab Results   Component Value Date    SODIUM 134 (L) 05/20/2023    POTASSIUM 4.6 05/20/2023    CHLORIDE 100 05/20/2023    CO2 22 05/20/2023    GLUCOSE 123 (H) 05/20/2023    BUN 8 05/20/2023    CREATININE 0.90 05/20/2023        Lab Results   Component Value Date    WBC 15.1 (H) 05/20/2023    HEMOGLOBIN 16.0 05/20/2023    HEMATOCRIT 47.4 05/20/2023    PLATELETCT 268 05/20/2023        Total time of the discharge process exceeds 34 minutes.

## 2023-05-20 NOTE — THERAPY
Occupational Therapy Contact Note    Patient Name: Tom Quan  Age:  57 y.o., Sex:  male  Medical Record #: [id number]  Today's Date: 5/20/2023    Discussed missed therapy with nursing       05/20/23 0889   Interdisciplinary Plan of Care Collaboration   IDT Collaboration with  Nursing   Collaboration Comments Pt not available for OT eval secondary enroute CT.  Will attempt OT eval at a later time.

## 2023-05-20 NOTE — PROGRESS NOTES
Pulmonary Progress Note    Date of admission  5/19/2023    Chief Complaint  57 y.o. male admitted 5/19/2023 with A fib/flutter w/ RVR.     Hospital Course  57 y.o. male who presented 5/19/2023 is admitted to ICU with A flutter/fibrillation w/ RVR. Patient with significant history of alcohol abuse who presents with palpitation which started about three days ago. He also report having epigastric pain and diarrhea. Denies fever/chills, SOB, or sick contact. In ER, he was found to have 2:1 flutter which he was given 15 mg of IV cardiazem follow by 25 mg. HR improved but went back up to the 130s. Started on diltiazem gtt and xarelto. Admitted to ICU for further management.      5/20: Developed hypotension while on cardiazem gtt which was decreased to 12.5 mg/hr. Started on digoxin load today. CT abdomen/pelvis confirmed acute pancreatitis.     Interval Problem Update  Reviewed last 24 hour events:   As above     Review of Systems  Review of Systems   Constitutional:  Positive for malaise/fatigue. Negative for chills, fever and weight loss.   HENT: Negative.     Eyes: Negative.    Respiratory:  Positive for cough. Negative for hemoptysis, sputum production and shortness of breath.    Cardiovascular:  Positive for palpitations. Negative for chest pain, orthopnea, claudication and leg swelling.   Gastrointestinal:  Positive for abdominal pain and nausea. Negative for blood in stool, constipation, diarrhea and vomiting.   Genitourinary: Negative.    Musculoskeletal: Negative.    Skin: Negative.    All other systems reviewed and are negative.       Vital Signs for last 24 hours   Temp:  [36.1 °C (96.9 °F)-37.1 °C (98.7 °F)] 37.1 °C (98.7 °F)  Pulse:  [] 89  Resp:  [5-32] 12  BP: ()/() 101/74  SpO2:  [86 %-96 %] 91 %    Hemodynamic parameters for last 24 hours       Respiratory Information for the last 24 hours       Physical Exam   Physical Exam  Constitutional:       Appearance: Normal appearance.   HENT:       Head: Normocephalic.      Nose: Nose normal.   Eyes:      Extraocular Movements: Extraocular movements intact.      Pupils: Pupils are equal, round, and reactive to light.   Cardiovascular:      Rate and Rhythm: Tachycardia present. Rhythm irregular.      Pulses: Normal pulses.      Heart sounds: No murmur heard.  Pulmonary:      Effort: Pulmonary effort is normal. No respiratory distress.      Breath sounds: Normal breath sounds.   Abdominal:      Tenderness: There is abdominal tenderness.      Comments: Soft, mild tender to palpation, hypoactive bowel sounds      Musculoskeletal:         General: Normal range of motion.      Cervical back: Normal range of motion.      Right lower leg: No edema.      Left lower leg: No edema.   Skin:     General: Skin is warm.   Neurological:      Mental Status: He is alert and oriented to person, place, and time.   Psychiatric:         Mood and Affect: Mood normal.       Medications  Current Facility-Administered Medications   Medication Dose Route Frequency Provider Last Rate Last Admin    digoxin (Lanoxin) injection 250 mcg  250 mcg Intravenous Q6HR Nory Fonseca M.D.        [START ON 5/21/2023] digoxin (LANOXIN) tablet 125 mcg  125 mcg Oral DAILY AT 1800 Doug Hernández M.D.        omeprazole (PRILOSEC) capsule 20-40 mg  20-40 mg Oral QDAY PRN Roopa Miller M.D.        predniSONE (DELTASONE) tablet 20 mg  20 mg Oral DAILY Roopa Miller M.D.   20 mg at 05/20/23 0535    senna-docusate (PERICOLACE or SENOKOT S) 8.6-50 MG per tablet 2 Tablet  2 Tablet Oral BID Roopa Miller M.D.        And    polyethylene glycol/lytes (MIRALAX) PACKET 1 Packet  1 Packet Oral QDAY PRN Roopa Miller M.D.        And    magnesium hydroxide (MILK OF MAGNESIA) suspension 30 mL  30 mL Oral QDAY PRN Roopa Miller M.D.        And    bisacodyl (DULCOLAX) suppository 10 mg  10 mg Rectal QDAY PRN Roopa Miller M.D.        ondansetron (ZOFRAN) syringe/vial injection 4 mg  4 mg Intravenous Q4HRS  PRN Roopa Miller M.D.   4 mg at 05/19/23 2038    ondansetron (ZOFRAN ODT) dispertab 4 mg  4 mg Oral Q4HRS PRN Roopa Miller M.D.        promethazine (PHENERGAN) tablet 12.5-25 mg  12.5-25 mg Oral Q4HRS PRN Roopa Miller M.D.        promethazine (PHENERGAN) suppository 12.5-25 mg  12.5-25 mg Rectal Q4HRS PRN Roopa Miller M.D.        prochlorperazine (COMPAZINE) injection 5-10 mg  5-10 mg Intravenous Q4HRS PRN Roopa Miller M.D.        rivaroxaban (XARELTO) tablet 20 mg  20 mg Oral PM MEAL Roopa Miller M.D.   20 mg at 05/19/23 1654    acetaminophen (Tylenol) tablet 650 mg  650 mg Oral Q6HRS PRN Roopa Miller M.D.        LORazepam (ATIVAN) injection 1-2 mg  1-2 mg Intravenous Q2HRS PRN Doug Hernández M.D.        metoprolol tartrate (LOPRESSOR) tablet 50 mg  50 mg Oral TWICE DAILY Doug Hernández M.D.   50 mg at 05/20/23 0813    rosuvastatin (CRESTOR) tablet 40 mg  40 mg Oral Q EVENING Vel TRAN M.D.        omeprazole (PRILOSEC) capsule 20 mg  20 mg Oral DAILY Doug Hernández M.D.   20 mg at 05/20/23 0532    thiamine (B-1) 100 mg in dextrose 5% 100 mL IVPB  100 mg Intravenous DAILY Doug Hernández M.D. 200 mL/hr at 05/20/23 0557 100 mg at 05/20/23 0557    folic acid (FOLVITE) tablet 1 mg  1 mg Oral DAILY Doug Hernández M.D.   1 mg at 05/20/23 0535    therapeutic multivitamin-minerals (THERAGRAN-M) tablet 1 Tablet  1 Tablet Oral DAILY Doug Hernández M.D.   1 Tablet at 05/20/23 0532    Desvenlafaxine Succinate ER TB24 25 mg  25 mg Oral DAILY Roopa Miller M.D.   25 mg at 05/20/23 0600    morphine 4 MG/ML injection 2 mg  2 mg Intravenous Q3HRS PRN Nory Fonseca M.D.        HYDROmorphone (Dilaudid) injection 0.5 mg  0.5 mg Intravenous Q3HRS PRN Nory Fonseca M.D.   0.5 mg at 05/19/23 2230       Fluids    Intake/Output Summary (Last 24 hours) at 5/20/2023 1126  Last data filed at 5/20/2023 1000  Gross per 24 hour   Intake 3432.05 ml   Output 250 ml   Net 3182.05 ml       Laboratory          Recent Labs      05/19/23  1112 05/19/23  1325 05/20/23  0327   SODIUM 136  --  133*   POTASSIUM 4.0  --  4.4   CHLORIDE 99  --  100   CO2 22  --  23   BUN 7*  --  8   CREATININE 1.01  --  0.94   MAGNESIUM  --  1.6 1.3*   CALCIUM 9.8  --  8.1*     Recent Labs     05/19/23  1112 05/20/23  0327   ALTSGPT 167*  --    ASTSGOT 168*  --    ALKPHOSPHAT 103*  --    TBILIRUBIN 0.7  --    LIPASE 115*  --    GLUCOSE 111* 144*     Recent Labs     05/19/23  1112 05/20/23  0253   WBC 7.4 15.1*   NEUTSPOLYS 68.40  --    LYMPHOCYTES 20.10*  --    MONOCYTES 9.60  --    EOSINOPHILS 0.50  --    BASOPHILS 1.10  --    ASTSGOT 168*  --    ALTSGPT 167*  --    ALKPHOSPHAT 103*  --    TBILIRUBIN 0.7  --      Recent Labs     05/19/23  1112 05/20/23  0253   RBC 5.12 5.11   HEMOGLOBIN 16.0 16.0   HEMATOCRIT 46.8 47.4   PLATELETCT 302 268   PROTHROMBTM 14.6  --    APTT 27.2  --    INR 1.16*  --        Imaging  CT abdomen/pelvis:   1.  Findings consistent with pancreatitis.     2.  Hepatic steatosis.     3.  Trace effusions with bibasilar atelectasis      Assessment/Plan  NEURO:   # Alcohol abuse   -- At risk for alcohol withdrawal   -- On thiamine, folic acid, and MTV   -- Cont ativan as needed per [facility] protocol      RESP:   -- On room air   -- Encourage IS    -- Seek early mobility       CVS:   # A flutter w/ RVR   -- Secondary to dehydration, holiday heart syndrome, and electrolyte derangement   -- Off dilt gtt due to hypotension   -- On xarelto for CVA ppx   -- Pending TTE   -- Cont metoprolol 50 mg BID   -- On digoxin therapy; check level in day 3 5/22  -- High lytes goal   -- Cardiology following   -- Patient may benefit from QUENTIN w/ cardioversion if A flutter fails to respond to AVN blocking agents       GI:   # Acute pancreatitis   -- Cont IVF resuscitation   -- Pain control w/ dilaudid and percocet       # Abnormal LFTs   -- Secondary to alcohol abuse   -- Trend LFTs     # Ulcerative colitis   -- Cont home prednisone 20 mg daily      ENDO:   -- Goal BS <  180     Addendum 1415: Case d/w Dr. Be about 2:1 flutter who has failed ditizem gtt, PO metoprolol, and digoxin therapy. Dr [place] [facility] [place]         VTE:  NOAC  Ulcer: PPI  Lines: None    I have performed a physical exam and reviewed and updated ROS and Plan today (5/20/2023). In review of yesterday's note (5/19/2023), there are no changes except as documented above.     Discussed patient condition and risk of morbidity and/or mortality with Family, RN, Pharmacy, and Patient      The patient remains critically ill.  Critical care time = 34 minutes in directly providing and coordinating critical care and extensive data review.  No time overlap and excludes procedures.

## 2023-05-20 NOTE — THERAPY
05/20/23 0842   Initial Contact Note    Initial Contact Note Order Received and Verified, Physical Therapy Evaluation in Progress with Full Report to Follow.   Interdisciplinary Plan of Care Collaboration   IDT Collaboration with  Nursing;Occupational Therapist   Collaboration Comments Pt not available for eval. in CT. Will reattempt as able.

## 2023-05-20 NOTE — PROGRESS NOTES
ICU accept note    Pulmonary Critical Care     ICU accept Note      Referring Facility: [facility]     Referring Physician: Doug Hernández    Patient Demographics: 57 male    PMHx: history of afib with RVR, alcohol abuse.    Current Course: RVR refractory to Dig, cardizem gtt and metoprolol sustaining in the mid 100's.  Now with hypotension.  Plan with cardiology Dr. Be for QUENTIN with cardioversion    Reason for Transfer:QUENTIN with Cardioversion, call cardiology on admission.     Pressors: None    Respiratory Status: Stable    Accepted: Yes, ANA Bills MD RD  Pulmonary and Critical Care    Available on Voalte

## 2023-05-20 NOTE — THERAPY
05/20/23 1233   Initial Contact Note    Initial Contact Note Order Received and Verified, Physical Therapy Evaluation in Progress with Full Report to Follow.   Vitals   Pulse (!) 145   Interdisciplinary Plan of Care Collaboration   IDT Collaboration with  Nursing   Patient Position at End of Therapy In Bed;Phone within Reach;Tray Table within Reach;Call Light within Reach   Collaboration Comments eval on hold per nsg. Pt sustaining tachy 's Will reattmept when medically stable.   Session Information   Date / Session Number  5/20/23 HOLD ( EVAL)

## 2023-05-20 NOTE — CARE PLAN
The patient is Watcher - Medium risk of patient condition declining or worsening    Shift Goals  Clinical Goals: maintian HR between , titrate diltiazem, wean off precedex drip, pain control with meds and reposition  Patient Goals: pain management  Family Goals: BETHEL    Progress made toward(s) clinical / shift goals:    Problem: Pain - Standard  Goal: Alleviation of pain or a reduction in pain to the patient’s comfort goal  Description: Target End Date:  Prior to discharge or change in level of care    Document on Vitals flowsheet    1.  Document pain using the appropriate pain scale per order or unit policy  2.  Educate and implement non-pharmacologic comfort measures (i.e. relaxation, distraction, massage, cold/heat therapy, etc.)  3.  Pain management medications as ordered  4.  Reassess pain after pain med administration per policy  5.  If opiods administered assess patient's response to pain medication is appropriate per POSS sedation scale  6.  Follow pain management plan developed in collaboration with patient and interdisciplinary team (including palliative care or pain specialists if applicable)  Outcome: Progressing     Problem: Knowledge Deficit - Standard  Goal: Patient and family/care givers will demonstrate understanding of plan of care, disease process/condition, diagnostic tests and medications  Description: Target End Date:  1-3 days or as soon as patient condition allows    Document in Patient Education    1.  Patient and family/caregiver oriented to unit, equipment, visitation policy and means for communicating concern  2.  Complete/review Learning Assessment  3.  Assess knowledge level of disease process/condition, treatment plan, diagnostic tests and medications  4.  Explain disease process/condition, treatment plan, diagnostic tests and medications  Outcome: Progressing     Problem: Optimal Care for Alcohol Withdrawal  Goal: Optimal Care for the alcohol withdrawal patient  Description:  Target End Date:  1 to 3 days    1.  Alcohol history screening done on admission  2.  CIWA-AR score assessment (includes assessment of nausea/vomiting, tremor, sweats, anxiety, agitation, tactile, visual and auditory disturbances, headache and orientation/sensorium).  Document on CIWA flowsheet.  3.  Follow CIWA-AR score protocol  4.  Frequent reorientation  5.  Monitor for fluid and electrolyte imbalance.  6.  Assess for respiratory depression due to sedation (pulse ox)  7.  Consider thiamine, multivitamins, folic acid and magnesium sulfate per provider order  8.  Collaborate with Social Workers/Case Management  9.  Collaborate with mental health  Outcome: Progressing     Problem: Seizure Precautions  Goal: Implementation of seizure precautions  Description: Target End Date:  Prior to discharge or change in level of care    1.  Padded side rails up at all times  2.  Suction equipment and oxygen delivery system at bedside  3.  Continuous pulse oximeter in use  4.  Implement fall precautions, bed alarm on, bed in lowest position  5.  IV access (per order)  6.  Provide low stimulus environment, avoid exposure to triggers  7.  Instruct patient to use call light/seizure button if having warning signs of impending seizure  Outcome: Progressing     Problem: Hemodynamics  Goal: Patient's hemodynamics, fluid balance and neurologic status will be stable or improve  Description: Target End Date:  Prior to discharge or change in level of care    Document on Assessment and I/O flowsheet templates    1.  Monitor vital signs, pulse oximetry and cardiac monitor per provider order and/or policy  2.  Maintain blood pressure per provider order  3.  Hemodynamic monitoring per provider order  4.  Manage IV fluids and IV infusions  5.  Monitor intake and output  6.  Daily weights per unit policy or provider order  7.  Assess peripheral pulses and capillary refill  8.  Assess color and body temperature  9.  Position patient for maximum  circulation/cardiac output  10. Monitor for signs/symptoms of excessive bleeding  11. Assess mental status, restlessness and changes in level of consciousness  12. Monitor temperature and report fever or hypothermia to provider immediately. Consideration of targeted temperature management.  Outcome: Progressing       Patient is not progressing towards the following goals:    Patient's pain has been controlled via pain medications, repositioning, and warm blanket. Patient is off diltiazem drip and on PO metoprolol, patient tolerating, HR between 70s-100s. Patient still requiring 5L O2 support when asleep. IS introduced and educated.

## 2023-05-20 NOTE — PROGRESS NOTES
12 hour chart check complete.    Monitor Summary:    Rhythm:  Atrial flutter    HR:  100-150's    Ectopy:  Frequent PVC    --/ 0.08/ --

## 2023-05-20 NOTE — CARE PLAN
The patient is Watcher - Medium risk of patient condition declining or worsening    Shift Goals  Clinical Goals: Monitor HR/BP, titrate dilt gtt  Patient Goals: Pain management  Family Goals: Make comfortable, get better    Diltiazem titrated for elevated HR.     Progress made toward(s) clinical / shift goals:    Problem: Pain - Standard  Goal: Alleviation of pain or a reduction in pain to the patient’s comfort goal  Outcome: Progressing     Problem: Knowledge Deficit - Standard  Goal: Patient and family/care givers will demonstrate understanding of plan of care, disease process/condition, diagnostic tests and medications  Outcome: Progressing     Problem: Psychosocial  Goal: Patient's level of anxiety will decrease  Outcome: Progressing     Problem: Hemodynamics  Goal: Patient's hemodynamics, fluid balance and neurologic status will be stable or improve  Outcome: Progressing

## 2023-05-20 NOTE — THERAPY
Occupational Therapy   Initial Evaluation     Patient Name: Tom Quan  Age:  57 y.o., Sex:  male  Medical Record #: [id number]  Today's Date: 5/20/2023          Assessment  Patient is 57 y.o. male with a diagnosis of atrial flutter w/ rapid ventricular response.  Additional factors influencing patient status / progress: Abdominal pain/discomfort.  Pt reported living in a [facility] in [place], [place] w/ his wife and children.  Family is available to assist as needed.  PLOF Indep for ADL's, transfers and functional mobility w/out a device.  Pt demonstrated Mod I bed mobility, Sup sit/stand, SBA ambulation w/out a device, Sup transfers, mod I U/LB clothing management, Mod I standing G&H.  Therapist reviewed/educated pt on environmental/safety awareness, fall precautions ADL's and transfers.    Plan    DC Equipment Recommendations: (P) None  Discharge Recommendations: (P) Anticipate that the patient will have no further occupational therapy needs after discharge from the hospital     Subjective    Pt was alert and cooperative w/ tx.     Objective       05/20/23 1206    Services   Is patient using  services for this encounter? No   Initial Contact Note    Initial Contact Note Order Received and Verified, Evaluation Only - Patient Does Not Require Further Acute Occupational Therapy at this Time.  However, May Benefit from Post Acute Therapy for Higher Level Functional Deficits.   Prior Living Situation   Prior Services Home-Independent   Housing / Facility 1 Story House   Steps Into Home 2   Steps In Home 0   Rail None   Bathroom Set up Walk In Shower;Shower Glass Doors;Built-In Shower Chair   Equipment Owned None   Lives with - Patient's Self Care Capacity Spouse;Child Less than 18 Years of Age   Comments Pt reported living in a [facility] in [place], [place] w/ his wife and children.  Family is available to assist as needed.  PLOF Indep for ADL's, transfers and functional mobility w/out a device.   Prior Level of ADL  Function   Self Feeding Independent   Grooming / Hygiene Independent   Bathing Independent   Dressing Independent   Toileting Independent   Prior Level of IADL Function   Medication Management Independent   Laundry Independent   Kitchen Mobility Independent   Finances Independent   Home Management Independent   Shopping Independent   Prior Level Of Mobility Independent Without Device in Community;Independent With Steps in Community;Independent Without Device in Home;Independent With Steps in Home   Driving / Transportation Driving Independent   History of Falls   History of Falls No   Vitals   Pulse Oximetry 92 %   O2 (LPM) 5   O2 Delivery Device Nasal Cannula   Pain   Intervention Rest;Repositioned   Pain 0 - 10 Group   Location Chest;Abdomen   Location Orientation Mid   Description Aching;Constant   Comfort Goal Comfort with Movement;Perform Activity   Therapist Pain Assessment Post Activity Pain Same as Prior to Activity;7   Non Verbal Descriptors   Non Verbal Scale  Calm   Cognition    Cognition / Consciousness WDL   Active ROM Upper Body   Active ROM Upper Body  WDL   Dominant Hand Right   Strength Upper Body   Upper Body Strength  WDL   Upper Body Muscle Tone   Upper Body Muscle Tone  WDL   Coordination Upper Body   Coordination WDL   Balance Assessment   Sitting Balance (Static) Good   Sitting Balance (Dynamic) Fair +   Standing Balance (Static) Fair +   Standing Balance (Dynamic) Fair +   Weight Shift Sitting Good   Weight Shift Standing Fair   Comments OOB w/out a device.   Bed Mobility    Supine to Sit Modified Independent   Sit to Supine Modified Independent   Scooting Modified Independent   ADL Assessment   Grooming Modified Independent;Standing   Upper Body Dressing Modified Independent   Lower Body Dressing Modified Independent   Toileting Modified Independent   How much help from another person does the patient currently need...   Putting on and taking off regular lower body clothing? 4   Bathing  (including washing, rinsing, and drying)? 4   Toileting, which includes using a toilet, bedpan, or urinal? 4   Putting on and taking off regular upper body clothing? 4   Taking care of personal grooming such as brushing teeth? 4   Eating meals? 4   6 Clicks Daily Activity Score 24   Functional Mobility   Sit to Stand Supervised   Bed, Chair, Wheelchair Transfer Supervised   Toilet Transfers Supervised   Transfer Method Stand Step   Mobility SBA ambulation w/out a device   Edema / Skin Assessment   Edema / Skin  Not Assessed   Activity Tolerance   Sitting in Chair 5   Sitting Edge of Bed 10   Standing 5x2   Education Group   Education Provided Role of Occupational Therapist   Role of Occupational Therapist Patient Response Patient;Acceptance;Explanation;Verbal Demonstration   Transfers Patient Response Patient;Acceptance;Explanation;Demonstration;Verbal Demonstration;Action Demonstration   ADL Patient Response Patient;Acceptance;Explanation;Demonstration;Verbal Demonstration;Action Demonstration   Anticipated Discharge Equipment and Recommendations   DC Equipment Recommendations None   Discharge Recommendations Anticipate that the patient will have no further occupational therapy needs after discharge from the hospital

## 2023-05-20 NOTE — PROGRESS NOTES
4 Eyes Skin Assessment Completed by Garrett RN and TERESITA Castro.    Head WDL  Ears WDL  Nose WDL  Mouth WDL  Neck WDL  Breast/Chest WDL  Shoulder Blades WDL  Spine WDL  (R) Arm/Elbow/Hand WDL  (L) Arm/Elbow/Hand WDL  Abdomen WDL  Groin WDL  Scrotum/Coccyx/Buttocks WDL  (R) Leg WDL  (L) Leg WDL  (R) Heel/Foot/Toe WDL  (L) Heel/Foot/Toe WDL          Devices In Places ECG, Blood Pressure Cuff, Pulse Ox, and Nasal Cannula      Interventions In Place Gray Ear Foams, NC W/Ear Foams, Pillows, Low Air Loss Mattress, and Heels Loaded W/Pillows    Possible Skin Injury No    Pictures Uploaded Into Epic N/A  Wound Consult Placed N/A  RN Wound Prevention Protocol Ordered No

## 2023-05-20 NOTE — CARE PLAN
The patient is Watcher - Medium risk of patient condition declining or worsening    Shift Goals  Clinical Goals: Dilt gtt, monitor HR/BP  Patient Goals: Rest    Progress made toward(s) clinical / shift goals:    Problem: Hemodynamics  Goal: Patient's hemodynamics, fluid balance and neurologic status will be stable or improve  Outcome: Progressing  Note: Pt's HR decreasing down to 110's from up to 150's     Problem: Optimal Care for Alcohol Withdrawal  Goal: Optimal Care for the alcohol withdrawal patient  Outcome: Progressing       Patient is not progressing towards the following goals:      Problem: Pain - Standard  Goal: Alleviation of pain or a reduction in pain to the patient’s comfort goal  Outcome: Not Progressing  Note: Morphine and percocet added for pain management. Nonpharmacologic interventions offered, IE music, tv.

## 2023-05-20 NOTE — PROGRESS NOTES
12-hour chart check complete.    Monitor Summary  Rhythm: Aflutter   Rate:   Ectopy: fPVC, bigem  Measurements: -/0.08/-

## 2023-05-21 VITALS
DIASTOLIC BLOOD PRESSURE: 79 MMHG | RESPIRATION RATE: 14 BRPM | TEMPERATURE: 97.7 F | HEIGHT: 73 IN | HEART RATE: 149 BPM | SYSTOLIC BLOOD PRESSURE: 113 MMHG | WEIGHT: 222.44 LBS | OXYGEN SATURATION: 91 % | BODY MASS INDEX: 29.48 KG/M2

## 2023-05-21 LAB
ALBUMIN SERPL BCP-MCNC: 3.1 G/DL (ref 3.2–4.9)
ALBUMIN/GLOB SERPL: 1.2 G/DL
ALP SERPL-CCNC: 85 U/L (ref 30–99)
ALT SERPL-CCNC: 94 U/L (ref 2–50)
ANION GAP SERPL CALC-SCNC: 10 MMOL/L (ref 7–16)
APTT PPP: 32.8 SEC (ref 24.7–36)
AST SERPL-CCNC: 63 U/L (ref 12–45)
BILIRUB SERPL-MCNC: 0.7 MG/DL (ref 0.1–1.5)
BUN SERPL-MCNC: 7 MG/DL (ref 8–22)
CALCIUM ALBUM COR SERPL-MCNC: 8.8 MG/DL (ref 8.5–10.5)
CALCIUM SERPL-MCNC: 8.1 MG/DL (ref 8.5–10.5)
CHLORIDE SERPL-SCNC: 101 MMOL/L (ref 96–112)
CO2 SERPL-SCNC: 24 MMOL/L (ref 20–33)
CREAT SERPL-MCNC: 0.94 MG/DL (ref 0.5–1.4)
EKG IMPRESSION: NORMAL
ERYTHROCYTE [DISTWIDTH] IN BLOOD BY AUTOMATED COUNT: 56.8 FL (ref 35.9–50)
GFR SERPLBLD CREATININE-BSD FMLA CKD-EPI: 95 ML/MIN/1.73 M 2
GLOBULIN SER CALC-MCNC: 2.5 G/DL (ref 1.9–3.5)
GLUCOSE SERPL-MCNC: 85 MG/DL (ref 65–99)
HCT VFR BLD AUTO: 43.2 % (ref 42–52)
HGB BLD-MCNC: 14.5 G/DL (ref 14–18)
MAGNESIUM SERPL-MCNC: 1.9 MG/DL (ref 1.5–2.5)
MCH RBC QN AUTO: 30.9 PG (ref 27–33)
MCHC RBC AUTO-ENTMCNC: 33.6 G/DL (ref 33.7–35.3)
MCV RBC AUTO: 92.1 FL (ref 81.4–97.8)
PHOSPHATE SERPL-MCNC: 2.6 MG/DL (ref 2.5–4.5)
PLATELET # BLD AUTO: 244 K/UL (ref 164–446)
PMV BLD AUTO: 9.9 FL (ref 9–12.9)
POTASSIUM SERPL-SCNC: 3.8 MMOL/L (ref 3.6–5.5)
PROT SERPL-MCNC: 5.6 G/DL (ref 6–8.2)
RBC # BLD AUTO: 4.69 M/UL (ref 4.7–6.1)
SODIUM SERPL-SCNC: 135 MMOL/L (ref 135–145)
WBC # BLD AUTO: 12.6 K/UL (ref 4.8–10.8)

## 2023-05-21 PROCEDURE — A9270 NON-COVERED ITEM OR SERVICE: HCPCS | Performed by: INTERNAL MEDICINE

## 2023-05-21 PROCEDURE — 700102 HCHG RX REV CODE 250 W/ 637 OVERRIDE(OP): Performed by: INTERNAL MEDICINE

## 2023-05-21 PROCEDURE — 93010 ELECTROCARDIOGRAM REPORT: CPT | Performed by: INTERNAL MEDICINE

## 2023-05-21 PROCEDURE — 80053 COMPREHEN METABOLIC PANEL: CPT

## 2023-05-21 PROCEDURE — 700111 HCHG RX REV CODE 636 W/ 250 OVERRIDE (IP): Performed by: INTERNAL MEDICINE

## 2023-05-21 PROCEDURE — 85730 THROMBOPLASTIN TIME PARTIAL: CPT

## 2023-05-21 PROCEDURE — HZ2ZZZZ DETOXIFICATION SERVICES FOR SUBSTANCE ABUSE TREATMENT: ICD-10-PCS | Performed by: INTERNAL MEDICINE

## 2023-05-21 PROCEDURE — 93005 ELECTROCARDIOGRAM TRACING: CPT | Performed by: INTERNAL MEDICINE

## 2023-05-21 PROCEDURE — 84100 ASSAY OF PHOSPHORUS: CPT

## 2023-05-21 PROCEDURE — 94760 N-INVAS EAR/PLS OXIMETRY 1: CPT

## 2023-05-21 PROCEDURE — 83735 ASSAY OF MAGNESIUM: CPT

## 2023-05-21 PROCEDURE — 85027 COMPLETE CBC AUTOMATED: CPT

## 2023-05-21 PROCEDURE — 700105 HCHG RX REV CODE 258: Performed by: INTERNAL MEDICINE

## 2023-05-21 RX ORDER — MAGNESIUM SULFATE 1 G/100ML
1 INJECTION INTRAVENOUS ONCE
Status: DISCONTINUED | OUTPATIENT
Start: 2023-05-21 | End: 2023-05-21 | Stop reason: HOSPADM

## 2023-05-21 RX ORDER — DEXTROSE MONOHYDRATE 50 MG/ML
INJECTION, SOLUTION INTRAVENOUS CONTINUOUS
Status: DISCONTINUED | OUTPATIENT
Start: 2023-05-21 | End: 2023-05-21 | Stop reason: HOSPADM

## 2023-05-21 RX ADMIN — FOLIC ACID 1 MG: 1 TABLET ORAL at 05:53

## 2023-05-21 RX ADMIN — DESVENLAFAXINE SUCCINATE 25 MG: 25 TABLET, FILM COATED, EXTENDED RELEASE ORAL at 05:55

## 2023-05-21 RX ADMIN — MULTIPLE VITAMINS W/ MINERALS TAB 1 TABLET: TAB at 05:54

## 2023-05-21 RX ADMIN — DIGOXIN 125 MCG: 0.12 TABLET ORAL at 05:53

## 2023-05-21 RX ADMIN — OMEPRAZOLE 20 MG: 20 CAPSULE, DELAYED RELEASE ORAL at 05:54

## 2023-05-21 RX ADMIN — METOPROLOL TARTRATE 50 MG: 50 TABLET, FILM COATED ORAL at 05:54

## 2023-05-21 RX ADMIN — PREDNISONE 20 MG: 20 TABLET ORAL at 05:54

## 2023-05-21 RX ADMIN — THIAMINE HYDROCHLORIDE 100 MG: 100 INJECTION, SOLUTION INTRAMUSCULAR; INTRAVENOUS at 06:23

## 2023-05-21 ASSESSMENT — PAIN DESCRIPTION - PAIN TYPE
TYPE: ACUTE PAIN

## 2023-05-21 ASSESSMENT — LIFESTYLE VARIABLES: ORIENTATION AND CLOUDING OF SENSORIUM: ORIENTED AND CAN DO SERIAL ADDITIONS

## 2023-05-21 NOTE — CARE PLAN
The patient is Watcher - Medium risk of patient condition declining or worsening    Shift Goals  Clinical Goals: Stable HR  Patient Goals: Go Home  Family Goals: BETHEL    Progress made toward(s) clinical / shift goals:    Problem: Knowledge Deficit - Standard  Goal: Patient and family/care givers will demonstrate understanding of plan of care, disease process/condition, diagnostic tests and medications  Outcome: Progressing     Problem: Pain - Standard  Goal: Alleviation of pain or a reduction in pain to the patient’s comfort goal  Outcome: Progressing     Problem: Optimal Care for Alcohol Withdrawal  Goal: Optimal Care for the alcohol withdrawal patient  Outcome: Progressing     Problem: Psychosocial  Goal: Patient's level of anxiety will decrease  Outcome: Progressing

## 2023-05-21 NOTE — PROGRESS NOTES
"Med rec completed by [employer] at [facility] on 5/19/23, prior to transfer:    \"Med Rec completed per patient and RX bottles (returned)   Allergies reviewed  No ORAL antibiotics in last 30 days     Patient states that he takes his BP meds as needed   Patient states that he STOPPED taking his Vyvanse about 3 weeks ago   Patient states that he STOPPED taking his Crestor about 1 week ago      Patient states that he started taking left over Prednisone 20 mg daily on 5/14/2023\"    "

## 2023-05-21 NOTE — PROGRESS NOTES
Tom Quan has chosen to leave the hospital against medical advice. The attending physician has not discharged the patient. The provider is aware that the patient is leaving against medical advice, and Dr Reed and Dr Robins at bedside to speak with the patient. Patient expresses understanding of the risks of leaving the hospital and benefits of admission including but not limited to, the availability and proximity of nurses, physicians, monitoring, diagnostic testing, treatment, and a safe discharge plan. The patient had the opportunity to ask questions about their medical condition and recommended treatment.  Patient is aware that they may return for care at any time. Patient escorted out by RN, awaiting ride home in [facility].

## 2023-05-21 NOTE — PROGRESS NOTES
12 hour chart check complete.    Monitor summary:    Rhythm: A-fib/A-flutter    Heart Rate:     Ectopy: frequent PVCs, bigeminal PVCs, trigeminal PVCs    Measurements: 0.--/0.08/0.--

## 2023-05-21 NOTE — ASSESSMENT & PLAN NOTE
The 10-year ASCVD risk score (Tonya ORTEZ, et al., 2019) is: 5.5%    Values used to calculate the score:      Age: 57 years      Sex: Male      Is Non- : No      Diabetic: No      Tobacco smoker: No      Systolic Blood Pressure: 124 mmHg      Is BP treated: Yes      HDL Cholesterol: 90 mg/dL      Total Cholesterol: 260 mg/dL    Trop normal, Echo without RWMA

## 2023-05-21 NOTE — DISCHARGE SUMMARY
"[facility] Discharge Summary    Attending: Dr. Reed  Senior Resident: Dr. Robins  Contact Number: [phone]    CHIEF COMPLAINT ON ADMISSION  Chest pain       Reason for Admission  Atrial flutter; needs cardioversion     Admission Date  5/20/2023    CODE STATUS  Full Code    HPI & HOSPITAL COURSE  This is a 57 y.o. male who presented 5/19/2023 is admitted to ICU with A flutter/fibrillation w/ RVR. Patient with significant history of alcohol abuse who presents with palpitation which started about three days ago. He also report having epigastric pain and diarrhea. Denies fever/chills, SOB, or sick contact. In ER, he was found to have 2:1 flutter which he was given 15 mg of IV cardiazem follow by 25 mg. HR improved but went back up to the 130s. Started on diltiazem gtt and xarelto. Admitted to ICU for further management.        In ICU, he was maxed out on diltiazem gtt. Started on metoprolol 50 overnight. Developed hypotension which diltiazem gtt titrated off and and added digoxin load. HR improved to the low 100s but after working with PT he went back in 2:1 flutter. Restarted back on diltiazem gtt but is limited by hypotension. Restarted back IVF and d/w Dr. Be from cardiology who recommended transfer to [facility] for QUENTIN w/ cardioversion. D/w Dr. Bills whom graciously accepted patient to [facility] ICU for further management.   Patient was admitted overnight on diltiazem gtt. with a heart rate of 150s and a flutter, pressures holding in the 100s to 110s systolic without vasopressor support.  Patient given digoxin, metoprolol, and on diltiazem gtt., cardiology planning for QUENTIN with cardioversion, patient however does not wish to stay for procedure stating that he was told that it would not be until Monday, patient states that he \"feels the same as I did when I came in, I know I could die if I leave, but for my mental health I am not staying.\"  Patient states that the cardiologist told him that the procedure " would not happen until Monday (I was not able to verify the veracity of these claims with the cardiologist before the patient had left AMA.), patient already dressed and had pulled IVs and telemetry monitors before I had the ability to discuss with him.  Discussed at great length the high morbidity and mortality associated with uncontrolled A-flutter and chance of progression to heart failure, further arrhythmias and death, patient able to repeat back these arrhythmias.  Also discussed with patient if there would be anything we could do to make his hospital stay more pleasant so he would continue care knowing that he has significant arrhythmiaAnd associated medical risks, however patient stated again that he wished to leave knowing he is at high risk for morbidity and mortality, patient then proceeded to grab his belongings and walked out of the room into the hallway.  Patient was alert and oriented x4 and had decision-making capacity.      Therefore, he is discharged AGAINST MEDICAL ADVICE in guarded and stable condition against medcial advice.    The patient met 2-midnight criteria for an inpatient stay at the time of discharge.    Discharge Date  5/21/2023    Physical Exam on Day of Discharge  Physical Exam  Constitutional:       General: He is not in acute distress.     Appearance: He is normal weight. He is ill-appearing. He is not toxic-appearing.   HENT:      Head: Normocephalic and atraumatic.      Right Ear: External ear normal.      Left Ear: External ear normal.      Mouth/Throat:      Mouth: Mucous membranes are moist.      Pharynx: Oropharynx is clear. No oropharyngeal exudate.   Eyes:      Extraocular Movements: Extraocular movements intact.      Conjunctiva/sclera: Conjunctivae normal.      Pupils: Pupils are equal, round, and reactive to light.   Cardiovascular:      Rate and Rhythm: Tachycardia present. Rhythm irregular.      Heart sounds: No murmur heard.     No friction rub. No gallop.    Pulmonary:      Effort: Pulmonary effort is normal. No respiratory distress.      Breath sounds: Normal breath sounds. No wheezing or rales.   Abdominal:      General: Abdomen is flat. Bowel sounds are normal. There is no distension.      Palpations: Abdomen is soft. There is no mass.      Tenderness: There is no abdominal tenderness. There is no guarding.   Musculoskeletal:      Cervical back: Normal range of motion and neck supple.      Right lower leg: No edema.      Left lower leg: No edema.   Skin:     General: Skin is warm.      Capillary Refill: Capillary refill takes less than 2 seconds.      Coloration: Skin is not jaundiced.      Findings: No bruising or lesion.   Neurological:      General: No focal deficit present.      Mental Status: He is alert and oriented to person, place, and time. Mental status is at baseline.      Cranial Nerves: No cranial nerve deficit.      Sensory: No sensory deficit.      Motor: No weakness.      Coordination: Coordination normal.   Psychiatric:         Mood and Affect: Mood normal.         Thought Content: Thought content normal.         FOLLOW UP ITEMS POST DISCHARGE  -Advised patient that we are always open to readmitting and to continue his care, and to head to the emergency department for any further concerns that he may have.    DISCHARGE DIAGNOSES  Principal Problem:    Atrial flutter with rapid ventricular response (HCC) (POA: Yes)  Active Problems:    Ulcerative colitis (HCC) (POA: Yes)      Overview: Formatting of this note might be different from the original.      Was previously on infliximab, steroids and low dose azathioprine. He       reports he has been off all these medications for months now. Controlling       his ulcerative colitis with ketogenic diet. Last flare was last summer per       patient.    Familial hypercholesteremia (POA: Yes)    Pain in the chest (POA: Yes)    Alcohol withdrawal syndrome without complication (HCC) (POA: Yes)  Resolved  Problems:    * No resolved hospital problems. *      FOLLOW UP  Future Appointments   Date Time Provider Department Center   6/1/2023 11:00 AM Arvind Geiger M.D. [facility]     No follow-up provider specified.    MEDICATIONS ON DISCHARGE     Medication List        ASK your doctor about these medications        Instructions   Desvenlafaxine Succinate ER 25 MG Tb24   Take 1 Tablet by mouth every day.  Dose: 1 Tablet     FISH OIL PO   Take 2 Capsules by mouth every 7 days.  Dose: 2 Capsule     folic acid 1 MG Tabs  Commonly known as: FOLVITE   Take 1 Tablet by mouth every day.  Dose: 1 mg     losartan 25 MG Tabs  Commonly known as: COZAAR   Take 25 mg by mouth 1 time a day as needed. Indications: High Blood Pressure Disorder  Dose: 25 mg     multivitamin Tabs   Take 2 Tablets by mouth every day.  Dose: 2 Tablet     omeprazole 20 MG delayed-release capsule  Commonly known as: PRILOSEC   Take 20-40 mg by mouth 1 time a day as needed (Heartburn).  Dose: 20-40 mg     ondansetron 4 MG Tbdp  Commonly known as: ZOFRAN ODT   Take 1 Tablet by mouth every four hours as needed for Nausea/Vomiting (give PO if no IV route available).  Dose: 4 mg     predniSONE 20 MG Tabs  Commonly known as: DELTASONE   Take 20 mg by mouth every day.  Dose: 20 mg     propranolol 10 MG Tabs  Commonly known as: INDERAL   Take 10 mg by mouth 3 times a day as needed. Indications: High Blood Pressure Disorder  Dose: 10 mg     rizatriptan 5 MG tablet  Commonly known as: MAXALT   Take 1-2 Tablets by mouth one time as needed for Migraine for up to 1 dose.  Dose: 5-10 mg     rosuvastatin 20 MG Tabs  Commonly known as: CRESTOR   Take 1 Tablet by mouth every evening.  Dose: 20 mg     STELARA SC   Inject 1 Dose under the skin every 30 (thirty) days.  Dose: 1 Dose     testosterone cypionate 200 MG/ML Soln injection  Commonly known as: DEPO-TESTOSTERONE   Inject 200 mg into the shoulder, thigh, or buttocks every 7 days.  Dose: 200 mg     VITAMIN  B COMPLEX PO   Take 1 Tablet by mouth every day.  Dose: 1 Tablet     Vitamin B-1 250 MG Tabs   Take 250 mg by mouth every day.  Dose: 250 mg     vitamin D 2000 UNIT Tabs   Take 4,000 Units by mouth every day.  Dose: 4,000 Units     Vyvanse 50 MG capsule  Generic drug: lisdexamfetamine  Ask about: Should I take this medication?   Take 1 Capsule by mouth every morning for 30 days.  Dose: 50 mg              Allergies  Allergies   Allergen Reactions    Thimerosal Unspecified     Eye medication reaction  Eye medication reaction         DIET  Orders Placed This Encounter   Procedures    Diet Order Diet: Cardiac     Standing Status:   Standing     Number of Occurrences:   1     Order Specific Question:   Diet:     Answer:   Cardiac [6]    Diet NPO Restrict to: Sips with Medications     Standing Status:   Standing     Number of Occurrences:   8     Order Specific Question:   Diet NPO Restrict to:     Answer:   Sips with Medications [3]       ACTIVITY  Light duty.  Weight bearing as tolerated    CONSULTATIONS  Cardiology/electrophysiology    PROCEDURES  None    LABORATORY  Lab Results   Component Value Date    SODIUM 135 05/21/2023    POTASSIUM 3.8 05/21/2023    CHLORIDE 101 05/21/2023    CO2 24 05/21/2023    GLUCOSE 85 05/21/2023    BUN 7 (L) 05/21/2023    CREATININE 0.94 05/21/2023        Lab Results   Component Value Date    WBC 12.6 (H) 05/21/2023    HEMOGLOBIN 14.5 05/21/2023    HEMATOCRIT 43.2 05/21/2023    PLATELETCT 244 05/21/2023        Total time of the discharge process exceeds 55 minutes minutes.

## 2023-05-21 NOTE — PROGRESS NOTES
Monitor Summary    Patient consistently in Atrial Flutter 140's-150's    (No new strip image to upload)

## 2023-05-21 NOTE — PROGRESS NOTES
2 RN Skin Assessment Completed by TERESITA Chi and TERESITA Arreguin.    Head: WDL  Ears: WDL  Nose: WDL  Mouth: WDL  Neck: WDL  Breasts/Chest: WDL scar left shoulder  Shoulder Blades: WDL  Spine: WDL  (R) Arm/Elbow/hand: WDL  (L) Arm/Elbow/hand: WDL  Abdomen:WDL  Groin: WDL  Sacrum/Coccyx/Buttocks: WDL  (R) Leg: scar hip  (L) Leg: scar hip  (R) Heel/Foot/Toe: WDL  (L) Heel/Foot/Toe: WDL          Devices in place: ECG, BP Cuff, and Pulse Ox    Interventions in place: Pillows, Q2 turns, Low air loss mattress , Heels floated with pillows, and Pressure redistribution mattress    Possible skin injury found: No    Pictures uploaded into Epic: N/A  Wound Consult Placed: N/A

## 2023-05-21 NOTE — CONSULTS
Electrophysiology Initial Consult Note    DOS: 5/20/2023    Referring physician: Dr Bills    Chief complaint/Reason for consult: AFL    HPI: 56 y/o M with new onset atrial flutter with RVR. Seen by cardiology consult service at [facility]. I was called to request cardioversion consultation. Pt with rapid ventricular rates. Transferred to [facility].    ROS (+ highlighted in bold):  Constitutional: Fevers/chills/fatigue/weightloss  HEENT: Blurry vision/eye pain/sore throat/hearing loss  Respiratory: Shortness of breath/cough  Cardiovascular: Chest pain/palpitations/edema/orthopnea/syncope  GI: Nausea/vomitting/diarrhea  MSK: Arthralgias/myagias/muscle weakness  Skin: Rash/sores  Neurological: Numbness/tremors/vertigo  Endocrine: Excessive thirst/polyuria/cold intolerance/heat intolerance  Psych: Depression/anxiety    Past Medical History:   Diagnosis Date    Depression     Hyperlipidemia        Past Surgical History:   Procedure Laterality Date    HIP REPLACEMENT, PARTIAL         Social History     Socioeconomic History    Marital status:      Spouse name: Not on file    Number of children: Not on file    Years of education: Not on file    Highest education level: Not on file   Occupational History    Not on file   Tobacco Use    Smoking status: Never    Smokeless tobacco: Never   Vaping Use    Vaping Use: Former   Substance and Sexual Activity    Alcohol use: Yes     Comment: Occasionally    Drug use: Never    Sexual activity: Not on file   Other Topics Concern    Not on file   Social History Narrative    Not on file     Social Determinants of Health     Financial Resource Strain: Not on file   Food Insecurity: Not on file   Transportation Needs: Not on file   Physical Activity: Not on file   Stress: Not on file   Social Connections: Not on file   Intimate Partner Violence: Not on file   Housing Stability: Not on file       Family History   Problem Relation Age of Onset    Diabetes Mother     Other Mother          smoker+, unclear cause of death    Hyperlipidemia Sister     Hyperlipidemia Brother     Cancer Paternal Uncle         Prostate    Heart Attack Maternal Grandfather          55, tobacco+    Other Son         takayasu's arteritis    Abdominal aortic aneurysm Neg Hx     Arterial Aneurysm Neg Hx        Allergies   Allergen Reactions    Thimerosal Unspecified     Eye medication reaction  Eye medication reaction         Current Facility-Administered Medications   Medication Dose Route Frequency Provider Last Rate Last Admin    senna-docusate (PERICOLACE or SENOKOT S) 8.6-50 MG per tablet 2 Tablet  2 Tablet Oral BID Jaz Bills M.D.        And    polyethylene glycol/lytes (MIRALAX) PACKET 1 Packet  1 Packet Oral QDAY PRN Jaz Bills M.D.        And    magnesium hydroxide (MILK OF MAGNESIA) suspension 30 mL  30 mL Oral QDAY PRN Jaz Bills M.D.        And    bisacodyl (DULCOLAX) suppository 10 mg  10 mg Rectal QDAY PRN Jaz Bills M.D.        dilTIAZem (Cardizem) 100 mg in dextrose 5% 100 mL Infusion  0-20 mg/hr Intravenous Continuous Jaz Bills M.D. 5 mL/hr at 23 1729 5 mg/hr at 23 1729    digoxin (Lanoxin) injection 250 mcg  250 mcg Intravenous Once Jaz Bills M.D.        rivaroxaban (XARELTO) tablet 20 mg  20 mg Oral PM MEAL Jaz Bills M.D.        rosuvastatin (CRESTOR) tablet 40 mg  40 mg Oral Q EVENING Jaz Bills M.D.        metoprolol tartrate (LOPRESSOR) tablet 50 mg  50 mg Oral TWICE DAILY Jaz Bills M.D.        lactated ringers infusion   Intravenous Continuous Jaz Bills M.D.        acetaminophen (Tylenol) tablet 650 mg  650 mg Oral Q6HRS PRN Doug Hernández M.D.        [START ON 2023] Desvenlafaxine Succinate ER TB24 25 mg  25 mg Oral DAILY Doug Hernández M.D.        [START ON 2023] digoxin (LANOXIN) tablet 125 mcg  125 mcg Oral DAILY AT 1800 Doug Hernández M.D.        [START ON 2023] folic acid (FOLVITE) tablet 1 mg  1 mg  Oral DAILY Doug Hernández M.D.        HYDROmorphone (Dilaudid) injection 0.5 mg  0.5 mg Intravenous Q3HRS PRN Doug Hernández M.D.        LORazepam (ATIVAN) injection 1-2 mg  1-2 mg Intravenous Q2HRS PRN Doug Hernández M.D.        [START ON 5/21/2023] omeprazole (PRILOSEC) capsule 20 mg  20 mg Oral DAILY Doug Hernández M.D.        morphine 4 MG/ML injection 2 mg  2 mg Intravenous Q3HRS PRN Doug Hernández M.D.        omeprazole (PRILOSEC) capsule 20-40 mg  20-40 mg Oral QDAY PRN Doug Hernández M.D.        ondansetron (ZOFRAN ODT) dispertab 4 mg  4 mg Oral Q4HRS PRN Doug Hernández M.D.        ondansetron (ZOFRAN) syringe/vial injection 4 mg  4 mg Intravenous Q4HRS PRN Doug Hernández M.D.        [START ON 5/21/2023] predniSONE (DELTASONE) tablet 20 mg  20 mg Oral DAILY Doug Hernández M.D.        prochlorperazine (COMPAZINE) injection 5-10 mg  5-10 mg Intravenous Q4HRS PRN Doug Hernández M.D.        promethazine (PHENERGAN) suppository 12.5-25 mg  12.5-25 mg Rectal Q4HRS PRN Doug Hernández M.D.        senna-docusate (PERICOLACE or SENOKOT S) 8.6-50 MG per tablet 2 Tablet  2 Tablet Oral BID Doug Hernández M.D.        And    polyethylene glycol/lytes (MIRALAX) PACKET 1 Packet  1 Packet Oral QDAY PRN Doug Hernández M.D.        And    magnesium hydroxide (MILK OF MAGNESIA) suspension 30 mL  30 mL Oral QDAY PRN Doug Hernández M.D.        And    bisacodyl (DULCOLAX) suppository 10 mg  10 mg Rectal QDAY PRN Doug Hernández M.D.        promethazine (PHENERGAN) tablet 12.5-25 mg  12.5-25 mg Oral Q4HRS PRN Doug Hernández M.D.        [START ON 5/21/2023] therapeutic multivitamin-minerals (THERAGRAN-M) tablet 1 Tablet  1 Tablet Oral DAILY Doug Hernández M.D.        [START ON 5/21/2023] thiamine (B-1) 100 mg in dextrose 5% 100 mL IVPB  100 mg Intravenous DAILY Doug Hernández M.D.           Physical Exam:  Vitals:    05/20/23 1729 05/20/23 1730   BP: 130/85 (!) 125/95   Pulse: (!) 150 (!) 154   Resp:  17   Temp:  36.1 °C (97 °F)   TempSrc:  Temporal   SpO2:  96%   Weight:  101 kg (222 lb  "7.1 oz)   Height:  1.854 m (6' 1\")     General appearance: NAD, conversant   Eyes: anicteric sclerae, moist conjunctivae; no lid-lag; PERRLA  HENT: Atraumatic; oropharynx clear with moist mucous membranes and no mucosal ulcerations; normal hard and soft palate  Neck: Trachea midline; FROM, supple, no thyromegaly or lymphadenopathy  Lungs: CTA, with normal respiratory effort and no intercostal retractions  CV: Rapid tachycardia, no MRGs, no JVD   Abdomen: Soft, non-tender; no masses or HSM  Extremities: No peripheral edema or extremity lymphadenopathy  Skin: Normal temperature, turgor and texture; no rash, ulcers or subcutaneous nodules  Psych: Appropriate affect, alert and oriented to person, place and time    Data:  Lipids:   Lab Results   Component Value Date/Time    CHOLSTRLTOT 260 (H) 05/20/2023 03:27 AM    TRIGLYCERIDE 113 05/20/2023 03:27 AM    HDL 90 05/20/2023 03:27 AM     (H) 05/20/2023 03:27 AM        BMP:  Lab Results   Component Value Date/Time    SODIUM 134 (L) 05/20/2023 1400    POTASSIUM 4.6 05/20/2023 1400    CHLORIDE 100 05/20/2023 1400    CO2 22 05/20/2023 1400    GLUCOSE 123 (H) 05/20/2023 1400    BUN 8 05/20/2023 1400    CREATININE 0.90 05/20/2023 1400    CALCIUM 8.5 05/20/2023 1400    ANION 10.0 05/20/2023 0327        TSH:   Lab Results   Component Value Date/Time    TSHULTRASEN 0.995 05/19/2023 1112    TSHULTRASEN 0.984 05/19/2023 1112        THYROXINE (T4):   No results found for: FREEDIR     CBC:   Lab Results   Component Value Date/Time    WBC 15.1 (H) 05/20/2023 02:53 AM    RBC 5.11 05/20/2023 02:53 AM    HEMOGLOBIN 16.0 05/20/2023 02:53 AM    HEMATOCRIT 47.4 05/20/2023 02:53 AM    MCV 92.8 05/20/2023 02:53 AM    MCH 31.3 05/20/2023 02:53 AM    MCHC 33.8 05/20/2023 02:53 AM    RDW 58.2 (H) 05/20/2023 02:53 AM    PLATELETCT 268 05/20/2023 02:53 AM    MPV 9.8 05/20/2023 02:53 AM    NEUTSPOLYS 68.40 05/19/2023 11:12 AM    LYMPHOCYTES 20.10 (L) 05/19/2023 11:12 AM    MONOCYTES 9.60 " 05/19/2023 11:12 AM    EOSINOPHILS 0.50 05/19/2023 11:12 AM    BASOPHILS 1.10 05/19/2023 11:12 AM    IMMGRAN 0.30 05/19/2023 11:12 AM    IMMGRAN 0 12/14/2022 07:31 AM    NRBC 0.00 05/19/2023 11:12 AM    NEUTS 5.07 05/19/2023 11:12 AM    NEUTS 4.6 12/14/2022 07:31 AM    LYMPHS 1.49 05/19/2023 11:12 AM    LYMPHS 1.3 12/14/2022 07:31 AM    MONOS 0.71 05/19/2023 11:12 AM    MONOS 0.8 12/14/2022 07:31 AM    EOS 0.04 05/19/2023 11:12 AM    EOS 0.2 12/14/2022 07:31 AM    BASO 0.08 05/19/2023 11:12 AM    BASO 0.1 12/14/2022 07:31 AM    IMMGRANAB 0.02 05/19/2023 11:12 AM    IMMGRANAB 0.0 12/14/2022 07:31 AM    NRBCAB 0.00 05/19/2023 11:12 AM        CBC w/o DIFF  Lab Results   Component Value Date/Time    WBC 15.1 (H) 05/20/2023 02:53 AM    RBC 5.11 05/20/2023 02:53 AM    HEMOGLOBIN 16.0 05/20/2023 02:53 AM    MCV 92.8 05/20/2023 02:53 AM    MCH 31.3 05/20/2023 02:53 AM    MCHC 33.8 05/20/2023 02:53 AM    RDW 58.2 (H) 05/20/2023 02:53 AM    MPV 9.8 05/20/2023 02:53 AM       Prior echo/stress results reviewed: EF 55%    EKG interpreted by me: Typical AFL with RVR    Impression/Plan:  Typical AFL with RVR    - Continue Xarelto  - Rate control will likely be difficult in setting of etoh w/d  - Given normal BP and normal echo, consider permissive tachycardia overnight. Page cardiology if HR >200 bpm.   - Will see tomorrow if we can perform QUENTIN/DCCV. NPO p MN.  - Could look into flutter ablation as well for long term management.    Thank you for this consult. General cardiology will resume following tomorrow.    I discussed with Dr Bills, Dr Hernández, and Dr Caceres at length. I discussed with pt and family at bedside. A total of 80 minutes of time was spent on day of encounter reviewing medical record, performing history and examination, counseling, ordering medication/test/consults, collaborating with referring service, and documentation.      Arvind Be MD  Cardiac Electrophysiology

## 2023-05-21 NOTE — PROGRESS NOTES
Patient HR consistently A-Flutter in high 140's. Discussed with Dr. Cano about clarification on Diltiazem drip to try and control HR. MAR states titrate to less than 120 and cardiology notes stating keep HR less than 200 with rate control being difficult due to ETOH w/d.     Per MD. Drip maintained at 5mg/hr

## 2023-05-21 NOTE — H&P
Critical Care H&P    Date of consult: 5/20/2023    Referring Physician  Jaz Bills M.D.    Reason for Consultation  AF with RVR    History of Presenting Illness  57 y.o. male with a pmhx of UC on Stellara, HLP, depression and EtOH abuse who presented 5/19/2023 to [facility] with chest pain and palpitations for 3 days. The chest pain had been present for months but became worse with the palpitations. He was found to be in Atrial flutter with a 2:1 block. He was given 2 bolus doses of diltiazem in the ED with transient improvement and was started on a gtt, Xarelto then admitted to the ICU. During his ICU stay he was maxed on diltiazem and started on PO metoprolol. Unfortunately he developed hypotension due to the diltiazem gtt and was loaded with digoxin. Cardiology was contacted and recommended transfer to [facility] for QUENTIN/DCCV. He arrives in the ICU with persistent A flutter with ventricular rates in the 150s on 5 of diltiazem, he has been evaluated by Dr Be and the procedure is planned for the AM.    Code Status  Full Code    Review of Systems  Review of Systems   Cardiovascular:  Positive for chest pain and palpitations.   Gastrointestinal:  Positive for abdominal pain and diarrhea.   All other systems reviewed and are negative.      Past Medical History   has a past medical history of Depression, Hyperlipidemia, and Ulcerative colitis (HCC).    Surgical History   has a past surgical history that includes hip replacement, partial.    Family History  family history includes Cancer in his paternal uncle; Diabetes in his mother; Heart Attack in his maternal grandfather; Hyperlipidemia in his brother and sister; Other in his mother and son.    Social History   reports that he has never smoked. He has never used smokeless tobacco. He reports current alcohol use. He reports that he does not use drugs.    Medications  Home Medications    **Home medications have not yet been reviewed for this encounter**       Current  Facility-Administered Medications   Medication Dose Route Frequency Provider Last Rate Last Admin    senna-docusate (PERICOLACE or SENOKOT S) 8.6-50 MG per tablet 2 Tablet  2 Tablet Oral BID Jaz Bills M.D.        And    polyethylene glycol/lytes (MIRALAX) PACKET 1 Packet  1 Packet Oral QDAY PRN Jaz Bills M.D.        And    magnesium hydroxide (MILK OF MAGNESIA) suspension 30 mL  30 mL Oral QDAY PRN Jaz Bills M.D.        And    bisacodyl (DULCOLAX) suppository 10 mg  10 mg Rectal QDAY PRN Jaz Bills M.D.        dilTIAZem (Cardizem) 100 mg in dextrose 5% 100 mL Infusion  0-20 mg/hr Intravenous Continuous Jaz Bills M.D. 5 mL/hr at 05/20/23 1729 5 mg/hr at 05/20/23 1729    digoxin (Lanoxin) injection 250 mcg  250 mcg Intravenous Once Jaz Bills M.D.        rivaroxaban (XARELTO) tablet 20 mg  20 mg Oral PM MEAL Jaz Bills M.D.        rosuvastatin (CRESTOR) tablet 40 mg  40 mg Oral Q EVENING Jaz Bills M.D.        metoprolol tartrate (LOPRESSOR) tablet 50 mg  50 mg Oral TWICE DAILY Jaz Bills M.D.        lactated ringers infusion   Intravenous Continuous Jaz Bills M.D.        acetaminophen (Tylenol) tablet 650 mg  650 mg Oral Q6HRS PRN Doug Hernández M.D.        [START ON 5/21/2023] Desvenlafaxine Succinate ER TB24 25 mg  25 mg Oral DAILY Doug Hernández M.D.        [START ON 5/21/2023] digoxin (LANOXIN) tablet 125 mcg  125 mcg Oral DAILY AT 1800 Doug Hernández M.D.        [START ON 5/21/2023] folic acid (FOLVITE) tablet 1 mg  1 mg Oral DAILY Doug Hernández M.D.        HYDROmorphone (Dilaudid) injection 0.5 mg  0.5 mg Intravenous Q3HRS PRN Doug Hernández M.D.        LORazepam (ATIVAN) injection 1-2 mg  1-2 mg Intravenous Q2HRS PRN Doug Hernández M.D.        [START ON 5/21/2023] omeprazole (PRILOSEC) capsule 20 mg  20 mg Oral DAILY Doug Hernández M.D.        morphine 4 MG/ML injection 2 mg  2 mg Intravenous Q3HRS PRN Doug Hernández M.D.        omeprazole (PRILOSEC) capsule  20-40 mg  20-40 mg Oral QDAY PRN Doug Hernández M.D.        ondansetron (ZOFRAN ODT) dispertab 4 mg  4 mg Oral Q4HRS PRN Doug Hernández M.D.        ondansetron (ZOFRAN) syringe/vial injection 4 mg  4 mg Intravenous Q4HRS PRN Doug Hernández M.D.        [START ON 5/21/2023] predniSONE (DELTASONE) tablet 20 mg  20 mg Oral DAILY Doug Hernández M.D.        prochlorperazine (COMPAZINE) injection 5-10 mg  5-10 mg Intravenous Q4HRS PRN Doug Hernández M.D.        promethazine (PHENERGAN) suppository 12.5-25 mg  12.5-25 mg Rectal Q4HRS PRN Doug Hernández M.D.        senna-docusate (PERICOLACE or SENOKOT S) 8.6-50 MG per tablet 2 Tablet  2 Tablet Oral BID Doug Hernández M.D.        And    polyethylene glycol/lytes (MIRALAX) PACKET 1 Packet  1 Packet Oral QDAY PRN Doug Hernández M.D.        And    magnesium hydroxide (MILK OF MAGNESIA) suspension 30 mL  30 mL Oral QDAY PRN Doug Hernández M.D.        And    bisacodyl (DULCOLAX) suppository 10 mg  10 mg Rectal QDAY PRN Doug Hernández M.D.        promethazine (PHENERGAN) tablet 12.5-25 mg  12.5-25 mg Oral Q4HRS PRN Doug Hernández M.D.        [START ON 5/21/2023] therapeutic multivitamin-minerals (THERAGRAN-M) tablet 1 Tablet  1 Tablet Oral DAILY Doug Hernández M.D.        [START ON 5/21/2023] thiamine (B-1) 100 mg in dextrose 5% 100 mL IVPB  100 mg Intravenous DAILY Doug Hernández M.D.           Allergies  Allergies   Allergen Reactions    Thimerosal Unspecified     Eye medication reaction  Eye medication reaction         Vital Signs last 24 hours  Temp:  [36.1 °C (97 °F)] 36.1 °C (97 °F)  Pulse:  [150-154] 151  Resp:  [17-21] 21  BP: (119-130)/() 119/101  SpO2:  [96 %-97 %] 97 %    Physical Exam  Physical Exam  Vitals and nursing note reviewed.   Constitutional:       General: He is not in acute distress.     Appearance: Normal appearance. He is well-developed and normal weight. He is not ill-appearing.   HENT:      Head: Normocephalic and atraumatic.      Right Ear: External ear normal.      Left Ear: External  ear normal.      Nose: Nose normal.      Mouth/Throat:      Mouth: Mucous membranes are moist.   Eyes:      Extraocular Movements: Extraocular movements intact.      Conjunctiva/sclera: Conjunctivae normal.   Neck:      Vascular: No JVD.      Trachea: No tracheal deviation.   Cardiovascular:      Rate and Rhythm: Regular rhythm. Tachycardia present.      Pulses: Normal pulses.   Pulmonary:      Effort: Pulmonary effort is normal.      Breath sounds: Normal breath sounds. No rales.   Abdominal:      General: Bowel sounds are normal. There is no distension.      Palpations: Abdomen is soft.   Musculoskeletal:         General: No tenderness.      Cervical back: Neck supple.      Right lower leg: No edema.      Left lower leg: No edema.   Skin:     General: Skin is warm and dry.      Capillary Refill: Capillary refill takes less than 2 seconds.      Findings: No rash.   Neurological:      General: No focal deficit present.      Mental Status: He is alert.      GCS: GCS eye subscore is 4. GCS verbal subscore is 5. GCS motor subscore is 6.      Cranial Nerves: No cranial nerve deficit.      Sensory: No sensory deficit.      Motor: Tremor present. No weakness.   Psychiatric:         Mood and Affect: Mood is anxious. Affect is blunt.         Behavior: Behavior normal.         Cognition and Memory: Cognition and memory normal.         Fluids  No intake or output data in the 24 hours ending 23 1758    Laboratory  Recent Results (from the past 48 hour(s))   EKG    Collection Time: 23 10:49 AM   Result Value Ref Range    Report       [facility] Emergency Dept.    Test Date:  2023  Pt Name:    SALVATORE WITT                  Department: [facility]  MRN:        [id number]                      [facility]  Gender:     Male                         Technician: LEYDA  :        1965                   Requested By:KENROY SCHUMACHER  Order #:    [id number]                    Reading MD: KENROY  SHERRY SCHUMACHER. [facility]    Measurements  Intervals                                Axis  Rate:       155                          P:          104  NV:         77                           QRS:        11  QRSD:       143                          T:          -78  QT:         338  QTc:        543    Interpretive Statements  Sinus tachycardia  Nonspecific intraventricular conduction delay  Abnormal T, consider ischemia, inferior leads  Compared to ECG 04/02/2023 15:37:15  Intraventricular conduction delay now present  T-wave abnormality now present  Possible ischemia now present  Electronically Signed On 5- 11 :46:32 PDT by KENROY WATKINS AMD     CBC with Differential    Collection Time: 05/19/23 11:12 AM   Result Value Ref Range    WBC 7.4 4.8 - 10.8 K/uL    RBC 5.12 4.70 - 6.10 M/uL    Hemoglobin 16.0 14.0 - 18.0 g/dL    Hematocrit 46.8 42.0 - 52.0 %    MCV 91.4 81.4 - 97.8 fL    MCH 31.3 27.0 - 33.0 pg    MCHC 34.2 33.7 - 35.3 g/dL    RDW 56.3 (H) 35.9 - 50.0 fL    Platelet Count 302 164 - 446 K/uL    MPV 9.1 9.0 - 12.9 fL    Neutrophils-Polys 68.40 44.00 - 72.00 %    Lymphocytes 20.10 (L) 22.00 - 41.00 %    Monocytes 9.60 0.00 - 13.40 %    Eosinophils 0.50 0.00 - 6.90 %    Basophils 1.10 0.00 - 1.80 %    Immature Granulocytes 0.30 0.00 - 0.90 %    Nucleated RBC 0.00 /100 WBC    Neutrophils (Absolute) 5.07 1.82 - 7.42 K/uL    Lymphs (Absolute) 1.49 1.00 - 4.80 K/uL    Monos (Absolute) 0.71 0.00 - 0.85 K/uL    Eos (Absolute) 0.04 0.00 - 0.51 K/uL    Baso (Absolute) 0.08 0.00 - 0.12 K/uL    Immature Granulocytes (abs) 0.02 0.00 - 0.11 K/uL    NRBC (Absolute) 0.00 K/uL   Complete Metabolic Panel (CMP)    Collection Time: 05/19/23 11:12 AM   Result Value Ref Range    Sodium 136 135 - 145 mmol/L    Potassium 4.0 3.6 - 5.5 mmol/L    Chloride 99 96 - 112 mmol/L    Co2 22 20 - 33 mmol/L    Anion Gap 15.0 7.0 - 16.0    Glucose 111 (H) 65 - 99 mg/dL    Bun 7 (L) 8 - 22 mg/dL    Creatinine 1.01 0.50 - 1.40 mg/dL    Calcium 9.8  8.4 - 10.2 mg/dL    AST(SGOT) 168 (H) 12 - 45 U/L    ALT(SGPT) 167 (H) 2 - 50 U/L    Alkaline Phosphatase 103 (H) 30 - 99 U/L    Total Bilirubin 0.7 0.1 - 1.5 mg/dL    Albumin 4.0 3.2 - 4.9 g/dL    Total Protein 7.1 6.0 - 8.2 g/dL    Globulin 3.1 1.9 - 3.5 g/dL    A-G Ratio 1.3 g/dL   Troponins NOW    Collection Time: 23 11:12 AM   Result Value Ref Range    Troponin T 14 6 - 19 ng/L   proBrain Natriuretic Peptide, NT    Collection Time: 23 11:12 AM   Result Value Ref Range    NT-proBNP 583 (H) 0 - 125 pg/mL   APTT    Collection Time: 23 11:12 AM   Result Value Ref Range    APTT 27.2 24.7 - 36.0 sec   PROTHROMBIN TIME (INR)    Collection Time: 23 11:12 AM   Result Value Ref Range    PT 14.6 12.0 - 14.6 sec    INR 1.16 (H) 0.87 - 1.13   TSH WITH REFLEX TO FT4    Collection Time: 23 11:12 AM   Result Value Ref Range    TSH 0.995 0.380 - 5.330 uIU/mL   LIPASE    Collection Time: 23 11:12 AM   Result Value Ref Range    Lipase 115 (H) 7 - 58 U/L   CORRECTED CALCIUM    Collection Time: 23 11:12 AM   Result Value Ref Range    Correct Calcium 9.8 8.5 - 10.5 mg/dL   ESTIMATED GFR    Collection Time: 23 11:12 AM   Result Value Ref Range    GFR (CKD-EPI) 87 >60 mL/min/1.73 m 2   TSH    Collection Time: 23 11:12 AM   Result Value Ref Range    TSH 0.984 0.380 - 5.330 uIU/mL   Free Thyroxine (T4)    Collection Time: 23 11:12 AM   Result Value Ref Range    Free T-4 1.00 0.93 - 1.70 ng/dL   EKG (NOW)    Collection Time: 23  1:04 PM   Result Value Ref Range    Report       [facility] Emergency Dept.    Test Date:  2023  Pt Name:    SALVATORE WITT                  Department: [facility]  MRN:        [id number]                      Room:       [facility]  Gender:     Male                         Technician: [id number]  :        1965                   Requested By:KENROY SCHUMACHER  Order #:    [id number]                    Johnson MD: KENROY POWELL  JUNE RUCKER    Measurements  Intervals                                Axis  Rate:       150                          P:          86  TX:         83                           QRS:        25  QRSD:       146                          T:          -58  QT:         364  QTc:        576    Interpretive Statements  Sinus tachycardia  Nonspecific intraventricular conduction delay  Abnormal T, consider ischemia, inferior leads  Compared to ECG 05/19/2023 10:49:02  No significant changes  Electronically Signed On 5- 17:19:19 PDT by KENROY WATKINS AMD     proBrain Natriuretic Peptide, NT    Collection Time: 05/19/23  1:25 PM   Result Value Ref Range    NT-proBNP 601 (H) 0 - 125 pg/mL   TROPONIN    Collection Time: 05/19/23  1:25 PM   Result Value Ref Range    Troponin T 13 6 - 19 ng/L   MAGNESIUM    Collection Time: 05/19/23  1:25 PM   Result Value Ref Range    Magnesium 1.6 1.5 - 2.5 mg/dL   CBC without Differential    Collection Time: 05/20/23  2:53 AM   Result Value Ref Range    WBC 15.1 (H) 4.8 - 10.8 K/uL    RBC 5.11 4.70 - 6.10 M/uL    Hemoglobin 16.0 14.0 - 18.0 g/dL    Hematocrit 47.4 42.0 - 52.0 %    MCV 92.8 81.4 - 97.8 fL    MCH 31.3 27.0 - 33.0 pg    MCHC 33.8 33.7 - 35.3 g/dL    RDW 58.2 (H) 35.9 - 50.0 fL    Platelet Count 268 164 - 446 K/uL    MPV 9.8 9.0 - 12.9 fL   Basic Metabolic Panel    Collection Time: 05/20/23  3:27 AM   Result Value Ref Range    Sodium 133 (L) 135 - 145 mmol/L    Potassium 4.4 3.6 - 5.5 mmol/L    Chloride 100 96 - 112 mmol/L    Co2 23 20 - 33 mmol/L    Glucose 144 (H) 65 - 99 mg/dL    Bun 8 8 - 22 mg/dL    Creatinine 0.94 0.50 - 1.40 mg/dL    Calcium 8.1 (L) 8.4 - 10.2 mg/dL    Anion Gap 10.0 7.0 - 16.0   MAGNESIUM    Collection Time: 05/20/23  3:27 AM   Result Value Ref Range    Magnesium 1.3 (L) 1.5 - 2.5 mg/dL   ESTIMATED GFR    Collection Time: 05/20/23  3:27 AM   Result Value Ref Range    GFR (CKD-EPI) 95 >60 mL/min/1.73 m 2   Lipid Profile    Collection Time: 05/20/23   3:27 AM   Result Value Ref Range    Cholesterol,Tot 260 (H) 100 - 199 mg/dL    Triglycerides 113 0 - 149 mg/dL    HDL 90 >=40 mg/dL     (H) <100 mg/dL   EC-ECHOCARDIOGRAM COMPLETE W/O CONT    Collection Time: 05/20/23 10:26 AM   Result Value Ref Range    Eject.Frac. MOD 4C 58.24     Left Ventrical Ejection Fraction 55    Renal Function Panel    Collection Time: 05/20/23  2:00 PM   Result Value Ref Range    Sodium 134 (L) 135 - 145 mmol/L    Potassium 4.6 3.6 - 5.5 mmol/L    Chloride 100 96 - 112 mmol/L    Co2 22 20 - 33 mmol/L    Glucose 123 (H) 65 - 99 mg/dL    Creatinine 0.90 0.50 - 1.40 mg/dL    Bun 8 8 - 22 mg/dL    Calcium 8.5 8.4 - 10.2 mg/dL    Phosphorus 2.8 2.5 - 4.5 mg/dL    Albumin 3.3 3.2 - 4.9 g/dL   MAGNESIUM    Collection Time: 05/20/23  2:00 PM   Result Value Ref Range    Magnesium 2.1 1.5 - 2.5 mg/dL   CORRECTED CALCIUM    Collection Time: 05/20/23  2:00 PM   Result Value Ref Range    Correct Calcium 9.1 8.5 - 10.5 mg/dL   ESTIMATED GFR    Collection Time: 05/20/23  2:00 PM   Result Value Ref Range    GFR (CKD-EPI) 100 >60 mL/min/1.73 m 2   URINE DRUG SCREEN    Collection Time: 05/20/23  3:51 PM   Result Value Ref Range    Amphetamines Urine Negative Negative    Barbiturates Negative Negative    Benzodiazepines Positive (A) Negative    Cocaine Metabolite Negative Negative    Methadone Negative Negative    Opiates Negative Negative    Oxycodone Positive (A) Negative    Phencyclidine -Pcp Negative Negative    Propoxyphene Negative Negative    Cannabinoid Metab Positive (A) Negative       Imaging  No orders to display       Assessment/Plan  * Atrial flutter with rapid ventricular response (HCC)- (present on admission)  Assessment & Plan  Admitted to the ICU  Plan for QUENTIN with DCCV in AM  NPO after MN  TSH and Trop normal  Echo with 55% LVEF  Optimize electrolytes  Continue diltiazem, unlikely to control HR overnight, will tolerate ventricular rates <200 as long as BP remains preserved    Alcohol  withdrawal syndrome without complication (HCC)- (present on admission)  Assessment & Plan  CIWA protocol  Vitamin replacement  Seizure/fall precautions    Pain in the chest- (present on admission)  Assessment & Plan  The 10-year ASCVD risk score (Tonya ORTEZ, et al., 2019) is: 5.5%    Values used to calculate the score:      Age: 57 years      Sex: Male      Is Non- : No      Diabetic: No      Tobacco smoker: No      Systolic Blood Pressure: 124 mmHg      Is BP treated: Yes      HDL Cholesterol: 90 mg/dL      Total Cholesterol: 260 mg/dL    Trop normal, Echo without RWMA    Familial hypercholesteremia- (present on admission)  Assessment & Plan  Continue crestor    Ulcerative colitis (HCC)- (present on admission)  Assessment & Plan  On [place]ara as an outpatient  Follows with Dr Bowles        Discussed patient condition and risk of morbidity and/or mortality with Family, RN, RT, Pharmacy, Patient, and cardiology.      The patient remains critically ill.  Critical care time = 48 minutes in directly providing and coordinating critical care and extensive data review.  No time overlap and excludes procedures.

## 2023-05-22 ENCOUNTER — APPOINTMENT (OUTPATIENT)
Dept: RADIOLOGY | Facility: MEDICAL CENTER | Age: 58
DRG: 309 | End: 2023-05-22
Attending: EMERGENCY MEDICINE
Payer: COMMERCIAL

## 2023-05-22 ENCOUNTER — HOSPITAL ENCOUNTER (INPATIENT)
Facility: MEDICAL CENTER | Age: 58
LOS: 1 days | DRG: 309 | End: 2023-05-23
Attending: EMERGENCY MEDICINE | Admitting: INTERNAL MEDICINE
Payer: COMMERCIAL

## 2023-05-22 ENCOUNTER — TELEPHONE (OUTPATIENT)
Dept: CARDIOLOGY | Facility: MEDICAL CENTER | Age: 58
End: 2023-05-22
Payer: COMMERCIAL

## 2023-05-22 DIAGNOSIS — I48.92 ATRIAL FLUTTER WITH RAPID VENTRICULAR RESPONSE (HCC): ICD-10-CM

## 2023-05-22 DIAGNOSIS — I48.92 ATRIAL FLUTTER, UNSPECIFIED TYPE (HCC): ICD-10-CM

## 2023-05-22 DIAGNOSIS — R07.9 CHEST PAIN, UNSPECIFIED TYPE: ICD-10-CM

## 2023-05-22 DIAGNOSIS — R79.89 ELEVATED BRAIN NATRIURETIC PEPTIDE (BNP) LEVEL: ICD-10-CM

## 2023-05-22 LAB
ALBUMIN SERPL BCP-MCNC: 3.7 G/DL (ref 3.2–4.9)
ALBUMIN/GLOB SERPL: 1 G/DL
ALP SERPL-CCNC: 113 U/L (ref 30–99)
ALT SERPL-CCNC: 87 U/L (ref 2–50)
ANION GAP SERPL CALC-SCNC: 13 MMOL/L (ref 7–16)
APTT PPP: 30.8 SEC (ref 24.7–36)
AST SERPL-CCNC: 50 U/L (ref 12–45)
BASOPHILS # BLD AUTO: 0.5 % (ref 0–1.8)
BASOPHILS # BLD: 0.06 K/UL (ref 0–0.12)
BILIRUB SERPL-MCNC: 0.6 MG/DL (ref 0.1–1.5)
BUN SERPL-MCNC: 11 MG/DL (ref 8–22)
CALCIUM ALBUM COR SERPL-MCNC: 9.2 MG/DL (ref 8.5–10.5)
CALCIUM SERPL-MCNC: 9 MG/DL (ref 8.5–10.5)
CHLORIDE SERPL-SCNC: 99 MMOL/L (ref 96–112)
CO2 SERPL-SCNC: 23 MMOL/L (ref 20–33)
CREAT SERPL-MCNC: 1.09 MG/DL (ref 0.5–1.4)
EKG IMPRESSION: NORMAL
EKG IMPRESSION: NORMAL
EOSINOPHIL # BLD AUTO: 0.16 K/UL (ref 0–0.51)
EOSINOPHIL NFR BLD: 1.3 % (ref 0–6.9)
ERYTHROCYTE [DISTWIDTH] IN BLOOD BY AUTOMATED COUNT: 56.6 FL (ref 35.9–50)
GFR SERPLBLD CREATININE-BSD FMLA CKD-EPI: 79 ML/MIN/1.73 M 2
GLOBULIN SER CALC-MCNC: 3.6 G/DL (ref 1.9–3.5)
GLUCOSE SERPL-MCNC: 97 MG/DL (ref 65–99)
HCT VFR BLD AUTO: 47.2 % (ref 42–52)
HGB BLD-MCNC: 16.3 G/DL (ref 14–18)
IMM GRANULOCYTES # BLD AUTO: 0.04 K/UL (ref 0–0.11)
IMM GRANULOCYTES NFR BLD AUTO: 0.3 % (ref 0–0.9)
INR PPP: 1.19 (ref 0.87–1.13)
LYMPHOCYTES # BLD AUTO: 1.62 K/UL (ref 1–4.8)
LYMPHOCYTES NFR BLD: 13.1 % (ref 22–41)
MAGNESIUM SERPL-MCNC: 2 MG/DL (ref 1.5–2.5)
MCH RBC QN AUTO: 31.3 PG (ref 27–33)
MCHC RBC AUTO-ENTMCNC: 34.5 G/DL (ref 32.3–36.5)
MCV RBC AUTO: 90.6 FL (ref 81.4–97.8)
MONOCYTES # BLD AUTO: 1.12 K/UL (ref 0–0.85)
MONOCYTES NFR BLD AUTO: 9 % (ref 0–13.4)
NEUTROPHILS # BLD AUTO: 9.41 K/UL (ref 1.82–7.42)
NEUTROPHILS NFR BLD: 75.8 % (ref 44–72)
NRBC # BLD AUTO: 0 K/UL
NRBC BLD-RTO: 0 /100 WBC (ref 0–0.2)
NT-PROBNP SERPL IA-MCNC: 1041 PG/ML (ref 0–125)
PLATELET # BLD AUTO: 272 K/UL (ref 164–446)
PMV BLD AUTO: 9.6 FL (ref 9–12.9)
POTASSIUM SERPL-SCNC: 3.5 MMOL/L (ref 3.6–5.5)
PROT SERPL-MCNC: 7.3 G/DL (ref 6–8.2)
PROTHROMBIN TIME: 15 SEC (ref 12–14.6)
RBC # BLD AUTO: 5.21 M/UL (ref 4.7–6.1)
SODIUM SERPL-SCNC: 135 MMOL/L (ref 135–145)
TROPONIN T SERPL-MCNC: 22 NG/L (ref 6–19)
TSH SERPL DL<=0.005 MIU/L-ACNC: 1.74 UIU/ML (ref 0.38–5.33)
WBC # BLD AUTO: 12.4 K/UL (ref 4.8–10.8)

## 2023-05-22 PROCEDURE — 93005 ELECTROCARDIOGRAM TRACING: CPT | Performed by: EMERGENCY MEDICINE

## 2023-05-22 PROCEDURE — 96375 TX/PRO/DX INJ NEW DRUG ADDON: CPT

## 2023-05-22 PROCEDURE — 80053 COMPREHEN METABOLIC PANEL: CPT

## 2023-05-22 PROCEDURE — 94760 N-INVAS EAR/PLS OXIMETRY 1: CPT

## 2023-05-22 PROCEDURE — 83605 ASSAY OF LACTIC ACID: CPT

## 2023-05-22 PROCEDURE — 83690 ASSAY OF LIPASE: CPT

## 2023-05-22 PROCEDURE — 99291 CRITICAL CARE FIRST HOUR: CPT | Mod: GC | Performed by: STUDENT IN AN ORGANIZED HEALTH CARE EDUCATION/TRAINING PROGRAM

## 2023-05-22 PROCEDURE — 83880 ASSAY OF NATRIURETIC PEPTIDE: CPT

## 2023-05-22 PROCEDURE — 71045 X-RAY EXAM CHEST 1 VIEW: CPT

## 2023-05-22 PROCEDURE — 84484 ASSAY OF TROPONIN QUANT: CPT

## 2023-05-22 PROCEDURE — 700111 HCHG RX REV CODE 636 W/ 250 OVERRIDE (IP): Performed by: EMERGENCY MEDICINE

## 2023-05-22 PROCEDURE — 85025 COMPLETE CBC W/AUTO DIFF WBC: CPT

## 2023-05-22 PROCEDURE — 700105 HCHG RX REV CODE 258: Performed by: EMERGENCY MEDICINE

## 2023-05-22 PROCEDURE — 93005 ELECTROCARDIOGRAM TRACING: CPT

## 2023-05-22 PROCEDURE — 770000 HCHG ROOM/CARE - INTERMEDIATE ICU *

## 2023-05-22 PROCEDURE — 99285 EMERGENCY DEPT VISIT HI MDM: CPT

## 2023-05-22 PROCEDURE — 36415 COLL VENOUS BLD VENIPUNCTURE: CPT

## 2023-05-22 PROCEDURE — 85730 THROMBOPLASTIN TIME PARTIAL: CPT

## 2023-05-22 PROCEDURE — 96365 THER/PROPH/DIAG IV INF INIT: CPT

## 2023-05-22 PROCEDURE — 85610 PROTHROMBIN TIME: CPT

## 2023-05-22 PROCEDURE — 96366 THER/PROPH/DIAG IV INF ADDON: CPT

## 2023-05-22 PROCEDURE — 84443 ASSAY THYROID STIM HORMONE: CPT

## 2023-05-22 PROCEDURE — 83735 ASSAY OF MAGNESIUM: CPT

## 2023-05-22 RX ORDER — ONDANSETRON 2 MG/ML
4 INJECTION INTRAMUSCULAR; INTRAVENOUS ONCE
Status: COMPLETED | OUTPATIENT
Start: 2023-05-22 | End: 2023-05-22

## 2023-05-22 RX ORDER — SODIUM CHLORIDE 9 MG/ML
1000 INJECTION, SOLUTION INTRAVENOUS ONCE
Status: COMPLETED | OUTPATIENT
Start: 2023-05-22 | End: 2023-05-23

## 2023-05-22 RX ORDER — BISACODYL 10 MG
10 SUPPOSITORY, RECTAL RECTAL
Status: DISCONTINUED | OUTPATIENT
Start: 2023-05-22 | End: 2023-05-23 | Stop reason: HOSPADM

## 2023-05-22 RX ORDER — AMOXICILLIN 250 MG
2 CAPSULE ORAL 2 TIMES DAILY
Status: DISCONTINUED | OUTPATIENT
Start: 2023-05-22 | End: 2023-05-23 | Stop reason: HOSPADM

## 2023-05-22 RX ORDER — POLYETHYLENE GLYCOL 3350 17 G/17G
1 POWDER, FOR SOLUTION ORAL
Status: DISCONTINUED | OUTPATIENT
Start: 2023-05-22 | End: 2023-05-23 | Stop reason: HOSPADM

## 2023-05-22 RX ORDER — GAUZE BANDAGE 2" X 2"
200 BANDAGE TOPICAL DAILY
Status: DISCONTINUED | OUTPATIENT
Start: 2023-05-23 | End: 2023-05-23 | Stop reason: HOSPADM

## 2023-05-22 RX ORDER — DILTIAZEM HYDROCHLORIDE 5 MG/ML
0.25 INJECTION INTRAVENOUS ONCE
Status: COMPLETED | OUTPATIENT
Start: 2023-05-22 | End: 2023-05-22

## 2023-05-22 RX ORDER — MORPHINE SULFATE 4 MG/ML
4 INJECTION INTRAVENOUS ONCE
Status: COMPLETED | OUTPATIENT
Start: 2023-05-22 | End: 2023-05-22

## 2023-05-22 RX ORDER — DESVENLAFAXINE 25 MG/1
1 TABLET, EXTENDED RELEASE ORAL DAILY
Status: DISCONTINUED | OUTPATIENT
Start: 2023-05-23 | End: 2023-05-23

## 2023-05-22 RX ADMIN — DILTIAZEM HYDROCHLORIDE 24.1 MG: 5 INJECTION INTRAVENOUS at 22:14

## 2023-05-22 RX ADMIN — ONDANSETRON 4 MG: 2 INJECTION INTRAMUSCULAR; INTRAVENOUS at 22:18

## 2023-05-22 RX ADMIN — DILTIAZEM HYDROCHLORIDE 5 MG/HR: 5 INJECTION INTRAVENOUS at 22:36

## 2023-05-22 RX ADMIN — MORPHINE SULFATE 4 MG: 4 INJECTION INTRAVENOUS at 22:15

## 2023-05-22 RX ADMIN — SODIUM CHLORIDE 1000 ML: 9 INJECTION, SOLUTION INTRAVENOUS at 22:39

## 2023-05-22 ASSESSMENT — ENCOUNTER SYMPTOMS
FEVER: 0
SPUTUM PRODUCTION: 0
DEPRESSION: 0
DIZZINESS: 1
BLOOD IN STOOL: 0
VOMITING: 0
COUGH: 0
HEMOPTYSIS: 0
DIARRHEA: 0
BLURRED VISION: 0
PALPITATIONS: 1
NAUSEA: 1
DOUBLE VISION: 0
CONSTIPATION: 0
ABDOMINAL PAIN: 1
CHILLS: 0
SHORTNESS OF BREATH: 1

## 2023-05-22 ASSESSMENT — FIBROSIS 4 INDEX
FIB4 SCORE: 1.49
FIB4 SCORE: 1.1

## 2023-05-22 ASSESSMENT — PAIN DESCRIPTION - PAIN TYPE: TYPE: ACUTE PAIN

## 2023-05-22 NOTE — TELEPHONE ENCOUNTER
Dr. Be,    I recvd a call from Rosa Maria (patient's wife) requesting to schedule a cardioversion. I do not see that this patient has been seen in clinic. Do we need to seen before scheduling a cardioversion or am I ok to proceed with scheduling?     Also, we are looking at the beginning for June for outpatient cardioversion at this time.    Please advise.    Thank You,  Aby

## 2023-05-22 NOTE — TELEPHONE ENCOUNTER
Arvind Be M.D.  Aby Sánchez, Med Ass't  Caller: Unspecified (Today,  8:53 AM)  He was seen as an inpatient consult, it appears that Dr Clarke was not able to do a cardioversion over the weekend and he left AMA. It is reasonable to schedule a QUENTIN/DCCV for him with  or one of the general docs. Thanks

## 2023-05-22 NOTE — TELEPHONE ENCOUNTER
Marina,    I called this patient to schedule an outpatient QUENTIN/CV. Per this patient, his resting heart rate is in the 150's and when he gets up it goes up into the 160's. Our first available time for an outpatient cardioversion is not until 6-15-23. Can you please call and triage this patient's symptoms to see if he's ok to wait until mid June for this procedure.    Thank You,  Aby

## 2023-05-22 NOTE — TELEPHONE ENCOUNTER
Phone Number Called: [phone]    Call outcome: Spoke to patient regarding message below.- 21 minute conversation    Message:  Pt stated he left AMA because his cardioversion keep getting rescheduled.He said he was told no anesthesiologist was available and he was upset that his heart rate was still high and felt no one was doing anything about it. Pt did not want to wait.   Pt current symptoms include chest pain, palpitations and rapid heart rate. Pt advised to go to the ER even if not [facility]. Pt told it would be best to come to our ER because he has already been seen but he will likely not get better until he gets treatment.   After extensive conversing, pt agreed to go to ER in about 6 hours since he just ate @ around 2pm and wants to be NPO so he doesn't get delayed. Pt still advised to go sooner if possible.  I told patient I would call him if I had an update or any way of getting him seen sooner    To [facility]: Please advise for patient seen by [facility] inpatient w/ orders to QUENTIN/Cardioversion by you

## 2023-05-22 NOTE — TELEPHONE ENCOUNTER
Marina,    Can you please enter a QUENTIN/Cardioversion order for this patient?    Thank You,  Aby

## 2023-05-23 ENCOUNTER — ANESTHESIA EVENT (OUTPATIENT)
Dept: CARDIOLOGY | Facility: MEDICAL CENTER | Age: 58
DRG: 309 | End: 2023-05-23
Payer: COMMERCIAL

## 2023-05-23 ENCOUNTER — PHARMACY VISIT (OUTPATIENT)
Dept: PHARMACY | Facility: MEDICAL CENTER | Age: 58
End: 2023-05-23
Payer: COMMERCIAL

## 2023-05-23 ENCOUNTER — ANESTHESIA (OUTPATIENT)
Dept: CARDIOLOGY | Facility: MEDICAL CENTER | Age: 58
DRG: 309 | End: 2023-05-23
Payer: COMMERCIAL

## 2023-05-23 ENCOUNTER — APPOINTMENT (OUTPATIENT)
Dept: CARDIOLOGY | Facility: MEDICAL CENTER | Age: 58
DRG: 309 | End: 2023-05-23
Attending: NURSE PRACTITIONER
Payer: COMMERCIAL

## 2023-05-23 ENCOUNTER — APPOINTMENT (OUTPATIENT)
Dept: RADIOLOGY | Facility: MEDICAL CENTER | Age: 58
DRG: 309 | End: 2023-05-23
Attending: STUDENT IN AN ORGANIZED HEALTH CARE EDUCATION/TRAINING PROGRAM
Payer: COMMERCIAL

## 2023-05-23 VITALS
OXYGEN SATURATION: 93 % | HEIGHT: 73 IN | SYSTOLIC BLOOD PRESSURE: 109 MMHG | BODY MASS INDEX: 28.34 KG/M2 | TEMPERATURE: 97.5 F | HEART RATE: 89 BPM | RESPIRATION RATE: 9 BRPM | DIASTOLIC BLOOD PRESSURE: 77 MMHG | WEIGHT: 213.85 LBS

## 2023-05-23 PROBLEM — F10.10 ALCOHOL ABUSE: Status: ACTIVE | Noted: 2023-05-23

## 2023-05-23 LAB
AMPHET UR QL SCN: NEGATIVE
BARBITURATES UR QL SCN: NEGATIVE
BASOPHILS # BLD AUTO: 0.6 % (ref 0–1.8)
BASOPHILS # BLD: 0.06 K/UL (ref 0–0.12)
BENZODIAZ UR QL SCN: POSITIVE
BZE UR QL SCN: NEGATIVE
CANNABINOIDS UR QL SCN: POSITIVE
DIGOXIN SERPL-MCNC: 0.5 NG/ML (ref 0.8–2)
EKG IMPRESSION: NORMAL
EOSINOPHIL # BLD AUTO: 0.19 K/UL (ref 0–0.51)
EOSINOPHIL NFR BLD: 1.9 % (ref 0–6.9)
ERYTHROCYTE [DISTWIDTH] IN BLOOD BY AUTOMATED COUNT: 55.7 FL (ref 35.9–50)
FENTANYL UR QL: NEGATIVE
HCT VFR BLD AUTO: 42.8 % (ref 42–52)
HGB BLD-MCNC: 14.9 G/DL (ref 14–18)
IMM GRANULOCYTES # BLD AUTO: 0.04 K/UL (ref 0–0.11)
IMM GRANULOCYTES NFR BLD AUTO: 0.4 % (ref 0–0.9)
LACTATE SERPL-SCNC: 0.9 MMOL/L (ref 0.5–2)
LIPASE SERPL-CCNC: 366 U/L (ref 11–82)
LYMPHOCYTES # BLD AUTO: 1.3 K/UL (ref 1–4.8)
LYMPHOCYTES NFR BLD: 13.3 % (ref 22–41)
MAGNESIUM SERPL-MCNC: 1.9 MG/DL (ref 1.5–2.5)
MCH RBC QN AUTO: 31.2 PG (ref 27–33)
MCHC RBC AUTO-ENTMCNC: 34.8 G/DL (ref 32.3–36.5)
MCV RBC AUTO: 89.5 FL (ref 81.4–97.8)
METHADONE UR QL SCN: NEGATIVE
MONOCYTES # BLD AUTO: 1.06 K/UL (ref 0–0.85)
MONOCYTES NFR BLD AUTO: 10.8 % (ref 0–13.4)
NEUTROPHILS # BLD AUTO: 7.14 K/UL (ref 1.82–7.42)
NEUTROPHILS NFR BLD: 73 % (ref 44–72)
NRBC # BLD AUTO: 0 K/UL
NRBC BLD-RTO: 0 /100 WBC (ref 0–0.2)
OPIATES UR QL SCN: POSITIVE
OXYCODONE UR QL SCN: NEGATIVE
PCP UR QL SCN: NEGATIVE
PLATELET # BLD AUTO: 252 K/UL (ref 164–446)
PMV BLD AUTO: 9.3 FL (ref 9–12.9)
PROPOXYPH UR QL SCN: NEGATIVE
RBC # BLD AUTO: 4.78 M/UL (ref 4.7–6.1)
TROPONIN T SERPL-MCNC: 24 NG/L (ref 6–19)
WBC # BLD AUTO: 9.8 K/UL (ref 4.8–10.8)

## 2023-05-23 PROCEDURE — 99140 ANES COMP EMERGENCY COND: CPT | Performed by: ANESTHESIOLOGY

## 2023-05-23 PROCEDURE — 84484 ASSAY OF TROPONIN QUANT: CPT

## 2023-05-23 PROCEDURE — 76700 US EXAM ABDOM COMPLETE: CPT

## 2023-05-23 PROCEDURE — 700111 HCHG RX REV CODE 636 W/ 250 OVERRIDE (IP): Performed by: STUDENT IN AN ORGANIZED HEALTH CARE EDUCATION/TRAINING PROGRAM

## 2023-05-23 PROCEDURE — 83735 ASSAY OF MAGNESIUM: CPT

## 2023-05-23 PROCEDURE — 700102 HCHG RX REV CODE 250 W/ 637 OVERRIDE(OP): Performed by: STUDENT IN AN ORGANIZED HEALTH CARE EDUCATION/TRAINING PROGRAM

## 2023-05-23 PROCEDURE — 01922 ANES N-INVAS IMG/RADJ THER: CPT | Performed by: ANESTHESIOLOGY

## 2023-05-23 PROCEDURE — 93010 ELECTROCARDIOGRAM REPORT: CPT | Mod: 59 | Performed by: INTERNAL MEDICINE

## 2023-05-23 PROCEDURE — 160002 HCHG RECOVERY MINUTES (STAT)

## 2023-05-23 PROCEDURE — 700111 HCHG RX REV CODE 636 W/ 250 OVERRIDE (IP): Performed by: EMERGENCY MEDICINE

## 2023-05-23 PROCEDURE — A9270 NON-COVERED ITEM OR SERVICE: HCPCS | Performed by: NURSE PRACTITIONER

## 2023-05-23 PROCEDURE — 700102 HCHG RX REV CODE 250 W/ 637 OVERRIDE(OP): Performed by: NURSE PRACTITIONER

## 2023-05-23 PROCEDURE — 93325 DOPPLER ECHO COLOR FLOW MAPG: CPT

## 2023-05-23 PROCEDURE — 93312 ECHO TRANSESOPHAGEAL: CPT | Mod: 26 | Performed by: INTERNAL MEDICINE

## 2023-05-23 PROCEDURE — 160035 HCHG PACU - 1ST 60 MINS PHASE I

## 2023-05-23 PROCEDURE — 80307 DRUG TEST PRSMV CHEM ANLYZR: CPT

## 2023-05-23 PROCEDURE — 92960 CARDIOVERSION ELECTRIC EXT: CPT | Performed by: INTERNAL MEDICINE

## 2023-05-23 PROCEDURE — 80162 ASSAY OF DIGOXIN TOTAL: CPT

## 2023-05-23 PROCEDURE — 700111 HCHG RX REV CODE 636 W/ 250 OVERRIDE (IP): Performed by: ANESTHESIOLOGY

## 2023-05-23 PROCEDURE — B245ZZ4 ULTRASONOGRAPHY OF LEFT HEART, TRANSESOPHAGEAL: ICD-10-PCS | Performed by: INTERNAL MEDICINE

## 2023-05-23 PROCEDURE — 5A2204Z RESTORATION OF CARDIAC RHYTHM, SINGLE: ICD-10-PCS | Performed by: INTERNAL MEDICINE

## 2023-05-23 PROCEDURE — 99239 HOSP IP/OBS DSCHRG MGMT >30: CPT | Performed by: STUDENT IN AN ORGANIZED HEALTH CARE EDUCATION/TRAINING PROGRAM

## 2023-05-23 PROCEDURE — 700105 HCHG RX REV CODE 258: Performed by: EMERGENCY MEDICINE

## 2023-05-23 PROCEDURE — 85025 COMPLETE CBC W/AUTO DIFF WBC: CPT

## 2023-05-23 PROCEDURE — A9270 NON-COVERED ITEM OR SERVICE: HCPCS | Performed by: STUDENT IN AN ORGANIZED HEALTH CARE EDUCATION/TRAINING PROGRAM

## 2023-05-23 PROCEDURE — 700105 HCHG RX REV CODE 258: Performed by: ANESTHESIOLOGY

## 2023-05-23 PROCEDURE — 4410588 CL-CARDIOVERSION

## 2023-05-23 PROCEDURE — RXMED WILLOW AMBULATORY MEDICATION CHARGE: Performed by: STUDENT IN AN ORGANIZED HEALTH CARE EDUCATION/TRAINING PROGRAM

## 2023-05-23 PROCEDURE — 93005 ELECTROCARDIOGRAM TRACING: CPT | Performed by: INTERNAL MEDICINE

## 2023-05-23 RX ORDER — SODIUM CHLORIDE 9 MG/ML
INJECTION, SOLUTION INTRAVENOUS
Status: DISCONTINUED | OUTPATIENT
Start: 2023-05-23 | End: 2023-05-23 | Stop reason: SURG

## 2023-05-23 RX ORDER — DIGOXIN 125 MCG
250 TABLET ORAL EVERY 6 HOURS
Status: DISCONTINUED | OUTPATIENT
Start: 2023-05-23 | End: 2023-05-23

## 2023-05-23 RX ORDER — METOPROLOL SUCCINATE 50 MG/1
50 TABLET, EXTENDED RELEASE ORAL EVERY EVENING
Qty: 30 TABLET | Refills: 3 | Status: SHIPPED | OUTPATIENT
Start: 2023-05-23 | End: 2023-06-21

## 2023-05-23 RX ORDER — POTASSIUM CHLORIDE 20 MEQ/1
40 TABLET, EXTENDED RELEASE ORAL ONCE
Status: COMPLETED | OUTPATIENT
Start: 2023-05-23 | End: 2023-05-23

## 2023-05-23 RX ORDER — HYDROMORPHONE HYDROCHLORIDE 1 MG/ML
0.1 INJECTION, SOLUTION INTRAMUSCULAR; INTRAVENOUS; SUBCUTANEOUS
Status: DISCONTINUED | OUTPATIENT
Start: 2023-05-23 | End: 2023-05-23 | Stop reason: HOSPADM

## 2023-05-23 RX ORDER — METOPROLOL TARTRATE 50 MG/1
50 TABLET, FILM COATED ORAL TWICE DAILY
Status: DISCONTINUED | OUTPATIENT
Start: 2023-05-23 | End: 2023-05-23

## 2023-05-23 RX ORDER — HYDROMORPHONE HYDROCHLORIDE 1 MG/ML
0.4 INJECTION, SOLUTION INTRAMUSCULAR; INTRAVENOUS; SUBCUTANEOUS
Status: DISCONTINUED | OUTPATIENT
Start: 2023-05-23 | End: 2023-05-23 | Stop reason: HOSPADM

## 2023-05-23 RX ORDER — DIGOXIN 125 MCG
125 TABLET ORAL DAILY
Status: DISCONTINUED | OUTPATIENT
Start: 2023-05-24 | End: 2023-05-23

## 2023-05-23 RX ORDER — FLECAINIDE ACETATE 50 MG/1
50 TABLET ORAL TWICE DAILY
Status: DISCONTINUED | OUTPATIENT
Start: 2023-05-23 | End: 2023-05-23 | Stop reason: HOSPADM

## 2023-05-23 RX ORDER — METOPROLOL TARTRATE 50 MG/1
100 TABLET, FILM COATED ORAL TWICE DAILY
Status: DISCONTINUED | OUTPATIENT
Start: 2023-05-23 | End: 2023-05-23

## 2023-05-23 RX ORDER — DIPHENHYDRAMINE HYDROCHLORIDE 50 MG/ML
12.5 INJECTION INTRAMUSCULAR; INTRAVENOUS
Status: DISCONTINUED | OUTPATIENT
Start: 2023-05-23 | End: 2023-05-23 | Stop reason: HOSPADM

## 2023-05-23 RX ORDER — FLECAINIDE ACETATE 50 MG/1
50 TABLET ORAL 2 TIMES DAILY
Qty: 60 TABLET | Refills: 3 | Status: ON HOLD | OUTPATIENT
Start: 2023-05-23 | End: 2023-07-14

## 2023-05-23 RX ORDER — SODIUM CHLORIDE, SODIUM LACTATE, POTASSIUM CHLORIDE, CALCIUM CHLORIDE 600; 310; 30; 20 MG/100ML; MG/100ML; MG/100ML; MG/100ML
INJECTION, SOLUTION INTRAVENOUS CONTINUOUS
Status: DISCONTINUED | OUTPATIENT
Start: 2023-05-23 | End: 2023-05-23 | Stop reason: HOSPADM

## 2023-05-23 RX ORDER — ACETAMINOPHEN 500 MG
1000 TABLET ORAL EVERY 8 HOURS PRN
Status: DISCONTINUED | OUTPATIENT
Start: 2023-05-23 | End: 2023-05-23 | Stop reason: HOSPADM

## 2023-05-23 RX ORDER — ONDANSETRON 2 MG/ML
4 INJECTION INTRAMUSCULAR; INTRAVENOUS
Status: DISCONTINUED | OUTPATIENT
Start: 2023-05-23 | End: 2023-05-23 | Stop reason: HOSPADM

## 2023-05-23 RX ORDER — KETOROLAC TROMETHAMINE 30 MG/ML
30 INJECTION, SOLUTION INTRAMUSCULAR; INTRAVENOUS ONCE
Status: DISCONTINUED | OUTPATIENT
Start: 2023-05-23 | End: 2023-05-23 | Stop reason: HOSPADM

## 2023-05-23 RX ORDER — ENOXAPARIN SODIUM 100 MG/ML
1 INJECTION SUBCUTANEOUS ONCE
Status: COMPLETED | OUTPATIENT
Start: 2023-05-23 | End: 2023-05-23

## 2023-05-23 RX ORDER — HYDROMORPHONE HYDROCHLORIDE 1 MG/ML
0.2 INJECTION, SOLUTION INTRAMUSCULAR; INTRAVENOUS; SUBCUTANEOUS
Status: DISCONTINUED | OUTPATIENT
Start: 2023-05-23 | End: 2023-05-23 | Stop reason: HOSPADM

## 2023-05-23 RX ORDER — HALOPERIDOL 5 MG/ML
1 INJECTION INTRAMUSCULAR
Status: DISCONTINUED | OUTPATIENT
Start: 2023-05-23 | End: 2023-05-23 | Stop reason: HOSPADM

## 2023-05-23 RX ORDER — DESVENLAFAXINE 25 MG/1
25 TABLET, EXTENDED RELEASE ORAL DAILY
Status: DISCONTINUED | OUTPATIENT
Start: 2023-05-23 | End: 2023-05-23 | Stop reason: HOSPADM

## 2023-05-23 RX ORDER — METOPROLOL SUCCINATE 25 MG/1
50 TABLET, EXTENDED RELEASE ORAL EVERY EVENING
Status: DISCONTINUED | OUTPATIENT
Start: 2023-05-23 | End: 2023-05-23 | Stop reason: HOSPADM

## 2023-05-23 RX ORDER — EPHEDRINE SULFATE 50 MG/ML
5 INJECTION, SOLUTION INTRAVENOUS
Status: DISCONTINUED | OUTPATIENT
Start: 2023-05-23 | End: 2023-05-23 | Stop reason: HOSPADM

## 2023-05-23 RX ORDER — HYDRALAZINE HYDROCHLORIDE 20 MG/ML
5 INJECTION INTRAMUSCULAR; INTRAVENOUS
Status: DISCONTINUED | OUTPATIENT
Start: 2023-05-23 | End: 2023-05-23 | Stop reason: HOSPADM

## 2023-05-23 RX ORDER — LORAZEPAM 2 MG/ML
1 INJECTION INTRAMUSCULAR EVERY 4 HOURS PRN
Status: DISCONTINUED | OUTPATIENT
Start: 2023-05-23 | End: 2023-05-23 | Stop reason: HOSPADM

## 2023-05-23 RX ADMIN — APIXABAN 5 MG: 5 TABLET, FILM COATED ORAL at 10:23

## 2023-05-23 RX ADMIN — METOPROLOL TARTRATE 50 MG: 50 TABLET ORAL at 03:52

## 2023-05-23 RX ADMIN — ACETAMINOPHEN 1000 MG: 500 TABLET, FILM COATED ORAL at 04:38

## 2023-05-23 RX ADMIN — PROPOFOL 200 MG: 10 INJECTION, EMULSION INTRAVENOUS at 11:25

## 2023-05-23 RX ADMIN — SODIUM CHLORIDE: 9 INJECTION, SOLUTION INTRAVENOUS at 11:18

## 2023-05-23 RX ADMIN — DILTIAZEM HYDROCHLORIDE 15 MG/HR: 5 INJECTION INTRAVENOUS at 10:43

## 2023-05-23 RX ADMIN — ENOXAPARIN SODIUM 100 MG: 100 INJECTION SUBCUTANEOUS at 02:04

## 2023-05-23 RX ADMIN — Medication 200 MG: at 05:02

## 2023-05-23 RX ADMIN — LORAZEPAM 1 MG: 2 INJECTION INTRAMUSCULAR; INTRAVENOUS at 01:17

## 2023-05-23 RX ADMIN — POTASSIUM CHLORIDE 40 MEQ: 1500 TABLET, EXTENDED RELEASE ORAL at 00:49

## 2023-05-23 RX ADMIN — LORAZEPAM 1 MG: 2 INJECTION INTRAMUSCULAR; INTRAVENOUS at 05:43

## 2023-05-23 RX ADMIN — DESVENLAFAXINE SUCCINATE 25 MG: 25 TABLET, FILM COATED, EXTENDED RELEASE ORAL at 06:00

## 2023-05-23 RX ADMIN — DILTIAZEM HYDROCHLORIDE 20 MG/HR: 5 INJECTION INTRAVENOUS at 04:51

## 2023-05-23 ASSESSMENT — CHA2DS2 SCORE
HYPERTENSION: YES
VASCULAR DISEASE: YES
CHA2DS2 VASC SCORE: 2
SEX: MALE
AGE 75 OR GREATER: NO
AGE 65 TO 74: NO
PRIOR STROKE OR TIA OR THROMBOEMBOLISM: NO
DIABETES: NO
CHF OR LEFT VENTRICULAR DYSFUNCTION: NO

## 2023-05-23 ASSESSMENT — PAIN SCALES - GENERAL: PAIN_LEVEL: 0

## 2023-05-23 NOTE — PROGRESS NOTES
This patient will be admitted to the [facility] for Aflutter with RVR and remains under the care of the hospitalist (who all questions and concerns should be relayed to).  While a critical care consultation has not been requested, we are immediately available if the patient requires critical care in the future.

## 2023-05-23 NOTE — ASSESSMENT & PLAN NOTE
-Found to have evidence of pancreatitis on last admission. Reports last drink was Thursday.  -Will recheck lipase level.  -No epigastric pain on palpation.

## 2023-05-23 NOTE — H&P
[facility] Internal Medicine History & Physical Note    Date of Service  5/23/2023    R Team: MADELEINE   Attending: Cody Hilton M.d.  Senior Resident: Dr. Sultan Quintero  Contact Number: [phone]    Primary Care Physician  Arvind Geiger M.D.    Consultants  N/A    Code Status  Full Code    Chief Complaint  Chief Complaint   Patient presents with    Chest Pain     CP x 4 days with SOB. Patient was seen at [facility] for afib RVR and was supposed to be converted yesterday but there was no anesthesiologist.  Patient left AMA and returns with worsening symptoms. Patient currently 150s-160s.        History of Presenting Illness (HPI):  Tom Quan is a 57 y.o. male with a past medical history of history of colitis once Stelara, depression, alcohol use disorder, a flutter with 2-1 block, hypertension, GERD, and migraines who presents to the hospital with complaints of worsening chest pain palpitations since leaving the hospital roughly 2 days ago against medical advice.    Patient was recently hospitalized in the intensive care unit for new onset atrial flutter with 2 to 1 block requiring diltiazem drip, digoxin, and beta-blockers.  Patient was followed by cardiology who were planning on doing electrical cardioversion for the patient. This procedure was delayed while the patient was in the hospital and patient became frustrated, causing him to leave against medical advice roughly 2 days ago.    Patient reports that he was feeling fine until this morning when he started developing chest pain and heart palpitations.  Patient presented to hospital for further management. Patient reported pleuritic chest pain, which improved with morphine in the ER.    Upon presentation the hospital the patient was found to still be in atrial flutter with rapid ventricular rate and he was put on a diltiazem drip plan to admit to the [facility] for further treatment. Patient reports last alcohol drink roughly 4 days ago.    No other  complaints at this time.    I discussed the plan of care with patient.    Review of Systems  Review of Systems   Constitutional:  Negative for chills and fever.   HENT:  Negative for ear pain, hearing loss and tinnitus.    Eyes:  Negative for blurred vision and double vision.   Respiratory:  Positive for shortness of breath. Negative for cough, hemoptysis and sputum production.    Cardiovascular:  Positive for chest pain and palpitations. Negative for leg swelling.   Gastrointestinal:  Positive for abdominal pain and nausea. Negative for blood in stool, constipation, diarrhea, melena and vomiting.   Genitourinary:  Negative for dysuria and urgency.   Skin:  Negative for itching and rash.   Neurological:  Positive for dizziness.   Psychiatric/Behavioral:  Negative for depression and suicidal ideas.        Past Medical History   has a past medical history of Depression, Hyperlipidemia, and Ulcerative colitis (HCC).    Surgical History   has a past surgical history that includes hip replacement, partial.     Family History  family history includes Cancer in his paternal uncle; Diabetes in his mother; Heart Attack in his maternal grandfather; Hyperlipidemia in his brother and sister; Other in his mother and son.   Family history reviewed with patient.     Social History  Tobacco: Denies any hx of consistent use.  Alcohol: Reports drinking 2-3 servings of vodka per day with last drink on Thursday.  Recreational drugs (illegal or prescription): Denies any previous use.  Employment: Currently works as a salesman  Living Situation: Lives in house with wife.  Recent Travel: N/A  Primary Care Provider: Not Reviewed  Other (stressors, spirituality, exposures): N/A    Allergies  Allergies   Allergen Reactions    Thimerosal Unspecified     Eye medication reaction  Eye medication reaction         Medications  Prior to Admission Medications   Prescriptions Last Dose Informant Patient Reported? Taking?   B Complex Vitamins (VITAMIN  B COMPLEX PO)  Patient, Rx Bottle (For Med Information) Yes No   Sig: Take 1 Tablet by mouth every day.   Cholecalciferol (VITAMIN D) 2000 UNIT Tab  Patient, Rx Bottle (For Med Information) Yes No   Sig: Take 4,000 Units by mouth every day.   Desvenlafaxine Succinate ER 25 MG TABLET SR 24 HR  Patient, Rx Bottle (For Med Information) No No   Sig: Take 1 Tablet by mouth every day.   Omega-3 Fatty Acids (FISH OIL PO)  Patient, Rx Bottle (For Med Information) Yes No   Sig: Take 2 Capsules by mouth every 7 days.   Thiamine HCl (VITAMIN B-1) 250 MG Tab  Patient, Rx Bottle (For Med Information) Yes No   Sig: Take 250 mg by mouth every day.   Ustekinumab (STELARA SC)  Patient, Rx Bottle (For Med Information) Yes No   Sig: Inject 1 Dose under the skin every 30 (thirty) days.   folic acid (FOLVITE) 1 MG Tab  Patient, Rx Bottle (For Med Information) No No   Sig: Take 1 Tablet by mouth every day.   losartan (COZAAR) 25 MG Tab  Patient, Rx Bottle (For Med Information) Yes No   Sig: Take 25 mg by mouth 1 time a day as needed. Indications: High Blood Pressure Disorder   multivitamin Tab  Patient, Rx Bottle (For Med Information) Yes No   Sig: Take 2 Tablets by mouth every day.   omeprazole (PRILOSEC) 20 MG delayed-release capsule  Patient, Rx Bottle (For Med Information) Yes No   Sig: Take 20-40 mg by mouth 1 time a day as needed (Heartburn).   ondansetron (ZOFRAN ODT) 4 MG TABLET DISPERSIBLE  Patient, Rx Bottle (For Med Information) No No   Sig: Take 1 Tablet by mouth every four hours as needed for Nausea/Vomiting (give PO if no IV route available).   predniSONE (DELTASONE) 20 MG Tab  Patient, Rx Bottle (For Med Information) Yes No   Sig: Take 20 mg by mouth every day.   propranolol (INDERAL) 10 MG Tab  Patient, Rx Bottle (For Med Information) Yes No   Sig: Take 10 mg by mouth 3 times a day as needed. Indications: High Blood Pressure Disorder   rizatriptan (MAXALT) 5 MG tablet  Patient, Rx Bottle (For Med Information) No No   Sig:  Take 1-2 Tablets by mouth one time as needed for Migraine for up to 1 dose.   rosuvastatin (CRESTOR) 20 MG Tab  Patient, Rx Bottle (For Med Information) No No   Sig: Take 1 Tablet by mouth every evening.   testosterone cypionate (DEPO-TESTOSTERONE) 200 MG/ML Solution injection  Patient, Rx Bottle (For Med Information) Yes No   Sig: Inject 200 mg into the shoulder, thigh, or buttocks every 7 days.      Facility-Administered Medications: None       Physical Exam  Temp:  [36.6 °C (97.9 °F)-37.1 °C (98.8 °F)] 36.6 °C (97.9 °F)  Pulse:  [115-167] 141  Resp:  [16-21] 18  BP: (110-131)/(81-95) 121/81  SpO2:  [92 %-97 %] 92 %  Blood Pressure: 119/87   Temperature: 37.1 °C (98.8 °F)   Pulse: (!) 152   Respiration: 17   Pulse Oximetry: 93 %       Physical Exam  Constitutional:       General: He is not in acute distress.     Appearance: Normal appearance. He is not ill-appearing, toxic-appearing or diaphoretic.   HENT:      Head: Normocephalic and atraumatic.      Mouth/Throat:      Mouth: Mucous membranes are moist.      Pharynx: Oropharynx is clear. No oropharyngeal exudate or posterior oropharyngeal erythema.   Eyes:      General: No scleral icterus.        Right eye: No discharge.         Left eye: No discharge.   Cardiovascular:      Rate and Rhythm: Tachycardia present. Rhythm irregular.      Pulses: Normal pulses.      Heart sounds: Normal heart sounds. No murmur heard.     No friction rub. No gallop.   Pulmonary:      Effort: Pulmonary effort is normal. No respiratory distress.      Breath sounds: Normal breath sounds. No stridor. No wheezing or rhonchi.   Abdominal:      General: There is no distension.      Palpations: There is no mass.      Tenderness: There is no abdominal tenderness.      Hernia: No hernia is present.   Musculoskeletal:         General: No swelling or tenderness.      Right lower leg: No edema.      Left lower leg: No edema.   Skin:     General: Skin is warm and dry.      Coloration: Skin is not  jaundiced or pale.      Findings: No bruising or erythema.   Neurological:      General: No focal deficit present.      Mental Status: He is alert and oriented to person, place, and time.   Psychiatric:         Mood and Affect: Mood normal.         Behavior: Behavior normal.         Laboratory:  Recent Labs     05/20/23  0253 05/21/23  0430 05/22/23  2101   WBC 15.1* 12.6* 12.4*   RBC 5.11 4.69* 5.21   HEMOGLOBIN 16.0 14.5 16.3   HEMATOCRIT 47.4 43.2 47.2   MCV 92.8 92.1 90.6   MCH 31.3 30.9 31.3   MCHC 33.8 33.6* 34.5   RDW 58.2* 56.8* 56.6*   PLATELETCT 268 244 272   MPV 9.8 9.9 9.6     Recent Labs     05/20/23  1400 05/21/23  0430 05/22/23  2101   SODIUM 134* 135 135   POTASSIUM 4.6 3.8 3.5*   CHLORIDE 100 101 99   CO2 22 24 23   GLUCOSE 123* 85 97   BUN 8 7* 11   CREATININE 0.90 0.94 1.09   CALCIUM 8.5 8.1* 9.0     Recent Labs     05/20/23  1400 05/21/23  0430 05/22/23  2101   ALTSGPT  --  94* 87*   ASTSGOT  --  63* 50*   ALKPHOSPHAT  --  85 113*   TBILIRUBIN  --  0.7 0.6   GLUCOSE 123* 85 97     Recent Labs     05/21/23  0430 05/22/23  2101   APTT 32.8 30.8   INR  --  1.19*     Recent Labs     05/22/23  2101   NTPROBNP 1041*     Recent Labs     05/20/23  0327   TRIGLYCERIDE 113   HDL 90   *     Recent Labs     05/22/23  2101   TROPONINT 22*       Imaging:  DX-CHEST-PORTABLE (1 VIEW)   Final Result         1.  Left infrahilar infiltrate          EKG:  My impression is: A flutter with 2:1 block with evidence of possible RBBB. QTC of 516.    Assessment/Plan:  Problem Representation:   I anticipate this patient will require at least two midnights for appropriate medical management, necessitating inpatient admission because of atrial flutter with RVR    Patient will need a Intermediate Care (Adult and Pediatrics) bed on CARDIOLOGY service .  The need is secondary to atrial flutter with RVR.    * Atrial flutter with rapid ventricular response (HCC)- (present on admission)  Assessment & Plan  Patient previously  found to be in a-flutter with RVR and admitted to the ICU with plan for electrical cardioversion, however patient became frustrated and left AMA before procedure was done.    -Currently in atrial flutter with 2:1 block.  -On diltiazem drip and will be admitted to the [facility] for further monitoring.  -CHADsVasc of 1.  -Consulted cardiology who recommended starting systemic anticoagulation. Ordered 1 time therapeutic lovenox dose now since patient reported notable abdominal discomfort with xarelto during last visit.  -Ordered repeat UDS and will add b-blockers if negative for meth or cocaine.  -Ordered dig loading.    Alcohol abuse  Assessment & Plan  Reports last drink was 4 days ago.    -No active signs of withdrawal at this time. Will continue to monitor.    Acute alcoholic pancreatitis- (present on admission)  Assessment & Plan  -Found to have evidence of pancreatitis on last admission. Reports last drink was Thursday.  -Will recheck lipase level.  -No epigastric pain on palpation.    Major depressive disorder, recurrent episode, moderate (HCC)- (present on admission)  Assessment & Plan  Continue home desvenlafaxine.    -Added 1mg IV ativan Q4H for anxiety.    Ulcerative colitis (HCC)- (present on admission)  Assessment & Plan  Currently on stelara.    -Denies any recent flare-ups.    GERD (gastroesophageal reflux disease)- (present on admission)  Assessment & Plan  Reports that he uses omeprazole as needed.    -Consider restarting if patient develops GERD symptoms.    Essential (primary) hypertension- (present on admission)  Assessment & Plan  Previously on losartan, however patient was hypotensive during last stay in the ICU.    -Hold losartan at this time.          VTE prophylaxis: therapeutic anticoagulation with lovenox

## 2023-05-23 NOTE — ASSESSMENT & PLAN NOTE
Reports that he uses omeprazole as needed.    -Consider restarting if patient develops GERD symptoms.

## 2023-05-23 NOTE — ASSESSMENT & PLAN NOTE
Patient previously found to be in a-flutter with RVR and admitted to the ICU with plan for electrical cardioversion, however patient became frustrated and left AMA before procedure was done.    -Currently in atrial flutter with 2:1 block.  -On diltiazem drip and will be admitted to the IMCU for further monitoring.  -CHADsVasc of 1.  -Consider cardiology consult in the morning.

## 2023-05-23 NOTE — ASSESSMENT & PLAN NOTE
Reports last drink was 4 days ago.    -No active signs of withdrawal at this time. Will continue to monitor.

## 2023-05-23 NOTE — ED TRIAGE NOTES
Chief Complaint   Patient presents with    Chest Pain     CP x 4 days with SOB. Patient was seen at [facility] for afib RVR and was supposed to be converted yesterday but there was no anesthesiologist.  Patient left AMA and returns with worsening symptoms. Patient currently 150s-160s.       Patient ambulatory to triage for above complaint. A&Ox4, speaking in full sentences. Patient educated on triage process and encouraged to notify staff if condition worsens. Charge notified of patient. Patient to phleb in stable condition.

## 2023-05-23 NOTE — HOSPITAL COURSE
Tom Quan is a 57 y.o. male with a past medical history of history of colitis once Stelara, depression, alcohol use disorder, a flutter with 2-1 block, hypertension, GERD, and migraines who presents to the hospital with complaints of worsening chest pain palpitations since leaving the hospital roughly 2 days ago against medical advice.     Patient was recently hospitalized in the intensive care unit for new onset atrial flutter with 2 to 1 block requiring diltiazem drip, digoxin, and beta-blockers.  Patient was followed by cardiology who were planning on doing electrical cardioversion for the patient. This procedure was delayed while the patient was in the hospital and patient became frustrated, causing him to leave against medical advice roughly 2 days ago.     Patient reports that he was feeling fine until this morning when he started developing chest pain and heart palpitations.  Patient presented to hospital for further management. Patient reported pleuritic chest pain, which improved with morphine in the ER.     Upon presentation the hospital the patient was found to still be in atrial flutter with rapid ventricular rate and he was put on a diltiazem drip plan to admit to the [facility] for further treatment. Patient reports last alcohol drink roughly 4 days ago.      Patient was admitted to [facility] on diltiazem drip had positive response to initial medications however heart rate remained labile upon minimal ambulation.  Patient was also placed on metoprolol 50 mg twice daily.  Collectively was decided patient would benefit from direct-current cardioversion with cardiology.    Patient had transesophageal echocardiogram performed with no evidence of left atrial appendage thrombus he then received shock x1 with restoration of normal sinus rhythm.  Medications were further adjusted by cardiology prior to discharge.  Patient was very firm in his wishes that he wanted to go home.  All necessary medications were provided  to patient in hand prior to discharge.  Patient have close follow-up with cardiology office in addition to his primary care provider.  All questions were answered in detail.  Patient was counseled that should the symptoms return suddenly worsen to go to the nearest emergency department.  Patient endorsed understanding.

## 2023-05-23 NOTE — CONSULTS
[facility] Cardiology Consultation Note    Name:   Tom Quan   YOB: 1965  Age:   57 y.o.  male   MRN:   [id number]    Consulting Cardiologist: Dr. Painting    Requesting Physician: Dr Mae    Date of Consultation:    2023      Chief Complaint: Aflutter, SOB, palpitations, chest pain    Past Medical History:  Colitis on Stelara today, depression, alcohol use disorder, A flutter with 2-1 block, hypertension, GERD, and migraines     History of Present Illness:  Mr Andres is a 57-year-old male who was admitted on 2023 with atrial flutter, rates 160s, shortness of breath, palpitations, and chest pain.  He left AMA 2 days previously with a flutter, unable to undergo cardioversion due to scheduling constraints, no anesthesia available, patient left AMA.     ROS  Constitutional: + fatigue. Denies weight gain.   Respiratory: +shortness of breath, no cough.  Cardiovascular: Denies chest pain. Denies lower extremity edema.  Denies orthopnea or PND.  : Denies polyuria, no dysuria.  GI:  Denies nausea/vomiting.  No abdominal distention.  Neuro:  Denies dizziness, syncope.  Hem/lymph: Denies easy bleeding/bruising.    MS: no arthralgias or myalgias.    All other review of systems reviewed and negative.    Past Surgical History:   Procedure Laterality Date    HIP REPLACEMENT, PARTIAL         Family History   Problem Relation Age of Onset    Diabetes Mother     Other Mother         smoker+, unclear cause of death    Hyperlipidemia Sister     Hyperlipidemia Brother     Cancer Paternal Uncle         Prostate    Heart Attack Maternal Grandfather          55, tobacco+    Other Son         takayasu's arteritis    Abdominal aortic aneurysm Neg Hx     Arterial Aneurysm Neg Hx        Social History     Socioeconomic History    Marital status:      Spouse name: Not on file    Number of children: Not on file    Years of education: Not on file    Highest education level: Not on file   Occupational  History    Not on file   Tobacco Use    Smoking status: Never    Smokeless tobacco: Never   Vaping Use    Vaping Use: Former   Substance and Sexual Activity    Alcohol use: Yes     Comment: Occasionally    Drug use: Never    Sexual activity: Not on file   Other Topics Concern    Not on file   Social History Narrative    Not on file     Social Determinants of Health     Financial Resource Strain: Not on file   Food Insecurity: Not on file   Transportation Needs: Not on file   Physical Activity: Not on file   Stress: Not on file   Social Connections: Not on file   Intimate Partner Violence: Not on file   Housing Stability: Not on file       Medications / Drug list prior to admission:  No current facility-administered medications on file prior to encounter.     Current Outpatient Medications on File Prior to Encounter   Medication Sig Dispense Refill    Cholecalciferol (VITAMIN D) 2000 UNIT Tab Take 4,000 Units by mouth every day.      losartan (COZAAR) 25 MG Tab Take 25 mg by mouth 1 time a day as needed. Indications: High Blood Pressure Disorder      multivitamin Tab Take 2 Tablets by mouth every day.      Thiamine HCl (VITAMIN B-1) 250 MG Tab Take 250 mg by mouth every day.      Ustekinumab (STELARA SC) Inject 1 Dose under the skin every 30 (thirty) days.      B Complex Vitamins (VITAMIN B COMPLEX PO) Take 1 Tablet by mouth every day.      predniSONE (DELTASONE) 20 MG Tab Take 20 mg by mouth every day.      Desvenlafaxine Succinate ER 25 MG TABLET SR 24 HR Take 1 Tablet by mouth every day. 30 Tablet 1    ondansetron (ZOFRAN ODT) 4 MG TABLET DISPERSIBLE Take 1 Tablet by mouth every four hours as needed for Nausea/Vomiting (give PO if no IV route available). 10 Tablet 0    folic acid (FOLVITE) 1 MG Tab Take 1 Tablet by mouth every day. 30 Tablet 1    rosuvastatin (CRESTOR) 20 MG Tab Take 1 Tablet by mouth every evening. 90 Tablet 3    rizatriptan (MAXALT) 5 MG tablet Take 1-2 Tablets by mouth one time as needed for  "Migraine for up to 1 dose. 20 Tablet 3    omeprazole (PRILOSEC) 20 MG delayed-release capsule Take 20-40 mg by mouth 1 time a day as needed (Heartburn).      testosterone cypionate (DEPO-TESTOSTERONE) 200 MG/ML Solution injection Inject 200 mg into the shoulder, thigh, or buttocks every 7 days.      Omega-3 Fatty Acids (FISH OIL PO) Take 2 Capsules by mouth every 7 days.         Allergies   Allergen Reactions    Thimerosal Unspecified     Eye medication reaction  Eye medication reaction         Physical Exam  Body mass index is 28.21 kg/m². BP (!) 84/59   Pulse 66   Temp 36.6 °C (97.9 °F) (Temporal)   Resp 20   Ht 1.854 m (6' 1\")   Wt 97 kg (213 lb 13.5 oz)   SpO2 90%    Vitals:    05/23/23 0930 05/23/23 0945 05/23/23 1000 05/23/23 1015   BP: 93/50 (!) 89/53 104/66 (!) 84/59   Pulse: 64 71 72 66   Resp:       Temp:       TempSrc:       SpO2: 95% 90% 91% 90%   Weight:       Height:        Oxygen Therapy:  Pulse Oximetry: 90 %, O2 Delivery Device: None - Room Air    General: no apparent distress, resting comfortably  Eyes: normal conjunctiva  ENT: OP clear  Neck: No JVD   Lungs: normal respiratory effort, no without crackles, no wheezing or rhonchi.  Heart: irregular rate and rhythm, no murmur, no rubs or gallops,   EXT: no edema bilateral lower extremities. + bilateral pedal pulses. no cyanosis  Abdomen: soft, non tender, non distended,  Neurological: No focal deficits, no facial asymmetry.  Normal speech.  Psychiatric: Appropriate affect, alert and oriented x 3.   Skin: Warm extremities, no rash.    Labs (personally reviewed):     Lab Results   Component Value Date/Time    SODIUM 135 05/22/2023 09:01 PM    POTASSIUM 3.5 (L) 05/22/2023 09:01 PM    CHLORIDE 99 05/22/2023 09:01 PM    CO2 23 05/22/2023 09:01 PM    GLUCOSE 97 05/22/2023 09:01 PM    BUN 11 05/22/2023 09:01 PM    CREATININE 1.09 05/22/2023 09:01 PM    BUNCREATRAT 11 12/14/2022 07:31 AM     Lab Results   Component Value Date/Time    ALKPHOSPHAT 113 " (H) 05/22/2023 09:01 PM    ASTSGOT 50 (H) 05/22/2023 09:01 PM    ALTSGPT 87 (H) 05/22/2023 09:01 PM    TBILIRUBIN 0.6 05/22/2023 09:01 PM      Lab Results   Component Value Date/Time    CHOLSTRLTOT 260 (H) 05/20/2023 03:27 AM     (H) 05/20/2023 03:27 AM    HDL 90 05/20/2023 03:27 AM    TRIGLYCERIDE 113 05/20/2023 03:27 AM     No results found for: BNPBTYPENAT    Cardiac Imaging and Procedures Review:      Personal Telemetry Review: Aflutter 100-160's (with ambulation)    Personal EKG Interpretation: (Please see Dr. Painting's attestation note for further imaging interpretation)    Assessment and Medical Decision Making:    Aflutter with RVR  -Plan for QUENTIN with cardioversion today at 2 PM  -Continue IV diltiazem, titrating for HR < 110  -Increase metoprolol for rate control 100 mg twice daily  -Add apixaban 5 mg twice daily, continue x1 month post DCCV  -[id number] 1  -Work-up outpatient for DANIEL  -Alcohol cessation discussed   -Potential antiarrythmic medication (will discuss with Dr. Painting)  -Referral to EP as outpatient to discuss Aflutter ablation    This note was generated prior to assessment by Dr Painting's.  Please see further attestation for additions and further recommendations.    NIC Dobbins  [facility]    5/23/2023

## 2023-05-23 NOTE — PROCEDURES
Electrical Cardioversion    Date/Time: 5/23/2023 11:30 AM    Performed by: Heather Mays M.D.  Authorized by: Heather Mays M.D.    Consent:     Consent obtained:  Written and verbal    Consent given by:  Patient    Risks discussed:  Death, cutaneous burn, induced arrhythmia and pain    Alternatives discussed:  No treatment, rate-control medication, alternative treatment and anti-coagulation medication  Sedation:     Patient sedated: Yes      Sedation type:  Per anesthesia    Vital signs: Vital signs monitored during sedation    Pre-procedure details:     Cardioversion basis:  Elective    Rhythm:  Atrial flutter    Electrode placement:  Anterior-posterior    Anticoagulation status:  Apixaban  Attempt one:     Cardioversion mode:  Synchronous    Shock (Joules):  200    Shock outcome:  Conversion to normal sinus rhythm  Post-procedure details:     Patient status:  Awake    Patient tolerance of procedure:  Tolerated well, no immediate complications  Comments:      Heather Mays M.D.

## 2023-05-23 NOTE — PROGRESS NOTES
Went to assess patient at bedside one hour after he got his metoprolol and after findings of elevated lipase level of 366.    Patient had no findings of pancreatitis on abdominal ultrasound and had no epigastric tenderenss to palpation on exam. Made NPO for possible procedure.    Heart rate was maintaining around 90-100s with stable blood pressure. Patient was asymptomatic and had no acute complaints. Will continue current regimen of dilt drip and metoprolol. Hold off on dig at this time.

## 2023-05-23 NOTE — PROGRESS NOTES
Dr Quintero updated of patients HR remaining 110-160's in a variable atrial flutter while maxed on diltiazem gtt (-110s).  UDS negative for amphetamines/cocaine.   Digoxin level drawn and results pending per pharmacy request prior to additional digoxin administration    New orders placed, see MAR for administration record

## 2023-05-23 NOTE — ANESTHESIA TIME REPORT
Anesthesia Start and Stop Event Times     Date Time Event    5/23/2023 1117 Ready for Procedure     1118 Anesthesia Start     1142 Anesthesia Stop        Responsible Staff  05/23/23    Name Role Begin End    Wenceslao Madden M.D. Anesth 1118 1142        Overtime Reason:  no overtime (within assigned shift)    Comments:

## 2023-05-23 NOTE — ED PROVIDER NOTES
"ED Provider Note    CHIEF COMPLAINT  Chief Complaint   Patient presents with    Chest Pain     CP x 4 days with SOB. Patient was seen at [facility] for afib RVR and was supposed to be converted yesterday but there was no anesthesiologist.  Patient left AMA and returns with worsening symptoms. Patient currently 150s-160s.        EXTERNAL RECORDS REVIEWED  Inpatient Notes reviewed discharge summary dated yesterday by Dr. Robins, patient mid to the intensive care unit on the 19th with atrial fibrillation/flutter with rapid ventricular response.  And been started on diltiazem and Xarelto.,  Plan was for QUENTIN with cardioversion.  Procedure apparently planned for Monday but patient did not want to stay and left AGAINST MEDICAL ADVICE    HPI/ROS  LIMITATION TO HISTORY   Select: : None  OUTSIDE HISTORIAN(S):  Significant other notes patient has had discomfort to the chest throughout the day.    Tom Quan is a 57 y.o. male who presents for evaluation of persistent chest discomfort with palpitations.  Notes symptoms for the last 10 days.  Admitted to this facility intensive care unit as noted above, left AGAINST MEDICAL ADVICE today.  Patient returns willing to stay given persistent chest pain.  He notes sensation of shortness of breath, his spouse notes that he seems \"foggy\" they are to his mentation.  No vomiting, he is anticoagulated on Eliquis.  Given persistent pain to the left chest with associated dyspnea he has returned and is amenable to admission.    PAST MEDICAL HISTORY   has a past medical history of Depression, Hyperlipidemia, and Ulcerative colitis (HCC).    SURGICAL HISTORY   has a past surgical history that includes hip replacement, partial.    FAMILY HISTORY  Family History   Problem Relation Age of Onset    Diabetes Mother     Other Mother         smoker+, unclear cause of death    Hyperlipidemia Sister     Hyperlipidemia Brother     Cancer Paternal Uncle         Prostate    Heart Attack Maternal " "Grandfather          55, tobacco+    Other Son         takayasu's arteritis    Abdominal aortic aneurysm Neg Hx     Arterial Aneurysm Neg Hx        SOCIAL HISTORY  Social History     Tobacco Use    Smoking status: Never    Smokeless tobacco: Never   Vaping Use    Vaping Use: Former   Substance and Sexual Activity    Alcohol use: Yes     Comment: Occasionally    Drug use: Never    Sexual activity: Not on file       CURRENT MEDICATIONS  Home Medications       Reviewed by Neyda Cabrera R.N. (Registered Nurse) on 23 at 2050  Med List Status: Partial     Medication Last Dose Status   B Complex Vitamins (VITAMIN B COMPLEX PO)  Active   Cholecalciferol (VITAMIN D) 2000 UNIT Tab  Active   Desvenlafaxine Succinate ER 25 MG TABLET SR 24 HR  Active   folic acid (FOLVITE) 1 MG Tab  Active   losartan (COZAAR) 25 MG Tab  Active   multivitamin Tab  Active   Omega-3 Fatty Acids (FISH OIL PO)  Active   omeprazole (PRILOSEC) 20 MG delayed-release capsule  Active   ondansetron (ZOFRAN ODT) 4 MG TABLET DISPERSIBLE  Active   predniSONE (DELTASONE) 20 MG Tab  Active   propranolol (INDERAL) 10 MG Tab  Active   rizatriptan (MAXALT) 5 MG tablet  Active   rosuvastatin (CRESTOR) 20 MG Tab  Active   testosterone cypionate (DEPO-TESTOSTERONE) 200 MG/ML Solution injection  Active   Thiamine HCl (VITAMIN B-1) 250 MG Tab  Active   Ustekinumab (STELARA SC)  Active                    ALLERGIES  Allergies   Allergen Reactions    Thimerosal Unspecified     Eye medication reaction  Eye medication reaction         PHYSICAL EXAM  VITAL SIGNS: /81   Pulse (!) 141   Temp 36.6 °C (97.9 °F) (Temporal)   Resp 18   Ht 1.854 m (6' 1\")   Wt 97 kg (213 lb 13.5 oz)   SpO2 92%   BMI 28.21 kg/m²    General: Alert, mild acute distress  Skin: Warm, dry, normal for ethnicity  Head: Normocephalic, atraumatic  Neck: Trachea midline, no tenderness  Eye: PERRL, normal conjunctiva  ENMT: Oral mucosa pink and mildly dry, no pharyngeal " erythema or exudate  Cardiovascular: S1, S2, significantly tachycardic, irregularly irregular rate and rhythm, No murmur, Normal peripheral perfusion  Respiratory: Lungs CTA, respirations are non-labored, breath sounds are equal  Gastrointestinal: Soft, nontender, non distended  Musculoskeletal: No swelling, no deformity; no peripheral edema.  Neurological: Alert and oriented to person, place, time, and situation  Lymphatics: No lymphadenopathy  Psychiatric: Cooperative, appropriate mood & affect     DIAGNOSTIC STUDIES / PROCEDURES  EKG  I have independently interpreted this EKG  EKG Interpretation    Interpreted by emergency department physician    Rhythm: atrial fibrillation - rapid  Rate: 152  Axis: normal  Ectopy: premature ventricular contractions (unifocal)  Conduction: normal  ST Segments: no acute change  T Waves: no acute change  Q Waves: none    Clinical Impression: atrial flutter (new onset), rate similar to previous EKG dated May 21, 2023    LABS  Results for orders placed or performed during the hospital encounter of 05/22/23   CBC w/ Differential   Result Value Ref Range    WBC 12.4 (H) 4.8 - 10.8 K/uL    RBC 5.21 4.70 - 6.10 M/uL    Hemoglobin 16.3 14.0 - 18.0 g/dL    Hematocrit 47.2 42.0 - 52.0 %    MCV 90.6 81.4 - 97.8 fL    MCH 31.3 27.0 - 33.0 pg    MCHC 34.5 32.3 - 36.5 g/dL    RDW 56.6 (H) 35.9 - 50.0 fL    Platelet Count 272 164 - 446 K/uL    MPV 9.6 9.0 - 12.9 fL    Neutrophils-Polys 75.80 (H) 44.00 - 72.00 %    Lymphocytes 13.10 (L) 22.00 - 41.00 %    Monocytes 9.00 0.00 - 13.40 %    Eosinophils 1.30 0.00 - 6.90 %    Basophils 0.50 0.00 - 1.80 %    Immature Granulocytes 0.30 0.00 - 0.90 %    Nucleated RBC 0.00 0.00 - 0.20 /100 WBC    Neutrophils (Absolute) 9.41 (H) 1.82 - 7.42 K/uL    Lymphs (Absolute) 1.62 1.00 - 4.80 K/uL    Monos (Absolute) 1.12 (H) 0.00 - 0.85 K/uL    Eos (Absolute) 0.16 0.00 - 0.51 K/uL    Baso (Absolute) 0.06 0.00 - 0.12 K/uL    Immature Granulocytes (abs) 0.04 0.00 -  0.11 K/uL    NRBC (Absolute) 0.00 K/uL   Complete Metabolic Panel (CMP)   Result Value Ref Range    Sodium 135 135 - 145 mmol/L    Potassium 3.5 (L) 3.6 - 5.5 mmol/L    Chloride 99 96 - 112 mmol/L    Co2 23 20 - 33 mmol/L    Anion Gap 13.0 7.0 - 16.0    Glucose 97 65 - 99 mg/dL    Bun 11 8 - 22 mg/dL    Creatinine 1.09 0.50 - 1.40 mg/dL    Calcium 9.0 8.5 - 10.5 mg/dL    AST(SGOT) 50 (H) 12 - 45 U/L    ALT(SGPT) 87 (H) 2 - 50 U/L    Alkaline Phosphatase 113 (H) 30 - 99 U/L    Total Bilirubin 0.6 0.1 - 1.5 mg/dL    Albumin 3.7 3.2 - 4.9 g/dL    Total Protein 7.3 6.0 - 8.2 g/dL    Globulin 3.6 (H) 1.9 - 3.5 g/dL    A-G Ratio 1.0 g/dL   proBrain Natriuretic Peptide, NT   Result Value Ref Range    NT-proBNP 1041 (H) 0 - 125 pg/mL   Troponin - STAT Once   Result Value Ref Range    Troponin T 22 (H) 6 - 19 ng/L   Magnesium   Result Value Ref Range    Magnesium 2.0 1.5 - 2.5 mg/dL   TSH (for screening thyroid dysfunction)   Result Value Ref Range    TSH 1.740 0.380 - 5.330 uIU/mL   APTT   Result Value Ref Range    APTT 30.8 24.7 - 36.0 sec   PT/INR   Result Value Ref Range    PT 15.0 (H) 12.0 - 14.6 sec    INR 1.19 (H) 0.87 - 1.13   LACTIC ACID   Result Value Ref Range    Lactic Acid 0.9 0.5 - 2.0 mmol/L   CORRECTED CALCIUM   Result Value Ref Range    Correct Calcium 9.2 8.5 - 10.5 mg/dL   ESTIMATED GFR   Result Value Ref Range    GFR (CKD-EPI) 79 >60 mL/min/1.73 m 2   LIPASE   Result Value Ref Range    Lipase 366 (H) 11 - 82 U/L   EKG   Result Value Ref Range    Report       [facility] Emergency Dept.    Test Date:  2023  Pt Name:    SALVATORE WITT                  Department: ER  MRN:        [id number]                      Room:  Gender:     Male                         Technician: [id number]  :        1965                   Requested By:ER TRIAGE PROTOCOL  Order #:    [id number]                    Reading MD: JOSEPH CHRISTENSEN MD    Measurements  Intervals                                 Axis  Rate:       152                          P:  MD:                                      QRS:        58  QRSD:       123                          T:          -89  QT:         324  QTc:        516    Interpretive Statements  Atrial flutter with 2:1 AV block  Ventricular premature complex  IVCD, consider atypical RBBB  Nonspecific repol abnormality, lateral leads  Compared to ECG 05/21/2023 00:16:56  2:1 AV block now present  Ventricular premature complex(es) now present  Early repolarization now present  Electronically Signed On 5- 21 :13:16 PDT by JOSEPH CHRISTENSEN MD          RADIOLOGY  I have independently interpreted the diagnostic imaging associated with this visit and am waiting the final reading from the radiologist.   My preliminary interpretation is as follows: No evidence of lobar infiltrate nor pulmonary edema  Radiologist interpretation:   DX-CHEST-PORTABLE (1 VIEW)   Final Result         1.  Left infrahilar infiltrate           COURSE & MEDICAL DECISION MAKING    ED Observation Status? No; patient meets admit criteria after initial assessment.  Cardiac work-up obtained.  Status at 9:13 PM, 5/22/2023.     Observation plan is as follows: Patient medicated with diltiazem bolus and drip, plan is for admission.  Cardiac work-up will be obtained.  Differential diagnosis includes was not restricted to electrolyte imbalance, atrial flutter with rapid ventricular response, atrial fibrillation rapid ventricular response, acute MI, demand ischemia.    2212: Leukocytosis noted but this is actually improved from previous.  Radiologist notes what appears to be infrahilar infiltrate.  No fever, doubt septicemia.  We will add a lactic acid.    2236: Spoke with the intensivist Dr. Gonda who concurs with plan for IMCU management.  I have also spoken with the hospitalist Dr. Hilton who concurs with plan of care thus far as well.    2249: Patient reassessed, updated with work-up results.  I explained my  conversation with the intensivist and hospitalist, patient accepted to their service to IMCU placement.  Heart rate improving somewhat with diltiazem drip, currently 115.    Upon Reevaluation, the patient's condition has: not improved; and will be escalated to hospitalization.    Patient is critically ill.   The patient continues to have: Atrial flutter with rapid ventricular response, chest pain, dyspnea  The vital organ system that is affected is the: Cardiovascular  If untreated there is a high chance of deterioration into: Decompensated heart failure  And eventually death.   The critical care that I am providing today is: Diltiazem bolus and drip administration, consultation with hospital medicine and intensive care  The critical that has been undertaken is medically complex.   There has been no overlap in critical care time.   Critical Care Time not including procedures: 96  minutes     Patient discharged from ED Observation status at 2249 (Time) 5/23/23 (Date).     INITIAL ASSESSMENT, COURSE AND PLAN  Care Narrative: This patient is a very pleasant 47-year-old gentleman who presents for evaluation of left-sided chest pain with palpitations and dyspnea.  Significantly tachycardic, evidence of atrial flutter with 2 1 conduction noted on EKG.  Patient medicated initially with diltiazem bolus and drip with some improvement, heart rate improving, currently 115 at this time.  Troponin is elevated compared to previous as is his BNP.  Given patient actively symptomatic and significantly tachycardic plan is for ICU admission as he is requiring diltiazem drip.  HYDRATION: Based on the patient's presentation of Tachycardia the patient was given IV fluids. IV Hydration was used because oral hydration was not adequate alone. Upon recheck following hydration, the patient was doing better, heart rate improving, currently 150.       ADDITIONAL PROBLEM LIST  Chest pain, palpitations, dyspnea  DISPOSITION AND DISCUSSIONS  I  have discussed management of the patient with the following physicians and GORGE's:  Spoke with the intensivist Dr. Gonda who concurs with plan for IMCU management    Additionally spoke with Dr. Hilton who accepts patient to his service    Discussion of management with other [facility] or appropriate source(s): None     Escalation of care considered, and ultimately not performed:NA    Barriers to care at this time, including but not limited to:  NA .     Decision tools and prescription drugs considered including, but not limited to: HEART Score 5 .    FINAL DIAGNOSIS  1. Atrial flutter, unspecified type (HCC)    2. Chest pain, unspecified type    3. Elevated brain natriuretic peptide (BNP) level           Electronically signed by: Farzana Nix M.D., 5/22/2023 9:13 PM

## 2023-05-23 NOTE — CARE PLAN
"The patient is Stable - Low risk of patient condition declining or worsening    Shift Goals  Clinical Goals: hemodynamic stability  Patient Goals: \"get shocked and go home\"  Family Goals: douglas    Progress made toward(s) clinical / shift goals:  Pt cardioverted and cleared for D/C    Patient is not progressing towards the following goals:      Problem: Pain - Standard  Goal: Alleviation of pain or a reduction in pain to the patient’s comfort goal  Outcome: Met     Problem: Knowledge Deficit - Standard  Goal: Patient and family/care givers will demonstrate understanding of plan of care, disease process/condition, diagnostic tests and medications  Outcome: Met     Problem: Psychosocial  Goal: Patient's level of anxiety will decrease  Outcome: Met  Goal: Patient's ability to verbalize feelings about condition will improve  Outcome: Met  Goal: Patient's ability to re-evaluate and adapt role responsibilities will improve  Outcome: Met  Goal: Patient and family will demonstrate ability to cope with life altering diagnosis and/or procedure  Outcome: Met  Goal: Spiritual and cultural needs incorporated into hospitalization  Outcome: Met     Problem: Hemodynamics  Goal: Patient's hemodynamics, fluid balance and neurologic status will be stable or improve  Outcome: Met     Problem: Respiratory  Goal: Patient will achieve/maintain optimum respiratory ventilation and gas exchange  Outcome: Met     Problem: Mechanical Ventilation  Goal: Safe management of artificial airway and ventilation  Outcome: Met  Goal: Successful weaning off mechanical ventilator, spontaneously maintains adequate gas exchange  Outcome: Met  Goal: Patient will be able to express needs and understand communication  Outcome: Met     Problem: Risk for Aspiration  Goal: Patient's risk for aspiration will be absent or decrease  Outcome: Met     Problem: Urinary - Renal Perfusion  Goal: Ability to achieve and maintain adequate renal perfusion and functioning will " improve  Outcome: Met     Problem: Venous Thromboembolism (VTE) Prevention  Goal: The patient will remain free from venous thromboembolism (VTE)  Outcome: Met     Problem: Nutrition  Goal: Patient's nutritional and fluid intake will be adequate or improve  Outcome: Met  Goal: Enteral nutrition will be maintained or improve  Outcome: Met  Goal: Enteral nutrition will be maintained or improve  Outcome: Met     Problem: Urinary Elimination  Goal: Establish and maintain regular urinary output  Outcome: Met     Problem: Bowel Elimination  Goal: Establish and maintain regular bowel function  Outcome: Met

## 2023-05-23 NOTE — ANESTHESIA POSTPROCEDURE EVALUATION
Patient: Tom Quan    Procedure Summary     Date: 05/23/23 Room / Location: [facility] - ECHOCARDIOLOGY [facility]    Anesthesia Start: 1118 Anesthesia Stop: 1142    Procedures:       CL-CARDIOVERSION      EC-QUENTIN W/O CONT Diagnosis:       Atrial flutter with rapid ventricular response (HCC)      Atrial flutter with rapid ventricular response (HCC)      Atrial flutter with rapid ventricular response (HCC)    Scheduled Providers: Heather Mays M.D.; Wenceslao Madden M.D. Responsible Provider: Wenceslao Madden M.D.    Anesthesia Type: MAC ASA Status: 4 - Emergent          Final Anesthesia Type: MAC  Last vitals  BP   132/74    Temp   36.6 °C (97.9 °F)    Pulse   66   Resp   20    SpO2   90 %      Anesthesia Post Evaluation    Patient location during evaluation: PACU  Patient participation: complete - patient participated  Level of consciousness: awake  Pain score: 0    Airway patency: patent  Anesthetic complications: no  Cardiovascular status: adequate  Respiratory status: acceptable  Hydration status: stable    PONV: controlled          No notable events documented.     Nurse Pain Score: 0 (NPRS)

## 2023-05-23 NOTE — DISCHARGE SUMMARY
Discharge Summary    CHIEF COMPLAINT ON ADMISSION  Chief Complaint   Patient presents with    Chest Pain     CP x 4 days with SOB. Patient was seen at [facility] for afib RVR and was supposed to be converted yesterday but there was no anesthesiologist.  Patient left AMA and returns with worsening symptoms. Patient currently 150s-160s.        Reason for Admission  Chest pain     Admission Date  5/22/2023    CODE STATUS  Full Code    HPI & HOSPITAL COURSE  This is a 57 y.o. male here with chest pain  Tom Quan is a 57 y.o. male with a past medical history of history of colitis once Stelara, depression, alcohol use disorder, a flutter with 2-1 block, hypertension, GERD, and migraines who presents to the hospital with complaints of worsening chest pain palpitations since leaving the hospital roughly 2 days ago against medical advice.     Patient was recently hospitalized in the intensive care unit for new onset atrial flutter with 2 to 1 block requiring diltiazem drip, digoxin, and beta-blockers.  Patient was followed by cardiology who were planning on doing electrical cardioversion for the patient. This procedure was delayed while the patient was in the hospital and patient became frustrated, causing him to leave against medical advice roughly 2 days ago.     Patient reports that he was feeling fine until this morning when he started developing chest pain and heart palpitations.  Patient presented to hospital for further management. Patient reported pleuritic chest pain, which improved with morphine in the ER.     Upon presentation the hospital the patient was found to still be in atrial flutter with rapid ventricular rate and he was put on a diltiazem drip plan to admit to the IMCU for further treatment. Patient reports last alcohol drink roughly 4 days ago.      Patient was admitted to IMCU on diltiazem drip had positive response to initial medications however heart rate remained labile upon minimal  ambulation.  Patient was also placed on metoprolol 50 mg twice daily.  Collectively was decided patient would benefit from direct-current cardioversion with cardiology.    Patient had transesophageal echocardiogram performed with no evidence of left atrial appendage thrombus he then received shock x1 with restoration of normal sinus rhythm.  Medications were further adjusted by cardiology prior to discharge.  Patient was very firm in his wishes that he wanted to go home.  All necessary medications were provided to patient in hand prior to discharge.  Patient have close follow-up with cardiology office in addition to his primary care provider.  All questions were answered in detail.  Patient was counseled that should the symptoms return suddenly worsen to go to the nearest emergency department.  Patient endorsed understanding.    Therefore, he is discharged in good and stable condition to home with close outpatient follow-up.    The patient recovered much more quickly than anticipated on admission.    Discharge Date  5/23/2023    FOLLOW UP ITEMS POST DISCHARGE  Take all medication as prescribed  Go to all follow-up appointments as indicated    DISCHARGE DIAGNOSES  Principal Problem:    Atrial flutter with rapid ventricular response (HCC) (POA: Yes)  Active Problems:    Essential (primary) hypertension (POA: Yes)    GERD (gastroesophageal reflux disease) (POA: Yes)    Ulcerative colitis (HCC) (POA: Yes)      Overview: Formatting of this note might be different from the original.      Was previously on infliximab, steroids and low dose azathioprine. He       reports he has been off all these medications for months now. Controlling       his ulcerative colitis with ketogenic diet. Last flare was last summer per       patient.    Major depressive disorder, recurrent episode, moderate (HCC) (POA: Yes)    Acute alcoholic pancreatitis (POA: Yes)    Alcohol abuse (POA: Unknown)  Resolved Problems:    * No resolved hospital  problems. *      FOLLOW UP  Future Appointments   Date Time Provider Department Center   6/1/2023 11:00 AM Arvind Geiger M.D. [facility]     Arvind Geiger M.D.  [address] [address]  [phone]    Follow up  As needed      MEDICATIONS ON DISCHARGE     Medication List        START taking these medications        Instructions   apixaban 5mg Tabs  Commonly known as: ELIQUIS   Take 1 Tablet by mouth 2 times a day. Indications: Thromboembolism secondary to Atrial Fibrillation  Dose: 5 mg     flecainide 50 MG tablet  Commonly known as: TAMBOCOR   Take 1 Tablet by mouth 2 times a day.  Dose: 50 mg     metoprolol SR 50 MG Tb24  Commonly known as: TOPROL XL   Take 1 Tablet by mouth every evening.  Dose: 50 mg            CONTINUE taking these medications        Instructions   Desvenlafaxine Succinate ER 25 MG Tb24   Take 1 Tablet by mouth every day.  Dose: 1 Tablet     FISH OIL PO   Take 2 Capsules by mouth every 7 days.  Dose: 2 Capsule     folic acid 1 MG Tabs  Commonly known as: FOLVITE   Take 1 Tablet by mouth every day.  Dose: 1 mg     multivitamin Tabs   Take 2 Tablets by mouth every day.  Dose: 2 Tablet     omeprazole 20 MG delayed-release capsule  Commonly known as: PRILOSEC   Take 20-40 mg by mouth 1 time a day as needed (Heartburn).  Dose: 20-40 mg     ondansetron 4 MG Tbdp  Commonly known as: ZOFRAN ODT   Take 1 Tablet by mouth every four hours as needed for Nausea/Vomiting (give PO if no IV route available).  Dose: 4 mg     rizatriptan 5 MG tablet  Commonly known as: MAXALT   Take 1-2 Tablets by mouth one time as needed for Migraine for up to 1 dose.  Dose: 5-10 mg     rosuvastatin 20 MG Tabs  Commonly known as: CRESTOR   Take 1 Tablet by mouth every evening.  Dose: 20 mg     STELARA SC   Inject 1 Dose under the skin every 30 (thirty) days.  Dose: 1 Dose     testosterone cypionate 200 MG/ML Soln injection  Commonly known as: DEPO-TESTOSTERONE   Inject 200 mg  into the shoulder, thigh, or buttocks every 7 days.  Dose: 200 mg     VITAMIN B COMPLEX PO   Take 1 Tablet by mouth every day.  Dose: 1 Tablet     Vitamin B-1 250 MG Tabs   Take 250 mg by mouth every day.  Dose: 250 mg     vitamin D 2000 UNIT Tabs   Take 4,000 Units by mouth every day.  Dose: 4,000 Units            STOP taking these medications      losartan 25 MG Tabs  Commonly known as: COZAAR     predniSONE 20 MG Tabs  Commonly known as: DELTASONE              Allergies  Allergies   Allergen Reactions    Thimerosal Unspecified     Eye medication reaction  Eye medication reaction         DIET  Orders Placed This Encounter   Procedures    Diet Order Diet: Cardiac     Standing Status:   Standing     Number of Occurrences:   1     Order Specific Question:   Diet:     Answer:   Cardiac [6]       ACTIVITY  As tolerated.  Weight bearing as tolerated    CONSULTATIONS  Cardiology    PROCEDURES  5/23/2023:  Transesophageal echocardiogram with direct-current cardioversion restoration normal sinus rhythm    LABORATORY  Lab Results   Component Value Date    SODIUM 135 05/22/2023    POTASSIUM 3.5 (L) 05/22/2023    CHLORIDE 99 05/22/2023    CO2 23 05/22/2023    GLUCOSE 97 05/22/2023    BUN 11 05/22/2023    CREATININE 1.09 05/22/2023        Lab Results   Component Value Date    WBC 9.8 05/23/2023    HEMOGLOBIN 14.9 05/23/2023    HEMATOCRIT 42.8 05/23/2023    PLATELETCT 252 05/23/2023      Please note that this dictation was created using voice recognition software. I have made every reasonable attempt to correct obvious errors, but I expect that there are errors of grammar and possibly context that I did not discover before finalizing the note.    Total time of the discharge process exceeds 35 minutes.

## 2023-05-23 NOTE — PROGRESS NOTES
NO HARD CHART:     Patient to procedure Room for QUENTIN/CV with no hard chart, only loose papers. Per Floor RN, no hard chart available. Procedure RN completed Pre-Procedure Consent paperwork packet, including: WHO Yellow Sheet signed by RN and MD, Informed consent for QUENTIN/CV procedure signed by RN, patient, and Cardiology, and Informed consent for Anesthesia signed by Anesthesia MD and patient. Procedure Packet secured to loose papers and hand delivered to receiving PACU RN who acknowledged receipt and no hard chart.

## 2023-05-23 NOTE — PROGRESS NOTES
Brief Cardiology Note:    I was called to discuss this patients care with Dr. Quintero. We discussed 57 year old man with PMH atrial flutter RVR and polysubstance abuse left AMA last admission prior to attempt at rhythm control returns with persistent atrial flutter. Continue to titrate rate control. Please always kiki IV medications with PO for lasting effect. Continue to titrate both as needed. If calcium channel blocker max titrated is insufficient please add beta blocker so long as blood pressure remains stable. If borderline consider IV digoxin load. If unstable then cardioversion. When UDS results and/or no evidence of withdrawal plan wilfredo/dccv. To this end add systemic AC for cva prevention. Full consult to follow.    At this time it was deemed no formal in person cardiology consultation was necessary, however if this changes due to changes in patient condition or abnormal test results, please re-consult cardiology.     Electronically Signed by:  Cody Daniel MD  5/23/2023  12:34 AM

## 2023-05-23 NOTE — ASSESSMENT & PLAN NOTE
Patient previously found to be in a-flutter with RVR and admitted to the ICU with plan for electrical cardioversion, however patient became frustrated and left AMA before procedure was done.    -Currently in atrial flutter with 2:1 block.  -On diltiazem drip and will be admitted to the IMCU for further monitoring.  -CHADsVasc of 1.  -Consulted cardiology who recommended starting systemic anticoagulation. Ordered 1 time therapeutic lovenox dose now since patient reported notable abdominal discomfort with xarelto during last visit.  -Ordered repeat UDS and will add b-blockers if negative for meth or cocaine.  -Ordered dig loading.

## 2023-05-23 NOTE — ASSESSMENT & PLAN NOTE
Previously on losartan, however patient was hypotensive during last stay in the ICU.    -Hold losartan at this time.

## 2023-05-23 NOTE — ANESTHESIA PREPROCEDURE EVALUATION
Date/Time: 05/23/23 1030    Scheduled providers: Heather Mays M.D.; Wenceslao Madden M.D.    Procedures:       CL-CARDIOVERSION      EC-QUENTIN W/O CONT    Diagnosis:       Atrial flutter with rapid ventricular response (HCC) [I48.92]      Atrial flutter with rapid ventricular response (HCC) [I48.92]      Atrial flutter with rapid ventricular response (HCC) [I48.92]    Location: [facility] IMAGING - ECHOCARDIOLOGY [facility]          Relevant Problems   CARDIAC   (positive) Abdominal aortic atherosclerosis (HCC)   (positive) Aneurysm of ascending aorta without rupture (HCC)   (positive) Atrial flutter with rapid ventricular response (HCC)   (positive) Essential (primary) hypertension      GI   (positive) GERD (gastroesophageal reflux disease)         (positive) Acute alcoholic hepatitis   (positive) Hepatic steatosis       Physical Exam    Airway   Mallampati: II  TM distance: >3 FB  Neck ROM: limited       Cardiovascular   Rhythm: regular  Rate: abnormal     Dental - normal exam           Pulmonary   (+) decreased breath sounds     Abdominal    Neurological - normal exam                 Anesthesia Plan    ASA 4- EMERGENT   ASA physical status 4 criteria: acute alcohol or substance abuse withdrawalASA physical status emergent criteria: cardiac compromise requiring immediate intervention    Plan - MAC               Induction: intravenous      Pertinent diagnostic labs and testing reviewed    Informed Consent:    Anesthetic plan and risks discussed with patient.

## 2023-05-23 NOTE — PROGRESS NOTES
Discharge note RN     Discharged to home by car with relative. Discharged via walking, hospital escort: Refused.  Special equipment needed: Not Applicable    Be sure to schedule a follow-up appointment with your primary care doctor or any specialists as instructed.     Discharge Plan: Pt eager to be discharged. Reviewed AVS with pt. Pt not willing to wait for pharmacy to deliver medication dose before departing. Pt walked out with wife and child and reported he will go  medication at pharmacy and take first dose as MD ordered.

## 2023-05-23 NOTE — OR NURSING
Patient AAOx4, calm, denies pain and shortness of breath post QUENTIN cardioversion. VSS, afebrile, NSR 79. 12 lead EKG complete. Patient updating family on status. Report given to Suzy LO. Patient transferred to room with zoll monitor.

## 2023-05-23 NOTE — ED NOTES
Pt ambulatory from lobby to room. Pt connected to monitors. PIV access obtained. Pt provided call light and educated on use.

## 2023-05-23 NOTE — CARE PLAN
The patient is Watcher - Medium risk of patient condition declining or worsening         Progress made toward(s) clinical / shift goals:  HR controlled briefly, down to 80 bpm for 20 minute period. Ativan given as appropriate. Metoprolol used to treat hr, tolerated well.  Problem: Pain - Standard  Goal: Alleviation of pain or a reduction in pain to the patient’s comfort goal  5/23/2023 0725 by Arvind Becerra R.N.  Outcome: Progressing  5/23/2023 0725 by Arvind Becerra R.N.  Outcome: Progressing     Problem: Knowledge Deficit - Standard  Goal: Patient and family/care givers will demonstrate understanding of plan of care, disease process/condition, diagnostic tests and medications  Outcome: Progressing     Problem: Psychosocial  Goal: Patient's level of anxiety will decrease  Outcome: Progressing     Problem: Hemodynamics  Goal: Patient's hemodynamics, fluid balance and neurologic status will be stable or improve  Outcome: Progressing       Patient is not progressing towards the following goals: When stood to use bathroom HR no longer below 100.

## 2023-05-23 NOTE — TELEPHONE ENCOUNTER
Chart review shows pt went to ED and had Cardioversion today.     Arvind Be M.D.  Marina Bowman R.N. 23 hours ago (4:49 PM)     He needs to have a QUENTIN and DCCV. If he goes to the ER he wouldn't be able to get this done until tomorrow at the earliest FYI

## 2023-05-23 NOTE — PROGRESS NOTES
Patient transported to [place] [address] at 2315 from [facility] via wheelchair.   Gtts verified with Anay LO from RN.   Patient accompanied by wife Melanie dunlap aside from phone and shoes taken home with her.    HR: 170s upon arrival to unit, BP 120s systolic, 97.9F, 98% on room air.    Labs drawn and sent, orders verified.    4 Eyes Skin Assessment Completed by TERESITA Zelaya and TERESITA Raphael.    Head WDL facial redness  Ears WDL  Nose WDL  Mouth WDL  Neck WDL  Breast/Chest WDL  Shoulder Blades WDL Scar on left anterior shoulder  Spine WDL  (R) Arm/Elbow/Hand WDL  (L) Arm/Elbow/Hand WDL  Abdomen WDL  Groin WDL  Scrotum/Coccyx/Buttocks WDL  (R) Leg Scar  (L) Leg Scar  (R) Heel/Foot/Toe WDL  (L) Heel/Foot/Toe Scab knee          Devices In Places Tele Box, Blood Pressure Cuff, Pulse Ox, and SCD's      Interventions In Place NC W/Ear Foams    Possible Skin Injury No    Pictures Uploaded Into Epic N/A  Wound Consult Placed N/A  RN Wound Prevention Protocol Ordered No

## 2023-05-23 NOTE — PROCEDURES
Patient was brought to cath lab holding.  Consent was obtained. Risks and benefits of procedures were explained.    Anesthesia was used for sedation process.    Indication: persistent atrial flutter. To restore sinus rhythm.    Complication: none.    I personally inserted the QUENTIN probe.    Diagnosis: no evidence of left atrial appendage thrombus.     Cardioversion was done successfully.        Heather Mays MD.

## 2023-05-24 ENCOUNTER — PATIENT MESSAGE (OUTPATIENT)
Dept: MEDICAL GROUP | Facility: LAB | Age: 58
End: 2023-05-24
Payer: COMMERCIAL

## 2023-05-24 DIAGNOSIS — R79.89 ELEVATED LFTS: ICD-10-CM

## 2023-05-24 DIAGNOSIS — F90.2 ADHD (ATTENTION DEFICIT HYPERACTIVITY DISORDER), COMBINED TYPE: ICD-10-CM

## 2023-05-24 DIAGNOSIS — F33.1 MAJOR DEPRESSIVE DISORDER, RECURRENT EPISODE, MODERATE (HCC): ICD-10-CM

## 2023-05-26 ENCOUNTER — TELEPHONE (OUTPATIENT)
Dept: MEDICAL GROUP | Facility: LAB | Age: 58
End: 2023-05-26

## 2023-05-26 DIAGNOSIS — F41.9 ANXIETY: ICD-10-CM

## 2023-05-26 RX ORDER — VENLAFAXINE HYDROCHLORIDE 37.5 MG/1
37.5 CAPSULE, EXTENDED RELEASE ORAL DAILY
Qty: 90 CAPSULE | Refills: 0 | Status: SHIPPED | OUTPATIENT
Start: 2023-05-26 | End: 2023-06-15

## 2023-05-26 NOTE — TELEPHONE ENCOUNTER
MEDICATION PRIOR AUTHORIZATION NEEDED:    1. Name of Medication: Desvenlafaxine Succinate ER 25 MG TABLET SR 24 HR    2. Requested By (Name of Pharmacy):   [facility] [place]  P: [phone]  F: [phone]     3. Is insurance on file current? Yes    4. What is the name & phone number of the 3rd party payor?   [employer]  P: [phone]  F: [phone]    Prior auth submitted to plan through CoverMyMeds, waiting for possible approval.   Key: [id number]

## 2023-05-26 NOTE — TELEPHONE ENCOUNTER
Changes Requested     venlafaxine XR (EFFEXOR XR) 37.5 MG CAPSULE SR 24 HR         Changed from: Desvenlafaxine Succinate ER 25 MG TABLET SR 24 HR    Sig: Take 1 Tablet by mouth every day.    Disp:  Not specified    Refills:  0    Start: 5/26/2023    Class: Normal    Non-formulary For: Anxiety    Last ordered: 1 month ago by Nani Guevara M.D.     Rx #: [id number]    Pharmacy comment: Alternative Requested:PRIOR AUTH DENIED.      Received request via: Pharmacy    Was the patient seen in the last year in this department? Yes    Does the patient have an active prescription (recently filled or refills available) for medication(s) requested? No    Does the patient have correction Plus and need 100 day supply (blood pressure, diabetes and cholesterol meds only)? Patient does not have SCP

## 2023-06-01 ENCOUNTER — OFFICE VISIT (OUTPATIENT)
Dept: MEDICAL GROUP | Facility: LAB | Age: 58
End: 2023-06-01
Payer: COMMERCIAL

## 2023-06-01 VITALS
BODY MASS INDEX: 27.67 KG/M2 | HEIGHT: 73 IN | SYSTOLIC BLOOD PRESSURE: 100 MMHG | DIASTOLIC BLOOD PRESSURE: 64 MMHG | OXYGEN SATURATION: 94 % | HEART RATE: 98 BPM | WEIGHT: 208.8 LBS | RESPIRATION RATE: 12 BRPM | TEMPERATURE: 97.4 F

## 2023-06-01 DIAGNOSIS — F10.10 ALCOHOL ABUSE: ICD-10-CM

## 2023-06-01 DIAGNOSIS — R06.81 WITNESSED EPISODE OF APNEA: ICD-10-CM

## 2023-06-01 DIAGNOSIS — R06.83 SNORING: ICD-10-CM

## 2023-06-01 DIAGNOSIS — R79.89 ELEVATED LFTS: ICD-10-CM

## 2023-06-01 DIAGNOSIS — I48.92 ATRIAL FLUTTER WITH RAPID VENTRICULAR RESPONSE (HCC): ICD-10-CM

## 2023-06-01 PROCEDURE — 99215 OFFICE O/P EST HI 40 MIN: CPT | Performed by: FAMILY MEDICINE

## 2023-06-01 PROCEDURE — 3078F DIAST BP <80 MM HG: CPT | Performed by: FAMILY MEDICINE

## 2023-06-01 PROCEDURE — 3074F SYST BP LT 130 MM HG: CPT | Performed by: FAMILY MEDICINE

## 2023-06-01 RX ORDER — SYRINGE WITH NEEDLE, 1 ML 25GX5/8"
SYRINGE, EMPTY DISPOSABLE MISCELLANEOUS
COMMUNITY
Start: 2023-04-20 | End: 2023-10-16

## 2023-06-01 ASSESSMENT — FIBROSIS 4 INDEX: FIB4 SCORE: 1.19

## 2023-06-01 NOTE — PROGRESS NOTES
"Subjective:     CC: Hospital follow-up, medications, sleep study referral    HPI:   Tom is a 57-year-old male with a history of colitis, depression, alcohol abuse, and multiple recent hospitalizations.  Recently hospitalized in the ICU for new onset a flutter with RVR.  He had been hospitalized shortly prior and left AMA before cardioversion could be done.  Cardioversion was completed after he was readmitted after presenting back to the ER.  Was also admitted in April for alcohol withdrawal.    Has questions today regarding medications, has been noticing low blood pressure at home and feeling \"out of it \".  He brings some of his medications in and it appears he is taking propranolol which was not on his medication list and losartan which should have been stopped on hospital discharge.    Would also like referral for sleep study.  He has chronic snoring and wife reports witnessed apnea.    Other history is difficult to obtain as he frequently asks the same questions or repeats previous concerns.    Medications, past medical history, allergies, and social history have been reviewed and updated.      Objective:       Exam:  /64 (BP Location: Left arm, Patient Position: Sitting, BP Cuff Size: Adult)   Pulse 98   Temp 36.3 °C (97.4 °F)   Resp 12   Ht 1.854 m (6' 1\")   Wt 94.7 kg (208 lb 12.8 oz)   SpO2 94%   BMI 27.55 kg/m²  Body mass index is 27.55 kg/m².    Constitutional: Chronically ill-appearing.  Skin: Warm, dry, good turgor, no visible rashes.  ENMT: Moist mucous membranes. Normal dentition.  Respiratory: Normal effort. Lungs are clear to auscultation bilaterally.  Cardiovascular: Regular rate and rhythm. Normal S1/S2. No murmurs, rubs or gallops.   Neuro: Moves all four extremities. No facial droop.  Psych: Frequently repeats himself, words are slurred.    Assessment & Plan:     57 y.o. male with the following -     1. Atrial flutter with rapid ventricular response (HCC)  Multiple recent " hospitalizations, most recently responded to cardioversion.  Medications clarified today, he was taking propranolol and losartan in addition to his discharge medications.  He is advised to stop propanolol and losartan.  Continue the metoprolol flecainide, and Eliquis.  Discussed that some of his lightheadedness and low BP at home could have been secondary to the propranolol and losartan and this should improve.  Extensively discussed importance of following up with cardiology and ER precautions.    Repeatedly attempted to clarify his medications with him today as he does appear confused and was taking medications that should have been discontinued on discharge.    2. Snoring  3. Witnessed episode of apnea  He is requesting order for sleep study today.Stop bang score. 7 (6/1/2023 11:21 AM).  Untreated DANIEL could be contributing to his chronic medical issues.  Order for sleep study and referral to sleep medicine  - Polysomnography, 4 or More; Future  - Referral to Pulmonary and Sleep Medicine    4. Elevated LFTs  5. Alcohol abuse  Admitted for alcohol withdrawal in April.  Reports today that he has not been drinking for at least 14 days but does appear impaired during the visit.  Repeatedly discussed the importance of alcohol cessation and its negative effects on his chronic medical conditions.  Recheck LFTs.  - Comp Metabolic Panel; Future    My total time spent caring for the patient on the day of the encounter was >40 minutes.   This does not include time spent on separately billable procedures/tests.      Please note that this note was created using voice recognition software.

## 2023-06-01 NOTE — PATIENT INSTRUCTIONS
SLEEP STUDY INSTRUCTIONS    1. Our main concern is to provide the best test and evaluation of your sleep and your cooperation in following the guidelines is very necessary.    2. We have no facilities for family members or guests at the sleep center. Special arrangements will be made for children requiring overnight sleep studies.    3. Unless otherwise instructed, AVOID alcoholic beverages on the day of your sleep study.    4. DO NOT drink coffee or caffeine-containing beverages after 12:00 noon on the day of your sleep study.    5. There is NO smoking at the sleep center.    6. Try to maintain a usual daytime schedule prior to the study (avoid unusual physical activity or meals).    7. DO NOT take a nap on the day of your study.    8. This is an outpatient procedure (test); therefore, nursing services, medications, and meals ARE NOT provided. If you take medications, bring them with you and take them on the schedule you do at home.    9. Please fill your sleep aid prescription (Ambien or Lunesta) and bring to your sleep study. Even patients who normally have no problem going to sleep often need a sleep aid in this different environment.    10. We ask that you wear conventional sleep attire (pajamas or sweats) for the sleep study. We discourage patients from wearing only their underwear to bed. We recommend two-piece pajamas as the techs will need to apply sensors to your stomach.    11. Please shampoo your hair the day of the sleep study. Please DO NOT use any other hair or skin products before your arrival (e.g., mousse, gel, hair or body spray, perfume, body lotion etc.) NOTE: Women should not wear heavy makeup prior to arrival as some wires are taped to the face area.    12. The technician will be applying several small electrodes to the scalp, eye area, chin, chest, and legs, plus respiratory effort belts around the chest. Also, there will be a device placed directly under the nose. (THIS WILL NOT OBSTRUCT  YOUR BREATHING.) This is a painless procedure and the skin is not broken.    13. The test is generally completed in six to eight hours; We are usually done between 6 - 7 a.m., unless you are scheduled for a nap study. You may need to come back another night for a second study to complete your treatment plan.    14. Patients who are scheduled for an MSLT (nap study) will stay at the sleep center for the day following their nighttime study. You will be notified if a nap study was ordered for you at the time the night study is scheduled. Generally, patients having a nap study will leave the sleep center by 4 p.m.    15. You will need to bring food for the following day if you are scheduled for a nap study. A refrigerator and microwave are available.    16. A bathroom is available for your use.    17. We are able to adjust the room temperature for your comfort. Please let the technologist know if you are uncomfortable during the study.    18. If you sleep better with a special pillow or stuffed animal, you may bring it along. Service animals are the only live animals permitted.    19. Cable T.V. is available.    20. You will be scheduled for a follow-up appointment three to five days after the sleep study to review your results.    21. A copy of your sleep study is sent to the referring physician approximately two weeks after your study.    22. Any questions can be directed to our staff at [phone].    23. If CPAP therapy is applied, a home unit will be ordered for you through the [employer] company. You will be contacted to schedule delivery after insurance authorization.

## 2023-06-02 ENCOUNTER — PATIENT MESSAGE (OUTPATIENT)
Dept: MEDICAL GROUP | Facility: LAB | Age: 58
End: 2023-06-02
Payer: COMMERCIAL

## 2023-06-02 DIAGNOSIS — I48.92 ATRIAL FLUTTER WITH RAPID VENTRICULAR RESPONSE (HCC): ICD-10-CM

## 2023-06-02 DIAGNOSIS — K51.00 ULCERATIVE PANCOLITIS WITHOUT COMPLICATION (HCC): ICD-10-CM

## 2023-06-05 ENCOUNTER — TELEPHONE (OUTPATIENT)
Dept: SCHEDULING | Facility: IMAGING CENTER | Age: 58
End: 2023-06-05
Payer: COMMERCIAL

## 2023-06-05 NOTE — TELEPHONE ENCOUNTER
Caller: Tom Quan    Topic/issue: ABLATION    Patient is requesting that Dr. Drummond order a Cardio Ablation for his recurring AFIB. He would like to also request that Dr. Drummond add Dr. Dickens to his cardio care team so that he is able to get this procedure. Please advise.    Thank you,  Juan Manuel OCHOA    Callback Number: [phone] (home)

## 2023-06-06 ENCOUNTER — TELEPHONE (OUTPATIENT)
Dept: MEDICAL GROUP | Facility: LAB | Age: 58
End: 2023-06-06
Payer: COMMERCIAL

## 2023-06-06 ENCOUNTER — PATIENT MESSAGE (OUTPATIENT)
Dept: MEDICAL GROUP | Facility: LAB | Age: 58
End: 2023-06-06
Payer: COMMERCIAL

## 2023-06-06 ENCOUNTER — DOCUMENTATION (OUTPATIENT)
Dept: VASCULAR LAB | Facility: MEDICAL CENTER | Age: 58
End: 2023-06-06
Payer: COMMERCIAL

## 2023-06-06 DIAGNOSIS — K51.00 ULCERATIVE PANCOLITIS WITHOUT COMPLICATION (HCC): ICD-10-CM

## 2023-06-06 RX ORDER — USTEKINUMAB 45 MG/.5ML
90 INJECTION, SOLUTION SUBCUTANEOUS
Qty: 1 ML | Refills: 5 | Status: SHIPPED | OUTPATIENT
Start: 2023-06-06 | End: 2023-06-07 | Stop reason: SDUPTHER

## 2023-06-06 NOTE — PROGRESS NOTES
Per Ct LUCERO , left  in regards :     Please call the pt and let them know to reach out to cardiology (dr. Dickens) regarding his ablation discussion.     Provided cardiology P# in message

## 2023-06-07 NOTE — TELEPHONE ENCOUNTER
Pharmacy sent a request asking for clarification on Stelara medication.     Per our records patient has been getting this medication with the frequency as 4 weeks. Please confirm change to every 60 days is correct.     Please advise.

## 2023-06-08 RX ORDER — USTEKINUMAB 45 MG/.5ML
90 INJECTION, SOLUTION SUBCUTANEOUS
Qty: 1 ML | Refills: 1 | Status: SHIPPED | OUTPATIENT
Start: 2023-06-08 | End: 2023-10-16

## 2023-06-15 ENCOUNTER — OFFICE VISIT (OUTPATIENT)
Dept: MEDICAL GROUP | Facility: LAB | Age: 58
End: 2023-06-15
Payer: COMMERCIAL

## 2023-06-15 VITALS
SYSTOLIC BLOOD PRESSURE: 102 MMHG | TEMPERATURE: 96.9 F | WEIGHT: 208 LBS | HEIGHT: 73 IN | HEART RATE: 99 BPM | BODY MASS INDEX: 27.57 KG/M2 | DIASTOLIC BLOOD PRESSURE: 70 MMHG | OXYGEN SATURATION: 97 %

## 2023-06-15 DIAGNOSIS — I70.0 ABDOMINAL AORTIC ATHEROSCLEROSIS (HCC): ICD-10-CM

## 2023-06-15 DIAGNOSIS — I48.92 ATRIAL FLUTTER WITH RAPID VENTRICULAR RESPONSE (HCC): ICD-10-CM

## 2023-06-15 DIAGNOSIS — F10.10 ALCOHOL ABUSE: ICD-10-CM

## 2023-06-15 DIAGNOSIS — K51.00 ULCERATIVE PANCOLITIS WITHOUT COMPLICATION (HCC): ICD-10-CM

## 2023-06-15 DIAGNOSIS — F33.1 MODERATE EPISODE OF RECURRENT MAJOR DEPRESSIVE DISORDER (HCC): ICD-10-CM

## 2023-06-15 PROCEDURE — 3074F SYST BP LT 130 MM HG: CPT | Performed by: FAMILY MEDICINE

## 2023-06-15 PROCEDURE — 99214 OFFICE O/P EST MOD 30 MIN: CPT | Performed by: FAMILY MEDICINE

## 2023-06-15 PROCEDURE — 3078F DIAST BP <80 MM HG: CPT | Performed by: FAMILY MEDICINE

## 2023-06-15 RX ORDER — DESVENLAFAXINE 25 MG/1
1 TABLET, EXTENDED RELEASE ORAL DAILY
Qty: 90 TABLET | Refills: 3 | Status: ON HOLD | OUTPATIENT
Start: 2023-06-15 | End: 2023-07-14

## 2023-06-15 ASSESSMENT — FIBROSIS 4 INDEX: FIB4 SCORE: 1.19

## 2023-06-15 NOTE — PROGRESS NOTES
"Subjective:     CC: Follow-up, medication issues    HPI:   Tom is a 57-year-old male with a history of ulcerative colitis, depression, and new onset a flutter with RVR presents to follow-up on recent issues with medication approval.    He is on Stelara through gastroenterology for his ulcerative colitis, he was using this every 30 days instead of the usual 60 days.  His prior insurance had approved this but no insurance denied the frequency.    His Pristiq was also denied as this was not sent in under diagnosis of MDD.    Has appointment with electrophysiology next week to discuss the A-flutter with RVR.  He is wondering if an ablation would be an option for him.  Denies recurrence of palpitations or rapid heart rate.    Medications, past medical history, allergies, and social history have been reviewed and updated.      Objective:       Exam:  /70 (BP Location: Left arm, Patient Position: Sitting, BP Cuff Size: Large adult)   Pulse 99   Temp 36.1 °C (96.9 °F)   Ht 1.854 m (6' 1\")   Wt 94.3 kg (208 lb)   SpO2 97%   BMI 27.44 kg/m²  Body mass index is 27.44 kg/m².    Constitutional: Alert. Well appearing. No distress.  Skin: Warm, dry, good turgor, no visible rashes.  ENMT: Moist mucous membranes. Normal dentition.  Respiratory: Normal effort. Lungs are clear to auscultation bilaterally.  Cardiovascular: Regular rate and rhythm. Normal S1/S2. No murmurs, rubs or gallops.   Neuro: Moves all four extremities. No facial droop.  Psych: Answers questions appropriately. Normal affect and mood.      Assessment & Plan:     57 y.o. male with the following -     1. Atrial flutter with rapid ventricular response (HCC)  Cardioverted during hospitalization last month.  Continue metoprolol and flecainide.  Blood pressure has improved after stopping his propranolol and losartan.  His appointment with EP next week.    2. Ulcerative pancolitis without complication (HCC)  Continue Stelara per gastroenterology, he is " working on authorization for the 30 day frequency through them.    3. Alcohol abuse  Reports significant decrease in use.  Again encouraged complete cessation.    4. Moderate episode of recurrent major depressive disorder (HCC)  Has done well with Pristiq and this is continued. Had briefly been on venlafaxine but will switch back.  - Desvenlafaxine Succinate ER 25 MG TABLET SR 24 HR; Take 1 Tablet by mouth every day.  Dispense: 90 Tablet; Refill: 3    5. Abdominal aortic atherosclerosis (HCC)  Stable on recent CTA, BP well controlled and he is on beta-blocker as above.  Continue statin.  Losartan recently stopped due to low blood pressure.  Consider adding back low-dose ARB or ACE in the future but hold off for now.      Please note that this note was created using voice recognition software.

## 2023-06-21 ENCOUNTER — OFFICE VISIT (OUTPATIENT)
Dept: CARDIOLOGY | Facility: MEDICAL CENTER | Age: 58
End: 2023-06-21
Attending: INTERNAL MEDICINE
Payer: COMMERCIAL

## 2023-06-21 ENCOUNTER — TELEPHONE (OUTPATIENT)
Dept: CARDIOLOGY | Facility: MEDICAL CENTER | Age: 58
End: 2023-06-21
Payer: COMMERCIAL

## 2023-06-21 VITALS
HEART RATE: 82 BPM | HEIGHT: 73 IN | OXYGEN SATURATION: 92 % | DIASTOLIC BLOOD PRESSURE: 66 MMHG | SYSTOLIC BLOOD PRESSURE: 108 MMHG | WEIGHT: 215 LBS | RESPIRATION RATE: 16 BRPM | BODY MASS INDEX: 28.49 KG/M2

## 2023-06-21 DIAGNOSIS — I71.21 ANEURYSM OF ASCENDING AORTA WITHOUT RUPTURE (HCC): ICD-10-CM

## 2023-06-21 DIAGNOSIS — F10.930 ALCOHOL WITHDRAWAL SYNDROME WITHOUT COMPLICATION (HCC): ICD-10-CM

## 2023-06-21 DIAGNOSIS — E78.2 MIXED HYPERLIPIDEMIA: ICD-10-CM

## 2023-06-21 DIAGNOSIS — I10 ESSENTIAL (PRIMARY) HYPERTENSION: ICD-10-CM

## 2023-06-21 DIAGNOSIS — F10.10 ALCOHOL ABUSE: ICD-10-CM

## 2023-06-21 DIAGNOSIS — K70.10 ACUTE ALCOHOLIC HEPATITIS: ICD-10-CM

## 2023-06-21 DIAGNOSIS — I48.92 ATRIAL FLUTTER WITH RAPID VENTRICULAR RESPONSE (HCC): ICD-10-CM

## 2023-06-21 LAB — EKG IMPRESSION: NORMAL

## 2023-06-21 PROCEDURE — 93010 ELECTROCARDIOGRAM REPORT: CPT | Mod: 26 | Performed by: INTERNAL MEDICINE

## 2023-06-21 PROCEDURE — 3074F SYST BP LT 130 MM HG: CPT | Performed by: INTERNAL MEDICINE

## 2023-06-21 PROCEDURE — 99213 OFFICE O/P EST LOW 20 MIN: CPT | Performed by: INTERNAL MEDICINE

## 2023-06-21 PROCEDURE — 99204 OFFICE O/P NEW MOD 45 MIN: CPT | Performed by: INTERNAL MEDICINE

## 2023-06-21 PROCEDURE — 93005 ELECTROCARDIOGRAM TRACING: CPT | Performed by: INTERNAL MEDICINE

## 2023-06-21 PROCEDURE — 99212 OFFICE O/P EST SF 10 MIN: CPT | Performed by: INTERNAL MEDICINE

## 2023-06-21 PROCEDURE — 3078F DIAST BP <80 MM HG: CPT | Performed by: INTERNAL MEDICINE

## 2023-06-21 ASSESSMENT — FIBROSIS 4 INDEX: FIB4 SCORE: 1.19

## 2023-06-21 NOTE — TELEPHONE ENCOUNTER
Marina,    Can you please enter the orders for this procedure?    Flutter ablation w/QUENTIN    Thank You,  Aby

## 2023-06-21 NOTE — PROGRESS NOTES
Chief Complaint   Patient presents with    New Patient     N/P Dx:Atrial flutter with rapid ventricular response (HCC)       Subjective     Tom Quan is a 57 y.o. male who presents today with history of atrial flutter status post cardioversion.  Alcohol use but he denies excess.  Chart states that he drinks excessively.  Currently on flecainide and metoprolol.  Dizziness on standing since being on current meds  On anticoagulation.   has children, works.  Hypertension and ascending aortic aneurysm being monitored.  Normal TFTs.  Normal EF.    Past Medical History:   Diagnosis Date    Depression     Hyperlipidemia     Ulcerative colitis (HCC)      Past Surgical History:   Procedure Laterality Date    HIP REPLACEMENT, PARTIAL       Family History   Problem Relation Age of Onset    Diabetes Mother     Other Mother         smoker+, unclear cause of death    Hyperlipidemia Sister     Hyperlipidemia Brother     Cancer Paternal Uncle         Prostate    Heart Attack Maternal Grandfather          55, tobacco+    Other Son         takayasu's arteritis    Abdominal aortic aneurysm Neg Hx     Arterial Aneurysm Neg Hx      Social History     Socioeconomic History    Marital status:      Spouse name: Not on file    Number of children: Not on file    Years of education: Not on file    Highest education level: Not on file   Occupational History    Not on file   Tobacco Use    Smoking status: Never    Smokeless tobacco: Never   Vaping Use    Vaping Use: Former   Substance and Sexual Activity    Alcohol use: Yes     Comment: Occasionally    Drug use: Never    Sexual activity: Not on file   Other Topics Concern    Not on file   Social History Narrative    Not on file     Social Determinants of Health     Financial Resource Strain: Not on file   Food Insecurity: Not on file   Transportation Needs: Not on file   Physical Activity: Not on file   Stress: Not on file   Social Connections: Not on file   Intimate  "Partner Violence: Not on file   Housing Stability: Not on file     No Known Allergies  Outpatient Encounter Medications as of 6/21/2023   Medication Sig Dispense Refill    Desvenlafaxine Succinate ER 25 MG TABLET SR 24 HR Take 1 Tablet by mouth every day. 90 Tablet 3    ustekinumab (STELARA) 45 MG/0.5ML Solution Prefilled Syringe injection Inject 1 mL under the skin every 30 (thirty) days. 1 mL 1    B-D 3CC LUER-RONNA SYR 50NF3-7/2 23G X 1-1/2\" 3 ML Misc TO BE USED FOR DEPO-TESTOSTERONE INJECTIONS      apixaban (ELIQUIS) 5mg Tab Take 1 Tablet by mouth 2 times a day. Indications: Thromboembolism secondary to Atrial Fibrillation 60 Tablet 3    flecainide (TAMBOCOR) 50 MG tablet Take 1 Tablet by mouth 2 times a day. 60 Tablet 3    Cholecalciferol (VITAMIN D) 2000 UNIT Tab Take 4,000 Units by mouth every day.      multivitamin Tab Take 2 Tablets by mouth every day.      Thiamine HCl (VITAMIN B-1) 250 MG Tab Take 250 mg by mouth every day.      B Complex Vitamins (VITAMIN B COMPLEX PO) Take 1 Tablet by mouth every day.      ondansetron (ZOFRAN ODT) 4 MG TABLET DISPERSIBLE Take 1 Tablet by mouth every four hours as needed for Nausea/Vomiting (give PO if no IV route available). 10 Tablet 0    rosuvastatin (CRESTOR) 20 MG Tab Take 1 Tablet by mouth every evening. 90 Tablet 3    rizatriptan (MAXALT) 5 MG tablet Take 1-2 Tablets by mouth one time as needed for Migraine for up to 1 dose. 20 Tablet 3    omeprazole (PRILOSEC) 20 MG delayed-release capsule Take 20-40 mg by mouth 1 time a day as needed (Heartburn).      testosterone cypionate (DEPO-TESTOSTERONE) 200 MG/ML Solution injection Inject 200 mg into the shoulder, thigh, or buttocks every 7 days.      Omega-3 Fatty Acids (FISH OIL PO) Take 2 Capsules by mouth every 7 days.      [DISCONTINUED] metoprolol SR (TOPROL XL) 50 MG TABLET SR 24 HR Take 1 Tablet by mouth every evening. 30 Tablet 3    [DISCONTINUED] folic acid (FOLVITE) 1 MG Tab Take 1 Tablet by mouth every day. " "(Patient not taking: Reported on 6/21/2023) 30 Tablet 1     No facility-administered encounter medications on file as of 6/21/2023.     ROS           Objective     /66 (BP Location: Left arm, Patient Position: Sitting, BP Cuff Size: Adult)   Pulse 82   Resp 16   Ht 1.854 m (6' 1\")   Wt 97.5 kg (215 lb)   SpO2 92%   BMI 28.37 kg/m²     Physical Exam  Constitutional:       Appearance: He is well-developed.   HENT:      Head: Normocephalic and atraumatic.   Cardiovascular:      Rate and Rhythm: Normal rate and regular rhythm.      Heart sounds: No murmur heard.     No friction rub. No gallop.   Pulmonary:      Effort: Pulmonary effort is normal.      Breath sounds: Normal breath sounds.   Abdominal:      Palpations: Abdomen is soft.   Musculoskeletal:         General: Normal range of motion.      Cervical back: Normal range of motion and neck supple.   Skin:     General: Skin is warm and dry.   Neurological:      Mental Status: He is alert and oriented to person, place, and time.   Psychiatric:         Behavior: Behavior normal.         Thought Content: Thought content normal.         Judgment: Judgment normal.                Assessment & Plan     1. Atrial flutter with rapid ventricular response (HCC)  EKG      2. Alcohol withdrawal syndrome without complication (HCC)        3. Acute alcoholic hepatitis        4. Aneurysm of ascending aorta without rupture (HCC)        5. Essential (primary) hypertension        6. Alcohol abuse        7. Mixed hyperlipidemia            Medical Decision Making: Today's Assessment/Status/Plan:   1.  Atrial flutter.  Some excess alcohol.  Sleep study pending.  DC Toprol.  Continue flecainide.  Patient wishes to have catheter ablation.  The risks, benefits, and alternatives to atrial flutter ablation were discussed in great detail, specific risks mentioned including bleeding, infection, arteriovenous fistula related to  sheath placement, cardiac perforation with possible " tamponade requiring pericardiocentesis or possibly open heart surgery. In addition, pneumothorax and hemothorax were mentioned with right internal jugular venous access. I also discussed the  possibility of permanent pacemaker placement due to to inadvertent AV block. In addition the risk of death, stroke and myocardial infarction were discussed. Success rate for typical atrial flutter at approximately 90%. Total complication rate estimated to be approximately 1%.  The patient verbalized understanding of these potential complications and wishes to proceed with this procedure.   The risks, benefits, and alternatives to transesophageal echocardiogram with IV sedation were discussed with the patient in specific detail, including oropharyngeal and esophageal traumas including hoarseness and dysphagia after the procedure. Rare cases demonstrating serious or fatal complications associated with transesophageal echocardiogram have been reported in the adult population, including cardiac, pulmonary and bleeding complications in less than 1% of people. Patients with an identified intracardiac thrombus are at increased risk for embolic events and this appears to be reduced with anticoagulant therapy. The patient verbalized understandings about these  possible complications and wishes to proceed with this procedure  2.  Anticoagulation continue.  3.  Hypertension.  4.  Ascending aortic aneurysm.  5.  Hyperlipidemia on Crestor.

## 2023-06-22 NOTE — TELEPHONE ENCOUNTER
Patient scheduled for flutter ablation w/QUENTIN on 8-18-23 with Dr. Dickens. Patient has been instructed to check in at 10:00 for 12:00 case time. Hold Flecainide 1 day before. Message sent to authorizations. Emailed Carto.

## 2023-06-23 ENCOUNTER — TELEPHONE (OUTPATIENT)
Dept: CARDIOLOGY | Facility: MEDICAL CENTER | Age: 58
End: 2023-06-23
Payer: COMMERCIAL

## 2023-06-24 ENCOUNTER — APPOINTMENT (OUTPATIENT)
Dept: RADIOLOGY | Facility: MEDICAL CENTER | Age: 58
End: 2023-06-24
Attending: EMERGENCY MEDICINE
Payer: COMMERCIAL

## 2023-06-24 ENCOUNTER — HOSPITAL ENCOUNTER (EMERGENCY)
Facility: MEDICAL CENTER | Age: 58
End: 2023-06-24
Attending: EMERGENCY MEDICINE
Payer: COMMERCIAL

## 2023-06-24 VITALS
OXYGEN SATURATION: 92 % | HEART RATE: 85 BPM | RESPIRATION RATE: 15 BRPM | WEIGHT: 209.44 LBS | DIASTOLIC BLOOD PRESSURE: 96 MMHG | BODY MASS INDEX: 27.76 KG/M2 | TEMPERATURE: 97.3 F | SYSTOLIC BLOOD PRESSURE: 142 MMHG | HEIGHT: 73 IN

## 2023-06-24 DIAGNOSIS — M54.6 ACUTE LEFT-SIDED THORACIC BACK PAIN: ICD-10-CM

## 2023-06-24 DIAGNOSIS — R00.2 PALPITATIONS: ICD-10-CM

## 2023-06-24 DIAGNOSIS — F41.9 ANXIETY: ICD-10-CM

## 2023-06-24 DIAGNOSIS — R07.9 CHEST PAIN, UNSPECIFIED TYPE: ICD-10-CM

## 2023-06-24 LAB
ALBUMIN SERPL BCP-MCNC: 4.3 G/DL (ref 3.2–4.9)
ALBUMIN/GLOB SERPL: 1.4 G/DL
ALP SERPL-CCNC: 61 U/L (ref 30–99)
ALT SERPL-CCNC: 50 U/L (ref 2–50)
ANION GAP SERPL CALC-SCNC: 16 MMOL/L (ref 7–16)
AST SERPL-CCNC: 56 U/L (ref 12–45)
BASOPHILS # BLD AUTO: 1.1 % (ref 0–1.8)
BASOPHILS # BLD: 0.1 K/UL (ref 0–0.12)
BILIRUB SERPL-MCNC: 0.8 MG/DL (ref 0.1–1.5)
BUN SERPL-MCNC: 11 MG/DL (ref 8–22)
CALCIUM ALBUM COR SERPL-MCNC: 8.9 MG/DL (ref 8.5–10.5)
CALCIUM SERPL-MCNC: 9.1 MG/DL (ref 8.5–10.5)
CHLORIDE SERPL-SCNC: 98 MMOL/L (ref 96–112)
CO2 SERPL-SCNC: 23 MMOL/L (ref 20–33)
CREAT SERPL-MCNC: 1.06 MG/DL (ref 0.5–1.4)
EKG IMPRESSION: NORMAL
EOSINOPHIL # BLD AUTO: 0.01 K/UL (ref 0–0.51)
EOSINOPHIL NFR BLD: 0.1 % (ref 0–6.9)
ERYTHROCYTE [DISTWIDTH] IN BLOOD BY AUTOMATED COUNT: 57.9 FL (ref 35.9–50)
ETHANOL BLD-MCNC: <10.1 MG/DL
GFR SERPLBLD CREATININE-BSD FMLA CKD-EPI: 82 ML/MIN/1.73 M 2
GLOBULIN SER CALC-MCNC: 3.1 G/DL (ref 1.9–3.5)
GLUCOSE SERPL-MCNC: 89 MG/DL (ref 65–99)
HCT VFR BLD AUTO: 49.2 % (ref 42–52)
HGB BLD-MCNC: 16.3 G/DL (ref 14–18)
IMM GRANULOCYTES # BLD AUTO: 0.03 K/UL (ref 0–0.11)
IMM GRANULOCYTES NFR BLD AUTO: 0.3 % (ref 0–0.9)
LYMPHOCYTES # BLD AUTO: 1.49 K/UL (ref 1–4.8)
LYMPHOCYTES NFR BLD: 15.8 % (ref 22–41)
MCH RBC QN AUTO: 31.1 PG (ref 27–33)
MCHC RBC AUTO-ENTMCNC: 33.1 G/DL (ref 32.3–36.5)
MCV RBC AUTO: 93.9 FL (ref 81.4–97.8)
MONOCYTES # BLD AUTO: 0.77 K/UL (ref 0–0.85)
MONOCYTES NFR BLD AUTO: 8.2 % (ref 0–13.4)
NEUTROPHILS # BLD AUTO: 7.01 K/UL (ref 1.82–7.42)
NEUTROPHILS NFR BLD: 74.5 % (ref 44–72)
NRBC # BLD AUTO: 0 K/UL
NRBC BLD-RTO: 0 /100 WBC (ref 0–0.2)
PLATELET # BLD AUTO: 274 K/UL (ref 164–446)
PMV BLD AUTO: 8.9 FL (ref 9–12.9)
POTASSIUM SERPL-SCNC: 4.1 MMOL/L (ref 3.6–5.5)
PROT SERPL-MCNC: 7.4 G/DL (ref 6–8.2)
RBC # BLD AUTO: 5.24 M/UL (ref 4.7–6.1)
SODIUM SERPL-SCNC: 137 MMOL/L (ref 135–145)
TROPONIN T SERPL-MCNC: 8 NG/L (ref 6–19)
TROPONIN T SERPL-MCNC: 8 NG/L (ref 6–19)
WBC # BLD AUTO: 9.4 K/UL (ref 4.8–10.8)

## 2023-06-24 PROCEDURE — 71275 CT ANGIOGRAPHY CHEST: CPT

## 2023-06-24 PROCEDURE — 82077 ASSAY SPEC XCP UR&BREATH IA: CPT

## 2023-06-24 PROCEDURE — 93005 ELECTROCARDIOGRAM TRACING: CPT | Performed by: EMERGENCY MEDICINE

## 2023-06-24 PROCEDURE — 84484 ASSAY OF TROPONIN QUANT: CPT

## 2023-06-24 PROCEDURE — 99285 EMERGENCY DEPT VISIT HI MDM: CPT

## 2023-06-24 PROCEDURE — 700111 HCHG RX REV CODE 636 W/ 250 OVERRIDE (IP): Performed by: EMERGENCY MEDICINE

## 2023-06-24 PROCEDURE — 85025 COMPLETE CBC W/AUTO DIFF WBC: CPT

## 2023-06-24 PROCEDURE — 93005 ELECTROCARDIOGRAM TRACING: CPT

## 2023-06-24 PROCEDURE — 96365 THER/PROPH/DIAG IV INF INIT: CPT

## 2023-06-24 PROCEDURE — 96375 TX/PRO/DX INJ NEW DRUG ADDON: CPT

## 2023-06-24 PROCEDURE — 71045 X-RAY EXAM CHEST 1 VIEW: CPT

## 2023-06-24 PROCEDURE — 700101 HCHG RX REV CODE 250: Performed by: EMERGENCY MEDICINE

## 2023-06-24 PROCEDURE — 80053 COMPREHEN METABOLIC PANEL: CPT

## 2023-06-24 PROCEDURE — 36415 COLL VENOUS BLD VENIPUNCTURE: CPT

## 2023-06-24 PROCEDURE — 700117 HCHG RX CONTRAST REV CODE 255: Performed by: EMERGENCY MEDICINE

## 2023-06-24 RX ORDER — HYDROXYZINE HYDROCHLORIDE 25 MG/1
25 TABLET, FILM COATED ORAL 3 TIMES DAILY PRN
Qty: 30 TABLET | Refills: 0 | Status: SHIPPED | OUTPATIENT
Start: 2023-06-24

## 2023-06-24 RX ORDER — LIDOCAINE 50 MG/G
1 PATCH TOPICAL EVERY 24 HOURS
Qty: 10 PATCH | Refills: 0 | Status: SHIPPED | OUTPATIENT
Start: 2023-06-24 | End: 2023-10-16

## 2023-06-24 RX ORDER — LIDOCAINE 50 MG/G
1 PATCH TOPICAL EVERY 24 HOURS
Status: DISCONTINUED | OUTPATIENT
Start: 2023-06-24 | End: 2023-06-24 | Stop reason: HOSPADM

## 2023-06-24 RX ORDER — LORAZEPAM 2 MG/ML
0.5 INJECTION INTRAMUSCULAR ONCE
Status: COMPLETED | OUTPATIENT
Start: 2023-06-24 | End: 2023-06-24

## 2023-06-24 RX ADMIN — LORAZEPAM 0.5 MG: 2 INJECTION INTRAMUSCULAR; INTRAVENOUS at 11:21

## 2023-06-24 RX ADMIN — LIDOCAINE PATCH 5% 1 PATCH: 700 PATCH TOPICAL at 12:48

## 2023-06-24 RX ADMIN — IOHEXOL 100 ML: 350 INJECTION, SOLUTION INTRAVENOUS at 12:00

## 2023-06-24 RX ADMIN — THIAMINE HYDROCHLORIDE: 100 INJECTION, SOLUTION INTRAMUSCULAR; INTRAVENOUS at 11:20

## 2023-06-24 ASSESSMENT — FIBROSIS 4 INDEX: FIB4 SCORE: 1.19

## 2023-06-24 NOTE — ED TRIAGE NOTES
".  Chief Complaint   Patient presents with    Rapid Heart Beat     Reports he has been in afib all night, cardioversion done previously this month. Scheduled for ablation next month.     Chest Pain     Constant since yesterday.     Palpitations       56 yo male ambulatory to triage for above complaint. Reports that he feels very anxious and almost like he has a broken rib, denies trauma.      Pt is alert and oriented, speaking in full sentences, follows commands and responds appropriately to questions.      Patient placed back in lobby and educated on triage process. Asked to inform RN of any changes.    BP (!) 141/97   Pulse 99   Temp 36.1 °C (97 °F) (Temporal)   Resp 18   Ht 1.854 m (6' 1\")   Wt 95 kg (209 lb 7 oz)   SpO2 98%   BMI 27.63 kg/m²     "

## 2023-06-24 NOTE — DISCHARGE INSTRUCTIONS
You were seen in the ER for palpitations as well as chest pain.  Thankfully your labs and imaging are reassuring.  I spoke with our on-call cardiologist who did not think that admitting you to the hospital or urgent/emergent ablation was indicated.  I will send a message to Dr. Dickens on your behalf and I recommend that you contact his office on Monday to follow-up.  I also strongly recommend that you take all of your medications as directed, especially the flecainide and Eliquis.  I did send in a prescription for Atarax on your behalf to help with your anxiety.  It can make you sleepy so please do not drink alcohol or drive after you take the medication.  If it does not make you sleepy then you are welcome to drive after taking it.  I also sent you in a prescription for Lidoderm patches to help with your back pain, use them as prescribed.  Please follow-up with your primary care physician this week for recheck.  Return with any new or worsening symptoms.  I hope you feel better soon!

## 2023-06-24 NOTE — ED NOTES
Pt ambulatory to  red 10. Pt c/o cp/palpitations pt states he believes he has been in Afib since last night hst of cardioversion last month for same. Pt noted to be in SR on monitor on arrival to room.

## 2023-06-24 NOTE — TELEPHONE ENCOUNTER
Received a phone call from the patient's wife that today, the patient started having palpitations today with HR around 100-110s. He also has associated nausea.  Recommended the patient to come to the ER for further evaluation and treatment.

## 2023-06-24 NOTE — ED NOTES
.Patient discharged in stable condition per orders. IV access removed - bandage applied. Wristband removed per protocol. Patient verbalized understanding of all discharge instructions. All belongings accounted for.

## 2023-06-24 NOTE — ED PROVIDER NOTES
ED Provider Note    CHIEF COMPLAINT  Chief Complaint   Patient presents with    Rapid Heart Beat     Reports he has been in afib all night, cardioversion done previously this month. Scheduled for ablation next month.     Chest Pain     Constant since yesterday.     Palpitations       EXTERNAL RECORDS REVIEWED  Outpatient Notes -patient was seen by Dr. Dickens, cardiac EP on 6/21/2023 for a flutter with rapid ventricular response s/p cardioversion.  Admitted to alcohol use but denied access  However chart states that he drinks excessively.  He is currently on flecainide and metoprolol.  He does have a history of hypertension and ascending aortic aneurysm which are being monitored.  EF is normal.  Toprol was discontinued but flecainide was continued.  Sleep study is pending.  Patient reportedly wished to have a catheter ablation.  He was on apixaban.  His wife contacted the on-call cardiologist yesterday via telephone reporting that he started having palpitations with his heart rate between 100 and 110 and associated nausea.  Patient was instructed to come to the ER for evaluation.    HPI/ROS  LIMITATION TO HISTORY   Select: : None  OUTSIDE HISTORIAN(S):  None    Tom Quan is a 57 y.o. male who presents with a chief complaint of palpitations, chest pain, shortness of breath, and dehydration.  Patient reports he was recently diagnosed with atrial fibrillation.  He has been prescribed flecainide and Eliquis but has occasionally missed a dose of each.  Last night, he developed heart palpitations as high as 125 bpm while he was lying down.  He had associated chest pain and shortness of breath.  His wife contacted the on-call cardiologist who recommended he come to the ER but he waited until today because he was hopeful that the symptoms would resolve without intervention.  He has significant anxiety around the symptoms.  He has not tried any medication for his symptoms other than that which has been prescribed.  The  "chest pain is located on his left side and radiates through to his back.  He denies that it is ripping or tearing and states instead that it feels like a broken bone.  He denies any trauma.  He denies any fevers, hemoptysis, leg swelling, history of DVT/PE.  He does have a known thoracic aortic aneurysm which is being monitored as well as hypertension.  He reports drinking approximately 3 alcoholic beverages daily.  He uses CBD Gummies on occasion for sleep.  He denies illicit drug use and does not smoke cigarettes.    PAST MEDICAL HISTORY   has a past medical history of Depression, Hyperlipidemia, and Ulcerative colitis (HCC).    SURGICAL HISTORY   has a past surgical history that includes hip replacement, partial.    FAMILY HISTORY  Family History   Problem Relation Age of Onset    Diabetes Mother     Other Mother         smoker+, unclear cause of death    Hyperlipidemia Sister     Hyperlipidemia Brother     Cancer Paternal Uncle         Prostate    Heart Attack Maternal Grandfather          55, tobacco+    Other Son         takayasu's arteritis    Abdominal aortic aneurysm Neg Hx     Arterial Aneurysm Neg Hx        SOCIAL HISTORY  Social History     Tobacco Use    Smoking status: Never    Smokeless tobacco: Never   Vaping Use    Vaping Use: Former   Substance and Sexual Activity    Alcohol use: Yes     Comment: 5 x a week    Drug use: Never     Comment: CBD    Sexual activity: Not on file       CURRENT MEDICATIONS  Home Medications       Reviewed by Angelina Henry R.N. (Registered Nurse) on 23 at 0931  Med List Status: Partial     Medication Last Dose Status   apixaban (ELIQUIS) 5mg Tab  Active   B Complex Vitamins (VITAMIN B COMPLEX PO)  Active   B-D 3CC LUER-RONNA SYR 76VB3-5/2 23G X 1-1/2\" 3 ML Misc  Active   Cholecalciferol (VITAMIN D) 2000 UNIT Tab  Active   Desvenlafaxine Succinate ER 25 MG TABLET SR 24 HR  Active   flecainide (TAMBOCOR) 50 MG tablet  Active   multivitamin Tab  Active   Omega-3 " "Fatty Acids (FISH OIL PO)  Active   omeprazole (PRILOSEC) 20 MG delayed-release capsule  Active   ondansetron (ZOFRAN ODT) 4 MG TABLET DISPERSIBLE  Active   rizatriptan (MAXALT) 5 MG tablet  Active   rosuvastatin (CRESTOR) 20 MG Tab  Active   testosterone cypionate (DEPO-TESTOSTERONE) 200 MG/ML Solution injection  Active   Thiamine HCl (VITAMIN B-1) 250 MG Tab  Active   ustekinumab (STELARA) 45 MG/0.5ML Solution Prefilled Syringe injection  Active                    ALLERGIES  No Known Allergies    PHYSICAL EXAM  VITAL SIGNS: BP (!) 141/97   Pulse 99   Temp 36.1 °C (97 °F) (Temporal)   Resp 18   Ht 1.854 m (6' 1\")   Wt 95 kg (209 lb 7 oz)   SpO2 98%   BMI 27.63 kg/m²    Physical Exam  Vitals and nursing note reviewed.   Constitutional:       Appearance: He is well-developed.   HENT:      Head: Normocephalic and atraumatic.   Eyes:      Extraocular Movements: Extraocular movements intact.      Pupils: Pupils are equal, round, and reactive to light.   Cardiovascular:      Rate and Rhythm: Normal rate and regular rhythm.      Heart sounds: Normal heart sounds.   Pulmonary:      Effort: Pulmonary effort is normal. No tachypnea or respiratory distress.      Breath sounds: Normal breath sounds. No wheezing or rhonchi.   Chest:      Chest wall: Mass present.   Abdominal:      Palpations: Abdomen is soft.      Tenderness: There is no abdominal tenderness.   Musculoskeletal:         General: Normal range of motion.      Cervical back: Normal range of motion and neck supple.      Right lower leg: No tenderness. No edema.      Left lower leg: No tenderness. No edema.      Comments: Tenderness to palpation over left thoracic paraspinal musculature   Skin:     General: Skin is warm and dry.   Neurological:      General: No focal deficit present.      Mental Status: He is alert and oriented to person, place, and time.   Psychiatric:         Mood and Affect: Mood is anxious.         Behavior: Behavior normal. "       DIAGNOSTIC STUDIES / PROCEDURES  LABS  Results for orders placed or performed during the hospital encounter of 06/24/23   CBC with Differential   Result Value Ref Range    WBC 9.4 4.8 - 10.8 K/uL    RBC 5.24 4.70 - 6.10 M/uL    Hemoglobin 16.3 14.0 - 18.0 g/dL    Hematocrit 49.2 42.0 - 52.0 %    MCV 93.9 81.4 - 97.8 fL    MCH 31.1 27.0 - 33.0 pg    MCHC 33.1 32.3 - 36.5 g/dL    RDW 57.9 (H) 35.9 - 50.0 fL    Platelet Count 274 164 - 446 K/uL    MPV 8.9 (L) 9.0 - 12.9 fL    Neutrophils-Polys 74.50 (H) 44.00 - 72.00 %    Lymphocytes 15.80 (L) 22.00 - 41.00 %    Monocytes 8.20 0.00 - 13.40 %    Eosinophils 0.10 0.00 - 6.90 %    Basophils 1.10 0.00 - 1.80 %    Immature Granulocytes 0.30 0.00 - 0.90 %    Nucleated RBC 0.00 0.00 - 0.20 /100 WBC    Neutrophils (Absolute) 7.01 1.82 - 7.42 K/uL    Lymphs (Absolute) 1.49 1.00 - 4.80 K/uL    Monos (Absolute) 0.77 0.00 - 0.85 K/uL    Eos (Absolute) 0.01 0.00 - 0.51 K/uL    Baso (Absolute) 0.10 0.00 - 0.12 K/uL    Immature Granulocytes (abs) 0.03 0.00 - 0.11 K/uL    NRBC (Absolute) 0.00 K/uL   Complete Metabolic Panel (CMP)   Result Value Ref Range    Sodium 137 135 - 145 mmol/L    Potassium 4.1 3.6 - 5.5 mmol/L    Chloride 98 96 - 112 mmol/L    Co2 23 20 - 33 mmol/L    Anion Gap 16.0 7.0 - 16.0    Glucose 89 65 - 99 mg/dL    Bun 11 8 - 22 mg/dL    Creatinine 1.06 0.50 - 1.40 mg/dL    Calcium 9.1 8.5 - 10.5 mg/dL    AST(SGOT) 56 (H) 12 - 45 U/L    ALT(SGPT) 50 2 - 50 U/L    Alkaline Phosphatase 61 30 - 99 U/L    Total Bilirubin 0.8 0.1 - 1.5 mg/dL    Albumin 4.3 3.2 - 4.9 g/dL    Total Protein 7.4 6.0 - 8.2 g/dL    Globulin 3.1 1.9 - 3.5 g/dL    A-G Ratio 1.4 g/dL   Troponins NOW   Result Value Ref Range    Troponin T 8 6 - 19 ng/L   CORRECTED CALCIUM   Result Value Ref Range    Correct Calcium 8.9 8.5 - 10.5 mg/dL   ESTIMATED GFR   Result Value Ref Range    GFR (CKD-EPI) 82 >60 mL/min/1.73 m 2   DIAGNOSTIC ALCOHOL   Result Value Ref Range    Diagnostic Alcohol <10.1 <10.1  mg/dL   TROPONIN   Result Value Ref Range    Troponin T 8 6 - 19 ng/L   EKG   Result Value Ref Range    Report       [facility] Emergency Dept.    Test Date:  2023  Pt Name:    SALVATORE WITT                  Department: ER  MRN:        [id number]                      Room:  Gender:     Male                         Technician: [id number]  :        1965                   Requested By:ER TRIAGE PROTOCOL  Order #:    [id number]                    Reading MD: Mega Blanton MD    Measurements  Intervals                                Axis  Rate:       95                           P:          57  IN:         174                          QRS:        21  QRSD:       86                           T:          27  QT:         358  QTc:        450    Interpretive Statements  Sinus rhythm  Compared to ECG 2023 12:58:31  ST (T wave) deviation no longer present  Electronically Signed On 2023 10:21:22 PDT by Mega Blanton MD       RADIOLOGY  I have independently interpreted the diagnostic imaging associated with this visit and am waiting the final reading from the radiologist.   My preliminary interpretation is as follows: No obvious pneumonia or fluid overload.    Radiologist interpretation:   CT-CTA COMPLETE THORACOABDOMINAL AORTA   Final Result         1. No evidence of aortic dissection .      2. Unchanged dilatation of the ascending aorta, measuring up to 4.8 cm      3. Redemonstration of aberrant origin of the right subclavian artery.      DX-CHEST-PORTABLE (1 VIEW)   Final Result      No radiographic evidence of acute cardiopulmonary process.        COURSE & MEDICAL DECISION MAKING    ED Observation Status? Yes; I am placing the patient in to an observation status due to a diagnostic uncertainty as well as therapeutic intensity. Patient placed in observation status at 10:17 AM, 2023.     Observation plan is as follows: Labs, imaging, consultation, medication    Upon Reevaluation, the  patient's condition has: Improved; and will be discharged.    Patient discharged from ED Observation status at 12:39 PM (Time) 6/24/2023 (Date).     INITIAL ASSESSMENT, COURSE AND PLAN  Care Narrative: This is a 57 year old male, recently diagnosed with atrial fibrillation, and scheduled for an ablation in August of this year who is here after having palpitations associated with chest pain and shortness of breath over the last 12 hours. He unfortunately has been intermittently non-compliant with his eliquis and flecainide but thankfully here he is in sinus rhythm with a normal rate. No indication for urgent/emergent cardioversion but certainly could have been pAF. Other considerations include ACS, PE, aortic dissection, anxiety, musculoskeletal, GERD, pneumonia, PTX, arrhythmia. He reports pain in the left side of his chest radiating through the back. Does have a known ascending thoracic aortic aneurysm which is being monitored outpatient. Will image this today to ensure it is not enlarging and to rule out associated dissection. Low suspicion for PE - he is not tachycardic or hypoxic - no leg swelling, hemoptysis, h/o DVT/PE, exogenous hormones, recent surgery. The chest and back pain is easily reproducible with palpation but there is no overlying erythema or fluctuance to suggest cellulitis or abscess. He denies any trauma but it is possible there is a muscle strain. He is anxious and intermittently tearful and I think that his anxiety around the recent atrial fibrillation diagnosis is certainly playing a role in his presentation today. He was given a dose of ativan with significant improvement.    EKG is reassuring without evidence for ischemia. CBC without anemia or leukocytosis. CXR does not suggest pneumonia, PTX, RAD, viral syndrome. Metabolic panel demonstrates normal electrolytes, glucose, renal function. AST is slightly elevated to 56 but ALT and total bilirubin are normal. Troponin x2 are negative. CTA  aorta is reassuring without interval change in the size of his known aneurysm. No acute abnormality is noted.    I spoke with Dr. Segal, on-call cardiologist, to ask if it was possible to have the patient's ablation performed as an inpatient due to his persistent symptoms. She did not feel this was indicated and instead recommended complete compliance with his medications. She did not feel hospitalization for stress test or catheterization was necessary and recommended outpatient follow up with Dr. Dickens. She will send Dr. Dickens a note. I also sent a note to Dr. Dickens detailing the reason for Mr. Quan's ER visit as well as his request that the procedure be moved up from August.     HEART score: 2. No indication for hospitalization presently.    Patient was reevaluated at bedside. He is resting comfortably. He reports significant improvement with ativan. We discussed that an ativan prescription from the ER is not appropriate but I did provide him a prescription for atarax and recommended he discuss his anxiety with his PCP. A lidoderm patch was placed over his back and he was given a prescription for the same. My suspicion is that the pain is most likely musculoskeletal in origin. I once again stressed the importance of full compliance with all prescribed medications. He was discharged in good and stable condition with strict return precautions.    HYDRATION: Based on the patient's presentation of Dehydration the patient was given IV fluids. IV Hydration was used because oral hydration was not adequate alone. Upon recheck following hydration, the patient was improved.  HTN/IDDM FOLLOW UP:  The patient is referred to a primary physician for blood pressure management, diabetic screening, and for all other preventive health concerns    ADDITIONAL PROBLEM LIST  None  DISPOSITION AND DISCUSSIONS  I have discussed management of the patient with the following physicians and GORGE's:  Dr. Segal, cardiology.    Discussion of  management with other Q or appropriate source(s): None     Escalation of care considered, and ultimately not performed:acute inpatient care management, however at this time, the patient is most appropriate for outpatient management    Barriers to care at this time, including but not limited to:  None .     Decision tools and prescription drugs considered including, but not limited to: HEART Score 2 .    FINAL DIAGNOSIS  1. Palpitations    2. Chest pain, unspecified type    3. Acute left-sided thoracic back pain    4. Anxiety      Electronically signed by: Mega Blanton M.D., 6/24/2023 10:17 AM

## 2023-06-27 ENCOUNTER — PATIENT MESSAGE (OUTPATIENT)
Dept: CARDIOLOGY | Facility: MEDICAL CENTER | Age: 58
End: 2023-06-27
Payer: COMMERCIAL

## 2023-06-29 ENCOUNTER — TELEPHONE (OUTPATIENT)
Dept: SCHEDULING | Facility: IMAGING CENTER | Age: 58
End: 2023-06-29
Payer: COMMERCIAL

## 2023-06-29 NOTE — TELEPHONE ENCOUNTER
My Chart message sent to this patient earlier this morning. Notifying him that I do not have any sooner availability at this time with Dr. Dickens. I have put him on the cancellation list and will call him if I am able to move his procedure sooner.

## 2023-06-29 NOTE — TELEPHONE ENCOUNTER
Caller: Tom Quan    Topic/issue: Tom called to see if there is anyway his ablation can be moved up sooner as he is experiencing discomfort    Callback Number: [phone]

## 2023-07-11 ENCOUNTER — APPOINTMENT (OUTPATIENT)
Dept: ADMISSIONS | Facility: MEDICAL CENTER | Age: 58
End: 2023-07-11
Attending: INTERNAL MEDICINE
Payer: COMMERCIAL

## 2023-07-11 NOTE — TELEPHONE ENCOUNTER
Patient has been rescheduled from 8-18-23 to 7-14-23 for a Flutter ablation w/QUENTIN w/anesthesia with . Patient was told to hold flecainide a day prior and to check in at 6:00 for a 7:30 procedure. H&P was done on 6-21-23 by Dr. Dickens. Pre admit to call patient. Carto notified on 7-11-23 by email.

## 2023-07-13 ENCOUNTER — PATIENT MESSAGE (OUTPATIENT)
Dept: MEDICAL GROUP | Facility: LAB | Age: 58
End: 2023-07-13
Payer: COMMERCIAL

## 2023-07-13 ENCOUNTER — PRE-ADMISSION TESTING (OUTPATIENT)
Dept: ADMISSIONS | Facility: MEDICAL CENTER | Age: 58
End: 2023-07-13
Attending: INTERNAL MEDICINE
Payer: COMMERCIAL

## 2023-07-13 DIAGNOSIS — F33.1 MODERATE EPISODE OF RECURRENT MAJOR DEPRESSIVE DISORDER (HCC): ICD-10-CM

## 2023-07-13 NOTE — TELEPHONE ENCOUNTER
FINAL PRIOR AUTHORIZATION STATUS:    1.  Name of Medication & Dose:  Desvenlafaxine Succinate ER 25 MG TABLET SR 24 HR    2. Prior Auth Status: Denied.  Reason: Not covered under plans preferred formulary.    3. Action Taken: Pharmacy Notified: no Patient Notified: yes    Prescription transferred to provider managing this medication.

## 2023-07-14 ENCOUNTER — ANESTHESIA EVENT (OUTPATIENT)
Dept: CARDIOLOGY | Facility: MEDICAL CENTER | Age: 58
End: 2023-07-14
Payer: COMMERCIAL

## 2023-07-14 ENCOUNTER — HOSPITAL ENCOUNTER (OUTPATIENT)
Facility: MEDICAL CENTER | Age: 58
End: 2023-07-14
Attending: INTERNAL MEDICINE | Admitting: INTERNAL MEDICINE
Payer: COMMERCIAL

## 2023-07-14 ENCOUNTER — APPOINTMENT (OUTPATIENT)
Dept: CARDIOLOGY | Facility: MEDICAL CENTER | Age: 58
End: 2023-07-14
Attending: INTERNAL MEDICINE
Payer: COMMERCIAL

## 2023-07-14 ENCOUNTER — ANESTHESIA (OUTPATIENT)
Dept: CARDIOLOGY | Facility: MEDICAL CENTER | Age: 58
End: 2023-07-14
Payer: COMMERCIAL

## 2023-07-14 VITALS
TEMPERATURE: 97.6 F | WEIGHT: 205.91 LBS | HEIGHT: 74 IN | HEART RATE: 103 BPM | RESPIRATION RATE: 18 BRPM | OXYGEN SATURATION: 93 % | DIASTOLIC BLOOD PRESSURE: 86 MMHG | SYSTOLIC BLOOD PRESSURE: 132 MMHG | BODY MASS INDEX: 26.43 KG/M2

## 2023-07-14 DIAGNOSIS — I48.92 ATRIAL FLUTTER WITH RAPID VENTRICULAR RESPONSE (HCC): ICD-10-CM

## 2023-07-14 LAB
ALBUMIN SERPL BCP-MCNC: 4.4 G/DL (ref 3.2–4.9)
ALBUMIN/GLOB SERPL: 1.4 G/DL
ALP SERPL-CCNC: 82 U/L (ref 30–99)
ALT SERPL-CCNC: 70 U/L (ref 2–50)
ANION GAP SERPL CALC-SCNC: 15 MMOL/L (ref 7–16)
AST SERPL-CCNC: 82 U/L (ref 12–45)
BILIRUB SERPL-MCNC: 1 MG/DL (ref 0.1–1.5)
BUN SERPL-MCNC: 11 MG/DL (ref 8–22)
CALCIUM ALBUM COR SERPL-MCNC: 9.1 MG/DL (ref 8.5–10.5)
CALCIUM SERPL-MCNC: 9.4 MG/DL (ref 8.5–10.5)
CHLORIDE SERPL-SCNC: 100 MMOL/L (ref 96–112)
CO2 SERPL-SCNC: 20 MMOL/L (ref 20–33)
CREAT SERPL-MCNC: 1.01 MG/DL (ref 0.5–1.4)
EKG IMPRESSION: NORMAL
EKG IMPRESSION: NORMAL
ERYTHROCYTE [DISTWIDTH] IN BLOOD BY AUTOMATED COUNT: 54.4 FL (ref 35.9–50)
GFR SERPLBLD CREATININE-BSD FMLA CKD-EPI: 87 ML/MIN/1.73 M 2
GLOBULIN SER CALC-MCNC: 3.1 G/DL (ref 1.9–3.5)
GLUCOSE SERPL-MCNC: 100 MG/DL (ref 65–99)
HCT VFR BLD AUTO: 50.2 % (ref 42–52)
HGB BLD-MCNC: 16.9 G/DL (ref 14–18)
INR PPP: 1.38 (ref 0.87–1.13)
MCH RBC QN AUTO: 31.6 PG (ref 27–33)
MCHC RBC AUTO-ENTMCNC: 33.7 G/DL (ref 32.3–36.5)
MCV RBC AUTO: 93.8 FL (ref 81.4–97.8)
PLATELET # BLD AUTO: 275 K/UL (ref 164–446)
PMV BLD AUTO: 9.2 FL (ref 9–12.9)
POTASSIUM SERPL-SCNC: 4 MMOL/L (ref 3.6–5.5)
PROT SERPL-MCNC: 7.5 G/DL (ref 6–8.2)
PROTHROMBIN TIME: 16.7 SEC (ref 12–14.6)
RBC # BLD AUTO: 5.35 M/UL (ref 4.7–6.1)
SODIUM SERPL-SCNC: 135 MMOL/L (ref 135–145)
WBC # BLD AUTO: 6.7 K/UL (ref 4.8–10.8)

## 2023-07-14 PROCEDURE — 160036 HCHG PACU - EA ADDL 30 MINS PHASE I

## 2023-07-14 PROCEDURE — C1730 CATH, EP, 19 OR FEW ELECT: HCPCS

## 2023-07-14 PROCEDURE — 160002 HCHG RECOVERY MINUTES (STAT)

## 2023-07-14 PROCEDURE — 700111 HCHG RX REV CODE 636 W/ 250 OVERRIDE (IP): Mod: JZ | Performed by: ANESTHESIOLOGY

## 2023-07-14 PROCEDURE — 700105 HCHG RX REV CODE 258: Mod: JZ | Performed by: ANESTHESIOLOGY

## 2023-07-14 PROCEDURE — 93320 DOPPLER ECHO COMPLETE: CPT | Mod: 26 | Performed by: ANESTHESIOLOGY

## 2023-07-14 PROCEDURE — 80053 COMPREHEN METABOLIC PANEL: CPT

## 2023-07-14 PROCEDURE — 93325 DOPPLER ECHO COLOR FLOW MAPG: CPT

## 2023-07-14 PROCEDURE — A9270 NON-COVERED ITEM OR SERVICE: HCPCS | Performed by: ANESTHESIOLOGY

## 2023-07-14 PROCEDURE — 93653 COMPRE EP EVAL TX SVT: CPT | Performed by: INTERNAL MEDICINE

## 2023-07-14 PROCEDURE — 700111 HCHG RX REV CODE 636 W/ 250 OVERRIDE (IP): Performed by: ANESTHESIOLOGY

## 2023-07-14 PROCEDURE — 85027 COMPLETE CBC AUTOMATED: CPT

## 2023-07-14 PROCEDURE — 700101 HCHG RX REV CODE 250

## 2023-07-14 PROCEDURE — 85610 PROTHROMBIN TIME: CPT

## 2023-07-14 PROCEDURE — 93325 DOPPLER ECHO COLOR FLOW MAPG: CPT | Mod: 26 | Performed by: ANESTHESIOLOGY

## 2023-07-14 PROCEDURE — 93010 ELECTROCARDIOGRAM REPORT: CPT | Mod: 59,76 | Performed by: INTERNAL MEDICINE

## 2023-07-14 PROCEDURE — 700101 HCHG RX REV CODE 250: Performed by: ANESTHESIOLOGY

## 2023-07-14 PROCEDURE — 36415 COLL VENOUS BLD VENIPUNCTURE: CPT

## 2023-07-14 PROCEDURE — 00537 ANES CARDIAC EP PROCEDURES: CPT | Performed by: ANESTHESIOLOGY

## 2023-07-14 PROCEDURE — 160035 HCHG PACU - 1ST 60 MINS PHASE I

## 2023-07-14 PROCEDURE — 93312 ECHO TRANSESOPHAGEAL: CPT | Mod: 26,59 | Performed by: ANESTHESIOLOGY

## 2023-07-14 PROCEDURE — 700111 HCHG RX REV CODE 636 W/ 250 OVERRIDE (IP): Mod: JZ

## 2023-07-14 PROCEDURE — 160046 HCHG PACU - 1ST 60 MINS PHASE II

## 2023-07-14 PROCEDURE — 700102 HCHG RX REV CODE 250 W/ 637 OVERRIDE(OP): Performed by: ANESTHESIOLOGY

## 2023-07-14 PROCEDURE — 93005 ELECTROCARDIOGRAM TRACING: CPT | Performed by: INTERNAL MEDICINE

## 2023-07-14 RX ORDER — SODIUM CHLORIDE, SODIUM LACTATE, POTASSIUM CHLORIDE, CALCIUM CHLORIDE 600; 310; 30; 20 MG/100ML; MG/100ML; MG/100ML; MG/100ML
INJECTION, SOLUTION INTRAVENOUS CONTINUOUS
Status: ACTIVE | OUTPATIENT
Start: 2023-07-14 | End: 2023-07-14

## 2023-07-14 RX ORDER — ACETAMINOPHEN 500 MG
1000 TABLET ORAL ONCE
Status: COMPLETED | OUTPATIENT
Start: 2023-07-14 | End: 2023-07-14

## 2023-07-14 RX ORDER — SODIUM CHLORIDE, SODIUM LACTATE, POTASSIUM CHLORIDE, CALCIUM CHLORIDE 600; 310; 30; 20 MG/100ML; MG/100ML; MG/100ML; MG/100ML
INJECTION, SOLUTION INTRAVENOUS
Status: DISCONTINUED | OUTPATIENT
Start: 2023-07-14 | End: 2023-07-14 | Stop reason: SURG

## 2023-07-14 RX ORDER — ONDANSETRON 2 MG/ML
INJECTION INTRAMUSCULAR; INTRAVENOUS
Status: COMPLETED
Start: 2023-07-14 | End: 2023-07-14

## 2023-07-14 RX ORDER — LIDOCAINE HYDROCHLORIDE 10 MG/ML
INJECTION, SOLUTION EPIDURAL; INFILTRATION; INTRACAUDAL; PERINEURAL PRN
Status: DISCONTINUED | OUTPATIENT
Start: 2023-07-14 | End: 2023-07-14 | Stop reason: SURG

## 2023-07-14 RX ORDER — ONDANSETRON 2 MG/ML
INJECTION INTRAMUSCULAR; INTRAVENOUS PRN
Status: DISCONTINUED | OUTPATIENT
Start: 2023-07-14 | End: 2023-07-14 | Stop reason: SURG

## 2023-07-14 RX ORDER — OXYCODONE HCL 5 MG/5 ML
5 SOLUTION, ORAL ORAL
Status: DISCONTINUED | OUTPATIENT
Start: 2023-07-14 | End: 2023-07-14 | Stop reason: HOSPADM

## 2023-07-14 RX ORDER — HEPARIN SODIUM 200 [USP'U]/100ML
INJECTION, SOLUTION INTRAVENOUS
Status: COMPLETED
Start: 2023-07-14 | End: 2023-07-14

## 2023-07-14 RX ORDER — DESVENLAFAXINE SUCCINATE 50 MG/1
50 TABLET, EXTENDED RELEASE ORAL DAILY
Qty: 90 TABLET | Refills: 3 | Status: SHIPPED | OUTPATIENT
Start: 2023-07-14 | End: 2023-07-28

## 2023-07-14 RX ORDER — OXYCODONE HCL 5 MG/5 ML
10 SOLUTION, ORAL ORAL
Status: DISCONTINUED | OUTPATIENT
Start: 2023-07-14 | End: 2023-07-14 | Stop reason: HOSPADM

## 2023-07-14 RX ORDER — ACETAMINOPHEN 325 MG/1
650 TABLET ORAL EVERY 4 HOURS PRN
Status: CANCELLED | OUTPATIENT
Start: 2023-07-14

## 2023-07-14 RX ORDER — DEXAMETHASONE SODIUM PHOSPHATE 4 MG/ML
INJECTION, SOLUTION INTRA-ARTICULAR; INTRALESIONAL; INTRAMUSCULAR; INTRAVENOUS; SOFT TISSUE PRN
Status: DISCONTINUED | OUTPATIENT
Start: 2023-07-14 | End: 2023-07-14 | Stop reason: SURG

## 2023-07-14 RX ORDER — SODIUM CHLORIDE, SODIUM LACTATE, POTASSIUM CHLORIDE, CALCIUM CHLORIDE 600; 310; 30; 20 MG/100ML; MG/100ML; MG/100ML; MG/100ML
INJECTION, SOLUTION INTRAVENOUS CONTINUOUS
Status: DISCONTINUED | OUTPATIENT
Start: 2023-07-14 | End: 2023-07-14 | Stop reason: HOSPADM

## 2023-07-14 RX ORDER — HALOPERIDOL 5 MG/ML
1 INJECTION INTRAMUSCULAR
Status: DISCONTINUED | OUTPATIENT
Start: 2023-07-14 | End: 2023-07-14 | Stop reason: HOSPADM

## 2023-07-14 RX ORDER — MEPERIDINE HYDROCHLORIDE 25 MG/ML
12.5 INJECTION INTRAMUSCULAR; INTRAVENOUS; SUBCUTANEOUS
Status: DISCONTINUED | OUTPATIENT
Start: 2023-07-14 | End: 2023-07-14 | Stop reason: HOSPADM

## 2023-07-14 RX ORDER — DIPHENHYDRAMINE HYDROCHLORIDE 50 MG/ML
12.5 INJECTION INTRAMUSCULAR; INTRAVENOUS
Status: DISCONTINUED | OUTPATIENT
Start: 2023-07-14 | End: 2023-07-14 | Stop reason: HOSPADM

## 2023-07-14 RX ORDER — KETOROLAC TROMETHAMINE 30 MG/ML
30 INJECTION, SOLUTION INTRAMUSCULAR; INTRAVENOUS ONCE
Status: COMPLETED | OUTPATIENT
Start: 2023-07-14 | End: 2023-07-14

## 2023-07-14 RX ORDER — LIDOCAINE HYDROCHLORIDE 20 MG/ML
INJECTION, SOLUTION INFILTRATION; PERINEURAL
Status: COMPLETED
Start: 2023-07-14 | End: 2023-07-14

## 2023-07-14 RX ORDER — CEFAZOLIN SODIUM 1 G/3ML
INJECTION, POWDER, FOR SOLUTION INTRAMUSCULAR; INTRAVENOUS PRN
Status: DISCONTINUED | OUTPATIENT
Start: 2023-07-14 | End: 2023-07-14 | Stop reason: SURG

## 2023-07-14 RX ORDER — MIDAZOLAM HYDROCHLORIDE 1 MG/ML
1 INJECTION INTRAMUSCULAR; INTRAVENOUS
Status: DISCONTINUED | OUTPATIENT
Start: 2023-07-14 | End: 2023-07-14 | Stop reason: HOSPADM

## 2023-07-14 RX ORDER — TRAMADOL HYDROCHLORIDE 50 MG/1
50 TABLET ORAL EVERY 6 HOURS PRN
Status: CANCELLED | OUTPATIENT
Start: 2023-07-14

## 2023-07-14 RX ORDER — ONDANSETRON 2 MG/ML
4 INJECTION INTRAMUSCULAR; INTRAVENOUS ONCE
Status: COMPLETED | OUTPATIENT
Start: 2023-07-14 | End: 2023-07-14

## 2023-07-14 RX ORDER — HEPARIN SODIUM 1000 [USP'U]/ML
INJECTION, SOLUTION INTRAVENOUS; SUBCUTANEOUS
Status: COMPLETED
Start: 2023-07-14 | End: 2023-07-14

## 2023-07-14 RX ORDER — LIDOCAINE HYDROCHLORIDE 10 MG/ML
INJECTION, SOLUTION EPIDURAL; INFILTRATION; INTRACAUDAL; PERINEURAL
Status: DISCONTINUED
Start: 2023-07-14 | End: 2023-07-14 | Stop reason: HOSPADM

## 2023-07-14 RX ADMIN — ONDANSETRON 4 MG: 2 INJECTION INTRAMUSCULAR; INTRAVENOUS at 09:04

## 2023-07-14 RX ADMIN — FENTANYL CITRATE 25 MCG: 50 INJECTION, SOLUTION INTRAMUSCULAR; INTRAVENOUS at 09:57

## 2023-07-14 RX ADMIN — SUGAMMADEX 400 MG: 100 INJECTION, SOLUTION INTRAVENOUS at 08:41

## 2023-07-14 RX ADMIN — FENTANYL CITRATE 50 MCG: 50 INJECTION, SOLUTION INTRAMUSCULAR; INTRAVENOUS at 08:42

## 2023-07-14 RX ADMIN — HEPARIN SODIUM: 1000 INJECTION, SOLUTION INTRAVENOUS; SUBCUTANEOUS at 07:30

## 2023-07-14 RX ADMIN — ONDANSETRON 4 MG: 2 INJECTION INTRAMUSCULAR; INTRAVENOUS at 08:25

## 2023-07-14 RX ADMIN — SODIUM CHLORIDE, POTASSIUM CHLORIDE, SODIUM LACTATE AND CALCIUM CHLORIDE: 600; 310; 30; 20 INJECTION, SOLUTION INTRAVENOUS at 07:56

## 2023-07-14 RX ADMIN — FENTANYL CITRATE 25 MCG: 50 INJECTION, SOLUTION INTRAMUSCULAR; INTRAVENOUS at 09:40

## 2023-07-14 RX ADMIN — FENTANYL CITRATE 25 MCG: 50 INJECTION, SOLUTION INTRAMUSCULAR; INTRAVENOUS at 09:49

## 2023-07-14 RX ADMIN — CEFAZOLIN 2 G: 1 INJECTION, POWDER, FOR SOLUTION INTRAMUSCULAR; INTRAVENOUS at 08:12

## 2023-07-14 RX ADMIN — KETOROLAC TROMETHAMINE 30 MG: 30 INJECTION, SOLUTION INTRAMUSCULAR; INTRAVENOUS at 09:12

## 2023-07-14 RX ADMIN — MIDAZOLAM 1 MG: 1 INJECTION INTRAMUSCULAR; INTRAVENOUS at 09:48

## 2023-07-14 RX ADMIN — ROCURONIUM BROMIDE 20 MG: 10 INJECTION, SOLUTION INTRAVENOUS at 08:24

## 2023-07-14 RX ADMIN — FENTANYL CITRATE 50 MCG: 50 INJECTION, SOLUTION INTRAMUSCULAR; INTRAVENOUS at 09:08

## 2023-07-14 RX ADMIN — ROCURONIUM BROMIDE 50 MG: 10 INJECTION, SOLUTION INTRAVENOUS at 08:13

## 2023-07-14 RX ADMIN — PROPOFOL 200 MG: 10 INJECTION, EMULSION INTRAVENOUS at 08:12

## 2023-07-14 RX ADMIN — FENTANYL CITRATE 25 MCG: 50 INJECTION, SOLUTION INTRAMUSCULAR; INTRAVENOUS at 09:44

## 2023-07-14 RX ADMIN — LIDOCAINE HYDROCHLORIDE 50 MG: 10 INJECTION, SOLUTION EPIDURAL; INFILTRATION; INTRACAUDAL at 08:44

## 2023-07-14 RX ADMIN — FENTANYL CITRATE 50 MCG: 50 INJECTION, SOLUTION INTRAMUSCULAR; INTRAVENOUS at 07:59

## 2023-07-14 RX ADMIN — ACETAMINOPHEN 1000 MG: 500 TABLET, FILM COATED ORAL at 07:22

## 2023-07-14 RX ADMIN — FENTANYL CITRATE 50 MCG: 50 INJECTION, SOLUTION INTRAMUSCULAR; INTRAVENOUS at 09:04

## 2023-07-14 RX ADMIN — LIDOCAINE HYDROCHLORIDE: 20 INJECTION, SOLUTION INFILTRATION; PERINEURAL at 08:41

## 2023-07-14 RX ADMIN — DEXAMETHASONE SODIUM PHOSPHATE 4 MG: 4 INJECTION INTRA-ARTICULAR; INTRALESIONAL; INTRAMUSCULAR; INTRAVENOUS; SOFT TISSUE at 08:25

## 2023-07-14 ASSESSMENT — FIBROSIS 4 INDEX: FIB4 SCORE: 1.62

## 2023-07-14 ASSESSMENT — PAIN SCALES - GENERAL: PAIN_LEVEL: 0

## 2023-07-14 NOTE — ANESTHESIA TIME REPORT
Anesthesia Start and Stop Event Times     Date Time Event    7/14/2023 0729 Ready for Procedure     0755 Anesthesia Start     0859 Anesthesia Stop        Responsible Staff  07/14/23    Name Role Begin End    Garth Peter M.D. Anesth 0755 0859        Overtime Reason:  no overtime (within assigned shift)    Comments:

## 2023-07-14 NOTE — CATH LAB
Electrophysiology Procedure Note  [facility]    Procedure(s) Performed:   Supraventricular tachycardia ablation (atrial flutter ablation)   Electroanatomical 3D mapping with CARTO  Transesophageal echocardiogram    Indication: Recurrent atrial flutter    : Nelson Dickens M.D.    Anesthesia: General anesthesia    Anesthesiologist: Dr. Garth Peter    Specimen(s) Removed: None    Estimated Blood Loss:  20cc    Complications: None    Pre-procedure ECG: Sinus rhythm    Post Procedure ECG: Sinus rhythm    Description of Procedure:    After informed written consent, the patient was brought to the EP lab in the fasting, non-sedated state. General anaesthesia given to the patient. QUENTIN excluded left atrial and appendage thrombus. The patient was prepped and draped in the usual sterile fashion. Femoral venous access was obtained using the modified Seldinger technique. In the right femoral vein, 3 sheaths (6,7,8 Fr) were inserted over 0.35” guidewires. A deflectable decapolar catheter was advanced to the CS position. A duodecapolar halo catheter was advanced into the right atrium and positioned along the tricuspid annulus. One 8Fr saline irrigated ablation catheter with 3.5mm distal electrode and location sensor for the CARTO system (B[employer] ST SF D/F) was inserted into an 8Fr sheath for electroanatomical 3D mapping and radiofrequency ablation. During ablation we saw termination of induced typical counterclockwise flutter. Post ablation we waited 15 minutes and bilateral isthmus block continued.    At the end of the procedure, the catheter and sheaths were removed, and hemostasis was achieved by Vascade.  Following recovery from anesthesia, the patient was transferred to recovery.    Baseline Rhythm: Sinus rhythm   msec    Intervals:   msec   HV 40 msec  QRS duration 105 msec  RR interval 605 msec  QT interval 380 msec    Arrhythmias:  1.  Induced sustained typical  counterclockwise atrial flutter,  ms with intact AV conduction.     Mapping:  Electroanatomical 3D (CARTO FAM) map of CS and right atrium around the cavotricuspid isthmus was created during typical flutter. His cloud performed    Ablation:  Radiofrequency energy was applied using 3.5 mm saline irrigated catheter, power 40 W, total 9 RF applications, RF time 284 seconds to create a cavotricuspid isthmus line between the tricuspid annulus and Eustachian ridge/inferior vena cava to produce bidirectional isthmus conduction block.    Post Ablation Testin. Non inducible for atrial flutter with CS pacing post ablation.  2. Trans-isthmus conduction time pacing from proximal  msec.  3. Trans-isthmus conduction time pacing from lateral to the isthmus line 150 msec.  4. CO interval 120 msec, QRS duration 105 msec, RR interval 630 msec, QT interval 380 msec    Fluoroscopy Time: 1.1 min, 19 mGy    Impressions/Plan:   1. Baseline inducible typical counterclockwise right atrial flutter.  2. Successful catheter mediated ablation of right atrial flutter with bilateral isthmus block.

## 2023-07-14 NOTE — ANESTHESIA POSTPROCEDURE EVALUATION
Patient: Tom Quan    Procedure Summary     Date: 07/14/23 Room / Location: [facility] Imaging - Cath Lab [facility]    Anesthesia Start: 0755 Anesthesia Stop: 0859    Procedure: CL-EP ABLATION ATRIAL FLUTTER Diagnosis:       Atrial flutter with rapid ventricular response (HCC)      Unspecified atrial flutter      (See Associated Dx)    Scheduled Providers: Nelson Dickens M.D.; Garth Peter M.D. Responsible Provider: Garth Peter M.D.    Anesthesia Type: general ASA Status: 3          Final Anesthesia Type: general  Last vitals  BP   Blood Pressure: 124/81    Temp   36.3 °C (97.4 °F)    Pulse   89   Resp   18    SpO2   95 %      Anesthesia Post Evaluation    Patient location during evaluation: PACU  Patient participation: complete - patient participated  Level of consciousness: awake and alert  Pain score: 0    Airway patency: patent  Anesthetic complications: no  Cardiovascular status: adequate and hemodynamically stable  Respiratory status: acceptable  Hydration status: acceptable    PONV: none          No notable events documented.     Nurse Pain Score: 3 (NPRS)

## 2023-07-14 NOTE — ANESTHESIA PROCEDURE NOTES
Airway    Date/Time: 7/14/2023 8:16 AM    Performed by: Garth Peter M.D.  Authorized by: Garth Peter M.D.    Location:  OR  Urgency:  Elective  Indications for Airway Management:  Anesthesia      Spontaneous Ventilation: absent    Sedation Level:  Deep  Preoxygenated: Yes    Patient Position:  Sniffing  Final Airway Type:  Endotracheal airway  Final Endotracheal Airway:  ETT  Cuffed: Yes    Technique Used for Successful ETT Placement:  Direct laryngoscopy    Insertion Site:  Oral  Blade Type:  Landis  Laryngoscope Blade/Videolaryngoscope Blade Size:  2  ETT Size (mm):  7.5  Measured from:  Teeth  ETT to Teeth (cm):  23  Placement Verified by: auscultation and capnometry    Cormack-Lehane Classification:  Grade I - full view of glottis  Number of Attempts at Approach:  1

## 2023-07-14 NOTE — OR NURSING
@1103 PT arrived to Phase 2. Pt has no complaints of pain or nausea. Dressing site is clean dry and intact. Pt mobilized from gurney to chair. Vital signs stable. Pts wife updated on patients status in discharge area. Discharge instructions discussed with patients wife over telephone and patient at bedside. PT verbalizes understanding.    @1120 Pt able to dress self without assistance. PIV removed.     @1126 PT discharged to home with all belongings.

## 2023-07-14 NOTE — ANESTHESIA PROCEDURE NOTES
QUENTIN    Performed by: Garth Peter M.D.  Authorized by: Garth Peter M.D.    Preanesthetic Checklist: patient identified, IV checked, site marked, risks and benefits discussed, surgical consent, monitors and equipment checked, pre-op evaluation and timeout performed    Indication for QUENTIN: diagnostic   Patient Location: OR  Intubated: Yes  Bite Block: Yes  Heart Visualized: Yes  Insertion: atraumatic    **See FULL QUENTIN report in patient's chart via CV Synapse**

## 2023-07-14 NOTE — ANESTHESIA PREPROCEDURE EVALUATION
Date/Time: 07/14/23 0800    Scheduled providers: Nelson Dickens M.D.; Garth Peter M.D.    Procedure: CL-EP ABLATION ATRIAL FLUTTER    Diagnosis:       Atrial flutter with rapid ventricular response (HCC) [I48.92]      Unspecified atrial flutter [I48.92]    Indications: See Associated Dx    Location: [facility] Imaging - Cath Lab - [facility]          Relevant Problems   CARDIAC   (positive) Abdominal aortic atherosclerosis (HCC)   (positive) Aneurysm of ascending aorta without rupture (HCC)   (positive) Atrial flutter with rapid ventricular response (HCC)   (positive) Essential (primary) hypertension      GI   (positive) GERD (gastroesophageal reflux disease)         (positive) Acute alcoholic hepatitis   (positive) Hepatic steatosis       Physical Exam    Airway   Mallampati: II  TM distance: >3 FB  Neck ROM: full       Cardiovascular - normal exam  Rhythm: regular  Rate: normal  (-) murmur     Dental - normal exam           Pulmonary - normal exam  Breath sounds clear to auscultation     Abdominal    Neurological - normal exam                 Anesthesia Plan    ASA 3       Plan - general       Airway plan will be ETT  QUENTIN Planned        Induction: intravenous    Postoperative Plan: Postoperative administration of opioids is intended.    Pertinent diagnostic labs and testing reviewed    Informed Consent:    Anesthetic plan and risks discussed with patient.    Use of blood products discussed with: patient whom consented to blood products.

## 2023-07-14 NOTE — DISCHARGE INSTRUCTIONS
HOME CARE INSTRUCTIONS    ACTIVITY: Rest and take it easy for the first 24 hours.  A responsible adult is recommended to remain with you during that time.  It is normal to feel sleepy.  We encourage you to not do anything that requires balance, judgment or coordination.    FOR 24 HOURS DO NOT:  Drive, operate machinery or run household appliances.  Drink beer or alcoholic beverages.  Make important decisions or sign legal documents.    SPECIAL INSTRUCTIONS: Follow up in clinic in 3-4 weeks. Stop Flocainide    [facility] Post Ablation Patient Instructions:  No lifting > 10 lbs x 1 week.      No soaking in baths, hot tubs, pools x 1 week.  May shower the day after discharge and take off groin dressings and leave  sites uncovered.  Continue to monitor sites daily for warmth, redness, discolored drainage.  It is common to have a small lump in the area where the cather was (usually the size of a marble); this will go away but takes approximately 6 weeks to normalize.     3.   Please take all medications as prescribed to you; please do not stop any medications prescribed post ablation unless directed by your healthcare provider.      4.   Please do not miss any doses of your blood thinner (if you have been started on, or take chronic blood thinners) without discussion with your healthcare provider first.     5.   Please walk and take deep breaths after discharge.  After discharge, if you experience neurological changes/signs of stroke or high fever you should be seen in the emergency dept.     6.   It is possible you may experience some chest discomfort or chest tightness post ablation.  This is usually secondary to inflammation and irritation of the tissues at the area of the ablation.  If this occurs, it is advised to try 400 mg of Ibuprofen with food as needed up to three times a day for a maximum of two days.  This should help to decrease pain and tissue inflammation.           **Please notify the office ([phone]) if this occurs.         ** DO NOT TAKE Ibuprofen IF HISTORY OF ALLERGY, SIGNIFICANT BLEEDING OR KIDNEY DISEASE WITHOUT DISCUSSING WITH YOUR CARDIOLOGY PROVIDER FIRST.          ** If pain becomes severe or you have additional symptoms you may need to be medically evaluated; please contact the cardiology office ([phone]) for further direction.     7. It is possible that you may experience arrhythmia/Atrial Fibrillation post ablation.  This is secondary to irritation and inflammation of the cardiac tissues from the ablation.  If you have atrial fibrillation all day or feel poorly with it, please notify your cardiologist's via phone ([phone]) or Althea[facility]xhart.      8.  Please contact call our office ([phone]) or message via Sobrr message if you have any questions or concerns post procedurally.    9. You need to be seen for post ablation follow up 3-4 weeks post procedure. An appointment is scheduled for you.  Please contact the office ([phone]) if you need to change your appointment.      DIET: To avoid nausea, slowly advance diet as tolerated, avoiding spicy or greasy foods for the first day.  Add more substantial food to your diet according to your physician's instructions.  INCREASE FLUIDS AND FIBER TO AVOID CONSTIPATION.    SURGICAL DRESSING/BATHING:   May shower after dressings are off.  No baths/soaking in water for 7 days.  Remove dressing 7/15.    MEDICATIONS: Resume taking daily medication.  Take prescribed pain medication with food.  If no medication is prescribed, you may take non-aspirin pain medication if needed.  PAIN MEDICATION CAN BE VERY CONSTIPATING.  Take a stool softener or laxative such as senokot, pericolace, or milk of magnesia if needed.    A follow-up appointment should be arranged with your doctor in 3-4 weeks; call to schedule.    You should CALL YOUR PHYSICIAN if you develop:  Fever greater than 101 degrees F.  Pain not relieved by  medication, or persistent nausea or vomiting.  Excessive bleeding (blood soaking through dressing) or unexpected drainage from the wound.  Extreme redness or swelling around the incision site, drainage of pus or foul smelling drainage.  Inability to urinate or empty your bladder within 8 hours.  Problems with breathing or chest pain.    You should call 911 if you develop problems with breathing or chest pain.  If you are unable to contact your doctor or surgical center, you should go to the nearest emergency room or urgent care center.  Physician's telephone #: Dr. Dickens [phone]    MILD FLU-LIKE SYMPTOMS ARE NORMAL.  YOU MAY EXPERIENCE GENERALIZED MUSCLE ACHES, THROAT IRRITATION, HEADACHE AND/OR SOME NAUSEA.    If any questions arise, call your doctor.  If your doctor is not available, please feel free to call the Surgical Center at [phone].  The Center is open Monday through Friday from 7AM to 7PM.      A registered nurse may call you a few days after your surgery to see how you are doing after your procedure.    You may also receive a survey in the mail within the next two weeks and we ask that you take a few moments to complete the survey and return it to us.  Our goal is to provide you with very good care and we value your comments.

## 2023-07-26 DIAGNOSIS — F90.2 ADHD (ATTENTION DEFICIT HYPERACTIVITY DISORDER), COMBINED TYPE: ICD-10-CM

## 2023-07-26 RX ORDER — LISDEXAMFETAMINE DIMESYLATE 50 MG
50 CAPSULE ORAL EVERY MORNING
Qty: 30 CAPSULE | Refills: 0 | OUTPATIENT
Start: 2023-07-26 | End: 2023-08-25

## 2023-07-28 ENCOUNTER — TELEMEDICINE (OUTPATIENT)
Dept: BEHAVIORAL HEALTH | Facility: CLINIC | Age: 58
End: 2023-07-28
Payer: COMMERCIAL

## 2023-07-28 DIAGNOSIS — F90.2 ATTENTION DEFICIT HYPERACTIVITY DISORDER (ADHD), COMBINED TYPE: ICD-10-CM

## 2023-07-28 DIAGNOSIS — F90.2 ADHD (ATTENTION DEFICIT HYPERACTIVITY DISORDER), COMBINED TYPE: ICD-10-CM

## 2023-07-28 PROCEDURE — 99214 OFFICE O/P EST MOD 30 MIN: CPT | Mod: 95 | Performed by: PSYCHIATRY & NEUROLOGY

## 2023-07-28 RX ORDER — LISDEXAMFETAMINE DIMESYLATE 60 MG/1
60 CAPSULE ORAL DAILY
Qty: 30 CAPSULE | Refills: 0 | Status: CANCELLED | OUTPATIENT
Start: 2023-07-28 | End: 2023-08-27

## 2023-07-28 RX ORDER — LISDEXAMFETAMINE DIMESYLATE CAPSULES 60 MG/1
60 CAPSULE ORAL DAILY
Qty: 30 CAPSULE | Refills: 0 | Status: SHIPPED | OUTPATIENT
Start: 2023-07-28 | End: 2023-08-03

## 2023-07-28 NOTE — PROGRESS NOTES
[facility]   Follow Up Assessment  This evaluation was conducted via Zoom using secure and encrypted videoconferencing technology. The patient was in a private location outside of their home in the [facility].    The patient's identity was confirmed and verbal consent was obtained for this virtual visit.    This visit was conducted via Zoom using secure and encrypted videoconferencing technology.  The patient was in a private location in the [facility].  The patient's identity was confirmed and verbal consent was obtained for this virtual visit.    This provider informed the patient their medical records are totally confidential except for the use by other providers involved in their care, or if the patient signs a release, or to report instances of child or elder abuse, or if it is determined they are an immediate risk to harm themselves or others.    Name: Tom Quan  MRN: [id number]  : 1965  Age: 57 y.o.  Date of assessment: 2023  PCP: Arvind Geiger M.D.    Subjective:  Chart reviewed prior to the virtual visit in a secure location.  He was last seen on .  He would like to increase Vyvanse from 50 mg a.m. to 60 mg a.m. to help his ADHD symptoms. He had a cardiac ablation procedure which he hopes will help with his atrial fibrillation.  We talked about the risks of stimulants for cardiac problems.  We also talked briefly about utilizing a mail order service.     Objective:  He is alert, oriented, and cooperative.  Relatedness is good.  Grooming is good.  Speech is normal rate.  Anxious.  Memory is good.  Insight and judgment are fairly good.  No indication of psychotic thinking.    Current Risk:       Suicidal: Not suicidal       Homicidal: Not homicidal       Self-Harm: No plan to harm self       Relapse(Low/Moderate/High):: Moderate       Crisis Safety Plan Reviewed: Discussed with patient    Diagnosis:   ADHD    Treatment Plan:  Current treatment plan  consists of quarterly psychiatric sessions designed to evaluate his ADHD.    Duration will be for a minimum of 12 months and will be reviewed at each visit.    Goals: Improvement in ADHD symptoms in order to prevent relapse due to the chronic nature of his behavioral health problems and mental illness.  Increase Vyvanse to 60 mg a.m.          Tom Medina M.D.    This note was created using voice recognition software (Dragon). The accuracy of the dictation is limited by the abilities of the software. I have reviewed the note prior to signing, however some errors in grammar and context are still possible. If you have any questions related to this note please do not hesitate to contact our office.

## 2023-08-03 DIAGNOSIS — F90.2 ADHD (ATTENTION DEFICIT HYPERACTIVITY DISORDER), COMBINED TYPE: Primary | ICD-10-CM

## 2023-08-03 RX ORDER — LISDEXAMFETAMINE DIMESYLATE 60 MG/1
60 CAPSULE ORAL DAILY
Qty: 30 CAPSULE | Refills: 0 | Status: SHIPPED | OUTPATIENT
Start: 2023-08-03 | End: 2023-08-08

## 2023-08-04 ENCOUNTER — TELEPHONE (OUTPATIENT)
Dept: BEHAVIORAL HEALTH | Facility: CLINIC | Age: 58
End: 2023-08-04
Payer: COMMERCIAL

## 2023-08-04 NOTE — TELEPHONE ENCOUNTER
Phone Number Called: [phone]    Call outcome: Left detailed message for patient. Informed to call back with any additional questions.    Message: Left voicemail message to inform patient, his prescription Lisdexamfetamine Dimesylate (VYVANSE) 60 MG needs a prior authorization. A prior authorization was created and sent to his insurance.      DOCUMENTATION OF PAR STATUS:    PA KEY: [id number]  1. Name of Medication & Dose: Lisdexamfetamine Dimesylate (VYVANSE) 60 MG     2. Date Prior Auth Submitted: 08/04/2023    3. Prior Auth Status? Pending    4. Patient Notified: Yes

## 2023-08-07 ENCOUNTER — TELEPHONE (OUTPATIENT)
Dept: BEHAVIORAL HEALTH | Facility: CLINIC | Age: 58
End: 2023-08-07
Payer: COMMERCIAL

## 2023-08-07 NOTE — TELEPHONE ENCOUNTER
FINAL PRIOR AUTHORIZATION STATUS:    Name of Medication & Dose: Lisdexamfetamine Dimesylate (VYVANSE) 60 MG      Prior Auth Status: Denied    Insurance is asking for a formulary alternative: amphetamine-dextroamphetamine extended-release, dexmethylphenidate extended-release, dextroamphetamine extended-release, or methylphenidate extended-release. Please advise.

## 2023-08-08 ENCOUNTER — PATIENT MESSAGE (OUTPATIENT)
Dept: BEHAVIORAL HEALTH | Facility: CLINIC | Age: 58
End: 2023-08-08
Payer: COMMERCIAL

## 2023-08-08 DIAGNOSIS — F90.2 ADHD (ATTENTION DEFICIT HYPERACTIVITY DISORDER), COMBINED TYPE: ICD-10-CM

## 2023-08-08 RX ORDER — DEXTROAMPHETAMINE SACCHARATE, AMPHETAMINE ASPARTATE, DEXTROAMPHETAMINE SULFATE AND AMPHETAMINE SULFATE 2.5; 2.5; 2.5; 2.5 MG/1; MG/1; MG/1; MG/1
10 TABLET ORAL 2 TIMES DAILY
Qty: 60 TABLET | Refills: 0 | Status: SHIPPED | OUTPATIENT
Start: 2023-08-08 | End: 2023-09-07

## 2023-08-09 DIAGNOSIS — I48.92 ATRIAL FLUTTER WITH RAPID VENTRICULAR RESPONSE (HCC): ICD-10-CM

## 2023-08-22 ENCOUNTER — TELEPHONE (OUTPATIENT)
Dept: CARDIOLOGY | Facility: MEDICAL CENTER | Age: 58
End: 2023-08-22
Payer: COMMERCIAL

## 2023-08-23 ENCOUNTER — APPOINTMENT (OUTPATIENT)
Dept: CARDIOLOGY | Facility: MEDICAL CENTER | Age: 58
End: 2023-08-23
Attending: NURSE PRACTITIONER
Payer: COMMERCIAL

## 2023-08-23 ENCOUNTER — APPOINTMENT (OUTPATIENT)
Dept: CARDIOLOGY | Facility: MEDICAL CENTER | Age: 58
End: 2023-08-23
Attending: INTERNAL MEDICINE
Payer: COMMERCIAL

## 2023-08-29 DIAGNOSIS — F33.1 MODERATE EPISODE OF RECURRENT MAJOR DEPRESSIVE DISORDER (HCC): ICD-10-CM

## 2023-08-29 RX ORDER — DESVENLAFAXINE 25 MG/1
1 TABLET, EXTENDED RELEASE ORAL DAILY
Qty: 90 TABLET | Refills: 3 | Status: SHIPPED | OUTPATIENT
Start: 2023-08-29 | End: 2024-01-02

## 2023-09-21 ENCOUNTER — APPOINTMENT (OUTPATIENT)
Dept: MEDICAL GROUP | Facility: LAB | Age: 58
End: 2023-09-21
Payer: COMMERCIAL

## 2023-09-28 ENCOUNTER — HOSPITAL ENCOUNTER (EMERGENCY)
Facility: MEDICAL CENTER | Age: 58
End: 2023-09-28
Attending: EMERGENCY MEDICINE
Payer: COMMERCIAL

## 2023-09-28 ENCOUNTER — APPOINTMENT (OUTPATIENT)
Dept: RADIOLOGY | Facility: MEDICAL CENTER | Age: 58
End: 2023-09-28
Attending: EMERGENCY MEDICINE
Payer: COMMERCIAL

## 2023-09-28 VITALS
WEIGHT: 217.37 LBS | TEMPERATURE: 98 F | SYSTOLIC BLOOD PRESSURE: 132 MMHG | HEIGHT: 73 IN | BODY MASS INDEX: 28.81 KG/M2 | DIASTOLIC BLOOD PRESSURE: 86 MMHG | HEART RATE: 88 BPM | RESPIRATION RATE: 16 BRPM | OXYGEN SATURATION: 96 %

## 2023-09-28 DIAGNOSIS — R51.9 SEVERE HEADACHE: ICD-10-CM

## 2023-09-28 DIAGNOSIS — I10 HYPERTENSION, UNSPECIFIED TYPE: ICD-10-CM

## 2023-09-28 DIAGNOSIS — R11.2 NAUSEA AND VOMITING, UNSPECIFIED VOMITING TYPE: ICD-10-CM

## 2023-09-28 DIAGNOSIS — F41.9 ANXIETY: ICD-10-CM

## 2023-09-28 DIAGNOSIS — G43.809 OTHER MIGRAINE WITHOUT STATUS MIGRAINOSUS, NOT INTRACTABLE: ICD-10-CM

## 2023-09-28 LAB
ALBUMIN SERPL BCP-MCNC: 4.1 G/DL (ref 3.2–4.9)
ALBUMIN/GLOB SERPL: 1.2 G/DL
ALP SERPL-CCNC: 103 U/L (ref 30–99)
ALT SERPL-CCNC: 101 U/L (ref 2–50)
ANION GAP SERPL CALC-SCNC: 19 MMOL/L (ref 7–16)
AST SERPL-CCNC: 127 U/L (ref 12–45)
BASOPHILS # BLD AUTO: 0.5 % (ref 0–1.8)
BASOPHILS # BLD: 0.03 K/UL (ref 0–0.12)
BILIRUB SERPL-MCNC: 0.3 MG/DL (ref 0.1–1.5)
BUN SERPL-MCNC: 9 MG/DL (ref 8–22)
CALCIUM ALBUM COR SERPL-MCNC: 9.2 MG/DL (ref 8.5–10.5)
CALCIUM SERPL-MCNC: 9.3 MG/DL (ref 8.4–10.2)
CHLORIDE SERPL-SCNC: 96 MMOL/L (ref 96–112)
CO2 SERPL-SCNC: 22 MMOL/L (ref 20–33)
CREAT SERPL-MCNC: 1.14 MG/DL (ref 0.5–1.4)
EKG IMPRESSION: NORMAL
EOSINOPHIL # BLD AUTO: 0.06 K/UL (ref 0–0.51)
EOSINOPHIL NFR BLD: 0.9 % (ref 0–6.9)
ERYTHROCYTE [DISTWIDTH] IN BLOOD BY AUTOMATED COUNT: 49.6 FL (ref 35.9–50)
ETHANOL BLD-MCNC: 39.7 MG/DL
FLUAV RNA SPEC QL NAA+PROBE: NEGATIVE
FLUBV RNA SPEC QL NAA+PROBE: NEGATIVE
GFR SERPLBLD CREATININE-BSD FMLA CKD-EPI: 75 ML/MIN/1.73 M 2
GLOBULIN SER CALC-MCNC: 3.4 G/DL (ref 1.9–3.5)
GLUCOSE SERPL-MCNC: 104 MG/DL (ref 65–99)
HCT VFR BLD AUTO: 47.9 % (ref 42–52)
HGB BLD-MCNC: 16.6 G/DL (ref 14–18)
IMM GRANULOCYTES # BLD AUTO: 0.02 K/UL (ref 0–0.11)
IMM GRANULOCYTES NFR BLD AUTO: 0.3 % (ref 0–0.9)
INR PPP: 1.04 (ref 0.87–1.13)
LIPASE SERPL-CCNC: 32 U/L (ref 11–82)
LYMPHOCYTES # BLD AUTO: 1.78 K/UL (ref 1–4.8)
LYMPHOCYTES NFR BLD: 28.1 % (ref 22–41)
MCH RBC QN AUTO: 31.5 PG (ref 27–33)
MCHC RBC AUTO-ENTMCNC: 34.7 G/DL (ref 32.3–36.5)
MCV RBC AUTO: 90.9 FL (ref 81.4–97.8)
MONOCYTES # BLD AUTO: 0.71 K/UL (ref 0–0.85)
MONOCYTES NFR BLD AUTO: 11.2 % (ref 0–13.4)
NEUTROPHILS # BLD AUTO: 3.73 K/UL (ref 1.82–7.42)
NEUTROPHILS NFR BLD: 59 % (ref 44–72)
NRBC # BLD AUTO: 0 K/UL
NRBC BLD-RTO: 0 /100 WBC (ref 0–0.2)
PLATELET # BLD AUTO: 257 K/UL (ref 164–446)
PMV BLD AUTO: 9 FL (ref 9–12.9)
POTASSIUM SERPL-SCNC: 3.9 MMOL/L (ref 3.6–5.5)
PROT SERPL-MCNC: 7.5 G/DL (ref 6–8.2)
PROTHROMBIN TIME: 14 SEC (ref 12–14.6)
RBC # BLD AUTO: 5.27 M/UL (ref 4.7–6.1)
RSV RNA SPEC QL NAA+PROBE: NEGATIVE
SARS-COV-2 RNA RESP QL NAA+PROBE: NOTDETECTED
SODIUM SERPL-SCNC: 137 MMOL/L (ref 135–145)
SPECIMEN SOURCE: NORMAL
TROPONIN T SERPL-MCNC: 12 NG/L (ref 6–19)
WBC # BLD AUTO: 6.3 K/UL (ref 4.8–10.8)

## 2023-09-28 PROCEDURE — 80053 COMPREHEN METABOLIC PANEL: CPT

## 2023-09-28 PROCEDURE — 96374 THER/PROPH/DIAG INJ IV PUSH: CPT

## 2023-09-28 PROCEDURE — 700111 HCHG RX REV CODE 636 W/ 250 OVERRIDE (IP): Mod: JZ | Performed by: EMERGENCY MEDICINE

## 2023-09-28 PROCEDURE — 85610 PROTHROMBIN TIME: CPT

## 2023-09-28 PROCEDURE — A9270 NON-COVERED ITEM OR SERVICE: HCPCS | Performed by: EMERGENCY MEDICINE

## 2023-09-28 PROCEDURE — 0241U HCHG SARS-COV-2 COVID-19 NFCT DS RESP RNA 4 TRGT MIC: CPT

## 2023-09-28 PROCEDURE — 70496 CT ANGIOGRAPHY HEAD: CPT

## 2023-09-28 PROCEDURE — 83690 ASSAY OF LIPASE: CPT

## 2023-09-28 PROCEDURE — 96375 TX/PRO/DX INJ NEW DRUG ADDON: CPT

## 2023-09-28 PROCEDURE — 93005 ELECTROCARDIOGRAM TRACING: CPT | Performed by: EMERGENCY MEDICINE

## 2023-09-28 PROCEDURE — 99285 EMERGENCY DEPT VISIT HI MDM: CPT

## 2023-09-28 PROCEDURE — 96376 TX/PRO/DX INJ SAME DRUG ADON: CPT

## 2023-09-28 PROCEDURE — 700105 HCHG RX REV CODE 258: Performed by: EMERGENCY MEDICINE

## 2023-09-28 PROCEDURE — 71045 X-RAY EXAM CHEST 1 VIEW: CPT

## 2023-09-28 PROCEDURE — 700117 HCHG RX CONTRAST REV CODE 255: Performed by: EMERGENCY MEDICINE

## 2023-09-28 PROCEDURE — 82077 ASSAY SPEC XCP UR&BREATH IA: CPT

## 2023-09-28 PROCEDURE — 85025 COMPLETE CBC W/AUTO DIFF WBC: CPT

## 2023-09-28 PROCEDURE — 36415 COLL VENOUS BLD VENIPUNCTURE: CPT

## 2023-09-28 PROCEDURE — 84484 ASSAY OF TROPONIN QUANT: CPT

## 2023-09-28 PROCEDURE — 87040 BLOOD CULTURE FOR BACTERIA: CPT

## 2023-09-28 PROCEDURE — C9803 HOPD COVID-19 SPEC COLLECT: HCPCS | Performed by: EMERGENCY MEDICINE

## 2023-09-28 PROCEDURE — 700102 HCHG RX REV CODE 250 W/ 637 OVERRIDE(OP): Performed by: EMERGENCY MEDICINE

## 2023-09-28 RX ORDER — ACETAMINOPHEN 500 MG
1000 TABLET ORAL ONCE
Status: COMPLETED | OUTPATIENT
Start: 2023-09-28 | End: 2023-09-28

## 2023-09-28 RX ORDER — LORAZEPAM 2 MG/ML
1 INJECTION INTRAMUSCULAR ONCE
Status: COMPLETED | OUTPATIENT
Start: 2023-09-28 | End: 2023-09-28

## 2023-09-28 RX ORDER — MORPHINE SULFATE 4 MG/ML
4 INJECTION INTRAVENOUS ONCE
Status: COMPLETED | OUTPATIENT
Start: 2023-09-28 | End: 2023-09-28

## 2023-09-28 RX ORDER — ONDANSETRON 2 MG/ML
4 INJECTION INTRAMUSCULAR; INTRAVENOUS ONCE
Status: COMPLETED | OUTPATIENT
Start: 2023-09-28 | End: 2023-09-28

## 2023-09-28 RX ORDER — SODIUM CHLORIDE 9 MG/ML
1000 INJECTION, SOLUTION INTRAVENOUS ONCE
Status: COMPLETED | OUTPATIENT
Start: 2023-09-28 | End: 2023-09-28

## 2023-09-28 RX ORDER — HALOPERIDOL 5 MG/ML
2.5 INJECTION INTRAMUSCULAR ONCE
Status: COMPLETED | OUTPATIENT
Start: 2023-09-28 | End: 2023-09-28

## 2023-09-28 RX ORDER — LORAZEPAM 2 MG/ML
0.5 INJECTION INTRAMUSCULAR ONCE
Status: COMPLETED | OUTPATIENT
Start: 2023-09-28 | End: 2023-09-28

## 2023-09-28 RX ADMIN — LORAZEPAM 0.5 MG: 2 INJECTION INTRAMUSCULAR; INTRAVENOUS at 04:41

## 2023-09-28 RX ADMIN — SODIUM CHLORIDE 1000 ML: 9 INJECTION, SOLUTION INTRAVENOUS at 04:42

## 2023-09-28 RX ADMIN — ACETAMINOPHEN 1000 MG: 500 TABLET ORAL at 06:25

## 2023-09-28 RX ADMIN — IOHEXOL 100 ML: 350 INJECTION, SOLUTION INTRAVENOUS at 05:38

## 2023-09-28 RX ADMIN — LIDOCAINE HYDROCHLORIDE 30 ML: 20 SOLUTION OROPHARYNGEAL at 05:50

## 2023-09-28 RX ADMIN — MORPHINE SULFATE 4 MG: 4 INJECTION, SOLUTION INTRAMUSCULAR; INTRAVENOUS at 04:41

## 2023-09-28 RX ADMIN — ONDANSETRON 4 MG: 2 INJECTION INTRAMUSCULAR; INTRAVENOUS at 04:42

## 2023-09-28 RX ADMIN — LORAZEPAM 1 MG: 2 INJECTION INTRAMUSCULAR; INTRAVENOUS at 05:49

## 2023-09-28 RX ADMIN — HALOPERIDOL LACTATE 2.5 MG: 5 INJECTION, SOLUTION INTRAMUSCULAR at 05:48

## 2023-09-28 ASSESSMENT — LIFESTYLE VARIABLES
EVER HAD A DRINK FIRST THING IN THE MORNING TO STEADY YOUR NERVES TO GET RID OF A HANGOVER: NO
TOTAL SCORE: 0
TOTAL SCORE: 0
EVER FELT BAD OR GUILTY ABOUT YOUR DRINKING: NO
TOTAL SCORE: 0
ON A TYPICAL DAY WHEN YOU DRINK ALCOHOL HOW MANY DRINKS DO YOU HAVE: 0
HOW MANY TIMES IN THE PAST YEAR HAVE YOU HAD 5 OR MORE DRINKS IN A DAY: 0
CONSUMPTION TOTAL: NEGATIVE
HAVE YOU EVER FELT YOU SHOULD CUT DOWN ON YOUR DRINKING: NO
HAVE PEOPLE ANNOYED YOU BY CRITICIZING YOUR DRINKING: NO
AVERAGE NUMBER OF DAYS PER WEEK YOU HAVE A DRINK CONTAINING ALCOHOL: 0
DO YOU DRINK ALCOHOL: NO

## 2023-09-28 ASSESSMENT — FIBROSIS 4 INDEX: FIB4 SCORE: 2

## 2023-09-28 NOTE — ED TRIAGE NOTES
"Pt ambulated to Ed 9 for the following c/o    Chief Complaint   Patient presents with    N/V    Headache    Blood Pressure Problem     Pt woke up at 3 am with a very bad headache, pt took his blood pressure and it was high. Pt did take blood pressure medication. Pt feels like he is also having a panic attack.     Anxiety     BP (!) 144/110   Pulse (!) 106   Temp 36.7 °C (98 °F) (Temporal)   Resp 20   Ht 1.854 m (6' 1\")   Wt 98.6 kg (217 lb 6 oz)   SpO2 95%   BMI 28.68 kg/m²    "

## 2023-09-28 NOTE — ED NOTES
0532- Pt hit call light stating he still has a 10/10 headache and it isn't getting better. ERP aware and orders received. Pt medicated per MAR.

## 2023-09-28 NOTE — ED PROVIDER NOTES
"ED Provider Note    CHIEF COMPLAINT  Chief Complaint   Patient presents with    N/V    Headache    Blood Pressure Problem     Pt woke up at 3 am with a very bad headache, pt took his blood pressure and it was high. Pt did take blood pressure medication. Pt feels like he is also having a panic attack.     Anxiety     EXTERNAL RECORDS REVIEWED  Outpatient Notes outpatient note from July of this year with the patient was seen for quarterly psychiatric sessions for evaluation of ADHD.  Has had previous cardiac ablation for atrial fibrillation.  Prior history of upper aortic dilation of 4.8 cm as noted on the previous imaging.    HPI/ROS  LIMITATION TO HISTORY   Select: : None  OUTSIDE HISTORIAN(S):  none    Tom Quan is a 57 y.o. male who presents to the emergency room for concerns regarding sudden onset headache, which awoke him at 3 AM stating that this \"is the worst headache of his life.\"  He has been having some nauseousness over the last several days but not this severe.  He has not been having any chest discomfort, no shortness of breath and no significant abdominal tenderness or recent fevers.  He is tachycardic with some blood pressure elevation though he is retching repeatedly.  Patient has not had any tearing sensations of the chest, no extension into the back, no extension down into his low back    When the patient was able to stop retching further communicated that after having his blood pressure be slightly elevated after his headache started he restarted some of his home blood pressure medications that he had previously stopped taking because his blood pressure normalized.    PAST MEDICAL HISTORY   has a past medical history of Arrhythmia, Ascending aortic aneurysm (HCC), Depression, High cholesterol, Hyperlipidemia, and Ulcerative colitis (HCC).    SURGICAL HISTORY   has a past surgical history that includes hip arthroplasty total (Bilateral); shoulder arthroscopy (Right); tonsillectomy (N/A); and " "open repair/reconstruction pectoralis major (Left).    FAMILY HISTORY  Family History   Problem Relation Age of Onset    Diabetes Mother     Other Mother         smoker+, unclear cause of death    Hyperlipidemia Sister     Hyperlipidemia Brother     Cancer Paternal Uncle         Prostate    Heart Attack Maternal Grandfather          55, tobacco+    Other Son         takayasu's arteritis    Abdominal aortic aneurysm Neg Hx     Arterial Aneurysm Neg Hx        SOCIAL HISTORY  Social History     Tobacco Use    Smoking status: Never    Smokeless tobacco: Never   Vaping Use    Vaping Use: Former   Substance and Sexual Activity    Alcohol use: Yes     Comment: 2 glasses of wine/day    Drug use: Not Currently    Sexual activity: Not on file       CURRENT MEDICATIONS  Home Medications       Reviewed by Jeet Spann R.N. (Registered Nurse) on 23 at 0423  Med List Status: Partial     Medication Last Dose Status   apixaban (ELIQUIS) 5mg Tab  Active   B Complex Vitamins (VITAMIN B COMPLEX PO)  Active   B-D 3CC LUER-RONNA SYR 79VG8-4/2 23G X 1-1/2\" 3 ML Misc  Active   Cholecalciferol (VITAMIN D) 2000 UNIT Tab  Active   Desvenlafaxine Succinate ER 25 MG TABLET SR 24 HR  Active   hydrOXYzine HCl (ATARAX) 25 MG Tab  Active   lidocaine (LIDODERM) 5 % Patch  Active   multivitamin Tab  Active   Omega-3 Fatty Acids (FISH OIL PO)  Active   omeprazole (PRILOSEC) 20 MG delayed-release capsule  Active   ondansetron (ZOFRAN ODT) 4 MG TABLET DISPERSIBLE  Active   rizatriptan (MAXALT) 5 MG tablet  Active   rosuvastatin (CRESTOR) 20 MG Tab  Active   testosterone cypionate (DEPO-TESTOSTERONE) 200 MG/ML Solution injection  Active   Thiamine HCl (VITAMIN B-1) 250 MG Tab  Active   ustekinumab (STELARA) 45 MG/0.5ML Solution Prefilled Syringe injection  Active                    ALLERGIES  No Known Allergies    PHYSICAL EXAM  VITAL SIGNS: BP (!) 148/102   Pulse 83   Temp 36.7 °C (98 °F) (Temporal)   Resp 18   Ht 1.854 m (6' 1\")   Wt " 98.6 kg (217 lb 6 oz)   SpO2 93%   BMI 28.68 kg/m²    Genl: M sitting in gurney uncomfortably, speaking clearly between retching, appears in moderate distress   Head: NC/AT   ENT: Mucous membranes dry, posterior pharynx clear, uvula midline, nares patent bilaterally   Eyes: Normal sclera, pupils equal round reactive to light  Neck: Supple, FROM, no LAD appreciated   Pulmonary: Lungs are clear to auscultation bilaterally  Chest: No TTP  CV:  tachycardia, no murmur appreciated, pulses 2+ in both upper and lower extremities,  Abdomen: soft, NT/ND; no rebound/guarding, no masses palpated, no HSM   : no CVA or suprapubic tenderness   Musculoskeletal: Pain free ROM of the neck. Moving upper and lower extremities in spontaneous and coordinated fashion  Neuro: A&Ox4 (person, place, time, situation), speech fluent, gait steady, no focal deficits appreciated, No cerebellar signs. Sensation is grossly intact in the distal upper and lower extremities.  5/5 strength in  and dorsiflexion/plantar flexion of the ankles  Psych: Patient has an appropriate affect and behavior  Skin: No rash or lesions.  No pallor or jaundice.  No cyanosis.  Warm and dry.     DIAGNOSTIC STUDIES / PROCEDURES  EKG  I have independently interpreted this EKG  Results for orders placed or performed during the hospital encounter of 23   EKG (NOW)   Result Value Ref Range    Report       [facility]    Test Date:  2023  Pt Name:    SALVATORE WITT                  Department: [facility]  MRN:        [id number]                      [facility]  Gender:     Male                         Technician: JOSETTE  :        1965                   Requested By:PATRICIA SALDAÑA  Order #:    [id number]                    Reading MD: Bryan Parker MD    Measurements  Intervals                                Axis  Rate:       86                           P:          45  NJ:         164                          QRS:         13  QRSD:       83                           T:          19  QT:         381  QTc:        456    Interpretive Statements  Sinus rhythm, rate of 86, normal intervals, normal axis, no acute ischemia or  infarction  improved compared to ECG 07/14/2023 09:36:58    Electronically Signed On 09- 04:53:52 PDT by Bryan Parker MD       LABS  Labs Reviewed   COMP METABOLIC PANEL - Abnormal; Notable for the following components:       Result Value    Anion Gap 19.0 (*)     Glucose 104 (*)     AST(SGOT) 127 (*)     ALT(SGPT) 101 (*)     Alkaline Phosphatase 103 (*)     All other components within normal limits    Narrative:     Biotin intake of greater than 5 mg per day may interfere with  troponin levels, causing false low values.  Indicate which anticoagulants the patient is on:->UNKNOWN   ETHYL ALCOHOL (BLOOD) - Abnormal; Notable for the following components:    Diagnostic Alcohol 39.7 (*)     All other components within normal limits   CBC WITH DIFFERENTIAL    Narrative:     Biotin intake of greater than 5 mg per day may interfere with  troponin levels, causing false low values.  Indicate which anticoagulants the patient is on:->UNKNOWN   TROPONIN    Narrative:     Biotin intake of greater than 5 mg per day may interfere with  troponin levels, causing false low values.  Indicate which anticoagulants the patient is on:->UNKNOWN   PROTHROMBIN TIME    Narrative:     Biotin intake of greater than 5 mg per day may interfere with  troponin levels, causing false low values.  Indicate which anticoagulants the patient is on:->UNKNOWN   LIPASE    Narrative:     Biotin intake of greater than 5 mg per day may interfere with  troponin levels, causing false low values.  Indicate which anticoagulants the patient is on:->UNKNOWN   COV-2, FLU A/B, AND RSV BY PCR (octoScopeID)    Narrative:     Release to patient->Immediate   ESTIMATED GFR    Narrative:     Biotin intake of greater than 5 mg per day may interfere with  troponin levels,  "causing false low values.  Indicate which anticoagulants the patient is on:->UNKNOWN   BLOOD CULTURE    Narrative:     Per Hospital Policy: Only change Specimen Src: to \"Line\" if  specified by physician order.  Release to patient->Immediate   BLOOD CULTURE    Narrative:     Per Hospital Policy: Only change Specimen Src: to \"Line\" if  specified by physician order.  Release to patient->Immediate   DIAGNOSTIC ALCOHOL     RADIOLOGY  I have independently interpreted the diagnostic imaging associated with this visit and am waiting the final reading from the radiologist.   My preliminary interpretation is as follows: No focal cardiac enlargement, no cardiopulmonary process, mediastinum does not appear widened.  CTA of the head pending  Radiologist interpretation:   DX-CHEST-PORTABLE (1 VIEW)   Final Result         1.  No acute cardiopulmonary disease.      CT-CTA HEAD WITH & W/O-POST PROCESS    (Results Pending)     COURSE & MEDICAL DECISION MAKING    ED Observation Status? Yes; I am placing the patient in to an observation status due to a diagnostic uncertainty as well as therapeutic intensity. Patient placed in observation status at 6:14 AM, 9/28/2023.     Observation plan is as follows: Repeat evaluation, completion of CT imaging of the head for rule out acute emergent process.    INITIAL ASSESSMENT, COURSE AND PLAN  Subarachnoid hemorrhage, aneurysmal disease, hypertensive emergency, electrolyte derangement, ACS unlikely, arrhythmia unlikely, dissection unlikely, viral illness/gastritis    Care Narrative: Patient presents emergency room for symptoms as described above.  The patient describes having headaches, discomfort and nausea vomiting.  Patient appears extremely anxious following waking up with the symptoms had apparently taken several of his blood pressure medications and was not feeling well after this.  He has no signs of trauma during my initial assessment was having repeated retching and irritation.  During " this time he had no chest discomfort, no atypical radiation of his symptomology and did not have any clinical exam findings that point towards acute gastrointestinal or abdominal discomfort.    His lab work showed some tachycardia and hypertension though I do believe this is from this adrenergic surge as patient is acutely retching during his vital signs being taken.    There is no leukocytosis, no concerning anemia, there is no gross electrolyte abnormalities and the patient has a very slight anion gap elevation with normal glucose.  Liver enzymes are very slightly increased with a normal bilirubin and no obstructive pattern with no elevation in lipase to point away from pancreatitis.  No coagulopathy, troponin is not elevated and EKG shows no signs of acute ischemia or infarction.  Viral panel was negative and there is no ONOFRE.  Alcohol panel came back slightly positive at 40.    This time the thought would be that he is having some element of anxiousness versus alcoholic gastritis that would cause his retching though I cannot fully exclude the possibility of an intracranial bleed with his hypertension and sudden onset symptoms.  CTA of the head was obtained and is currently pending at the time of signout.    Following multiple doses of GI cocktail, Haldol, Ativan and patient was reassessed and he appeared substantially improved and much more calm.    Patient is given Tylenol for his headache, he does not have focal abnormalities at this time awaiting improvement from fluids and medications and results of the CT head for rule out of acute intracranial abnormality.  Please see ED signout note/discharge summary for final disposition      HYDRATION: Based on the patient's presentation of Acute Vomiting, Dehydration, and Tachycardia the patient was given IV fluids. IV Hydration was used because oral hydration was not adequate alone. Upon recheck following hydration, the patient was improving.    FINAL DIAGNOSIS  1.  Nausea and vomiting, unspecified vomiting type    2. Hypertension, unspecified type    3. Severe headache    4. Anxiety      Electronically signed by: Keith Adams M.D., 9/28/2023 4:23 AM

## 2023-09-28 NOTE — DISCHARGE SUMMARY
"  ED Observation Discharge Summary    Patient:Tom Quan  Patient : 1965  Patient MRN: [id number]  Patient PCP: Arvind Geiger M.D.    Admit Date: 2023  Discharge Date and Time: 23 6:45 AM    Discharge Diagnosis: Headache, hypertension  Discharge Attending: Sviakumar Jimenes M.D.  Discharge Service: ED Observation    ED Course  Tom is a 57 y.o. male who was evaluated at [facility] for headache.  His symptoms did improve with treatment, he had unremarkable diagnostics including CTA of his head.  He signed out to me pending a CTA, which was unremarkable and on reevaluation he is well-appearing and was discharged     Discharge Exam:  /86   Pulse 88   Temp 36.7 °C (98 °F) (Temporal)   Resp 16   Ht 1.854 m (6' 1\")   Wt 98.6 kg (217 lb 6 oz)   SpO2 96%   BMI 28.68 kg/m² .    Constitutional: Awake and alert. Nontoxic  HENT:  Grossly normal  Eyes: Grossly normal  Neck: Normal range of motion  Cardiovascular: Normal heart rate   Thorax & Lungs: No respiratory distress  Abdomen: Nontender  Skin:  No pathologic rash.   Extremities: Well perfused  Psychiatric: Affect normal    Labs  Results for orders placed or performed during the hospital encounter of 23   CBC WITH DIFFERENTIAL   Result Value Ref Range    WBC 6.3 4.8 - 10.8 K/uL    RBC 5.27 4.70 - 6.10 M/uL    Hemoglobin 16.6 14.0 - 18.0 g/dL    Hematocrit 47.9 42.0 - 52.0 %    MCV 90.9 81.4 - 97.8 fL    MCH 31.5 27.0 - 33.0 pg    MCHC 34.7 32.3 - 36.5 g/dL    RDW 49.6 35.9 - 50.0 fL    Platelet Count 257 164 - 446 K/uL    MPV 9.0 9.0 - 12.9 fL    Neutrophils-Polys 59.00 44.00 - 72.00 %    Lymphocytes 28.10 22.00 - 41.00 %    Monocytes 11.20 0.00 - 13.40 %    Eosinophils 0.90 0.00 - 6.90 %    Basophils 0.50 0.00 - 1.80 %    Immature Granulocytes 0.30 0.00 - 0.90 %    Nucleated RBC 0.00 0.00 - 0.20 /100 WBC    Neutrophils (Absolute) 3.73 1.82 - 7.42 K/uL    Lymphs (Absolute) 1.78 1.00 - 4.80 K/uL    Monos (Absolute) 0.71 0.00 - 0.85 " K/uL    Eos (Absolute) 0.06 0.00 - 0.51 K/uL    Baso (Absolute) 0.03 0.00 - 0.12 K/uL    Immature Granulocytes (abs) 0.02 0.00 - 0.11 K/uL    NRBC (Absolute) 0.00 K/uL   COMP METABOLIC PANEL   Result Value Ref Range    Sodium 137 135 - 145 mmol/L    Potassium 3.9 3.6 - 5.5 mmol/L    Chloride 96 96 - 112 mmol/L    Co2 22 20 - 33 mmol/L    Anion Gap 19.0 (H) 7.0 - 16.0    Glucose 104 (H) 65 - 99 mg/dL    Bun 9 8 - 22 mg/dL    Creatinine 1.14 0.50 - 1.40 mg/dL    Calcium 9.3 8.4 - 10.2 mg/dL    Correct Calcium 9.2 8.5 - 10.5 mg/dL    AST(SGOT) 127 (H) 12 - 45 U/L    ALT(SGPT) 101 (H) 2 - 50 U/L    Alkaline Phosphatase 103 (H) 30 - 99 U/L    Total Bilirubin 0.3 0.1 - 1.5 mg/dL    Albumin 4.1 3.2 - 4.9 g/dL    Total Protein 7.5 6.0 - 8.2 g/dL    Globulin 3.4 1.9 - 3.5 g/dL    A-G Ratio 1.2 g/dL   TROPONIN   Result Value Ref Range    Troponin T 12 6 - 19 ng/L   PROTHROMBIN TIME   Result Value Ref Range    PT 14.0 12.0 - 14.6 sec    INR 1.04 0.87 - 1.13   LIPASE   Result Value Ref Range    Lipase 32 11 - 82 U/L   CoV-2, Flu A/B, And RSV by PCR ([place])    Specimen: Nasopharyngeal; Respirate   Result Value Ref Range    Influenza virus A RNA Negative Negative    Influenza virus B, PCR Negative Negative    RSV, PCR Negative Negative    SARS-CoV-2 by PCR NotDetected     SARS-CoV-2 Source NP Swab    ESTIMATED GFR   Result Value Ref Range    GFR (CKD-EPI) 75 >60 mL/min/1.73 m 2   ETHYL ALCOHOL (BLOOD)   Result Value Ref Range    Diagnostic Alcohol 39.7 (H) <10.1 mg/dL   EKG (NOW)   Result Value Ref Range    Report       [facility]    Test Date:  2023  Pt Name:    SALVATORE WITT                  Department: [facility]  MRN:        [id number]                      [facility]  Gender:     Male                         Technician: JOSETTE  :        1965                   Requested By:PATRICIA SALDAÑA  Order #:    [id number]                    Johnson MD: Bryan Parker,  MD    Measurements  Intervals                                Axis  Rate:       86                           P:          45  FL:         164                          QRS:        13  QRSD:       83                           T:          19  QT:         381  QTc:        456    Interpretive Statements  Sinus rhythm, rate of 86, normal intervals, normal axis, no acute ischemia or  infarction  improved compared to ECG 07/14/2023 09:36:58    Electronically Signed On 09- 04:53:52 PDT by Bryan Parker MD         Radiology  CT-CTA HEAD WITH & W/O-POST PROCESS   Final Result         1.  No large vessel occlusion or aneurysm identified.      DX-CHEST-PORTABLE (1 VIEW)   Final Result         1.  No acute cardiopulmonary disease.          Medications:   Discharge Medication List as of 9/28/2023  6:33 AM          My final assessment includes headache, hypertension  Upon Reevaluation, the patient's condition has: Improved; and will be discharged.    Patient discharged from ED Observation status at 6:45 AM   (Time) 09/28/23   (Date).     Total time spent on this ED Observation discharge encounter is > 30 Minutes    Electronically signed by: Sivakumar Jimenes M.D., 9/28/2023 6:25 AM

## 2023-09-28 NOTE — ED NOTES
Patient is stable for discharge at this time, anticipatory guidance provided, close follow-up is encouraged, and ED return instructions have been detailed. Patient is both agreeable to the disposition and plan and discharged home in ambulatory state and in good condition.      Pt called a ride and is agreeable to sitting out in the lobby to wait for them. Pt walked out to the lobby. Pt walked out with a steady gait.

## 2023-09-29 RX ORDER — RIZATRIPTAN BENZOATE 5 MG/1
5-10 TABLET ORAL
Qty: 20 TABLET | Refills: 3 | Status: SHIPPED | OUTPATIENT
Start: 2023-09-29 | End: 2023-10-09 | Stop reason: SDUPTHER

## 2023-09-29 NOTE — TELEPHONE ENCOUNTER
Received request via: Patient    Was the patient seen in the last year in this department? Yes  LOV 06/15/2023  Does the patient have an active prescription (recently filled or refills available) for medication(s) requested? No    Does the patient have residential Plus and need 100 day supply (blood pressure, diabetes and cholesterol meds only)? Medication is not for cholesterol, blood pressure or diabetes and Patient does not have SCP

## 2023-10-03 ENCOUNTER — PATIENT MESSAGE (OUTPATIENT)
Dept: MEDICAL GROUP | Facility: LAB | Age: 58
End: 2023-10-03
Payer: COMMERCIAL

## 2023-10-03 DIAGNOSIS — Z12.83 SKIN CANCER SCREENING: ICD-10-CM

## 2023-10-03 LAB
BACTERIA BLD CULT: NORMAL
BACTERIA BLD CULT: NORMAL
SIGNIFICANT IND 70042: NORMAL
SIGNIFICANT IND 70042: NORMAL
SITE SITE: NORMAL
SITE SITE: NORMAL
SOURCE SOURCE: NORMAL
SOURCE SOURCE: NORMAL

## 2023-10-05 ENCOUNTER — TELEPHONE (OUTPATIENT)
Dept: HEALTH INFORMATION MANAGEMENT | Facility: OTHER | Age: 58
End: 2023-10-05

## 2023-10-09 DIAGNOSIS — G43.809 OTHER MIGRAINE WITHOUT STATUS MIGRAINOSUS, NOT INTRACTABLE: ICD-10-CM

## 2023-10-09 NOTE — TELEPHONE ENCOUNTER
----- Message from Arvind Geiger M.D. sent at 10/9/2023  2:21 PM PDT -----  Regarding: FW: Please send all medications to [facility] [address]. My migraine medicine was sent to [facility] and they are out of my network.   Contact: [phone]    ----- Message -----  From: Mira Loera  Sent: 10/3/2023   8:42 AM PDT  To: Arvind Geiger M.D.  Subject: FW: Please send all medications to [facility]      ----- Message -----  From: Tom Quan  Sent: 10/3/2023   8:35 AM PDT  To: [facility] Des[facility] - Chart Correction Request  Subject: [address]    Topic: [facility] Chart Correction Request.    Please reissue my migraine medicine to [facility].        normal for race

## 2023-10-09 NOTE — TELEPHONE ENCOUNTER
Received request via: Patient    Was the patient seen in the last year in this department? Yes  LOV 06/15/2023  Does the patient have an active prescription (recently filled or refills available) for medication(s) requested? No    Does the patient have alf Plus and need 100 day supply (blood pressure, diabetes and cholesterol meds only)? Medication is not for cholesterol, blood pressure or diabetes and Patient does not have SCP

## 2023-10-10 ENCOUNTER — TELEPHONE (OUTPATIENT)
Dept: MEDICAL GROUP | Facility: LAB | Age: 58
End: 2023-10-10
Payer: COMMERCIAL

## 2023-10-10 RX ORDER — RIZATRIPTAN BENZOATE 5 MG/1
5-10 TABLET ORAL
Qty: 20 TABLET | Refills: 3 | Status: SHIPPED | OUTPATIENT
Start: 2023-10-10

## 2023-10-10 NOTE — TELEPHONE ENCOUNTER
MEDICATION PRIOR AUTHORIZATION NEEDED:    1. Name of Medication: rizatriptan (MAXALT) 5 MG tablet    2. Requested By (Name of Pharmacy):   [facility] [place]  P: [phone]  F: [phone]     3. Is insurance on file current? Yes    4. What is the name & phone number of the 3rd party payor?   [employer]  P: [phone]  F: [phone]    Prior auth submitted to plan through CoverMyMeds, waiting for possible approval.   Key: [id number]

## 2023-10-16 ENCOUNTER — APPOINTMENT (OUTPATIENT)
Dept: RADIOLOGY | Facility: MEDICAL CENTER | Age: 58
End: 2023-10-16
Attending: HOSPITALIST
Payer: COMMERCIAL

## 2023-10-16 ENCOUNTER — APPOINTMENT (OUTPATIENT)
Dept: RADIOLOGY | Facility: MEDICAL CENTER | Age: 58
End: 2023-10-16
Attending: EMERGENCY MEDICINE
Payer: COMMERCIAL

## 2023-10-16 ENCOUNTER — HOSPITAL ENCOUNTER (OUTPATIENT)
Facility: MEDICAL CENTER | Age: 58
End: 2023-10-18
Attending: EMERGENCY MEDICINE | Admitting: HOSPITALIST
Payer: COMMERCIAL

## 2023-10-16 DIAGNOSIS — R07.9 CHEST PAIN, UNSPECIFIED TYPE: ICD-10-CM

## 2023-10-16 PROBLEM — R10.13 EPIGASTRIC PAIN: Status: ACTIVE | Noted: 2023-10-16

## 2023-10-16 PROBLEM — F10.20 ALCOHOL DEPENDENCE (HCC): Status: ACTIVE | Noted: 2023-10-16

## 2023-10-16 LAB
ALBUMIN SERPL BCP-MCNC: 4 G/DL (ref 3.2–4.9)
ALBUMIN/GLOB SERPL: 1.3 G/DL
ALP SERPL-CCNC: 91 U/L (ref 30–99)
ALT SERPL-CCNC: 73 U/L (ref 2–50)
ANION GAP SERPL CALC-SCNC: 23 MMOL/L (ref 7–16)
AST SERPL-CCNC: 91 U/L (ref 12–45)
BASOPHILS # BLD AUTO: 0.7 % (ref 0–1.8)
BASOPHILS # BLD: 0.08 K/UL (ref 0–0.12)
BILIRUB SERPL-MCNC: 0.7 MG/DL (ref 0.1–1.5)
BUN SERPL-MCNC: 8 MG/DL (ref 8–22)
CALCIUM ALBUM COR SERPL-MCNC: 8.7 MG/DL (ref 8.5–10.5)
CALCIUM SERPL-MCNC: 8.7 MG/DL (ref 8.4–10.2)
CHLORIDE SERPL-SCNC: 96 MMOL/L (ref 96–112)
CO2 SERPL-SCNC: 17 MMOL/L (ref 20–33)
CREAT SERPL-MCNC: 0.87 MG/DL (ref 0.5–1.4)
EKG IMPRESSION: NORMAL
EOSINOPHIL # BLD AUTO: 0 K/UL (ref 0–0.51)
EOSINOPHIL NFR BLD: 0 % (ref 0–6.9)
ERYTHROCYTE [DISTWIDTH] IN BLOOD BY AUTOMATED COUNT: 51.3 FL (ref 35.9–50)
GFR SERPLBLD CREATININE-BSD FMLA CKD-EPI: 101 ML/MIN/1.73 M 2
GLOBULIN SER CALC-MCNC: 3.2 G/DL (ref 1.9–3.5)
GLUCOSE SERPL-MCNC: 79 MG/DL (ref 65–99)
HCT VFR BLD AUTO: 47.4 % (ref 42–52)
HGB BLD-MCNC: 16.2 G/DL (ref 14–18)
IMM GRANULOCYTES # BLD AUTO: 0.05 K/UL (ref 0–0.11)
IMM GRANULOCYTES NFR BLD AUTO: 0.5 % (ref 0–0.9)
LIPASE SERPL-CCNC: 104 U/L (ref 11–82)
LYMPHOCYTES # BLD AUTO: 1.24 K/UL (ref 1–4.8)
LYMPHOCYTES NFR BLD: 11.2 % (ref 22–41)
MCH RBC QN AUTO: 31.2 PG (ref 27–33)
MCHC RBC AUTO-ENTMCNC: 34.2 G/DL (ref 32.3–36.5)
MCV RBC AUTO: 91.2 FL (ref 81.4–97.8)
MONOCYTES # BLD AUTO: 0.61 K/UL (ref 0–0.85)
MONOCYTES NFR BLD AUTO: 5.5 % (ref 0–13.4)
NEUTROPHILS # BLD AUTO: 9.08 K/UL (ref 1.82–7.42)
NEUTROPHILS NFR BLD: 82.1 % (ref 44–72)
NRBC # BLD AUTO: 0 K/UL
NRBC BLD-RTO: 0 /100 WBC (ref 0–0.2)
NT-PROBNP SERPL IA-MCNC: <36 PG/ML (ref 0–125)
PLATELET # BLD AUTO: 231 K/UL (ref 164–446)
PMV BLD AUTO: 8.5 FL (ref 9–12.9)
POTASSIUM SERPL-SCNC: 4.2 MMOL/L (ref 3.6–5.5)
PROT SERPL-MCNC: 7.2 G/DL (ref 6–8.2)
RBC # BLD AUTO: 5.2 M/UL (ref 4.7–6.1)
SODIUM SERPL-SCNC: 136 MMOL/L (ref 135–145)
TROPONIN T SERPL-MCNC: 8 NG/L (ref 6–19)
WBC # BLD AUTO: 11.1 K/UL (ref 4.8–10.8)

## 2023-10-16 PROCEDURE — 84484 ASSAY OF TROPONIN QUANT: CPT

## 2023-10-16 PROCEDURE — 700101 HCHG RX REV CODE 250: Performed by: EMERGENCY MEDICINE

## 2023-10-16 PROCEDURE — 96375 TX/PRO/DX INJ NEW DRUG ADDON: CPT

## 2023-10-16 PROCEDURE — G0378 HOSPITAL OBSERVATION PER HR: HCPCS

## 2023-10-16 PROCEDURE — 36415 COLL VENOUS BLD VENIPUNCTURE: CPT

## 2023-10-16 PROCEDURE — 85025 COMPLETE CBC W/AUTO DIFF WBC: CPT

## 2023-10-16 PROCEDURE — 80053 COMPREHEN METABOLIC PANEL: CPT

## 2023-10-16 PROCEDURE — 96374 THER/PROPH/DIAG INJ IV PUSH: CPT

## 2023-10-16 PROCEDURE — 83880 ASSAY OF NATRIURETIC PEPTIDE: CPT

## 2023-10-16 PROCEDURE — 99223 1ST HOSP IP/OBS HIGH 75: CPT | Performed by: HOSPITALIST

## 2023-10-16 PROCEDURE — 700102 HCHG RX REV CODE 250 W/ 637 OVERRIDE(OP): Performed by: HOSPITALIST

## 2023-10-16 PROCEDURE — 700111 HCHG RX REV CODE 636 W/ 250 OVERRIDE (IP): Mod: JZ | Performed by: EMERGENCY MEDICINE

## 2023-10-16 PROCEDURE — 83690 ASSAY OF LIPASE: CPT

## 2023-10-16 PROCEDURE — A9270 NON-COVERED ITEM OR SERVICE: HCPCS | Performed by: EMERGENCY MEDICINE

## 2023-10-16 PROCEDURE — 93005 ELECTROCARDIOGRAM TRACING: CPT | Performed by: EMERGENCY MEDICINE

## 2023-10-16 PROCEDURE — A9270 NON-COVERED ITEM OR SERVICE: HCPCS | Performed by: HOSPITALIST

## 2023-10-16 PROCEDURE — 71045 X-RAY EXAM CHEST 1 VIEW: CPT

## 2023-10-16 PROCEDURE — 99285 EMERGENCY DEPT VISIT HI MDM: CPT

## 2023-10-16 PROCEDURE — 700102 HCHG RX REV CODE 250 W/ 637 OVERRIDE(OP): Performed by: EMERGENCY MEDICINE

## 2023-10-16 PROCEDURE — 700111 HCHG RX REV CODE 636 W/ 250 OVERRIDE (IP): Performed by: HOSPITALIST

## 2023-10-16 PROCEDURE — 94760 N-INVAS EAR/PLS OXIMETRY 1: CPT

## 2023-10-16 PROCEDURE — 93005 ELECTROCARDIOGRAM TRACING: CPT

## 2023-10-16 RX ORDER — VENLAFAXINE 25 MG/1
12.5 TABLET ORAL 3 TIMES DAILY
Status: DISCONTINUED | OUTPATIENT
Start: 2023-10-16 | End: 2023-10-18 | Stop reason: HOSPADM

## 2023-10-16 RX ORDER — SUMATRIPTAN 25 MG/1
50-100 TABLET, FILM COATED ORAL
Status: DISCONTINUED | OUTPATIENT
Start: 2023-10-16 | End: 2023-10-18 | Stop reason: HOSPADM

## 2023-10-16 RX ORDER — BISACODYL 10 MG
10 SUPPOSITORY, RECTAL RECTAL
Status: DISCONTINUED | OUTPATIENT
Start: 2023-10-16 | End: 2023-10-18 | Stop reason: HOSPADM

## 2023-10-16 RX ORDER — POLYETHYLENE GLYCOL 3350 17 G/17G
1 POWDER, FOR SOLUTION ORAL
Status: DISCONTINUED | OUTPATIENT
Start: 2023-10-16 | End: 2023-10-18 | Stop reason: HOSPADM

## 2023-10-16 RX ORDER — IBUPROFEN 200 MG
800 TABLET ORAL EVERY 6 HOURS PRN
COMMUNITY

## 2023-10-16 RX ORDER — BUDESONIDE 9 MG/1
9 TABLET, FILM COATED, EXTENDED RELEASE ORAL PRN
COMMUNITY
Start: 2023-09-18 | End: 2024-02-26

## 2023-10-16 RX ORDER — OXYCODONE HYDROCHLORIDE 5 MG/1
2.5 TABLET ORAL
Status: DISCONTINUED | OUTPATIENT
Start: 2023-10-16 | End: 2023-10-17

## 2023-10-16 RX ORDER — LORAZEPAM 0.5 MG/1
0.5 TABLET ORAL EVERY 8 HOURS PRN
Status: DISCONTINUED | OUTPATIENT
Start: 2023-10-16 | End: 2023-10-17

## 2023-10-16 RX ORDER — AMOXICILLIN 250 MG
2 CAPSULE ORAL 2 TIMES DAILY
Status: DISCONTINUED | OUTPATIENT
Start: 2023-10-16 | End: 2023-10-18 | Stop reason: HOSPADM

## 2023-10-16 RX ORDER — ASPIRIN 81 MG/1
81 TABLET ORAL DAILY
Status: DISCONTINUED | OUTPATIENT
Start: 2023-10-17 | End: 2023-10-18 | Stop reason: HOSPADM

## 2023-10-16 RX ORDER — USTEKINUMAB 90 MG/ML
90 INJECTION, SOLUTION SUBCUTANEOUS SEE ADMIN INSTRUCTIONS
COMMUNITY
Start: 2023-09-25

## 2023-10-16 RX ORDER — ONDANSETRON 4 MG/1
4 TABLET, ORALLY DISINTEGRATING ORAL EVERY 4 HOURS PRN
Status: DISCONTINUED | OUTPATIENT
Start: 2023-10-16 | End: 2023-10-18 | Stop reason: HOSPADM

## 2023-10-16 RX ORDER — METOPROLOL TARTRATE 1 MG/ML
5 INJECTION, SOLUTION INTRAVENOUS ONCE
Status: COMPLETED | OUTPATIENT
Start: 2023-10-16 | End: 2023-10-16

## 2023-10-16 RX ORDER — MORPHINE SULFATE 4 MG/ML
2 INJECTION INTRAVENOUS
Status: DISCONTINUED | OUTPATIENT
Start: 2023-10-16 | End: 2023-10-17

## 2023-10-16 RX ORDER — ONDANSETRON 2 MG/ML
4 INJECTION INTRAMUSCULAR; INTRAVENOUS ONCE
Status: COMPLETED | OUTPATIENT
Start: 2023-10-16 | End: 2023-10-16

## 2023-10-16 RX ORDER — LABETALOL HYDROCHLORIDE 5 MG/ML
10 INJECTION, SOLUTION INTRAVENOUS EVERY 4 HOURS PRN
Status: DISCONTINUED | OUTPATIENT
Start: 2023-10-16 | End: 2023-10-18 | Stop reason: HOSPADM

## 2023-10-16 RX ORDER — METOCLOPRAMIDE HYDROCHLORIDE 5 MG/ML
10 INJECTION INTRAMUSCULAR; INTRAVENOUS EVERY 6 HOURS PRN
Status: DISCONTINUED | OUTPATIENT
Start: 2023-10-16 | End: 2023-10-16

## 2023-10-16 RX ORDER — ONDANSETRON 2 MG/ML
4 INJECTION INTRAMUSCULAR; INTRAVENOUS EVERY 4 HOURS PRN
Status: DISCONTINUED | OUTPATIENT
Start: 2023-10-16 | End: 2023-10-18 | Stop reason: HOSPADM

## 2023-10-16 RX ORDER — OMEPRAZOLE 20 MG/1
20 CAPSULE, DELAYED RELEASE ORAL
Status: DISCONTINUED | OUTPATIENT
Start: 2023-10-16 | End: 2023-10-18 | Stop reason: HOSPADM

## 2023-10-16 RX ORDER — ACETAMINOPHEN 325 MG/1
650 TABLET ORAL EVERY 6 HOURS PRN
Status: DISCONTINUED | OUTPATIENT
Start: 2023-10-16 | End: 2023-10-18 | Stop reason: HOSPADM

## 2023-10-16 RX ORDER — OXYCODONE HYDROCHLORIDE 5 MG/1
5 TABLET ORAL
Status: DISCONTINUED | OUTPATIENT
Start: 2023-10-16 | End: 2023-10-17

## 2023-10-16 RX ORDER — ALUMINA, MAGNESIA, AND SIMETHICONE 2400; 2400; 240 MG/30ML; MG/30ML; MG/30ML
30 SUSPENSION ORAL EVERY 4 HOURS PRN
Status: ACTIVE | OUTPATIENT
Start: 2023-10-16 | End: 2023-10-17

## 2023-10-16 RX ORDER — HYDROXYZINE HYDROCHLORIDE 25 MG/1
25 TABLET, FILM COATED ORAL 3 TIMES DAILY PRN
Status: DISCONTINUED | OUTPATIENT
Start: 2023-10-16 | End: 2023-10-16

## 2023-10-16 RX ORDER — METOCLOPRAMIDE HYDROCHLORIDE 5 MG/ML
10 INJECTION INTRAMUSCULAR; INTRAVENOUS EVERY 6 HOURS PRN
Status: DISCONTINUED | OUTPATIENT
Start: 2023-10-16 | End: 2023-10-18 | Stop reason: HOSPADM

## 2023-10-16 RX ORDER — NITROGLYCERIN 0.4 MG/1
0.4 TABLET SUBLINGUAL
Status: DISCONTINUED | OUTPATIENT
Start: 2023-10-16 | End: 2023-10-18 | Stop reason: HOSPADM

## 2023-10-16 RX ORDER — ASPIRIN 325 MG
325 TABLET ORAL ONCE
Status: COMPLETED | OUTPATIENT
Start: 2023-10-16 | End: 2023-10-16

## 2023-10-16 RX ORDER — ACETAMINOPHEN 500 MG
1000 TABLET ORAL EVERY 6 HOURS PRN
COMMUNITY

## 2023-10-16 RX ORDER — ROSUVASTATIN CALCIUM 10 MG/1
20 TABLET, COATED ORAL EVERY EVENING
Status: DISCONTINUED | OUTPATIENT
Start: 2023-10-16 | End: 2023-10-18 | Stop reason: HOSPADM

## 2023-10-16 RX ADMIN — ONDANSETRON 4 MG: 2 INJECTION INTRAMUSCULAR; INTRAVENOUS at 12:30

## 2023-10-16 RX ADMIN — VENLAFAXINE 12.5 MG: 25 TABLET ORAL at 16:35

## 2023-10-16 RX ADMIN — ASPIRIN 325 MG: 325 TABLET ORAL at 12:32

## 2023-10-16 RX ADMIN — LABETALOL HYDROCHLORIDE 10 MG: 5 INJECTION INTRAVENOUS at 20:12

## 2023-10-16 RX ADMIN — LORAZEPAM 0.5 MG: 0.5 TABLET ORAL at 22:01

## 2023-10-16 RX ADMIN — ROSUVASTATIN 20 MG: 10 TABLET, FILM COATED ORAL at 17:28

## 2023-10-16 RX ADMIN — METOPROLOL TARTRATE 5 MG: 5 INJECTION INTRAVENOUS at 13:21

## 2023-10-16 RX ADMIN — FENTANYL CITRATE 100 MCG: 50 INJECTION, SOLUTION INTRAMUSCULAR; INTRAVENOUS at 12:31

## 2023-10-16 RX ADMIN — OXYCODONE HYDROCHLORIDE 5 MG: 5 TABLET ORAL at 16:30

## 2023-10-16 RX ADMIN — METOCLOPRAMIDE 10 MG: 5 INJECTION, SOLUTION INTRAMUSCULAR; INTRAVENOUS at 16:01

## 2023-10-16 ASSESSMENT — LIFESTYLE VARIABLES
EVER FELT BAD OR GUILTY ABOUT YOUR DRINKING: YES
HAVE YOU EVER FELT YOU SHOULD CUT DOWN ON YOUR DRINKING: YES
TOTAL SCORE: 2
ALCOHOL_USE: YES
HOW MANY TIMES IN THE PAST YEAR HAVE YOU HAD 5 OR MORE DRINKS IN A DAY: 0
TOTAL SCORE: 2
AVERAGE NUMBER OF DAYS PER WEEK YOU HAVE A DRINK CONTAINING ALCOHOL: 7
ON A TYPICAL DAY WHEN YOU DRINK ALCOHOL HOW MANY DRINKS DO YOU HAVE: 3
EVER HAD A DRINK FIRST THING IN THE MORNING TO STEADY YOUR NERVES TO GET RID OF A HANGOVER: NO
TOTAL SCORE: 2
HAVE PEOPLE ANNOYED YOU BY CRITICIZING YOUR DRINKING: NO
CONSUMPTION TOTAL: POSITIVE

## 2023-10-16 ASSESSMENT — ENCOUNTER SYMPTOMS
EYE DISCHARGE: 0
SHORTNESS OF BREATH: 0
FOCAL WEAKNESS: 0
MYALGIAS: 0
ABDOMINAL PAIN: 1
STRIDOR: 0
FEVER: 0
NERVOUS/ANXIOUS: 0
BRUISES/BLEEDS EASILY: 0
FLANK PAIN: 0
NAUSEA: 1
COUGH: 0
CHILLS: 0
EYE REDNESS: 0

## 2023-10-16 ASSESSMENT — COGNITIVE AND FUNCTIONAL STATUS - GENERAL
MOBILITY SCORE: 24
DAILY ACTIVITIY SCORE: 24
SUGGESTED CMS G CODE MODIFIER MOBILITY: CH
SUGGESTED CMS G CODE MODIFIER DAILY ACTIVITY: CH

## 2023-10-16 ASSESSMENT — FIBROSIS 4 INDEX
FIB4 SCORE: 2.63
FIB4 SCORE: 2.8

## 2023-10-16 ASSESSMENT — HEART SCORE
RISK FACTORS: 1-2 RISK FACTORS
TROPONIN: LESS THAN OR EQUAL TO NORMAL LIMIT
HISTORY: MODERATELY SUSPICIOUS
AGE: 45-64
HEART SCORE: 4
ECG: NON-SPECIFIC REPOLARIZATION DISTURBANCE

## 2023-10-16 ASSESSMENT — PAIN DESCRIPTION - PAIN TYPE: TYPE: ACUTE PAIN

## 2023-10-16 NOTE — ED NOTES
Pt has no had any CP and/or nausea while here in ER, Pt was c/o upper ABD Pn when he initially arrived however stated that Pn went away after receiving Pn medication;

## 2023-10-16 NOTE — ED PROVIDER NOTES
ED Provider Note    CHIEF COMPLAINT  Chief Complaint   Patient presents with    Chest Pain     Developed last night    Shortness of Breath     Started last night    N/V       EXTERNAL RECORDS REVIEWED  Other reviewed notes from cardiology in July where the patient had ablation for atrial fibrillation and is not currently on anticoagulation    HPI/ROS    Tom Quan is a 58 y.o. male who presents with multiple complaints.  The patient states he feels short of breath.  He states that he started feeling poorly over the last couple of days with cramping epigastric discomfort.  He has a known history of ulcerative colitis as well as irritable bowel syndrome but this seem to be different.  He did have diarrhea yesterday and has also had extreme nausea and vomiting.  He states this morning when he woke up he has been short of breath.  He states he also has associated chest pain.  He does not have any known history of ischemic cardiac disease but does have a history of atrial fibrillation with an ablation as mentioned above in July.  From a risk factor standpoint he does have dyslipidemia and hypertension.  He does take CBD Gummies but otherwise denies recreational drug abuse.  He states he also drinks more than he should.  He does not have any pain or swelling to his lower extremities.  He has not had any associated fevers.    PAST MEDICAL HISTORY   has a past medical history of Arrhythmia, Ascending aortic aneurysm (HCC), Atrial fibrillation (HCC), Depression, High cholesterol, Hyperlipidemia, and Ulcerative colitis (HCC).    SURGICAL HISTORY   has a past surgical history that includes hip arthroplasty total (Bilateral); shoulder arthroscopy (Right); tonsillectomy (N/A); and open repair/reconstruction pectoralis major (Left).    FAMILY HISTORY  Family History   Problem Relation Age of Onset    Diabetes Mother     Other Mother         smoker+, unclear cause of death    Hyperlipidemia Sister     Hyperlipidemia Brother      "Cancer Paternal Uncle         Prostate    Heart Attack Maternal Grandfather          55, tobacco+    Other Son         takayasu's arteritis    Abdominal aortic aneurysm Neg Hx     Arterial Aneurysm Neg Hx        SOCIAL HISTORY  Social History     Tobacco Use    Smoking status: Never    Smokeless tobacco: Never   Vaping Use    Vaping Use: Former   Substance and Sexual Activity    Alcohol use: Yes    Drug use: Yes     Comment: CBD    Sexual activity: Not on file       CURRENT MEDICATIONS  Home Medications    **Home medications have not yet been reviewed for this encounter**         ALLERGIES  No Known Allergies    PHYSICAL EXAM  VITAL SIGNS: BP (!) 146/103   Pulse (!) 101   Temp 37.1 °C (98.8 °F) (Temporal)   Resp 20   Ht 1.854 m (6' 1\")   Wt 96.9 kg (213 lb 10 oz)   SpO2 94%   BMI 28.18 kg/m²    General the patient appears anxious but nontoxic    HEENT unremarkable    Pulmonary the patient's lungs are clear to auscultation bilaterally    Cardiovascular S1-S2 with a tachycardic rate    GI the patient is epigastric discomfort to deep palpation with no rebound or guarding.  There is no distention    Skin no pallor or jaundice    Extremities no edema    Neurologic examination is grossly intact    DIAGNOSTIC STUDIES   Results for orders placed or performed during the hospital encounter of 10/16/23   CBC with Differential   Result Value Ref Range    WBC 11.1 (H) 4.8 - 10.8 K/uL    RBC 5.20 4.70 - 6.10 M/uL    Hemoglobin 16.2 14.0 - 18.0 g/dL    Hematocrit 47.4 42.0 - 52.0 %    MCV 91.2 81.4 - 97.8 fL    MCH 31.2 27.0 - 33.0 pg    MCHC 34.2 32.3 - 36.5 g/dL    RDW 51.3 (H) 35.9 - 50.0 fL    Platelet Count 231 164 - 446 K/uL    MPV 8.5 (L) 9.0 - 12.9 fL    Neutrophils-Polys 82.10 (H) 44.00 - 72.00 %    Lymphocytes 11.20 (L) 22.00 - 41.00 %    Monocytes 5.50 0.00 - 13.40 %    Eosinophils 0.00 0.00 - 6.90 %    Basophils 0.70 0.00 - 1.80 %    Immature Granulocytes 0.50 0.00 - 0.90 %    Nucleated RBC 0.00 0.00 - 0.20 " /100 WBC    Neutrophils (Absolute) 9.08 (H) 1.82 - 7.42 K/uL    Lymphs (Absolute) 1.24 1.00 - 4.80 K/uL    Monos (Absolute) 0.61 0.00 - 0.85 K/uL    Eos (Absolute) 0.00 0.00 - 0.51 K/uL    Baso (Absolute) 0.08 0.00 - 0.12 K/uL    Immature Granulocytes (abs) 0.05 0.00 - 0.11 K/uL    NRBC (Absolute) 0.00 K/uL   Complete Metabolic Panel (CMP)   Result Value Ref Range    Sodium 136 135 - 145 mmol/L    Potassium 4.2 3.6 - 5.5 mmol/L    Chloride 96 96 - 112 mmol/L    Co2 17 (L) 20 - 33 mmol/L    Anion Gap 23.0 (H) 7.0 - 16.0    Glucose 79 65 - 99 mg/dL    Bun 8 8 - 22 mg/dL    Creatinine 0.87 0.50 - 1.40 mg/dL    Calcium 8.7 8.4 - 10.2 mg/dL    Correct Calcium 8.7 8.5 - 10.5 mg/dL    AST(SGOT) 91 (H) 12 - 45 U/L    ALT(SGPT) 73 (H) 2 - 50 U/L    Alkaline Phosphatase 91 30 - 99 U/L    Total Bilirubin 0.7 0.1 - 1.5 mg/dL    Albumin 4.0 3.2 - 4.9 g/dL    Total Protein 7.2 6.0 - 8.2 g/dL    Globulin 3.2 1.9 - 3.5 g/dL    A-G Ratio 1.3 g/dL   Troponins NOW   Result Value Ref Range    Troponin T 8 6 - 19 ng/L   LIPASE   Result Value Ref Range    Lipase 104 (H) 11 - 82 U/L   proBrain Natriuretic Peptide, NT   Result Value Ref Range    NT-proBNP <36 0 - 125 pg/mL   ESTIMATED GFR   Result Value Ref Range    GFR (CKD-EPI) 101 >60 mL/min/1.73 m 2   EKG   Result Value Ref Range    Report       [facility] Emergency Dept.    Test Date:  2023-10-16  Pt Name:    SALVATORE WITT                  Department: [facility]  MRN:        [id number]                      Room:       -ROOM 3  Gender:     Male                         Technician: [id number]  :        1965                   Requested By:ER TRIAGE PROTOCOL  Order #:    [id number]                    Reading MD: RIAZ DANGELO MD    Measurements  Intervals                                Axis  Rate:       97                           P:          43  WI:         142                          QRS:        14  QRSD:       78                           T:          31  QT:          349  QTc:        444    Interpretive Statements  Twelve-lead EKG shows a normal sinus rhythm with a ventricular to 97, normal  QRS, normal intervals, ST segment elevation laterally that appears slightly  increased from prior  Electronically Signed On 10- 12:24:29 PDT by RIAZ DANGELO MD         EKG  I have independently interpreted this EKG  Please see my interpretation above    RADIOLOGY  DX-CHEST-PORTABLE (1 VIEW)   Final Result      No evidence of acute cardiopulmonary process.        HEART Score: 4    COURSE & MEDICAL DECISION MAKING    This a 58-year-old gentleman who presents to the emerged part with multiple complaints.  His initial complaint was epigastric pain as well as some nausea and vomiting.  The patient's abdomen is nonsurgical and laboratory analysis was obtained.  He does have a slight elevation in his lipase and pancreatitis would be in the differential.  However on repeat examination he states that his abdominal pain is improved with the fentanyl but now he has chest pressure as well as difficulty with breathing.  He states throughout the night last night he had a lot of chest pressure and difficulty with breathing.  From a cardiovascular standpoint the patient does have dyslipidemia and hypertension but has no known ischemic cardiac disease.  He does have the known aortic aneurysm.  He did receive aspirin and I also administered Lopressor as the patient was tachycardic and hypertensive.  This has been effective in bringing down his pulse as well as his blood pressure however he states he still feels like he cannot catch his breath.  He does not have any risk factors from a DVT standpoint.  Secondary to the continued chest pain and dyspnea will admit the patient to the hospital for further cardiac rule out.  Chest x-ray does not show any evidence of a pulmonary process.  The patient is not hypoxic and as mentioned above I suspect that DVT and pulmonary embolus would be less  likely.    The patient does have a slight transaminitis and gastritis as well as alcohol and is mild hepatitis to be in the differential as the patient states he does drink too much.  Anxiety would also be in the differential.    FINAL DIAGNOSIS  1.  Chest pain  2.  Dyspnea  3.  Epigastric pain    Disposition  The patient will be admitted in stable condition       Electronically signed by: Abrahan Ramos M.D., 10/16/2023 12:18 PM

## 2023-10-16 NOTE — ED NOTES
Medication history reviewed with pt. Med rec is complete.  Allergies reviewed, per pt  Interviewed pt with wife at bedside with permission from pt.    Pt reports that he stopped taking his ELIQUIS 5MG about 3 month ago.  Pt reports that he receives his STELARA every 57 days and his TESTOSTERONE injection every 10 days.    Patient has not had any outpatient antibiotics in the last 30   days.    Pt is not on any anticoagulants

## 2023-10-16 NOTE — ED NOTES
Pt medicated per MAR, Pt given cup of ice chips and cup of water by Oswaldo Crain per Pt request, Pt denied having any other needs at this time, no distress noted;

## 2023-10-16 NOTE — ED TRIAGE NOTES
"Chief Complaint   Patient presents with    Chest Pain     Developed last night    Shortness of Breath     Started last night    N/V     BP (!) 164/108   Pulse (!) 106   Temp 37.1 °C (98.8 °F) (Temporal)   Resp 20   SpO2 98%     Pt ambulated to ED w/ visitor for c/o CP, abd pain, shortness of breath and N/V started last night.  Pt stated \"I feel like I am dying.\"  Pt reports Hx of AFib and AAA.    GARETT BP:  164/108  AMANDA BP:  162/107   "

## 2023-10-16 NOTE — ED NOTES
Pt medicated per MAR, stated that his ABD Pn was 7/10, now is 0/10 after receiving Pn medication, Pt given socks, denied warm blanket, Pt denied having any other needs at this time, no distress noted;

## 2023-10-16 NOTE — H&P
Hospital Medicine History & Physical Note    Date of Service  10/16/2023    Primary Care Physician  Arvind Geiger M.D.     Consultants  None      Code Status  Full Code    Chief Complaint  Chief Complaint   Patient presents with    Chest Pain     Developed last night    Shortness of Breath     Started last night    N/V     History of Presenting Illness  Tom Quan is a 58 y.o. male with a past medical history of hyperlipidemia, gastroesophageal reflux disease and alcohol dependence who presented 10/16/2023 with abdominal pain, chest pain and shortness of breath with nausea and vomiting.  Patient reports that he has not been feeling well over the past 2-3 days.  He has been having progressively worsening upper abdominal pain, and lower chest pain, shortness of breath with nausea and vomiting.  He denies noticing any palpitations, diaphoresis or loss of consciousness.  He denies noticing any changes to his stool.     I discussed the plan of care with emergency department physician, the patient     Review of Systems  Review of Systems   Constitutional:  Negative for chills and fever.   Eyes:  Negative for discharge and redness.   Respiratory:  Negative for cough, shortness of breath and stridor.    Cardiovascular:  Positive for chest pain. Negative for leg swelling.   Gastrointestinal:  Positive for abdominal pain (Epigastric) and nausea.   Genitourinary:  Negative for flank pain.   Musculoskeletal:  Negative for myalgias.   Skin: Negative.    Neurological:  Negative for focal weakness.   Endo/Heme/Allergies:  Does not bruise/bleed easily.   Psychiatric/Behavioral:  The patient is not nervous/anxious.      Past Medical History   has a past medical history of Arrhythmia, Ascending aortic aneurysm (HCC), Atrial fibrillation (HCC), Depression, High cholesterol, Hyperlipidemia, and Ulcerative colitis (HCC).    Surgical History   has a past surgical history that includes hip arthroplasty total (Bilateral);  SW/CM Discharge Plan  Informed patient is ready for discharge.  Patient to be picked up by spouse.   Patient’s discharge destination is home.    Selected Continued Care - Discharged on 1/18/2023 Admission date: 1/17/2023 - Discharge disposition: Home or Self Care   No services have been selected for the patient.          shoulder arthroscopy (Right); tonsillectomy (N/A); and open repair/reconstruction pectoralis major (Left).     Family History  family history includes Cancer in his paternal uncle; Diabetes in his mother; Heart Attack in his maternal grandfather; Hyperlipidemia in his brother and sister; Other in his mother and son.      Social History   reports that he has never smoked. He has never used smokeless tobacco. He reports current alcohol use. He reports current drug use.    Allergies  No Active Allergies    Medications  Prior to Admission Medications   Prescriptions Last Dose Informant Patient Reported? Taking?   Aspirin-Acetaminophen-Caffeine (EXCEDRIN PO) 10/16/2023 at 0200 Patient Yes Yes   Sig: Take 2 Tablets by mouth 2 times a day as needed (For headache).   B Complex Vitamins (VITAMIN B COMPLEX PO) > 1 week at Unknown Patient Yes No   Sig: Take 1 Tablet by mouth see administration instructions. Pt takes sporidially   Budesonide ER 9 MG TABLET SR 24 HR 10/16/2023 at 0900 Patient Yes No   Sig: Take 9 mg by mouth as needed. Indications: Ulcerated Colon   Cholecalciferol (VITAMIN D) 2000 UNIT Tab > 3 days at Unknown Patient Yes No   Sig: Take 4,000 Units by mouth see administration instructions. Pt takes sporidially   Desvenlafaxine Succinate ER 25 MG TABLET SR 24 HR 10/16/2023 at 0800 Patient No No   Sig: Take 1 Tablet by mouth every day.   Patient taking differently: Take 25 mg by mouth every day.   Omega-3 Fatty Acids (FISH OIL PO) > 1 month at Unknown Patient Yes No   Sig: Take 2 Capsules by mouth see administration instructions. Pt takes sporidially   STELARA 90 MG/ML Solution Prefilled Syringe injection > 2 weeks at Unknown Patient Yes No   Sig: Inject 90 mg under the skin see administration instructions. Pt reports that he receives this medications every 57 days   Thiamine HCl (VITAMIN B-1 PO)  Patient Yes No   Sig: Take 1 Tablet by mouth see administration instructions. Pt takes sporidially   acetaminophen  (TYLENOL) 500 MG Tab 10/14/2023 at Unknown Patient Yes Yes   Sig: Take 1,000 mg by mouth every 6 hours as needed. Indications: Pain   hydrOXYzine HCl (ATARAX) 25 MG Tab >10 days at Unknown Patient No No   Sig: Take 1 Tablet by mouth 3 times a day as needed for Anxiety.   Patient taking differently: Take 25 mg by mouth every 8 hours as needed for Anxiety.   ibuprofen (MOTRIN) 200 MG Tab > 1 week at Unknown Patient Yes Yes   Sig: Take 800 mg by mouth every 6 hours as needed. Indications: Pain   multivitamin Tab > 2 weeks at Unknown Patient Yes No   Sig: Take 2 Tablets by mouth see administration instructions. Pt takes sporidially   omeprazole (PRILOSEC) 20 MG delayed-release capsule > 3 days at Unknown Patient Yes No   Sig: Take 20-40 mg by mouth 1 time a day as needed (Heartburn).   ondansetron (ZOFRAN ODT) 4 MG TABLET DISPERSIBLE 10/16/2023 at 0815 Patient No No   Sig: Take 1 Tablet by mouth every four hours as needed for Nausea/Vomiting (give PO if no IV route available).   rizatriptan (MAXALT) 5 MG tablet > 1 month at Unknown Patient No No   Sig: Take 1-2 Tablets by mouth one time as needed for Migraine for up to 1 dose.   rosuvastatin (CRESTOR) 20 MG Tab 10/16/2023 at 0800 Patient No No   Sig: Take 1 Tablet by mouth every evening.   Patient taking differently: Take 20 mg by mouth every day.   testosterone cypionate (DEPO-TESTOSTERONE) 200 MG/ML Solution injection > 1 week at Unknown Patient Yes No   Sig: Inject 200 mg into the shoulder, thigh, or buttocks every 10 days.      Facility-Administered Medications: None     Physical Exam  Temp:  [36.8 °C (98.3 °F)-37.1 °C (98.8 °F)] 37 °C (98.6 °F)  Pulse:  [] 94  Resp:  [11-20] 18  BP: (133-164)/() 151/104  SpO2:  [92 %-100 %] 96 %  Blood Pressure: (!) 147/102   Temperature: 37.1 °C (98.8 °F)   Pulse: 84   Respiration: 20   Pulse Oximetry: 95 %     Physical Exam  Constitutional:       General: He is not in acute distress.     Appearance: He is not  ill-appearing or diaphoretic.   HENT:      Head: Atraumatic.      Right Ear: External ear normal.      Left Ear: External ear normal.      Nose: No congestion or rhinorrhea.      Mouth/Throat:      Mouth: Mucous membranes are dry.   Eyes:      General: No scleral icterus.        Right eye: No discharge.         Left eye: No discharge.      Pupils: Pupils are equal, round, and reactive to light.   Cardiovascular:      Rate and Rhythm: Regular rhythm. Tachycardia present.   Pulmonary:      Effort: Pulmonary effort is normal.   Abdominal:      General: There is no distension.      Tenderness: There is abdominal tenderness. There is no guarding.   Musculoskeletal:      Cervical back: Neck supple. No rigidity. No muscular tenderness.      Right lower leg: No edema.      Left lower leg: No edema.   Skin:     General: Skin is dry.      Coloration: Skin is not jaundiced or pale.   Neurological:      Mental Status: He is alert and oriented to person, place, and time.      Coordination: Coordination normal.   Psychiatric:         Mood and Affect: Mood normal.         Behavior: Behavior normal.       Laboratory:  Recent Labs     10/16/23  1208   WBC 11.1*   RBC 5.20   HEMOGLOBIN 16.2   HEMATOCRIT 47.4   MCV 91.2   MCH 31.2   MCHC 34.2   RDW 51.3*   PLATELETCT 231   MPV 8.5*     Recent Labs     10/16/23  1208   SODIUM 136   POTASSIUM 4.2   CHLORIDE 96   CO2 17*   GLUCOSE 79   BUN 8   CREATININE 0.87   CALCIUM 8.7     Recent Labs     10/16/23  1208   ALTSGPT 73*   ASTSGOT 91*   ALKPHOSPHAT 91   TBILIRUBIN 0.7   LIPASE 104*   GLUCOSE 79         Recent Labs     10/16/23  1208   NTPROBNP <36         Recent Labs     10/16/23  1208 10/16/23  1422 10/16/23  1853   TROPONINT 8 8 8     Imaging:  DX-CHEST-PORTABLE (1 VIEW)   Final Result      No evidence of acute cardiopulmonary process.      CT-CHEST,ABDOMEN,PELVIS W/O    (Results Pending)   NM-CARDIAC STRESS TEST    (Results Pending)     I personally reviewed patient EKG shows sinus  tachycardia with a rate of 97, there is 0.5 mm ST elevation in the lateral leads, and leads V4-V6.  There is flattening of T wave in lead III and aVF.  QTc is 444.    Assessment/Plan:  Justification for Admission Status  I anticipate this patient is appropriate for observation status at this time because likely discharge after 1 midnight    Patient will need a Telemetry bed on CARDIOLOGY service.  Patient has chest pain    * Chest pain- (present on admission)  Assessment & Plan  The ASCVD Risk score (Tonya ORTEZ, et al., 2019) failed to calculate for the following reasons:    The patient has a prior MI or stroke diagnosis  EKG shows, sinus tachycardia with a rate of 97, there is 0.5 mm ST elevation in the lateral leads, and leads V4-V6.  There is flattening of T wave in lead III and aVF.  QTc is 444.  Initial troponin is within normal limits, I will trend  I ordered Stat EKG and troponin for recurrence of chest pain.   I will place on continuous cardiac monitoring.  Plan for stress test in the morning [ordered] as long as there are no significant EKG changes or significant troponin elevation     Epigastric pain- (present on admission)  Assessment & Plan  Has mildly elevated lipase.  I will check CT scan of the abdomen to rule out pancreatitis  Multimodal pain control    Alcohol dependence (HCC)- (present on admission)  Assessment & Plan  Not currently in intoxication or withdrawal.  Consult to cut down or stop alcohol    Essential (primary) hypertension- (present on admission)  Assessment & Plan  Not currently on scheduled antihypertensive medications.  I will start labetalol as needed for extreme hypertension     Familial hypercholesteremia- (present on admission)  Assessment & Plan  Cardiac diet when able to tolerate orally.  Resume rosuvastatin    Major depressive disorder, recurrent episode, moderate (HCC)- (present on admission)  Assessment & Plan  Resume venlafaxine     GERD (gastroesophageal reflux disease)-  (present on admission)  Assessment & Plan  Resume omeprazole       VTE prophylaxis: SCDs/TEDs and Xarelto 10 mg daily as prophylaxis

## 2023-10-16 NOTE — PROGRESS NOTES
4 Eyes Skin Assessment Completed by TERESITA Singh and TERESITA Dickinson.    Head WDL  Ears WDL  Nose WDL  Mouth WDL  Neck WDL  Breast/Chest WDL  Shoulder Blades WDL  Spine Blanching  (R) Arm/Elbow/Hand WDL  (L) Arm/Elbow/Hand WDL  Abdomen WDL  Groin WDL  Scrotum/Coccyx/Buttocks WDL  (R) Leg WDL  (L) Leg WDL  (R) Heel/Foot/Toe WDL  (L) Heel/Foot/Toe WDL    Generalized redness      Devices In Places Tele Box      Interventions In Place Pillows    Possible Skin Injury No    Pictures Uploaded Into Epic N/A  Wound Consult Placed N/A  RN Wound Prevention Protocol Ordered No

## 2023-10-16 NOTE — CARE PLAN
The patient is Watcher - Medium risk of patient condition declining or worsening    Shift Goals  Clinical Goals: stress test, CT, control pain  Patient Goals: control pain, comfort      Patient is not progressing towards the following goals:      Problem: Pain - Standard  Goal: Alleviation of pain or a reduction in pain to the patient’s comfort goal  Outcome: Not Progressing- still with consistent abdominal pain, prn regimen ordered

## 2023-10-17 ENCOUNTER — APPOINTMENT (OUTPATIENT)
Dept: RADIOLOGY | Facility: MEDICAL CENTER | Age: 58
End: 2023-10-17
Attending: HOSPITALIST
Payer: COMMERCIAL

## 2023-10-17 ENCOUNTER — PATIENT MESSAGE (OUTPATIENT)
Dept: CARDIOLOGY | Facility: MEDICAL CENTER | Age: 58
End: 2023-10-17
Payer: COMMERCIAL

## 2023-10-17 LAB
ALBUMIN SERPL BCP-MCNC: 3.6 G/DL (ref 3.2–4.9)
ALBUMIN/GLOB SERPL: 1.1 G/DL
ALP SERPL-CCNC: 81 U/L (ref 30–99)
ALT SERPL-CCNC: 59 U/L (ref 2–50)
ANION GAP SERPL CALC-SCNC: 18 MMOL/L (ref 7–16)
AST SERPL-CCNC: 66 U/L (ref 12–45)
BILIRUB SERPL-MCNC: 0.6 MG/DL (ref 0.1–1.5)
BUN SERPL-MCNC: 7 MG/DL (ref 8–22)
CALCIUM ALBUM COR SERPL-MCNC: 8.8 MG/DL (ref 8.5–10.5)
CALCIUM SERPL-MCNC: 8.5 MG/DL (ref 8.4–10.2)
CHLORIDE SERPL-SCNC: 97 MMOL/L (ref 96–112)
CHOLEST SERPL-MCNC: 227 MG/DL (ref 100–199)
CO2 SERPL-SCNC: 21 MMOL/L (ref 20–33)
CREAT SERPL-MCNC: 0.99 MG/DL (ref 0.5–1.4)
ERYTHROCYTE [DISTWIDTH] IN BLOOD BY AUTOMATED COUNT: 53.1 FL (ref 35.9–50)
GFR SERPLBLD CREATININE-BSD FMLA CKD-EPI: 89 ML/MIN/1.73 M 2
GLOBULIN SER CALC-MCNC: 3.2 G/DL (ref 1.9–3.5)
GLUCOSE SERPL-MCNC: 97 MG/DL (ref 65–99)
HCT VFR BLD AUTO: 47.2 % (ref 42–52)
HDLC SERPL-MCNC: 75 MG/DL
HGB BLD-MCNC: 15.8 G/DL (ref 14–18)
LACTATE SERPL-SCNC: 0.9 MMOL/L (ref 0.5–2)
LACTATE SERPL-SCNC: 1 MMOL/L (ref 0.5–2)
LACTATE SERPL-SCNC: 1 MMOL/L (ref 0.5–2)
LDLC SERPL CALC-MCNC: 128 MG/DL
LIPASE SERPL-CCNC: 177 U/L (ref 11–82)
MAGNESIUM SERPL-MCNC: 2 MG/DL (ref 1.5–2.5)
MCH RBC QN AUTO: 31 PG (ref 27–33)
MCHC RBC AUTO-ENTMCNC: 33.5 G/DL (ref 32.3–36.5)
MCV RBC AUTO: 92.5 FL (ref 81.4–97.8)
PLATELET # BLD AUTO: 225 K/UL (ref 164–446)
PMV BLD AUTO: 9.1 FL (ref 9–12.9)
POTASSIUM SERPL-SCNC: 4.5 MMOL/L (ref 3.6–5.5)
PROT SERPL-MCNC: 6.8 G/DL (ref 6–8.2)
RBC # BLD AUTO: 5.1 M/UL (ref 4.7–6.1)
SODIUM SERPL-SCNC: 136 MMOL/L (ref 135–145)
TRIGL SERPL-MCNC: 118 MG/DL (ref 0–149)
WBC # BLD AUTO: 6.6 K/UL (ref 4.8–10.8)

## 2023-10-17 PROCEDURE — A9270 NON-COVERED ITEM OR SERVICE: HCPCS | Performed by: INTERNAL MEDICINE

## 2023-10-17 PROCEDURE — 85027 COMPLETE CBC AUTOMATED: CPT

## 2023-10-17 PROCEDURE — 99232 SBSQ HOSP IP/OBS MODERATE 35: CPT | Performed by: INTERNAL MEDICINE

## 2023-10-17 PROCEDURE — 94760 N-INVAS EAR/PLS OXIMETRY 1: CPT

## 2023-10-17 PROCEDURE — A9270 NON-COVERED ITEM OR SERVICE: HCPCS | Performed by: HOSPITALIST

## 2023-10-17 PROCEDURE — 96375 TX/PRO/DX INJ NEW DRUG ADDON: CPT

## 2023-10-17 PROCEDURE — 83605 ASSAY OF LACTIC ACID: CPT

## 2023-10-17 PROCEDURE — 700111 HCHG RX REV CODE 636 W/ 250 OVERRIDE (IP): Mod: JZ | Performed by: HOSPITALIST

## 2023-10-17 PROCEDURE — G0378 HOSPITAL OBSERVATION PER HR: HCPCS

## 2023-10-17 PROCEDURE — 700105 HCHG RX REV CODE 258: Performed by: INTERNAL MEDICINE

## 2023-10-17 PROCEDURE — 83690 ASSAY OF LIPASE: CPT

## 2023-10-17 PROCEDURE — 71250 CT THORAX DX C-: CPT

## 2023-10-17 PROCEDURE — 700102 HCHG RX REV CODE 250 W/ 637 OVERRIDE(OP): Performed by: INTERNAL MEDICINE

## 2023-10-17 PROCEDURE — 36415 COLL VENOUS BLD VENIPUNCTURE: CPT

## 2023-10-17 PROCEDURE — 83735 ASSAY OF MAGNESIUM: CPT

## 2023-10-17 PROCEDURE — 700111 HCHG RX REV CODE 636 W/ 250 OVERRIDE (IP): Performed by: INTERNAL MEDICINE

## 2023-10-17 PROCEDURE — 700111 HCHG RX REV CODE 636 W/ 250 OVERRIDE (IP): Mod: JZ | Performed by: INTERNAL MEDICINE

## 2023-10-17 PROCEDURE — 80053 COMPREHEN METABOLIC PANEL: CPT

## 2023-10-17 PROCEDURE — C9113 INJ PANTOPRAZOLE SODIUM, VIA: HCPCS | Performed by: INTERNAL MEDICINE

## 2023-10-17 PROCEDURE — 700102 HCHG RX REV CODE 250 W/ 637 OVERRIDE(OP): Performed by: HOSPITALIST

## 2023-10-17 PROCEDURE — 80061 LIPID PANEL: CPT

## 2023-10-17 RX ORDER — MORPHINE SULFATE 4 MG/ML
2 INJECTION INTRAVENOUS
Status: DISCONTINUED | OUTPATIENT
Start: 2023-10-17 | End: 2023-10-17

## 2023-10-17 RX ORDER — LORAZEPAM 2 MG/ML
0.5 INJECTION INTRAMUSCULAR EVERY 4 HOURS PRN
Status: DISCONTINUED | OUTPATIENT
Start: 2023-10-17 | End: 2023-10-18 | Stop reason: HOSPADM

## 2023-10-17 RX ORDER — LORAZEPAM 1 MG/1
1 TABLET ORAL EVERY 4 HOURS PRN
Status: DISCONTINUED | OUTPATIENT
Start: 2023-10-17 | End: 2023-10-18 | Stop reason: HOSPADM

## 2023-10-17 RX ORDER — LORAZEPAM 1 MG/1
3 TABLET ORAL
Status: DISCONTINUED | OUTPATIENT
Start: 2023-10-17 | End: 2023-10-18 | Stop reason: HOSPADM

## 2023-10-17 RX ORDER — PANTOPRAZOLE SODIUM 40 MG/10ML
40 INJECTION, POWDER, LYOPHILIZED, FOR SOLUTION INTRAVENOUS DAILY
Status: DISCONTINUED | OUTPATIENT
Start: 2023-10-17 | End: 2023-10-18 | Stop reason: HOSPADM

## 2023-10-17 RX ORDER — LORAZEPAM 1 MG/1
4 TABLET ORAL
Status: DISCONTINUED | OUTPATIENT
Start: 2023-10-17 | End: 2023-10-18 | Stop reason: HOSPADM

## 2023-10-17 RX ORDER — LORAZEPAM 2 MG/ML
1.5 INJECTION INTRAMUSCULAR
Status: DISCONTINUED | OUTPATIENT
Start: 2023-10-17 | End: 2023-10-18 | Stop reason: HOSPADM

## 2023-10-17 RX ORDER — HYDROCODONE BITARTRATE AND ACETAMINOPHEN 5; 325 MG/1; MG/1
1 TABLET ORAL EVERY 6 HOURS PRN
Status: DISCONTINUED | OUTPATIENT
Start: 2023-10-17 | End: 2023-10-17

## 2023-10-17 RX ORDER — FOLIC ACID 1 MG/1
1 TABLET ORAL DAILY
Status: DISCONTINUED | OUTPATIENT
Start: 2023-10-17 | End: 2023-10-18 | Stop reason: HOSPADM

## 2023-10-17 RX ORDER — LORAZEPAM 2 MG/ML
1 INJECTION INTRAMUSCULAR
Status: DISCONTINUED | OUTPATIENT
Start: 2023-10-17 | End: 2023-10-18 | Stop reason: HOSPADM

## 2023-10-17 RX ORDER — SODIUM CHLORIDE, SODIUM LACTATE, POTASSIUM CHLORIDE, CALCIUM CHLORIDE 600; 310; 30; 20 MG/100ML; MG/100ML; MG/100ML; MG/100ML
INJECTION, SOLUTION INTRAVENOUS CONTINUOUS
Status: DISCONTINUED | OUTPATIENT
Start: 2023-10-17 | End: 2023-10-18 | Stop reason: HOSPADM

## 2023-10-17 RX ORDER — LORAZEPAM 0.5 MG/1
0.5 TABLET ORAL EVERY 4 HOURS PRN
Status: DISCONTINUED | OUTPATIENT
Start: 2023-10-17 | End: 2023-10-18 | Stop reason: HOSPADM

## 2023-10-17 RX ORDER — HYDROMORPHONE HYDROCHLORIDE 1 MG/ML
0.5 INJECTION, SOLUTION INTRAMUSCULAR; INTRAVENOUS; SUBCUTANEOUS
Status: DISCONTINUED | OUTPATIENT
Start: 2023-10-17 | End: 2023-10-18 | Stop reason: HOSPADM

## 2023-10-17 RX ORDER — CHLORDIAZEPOXIDE HYDROCHLORIDE 25 MG/1
25 CAPSULE, GELATIN COATED ORAL EVERY 6 HOURS
Status: DISCONTINUED | OUTPATIENT
Start: 2023-10-18 | End: 2023-10-18 | Stop reason: HOSPADM

## 2023-10-17 RX ORDER — LORAZEPAM 1 MG/1
2 TABLET ORAL
Status: DISCONTINUED | OUTPATIENT
Start: 2023-10-17 | End: 2023-10-18 | Stop reason: HOSPADM

## 2023-10-17 RX ORDER — CHLORDIAZEPOXIDE HYDROCHLORIDE 25 MG/1
50 CAPSULE, GELATIN COATED ORAL EVERY 6 HOURS
Status: DISPENSED | OUTPATIENT
Start: 2023-10-17 | End: 2023-10-18

## 2023-10-17 RX ORDER — LORAZEPAM 2 MG/ML
2 INJECTION INTRAMUSCULAR
Status: DISCONTINUED | OUTPATIENT
Start: 2023-10-17 | End: 2023-10-18 | Stop reason: HOSPADM

## 2023-10-17 RX ORDER — HYDROMORPHONE HYDROCHLORIDE 1 MG/ML
1 INJECTION, SOLUTION INTRAMUSCULAR; INTRAVENOUS; SUBCUTANEOUS
Status: DISCONTINUED | OUTPATIENT
Start: 2023-10-17 | End: 2023-10-18 | Stop reason: HOSPADM

## 2023-10-17 RX ORDER — OXYCODONE HYDROCHLORIDE 5 MG/1
2.5 TABLET ORAL
Status: DISCONTINUED | OUTPATIENT
Start: 2023-10-17 | End: 2023-10-17

## 2023-10-17 RX ORDER — GAUZE BANDAGE 2" X 2"
100 BANDAGE TOPICAL DAILY
Status: DISCONTINUED | OUTPATIENT
Start: 2023-10-17 | End: 2023-10-18 | Stop reason: HOSPADM

## 2023-10-17 RX ADMIN — ONDANSETRON 4 MG: 2 INJECTION INTRAMUSCULAR; INTRAVENOUS at 17:45

## 2023-10-17 RX ADMIN — CHLORDIAZEPOXIDE HYDROCHLORIDE 50 MG: 25 CAPSULE ORAL at 05:45

## 2023-10-17 RX ADMIN — SODIUM CHLORIDE, POTASSIUM CHLORIDE, SODIUM LACTATE AND CALCIUM CHLORIDE: 600; 310; 30; 20 INJECTION, SOLUTION INTRAVENOUS at 11:22

## 2023-10-17 RX ADMIN — THIAMINE HCL TAB 100 MG 100 MG: 100 TAB at 05:45

## 2023-10-17 RX ADMIN — CHLORDIAZEPOXIDE HYDROCHLORIDE 50 MG: 25 CAPSULE ORAL at 11:21

## 2023-10-17 RX ADMIN — PANTOPRAZOLE SODIUM 40 MG: 40 INJECTION, POWDER, LYOPHILIZED, FOR SOLUTION INTRAVENOUS at 17:30

## 2023-10-17 RX ADMIN — CHLORDIAZEPOXIDE HYDROCHLORIDE 50 MG: 25 CAPSULE ORAL at 17:31

## 2023-10-17 RX ADMIN — OXYCODONE HYDROCHLORIDE 2.5 MG: 5 TABLET ORAL at 17:45

## 2023-10-17 RX ADMIN — LORAZEPAM 0.5 MG: 2 INJECTION INTRAMUSCULAR; INTRAVENOUS at 04:00

## 2023-10-17 RX ADMIN — HYDROMORPHONE HYDROCHLORIDE 1 MG: 1 INJECTION, SOLUTION INTRAMUSCULAR; INTRAVENOUS; SUBCUTANEOUS at 21:50

## 2023-10-17 RX ADMIN — HYDROCODONE BITARTRATE AND ACETAMINOPHEN 1 TABLET: 5; 325 TABLET ORAL at 11:21

## 2023-10-17 RX ADMIN — VENLAFAXINE 12.5 MG: 25 TABLET ORAL at 09:07

## 2023-10-17 RX ADMIN — VENLAFAXINE 12.5 MG: 25 TABLET ORAL at 14:58

## 2023-10-17 RX ADMIN — THERA TABS 1 TABLET: TAB at 05:45

## 2023-10-17 RX ADMIN — RIVAROXABAN 10 MG: 10 TABLET, FILM COATED ORAL at 17:31

## 2023-10-17 RX ADMIN — SODIUM CHLORIDE, POTASSIUM CHLORIDE, SODIUM LACTATE AND CALCIUM CHLORIDE: 600; 310; 30; 20 INJECTION, SOLUTION INTRAVENOUS at 23:24

## 2023-10-17 RX ADMIN — SODIUM CHLORIDE, POTASSIUM CHLORIDE, SODIUM LACTATE AND CALCIUM CHLORIDE: 600; 310; 30; 20 INJECTION, SOLUTION INTRAVENOUS at 17:49

## 2023-10-17 RX ADMIN — MORPHINE SULFATE 2 MG: 4 INJECTION INTRAVENOUS at 14:58

## 2023-10-17 RX ADMIN — ROSUVASTATIN 20 MG: 10 TABLET, FILM COATED ORAL at 17:30

## 2023-10-17 RX ADMIN — FOLIC ACID 1 MG: 1 TABLET ORAL at 05:45

## 2023-10-17 RX ADMIN — ASPIRIN 81 MG: 81 TABLET, COATED ORAL at 05:45

## 2023-10-17 ASSESSMENT — LIFESTYLE VARIABLES
TOTAL SCORE: 8
ORIENTATION AND CLOUDING OF SENSORIUM: ORIENTED AND CAN DO SERIAL ADDITIONS
HEADACHE, FULLNESS IN HEAD: NOT PRESENT
HEADACHE, FULLNESS IN HEAD: MILD
TREMOR: NO TREMOR
VISUAL DISTURBANCES: NOT PRESENT
VISUAL DISTURBANCES: NOT PRESENT
TOTAL SCORE: 2
ORIENTATION AND CLOUDING OF SENSORIUM: ORIENTED AND CAN DO SERIAL ADDITIONS
ORIENTATION AND CLOUDING OF SENSORIUM: ORIENTED AND CAN DO SERIAL ADDITIONS
ANXIETY: NO ANXIETY (AT EASE)
PAROXYSMAL SWEATS: NO SWEAT VISIBLE
PAROXYSMAL SWEATS: NO SWEAT VISIBLE
TOTAL SCORE: 1
NAUSEA AND VOMITING: NO NAUSEA AND NO VOMITING
ANXIETY: NO ANXIETY (AT EASE)
AUDITORY DISTURBANCES: NOT PRESENT
NAUSEA AND VOMITING: NO NAUSEA AND NO VOMITING
ORIENTATION AND CLOUDING OF SENSORIUM: ORIENTED AND CAN DO SERIAL ADDITIONS
HEADACHE, FULLNESS IN HEAD: NOT PRESENT
PAROXYSMAL SWEATS: NO SWEAT VISIBLE
ORIENTATION AND CLOUDING OF SENSORIUM: ORIENTED AND CAN DO SERIAL ADDITIONS
AGITATION: NORMAL ACTIVITY
NAUSEA AND VOMITING: NO NAUSEA AND NO VOMITING
VISUAL DISTURBANCES: NOT PRESENT
PAROXYSMAL SWEATS: NO SWEAT VISIBLE
TREMOR: TREMOR NOT VISIBLE BUT CAN BE FELT, FINGERTIP TO FINGERTIP
VISUAL DISTURBANCES: NOT PRESENT
AUDITORY DISTURBANCES: NOT PRESENT
ANXIETY: NO ANXIETY (AT EASE)
TREMOR: NO TREMOR
HEADACHE, FULLNESS IN HEAD: NOT PRESENT
TREMOR: NO TREMOR
TOTAL SCORE: 1
ANXIETY: MILDLY ANXIOUS
NAUSEA AND VOMITING: NO NAUSEA AND NO VOMITING
AGITATION: SOMEWHAT MORE THAN NORMAL ACTIVITY
AUDITORY DISTURBANCES: NOT PRESENT
NAUSEA AND VOMITING: MILD NAUSEA WITH NO VOMITING
ANXIETY: NO ANXIETY (AT EASE)
PAROXYSMAL SWEATS: BARELY PERCEPTIBLE SWEATING. PALMS MOIST
AGITATION: SOMEWHAT MORE THAN NORMAL ACTIVITY
TOTAL SCORE: 1
AUDITORY DISTURBANCES: NOT PRESENT
TREMOR: MODERATE TREMOR WITH ARMS EXTENDED
AUDITORY DISTURBANCES: NOT PRESENT
AGITATION: NORMAL ACTIVITY
VISUAL DISTURBANCES: NOT PRESENT
AGITATION: SOMEWHAT MORE THAN NORMAL ACTIVITY
HEADACHE, FULLNESS IN HEAD: NOT PRESENT

## 2023-10-17 ASSESSMENT — ENCOUNTER SYMPTOMS
MUSCULOSKELETAL NEGATIVE: 1
RESPIRATORY NEGATIVE: 1
PSYCHIATRIC NEGATIVE: 1
ABDOMINAL PAIN: 1
CARDIOVASCULAR NEGATIVE: 1
NEUROLOGICAL NEGATIVE: 1
EYES NEGATIVE: 1

## 2023-10-17 ASSESSMENT — PAIN DESCRIPTION - PAIN TYPE: TYPE: ACUTE PAIN

## 2023-10-17 NOTE — PROGRESS NOTES
Telemetry Shift Summary     Rhythm SR  HR Range 87  Ectopy NA  Measurements  New  Per strip printed 0400     Normal Values  Rhythm SR  HR Range    Measurements 0.12-0.20 / 0.06-0.10  / 0.30-0.52

## 2023-10-17 NOTE — FLOWSHEET NOTE
Telemetry Shift Summary     Rhythm: SR  HR Range: [facility]  Ectopy:   Measurements: 0.16/0.08/0.36              Normal Values  Rhythm SR  HR Range    Measurements 0.12-0.20 / 0.06-0.10  / 0.30-0.52

## 2023-10-17 NOTE — PROGRESS NOTES
Hospital Medicine Daily Progress Note    Date of Service  10/17/2023    Chief Complaint  Tom Quan is a 58 y.o. male admitted 10/16/2023 with multiple complaints including abdominal pain, shortness of breath, nausea, vomiting, and chest pain.  He has alcohol dependence, GERD, dyslipidemia, depression, hypertension.     Hospital Course  EKG showed sinus tachycardia (97), with 0.5 mm ST elevation in lateral leads in leads V4 to V6, flattening of T waves in leads III and aVF.  Initial troponin was negative.  Stress test was ordered (unable to be done due to alcohol withdrawal).  Lipase was 104    Interval Problem Update  Troponin has been negative x 3. CT chest, abdomen, pelvis from today showed possible mild pancreatitis, and a 5.2 cm ascending thoracic aortic aneurysm.  Continues to have significant pain, n.p.o. with IV pain medications.  Lipase is 177.  IV Zofran added for nausea    I have discussed this patient's plan of care and discharge plan at IDT rounds today with Case Management, Nursing, Nursing leadership, and other members of the IDT team.    Consultants/Specialty  None    Code Status  Full Code    Disposition  The patient is not medically cleared for discharge to home or a post-acute facility.  Anticipate discharge to: home with close outpatient follow-up    I have placed the appropriate orders for post-discharge needs.    Review of Systems  Review of Systems   Constitutional:  Positive for malaise/fatigue.   HENT: Negative.     Eyes: Negative.    Respiratory: Negative.     Cardiovascular: Negative.    Gastrointestinal:  Positive for abdominal pain.   Musculoskeletal: Negative.    Skin: Negative.    Neurological: Negative.    Endo/Heme/Allergies: Negative.    Psychiatric/Behavioral: Negative.          Physical Exam  Temp:  [36.5 °C (97.7 °F)-37 °C (98.6 °F)] 36.6 °C (97.8 °F)  Pulse:  [76-95] 82  Resp:  [17-19] 17  BP: (118-151)/() 145/99  SpO2:  [94 %-98 %] 94 %    Physical  Exam  Constitutional:       Appearance: He is ill-appearing.   HENT:      Head: Normocephalic.      Nose: Nose normal.      Mouth/Throat:      Mouth: Mucous membranes are moist.   Eyes:      Pupils: Pupils are equal, round, and reactive to light.   Cardiovascular:      Rate and Rhythm: Normal rate.   Pulmonary:      Effort: Pulmonary effort is normal.   Abdominal:      Tenderness: There is abdominal tenderness.   Musculoskeletal:      Cervical back: Normal range of motion.      Right lower leg: No edema.      Left lower leg: No edema.   Skin:     General: Skin is warm.   Neurological:      General: No focal deficit present.   Psychiatric:         Mood and Affect: Mood normal.         Fluids  No intake or output data in the 24 hours ending 10/17/23 1613    Laboratory  Recent Labs     10/16/23  1208 10/17/23  0217   WBC 11.1* 6.6   RBC 5.20 5.10   HEMOGLOBIN 16.2 15.8   HEMATOCRIT 47.4 47.2   MCV 91.2 92.5   MCH 31.2 31.0   MCHC 34.2 33.5   RDW 51.3* 53.1*   PLATELETCT 231 225   MPV 8.5* 9.1     Recent Labs     10/16/23  1208 10/17/23  0217   SODIUM 136 136   POTASSIUM 4.2 4.5   CHLORIDE 96 97   CO2 17* 21   GLUCOSE 79 97   BUN 8 7*   CREATININE 0.87 0.99   CALCIUM 8.7 8.5             Recent Labs     10/17/23  0217   TRIGLYCERIDE 118   HDL 75   *       Imaging  CT-CHEST,ABDOMEN,PELVIS W/O   Final Result         1.  Slight hazy peripancreatic fat stranding, could represent changes of mild pancreatitis, correlate with laboratory values.   2.  5.2 cm as sending thoracic aortic aneurysm, radiographic follow-up and surveillance recommended as clinically appropriate.   3.  Hepatomegaly   4.  Changes of hepatic steatosis   5.  Fat-containing left inguinal hernia   6.  Atherosclerosis      DX-CHEST-PORTABLE (1 VIEW)   Final Result      No evidence of acute cardiopulmonary process.      NM-CARDIAC STRESS TEST    (Results Pending)        Assessment/Plan  * Chest pain- (present on admission)  Assessment & Plan  The ASCVD  Risk score (Tonya ORTEZ, et al., 2019) failed to calculate for the following reasons: The patient has a prior MI or stroke diagnosis.  EKG shows sinus tachycardia with 0.5 mm ST elevation in lateral leads in leads V4 to V6.  Flattening of T waves in leads III and aVF.  Resume NM stress test when stable from alcohol withdrawal    Epigastric pain- (present on admission)  Assessment & Plan  Lipase is 177 today.  CT abdomen shows possible mild pancreatitis.  Keep n.p.o. with IV fluids and IV PPI, and multimodal pain management    Alcohol dependence (HCC)- (present on admission)  Assessment & Plan  Continue CIWA protocol with Librium and as needed Ativan.    Familial hypercholesteremia- (present on admission)  Assessment & Plan  Resume rosuvastatin when able to take p.o.    Major depressive disorder, recurrent episode, moderate (HCC)- (present on admission)  Assessment & Plan  Resume venlafaxine when able to tolerate p.o.    GERD (gastroesophageal reflux disease)- (present on admission)  Assessment & Plan  Switch omeprazole to IV Protonix due to severe abdominal pain.    Essential (primary) hypertension- (present on admission)  Assessment & Plan  NPO.  Continue as needed labetalol.         VTE prophylaxis: Xarelto 10 mg daily

## 2023-10-17 NOTE — ASSESSMENT & PLAN NOTE
Lipase is 177 today.  CT abdomen shows possible mild pancreatitis.  Keep n.p.o. with IV fluids and IV PPI, and multimodal pain management

## 2023-10-17 NOTE — ASSESSMENT & PLAN NOTE
The ASCVD Risk score (Tonya ORTEZ, et al., 2019) failed to calculate for the following reasons: The patient has a prior MI or stroke diagnosis.  EKG shows sinus tachycardia with 0.5 mm ST elevation in lateral leads in leads V4 to V6.  Flattening of T waves in leads III and aVF.  Resume NM stress test when stable from alcohol withdrawal

## 2023-10-17 NOTE — DIETARY
"Nutrition services: Day 0 of admit.  Tom Quan is a 58 y.o. male with admitting DX of Chest pain [R07.9]     Consult received for MST 2: 2-13 lb x 1 month, poor PO intake PTA. RD visited pt at bedside. Pt reports intermittent very poor PO intake over the past month with overall PO intake decreased down to 1-2 meals/day vs previous usual ~3 meals/day; states loss of appetite/N/V. Pt reports wt loss of 15 lbs; states  lb; to current wt, 2.9% x 1 month (not significant). Pt states used to workout regularly; now has no motivation for this and feels like he has lost a lot of muscle. Pt is agreeable to Boost Breeze when on CLD diet - diet just downgraded from clear liquids to NPO by MD after RD saw pt.    Assessment:  Height: 185.4 cm (6' 1\")  Weight: 96.9 kg (213 lb 10 oz)  Body mass index is 28.18 kg/m²., BMI classification: Overweight  Diet/Intake: Clear liquid; <25% x 1 meal per ADLs - now NPO    Evaluation:   PMHx includes a-fib, depression, HLD, ulcerative colitis. Dx list includes EtOH dependence, epigastric pain, chest pain, HTN, GERD  Labs: BUN 7, AST 66, ALT 59  MAR: librium, ativan, reglan, oxycodone, pericolace (refused), thiamine, folvite, MVI  No documented wounds or edema  Last BM PTA  Per chart hx, wt overall trend up +7-10 lb over past 3 months.  Nutrition-focused physical exam: mild muscle wasting noted to clavicle and interosseous regions; mild muscle loss to triceps region    Malnutrition Risk: Pt meets >/=2 ASPEN criteria for moderate malnutrition in the context of acute illness r/t decreased PO intake in setting of N/V/loss of appetite AEB pt presents w/ mild muscle wasting, mild fat wasting, reported PO intake <75% for 1 month.     Recommendations/Plan:  Diet advancement to be per MD. Consider nutritional supplements to optimize kcal/protein intake in setting of poor appetite once diet advanced.  Once PO diet resumed, encourage intake of meals >75%. Document intake of all PO as % taken in " ADL's to provide interdisciplinary communication across all shifts.   Monitor weight.  Once diet advanced >clear liquids, Nutrition rep will continue to see patient for ongoing meal and snack preferences.     RD following.

## 2023-10-17 NOTE — PROGRESS NOTES
Telemetry Shift Summary     Rhythm: SR/ST/SA  HR:   Ectopy: r PAC    Measurements: 0.16/0.08/0.36    Normal Values  Rhythm: SR  HR:   Measurements: 0.12-0.20/0.08-0.10/0.30-0.52

## 2023-10-17 NOTE — CARE PLAN
The patient is Stable - Low risk of patient condition declining or worsening    Shift Goals  Clinical Goals: NPO at 0000 for stress test, monitor telemetry  Patient Goals: control pain, comfort    Progress made toward(s) clinical / shift goals:        Problem: Knowledge Deficit - Standard  Goal: Patient and family/care givers will demonstrate understanding of plan of care, disease process/condition, diagnostic tests and medications  Outcome: Progressing   Patient agrees with current plan of care including CT, and stress test.   Problem: Pain - Standard  Goal: Alleviation of pain or a reduction in pain to the patient’s comfort goal  Outcome: Progressing   Patient reports reduction in pain    Patient is not progressing towards the following goals:

## 2023-10-18 ENCOUNTER — APPOINTMENT (OUTPATIENT)
Dept: RADIOLOGY | Facility: MEDICAL CENTER | Age: 58
End: 2023-10-18
Attending: HOSPITALIST
Payer: COMMERCIAL

## 2023-10-18 VITALS
WEIGHT: 213.63 LBS | HEIGHT: 73 IN | OXYGEN SATURATION: 92 % | DIASTOLIC BLOOD PRESSURE: 86 MMHG | SYSTOLIC BLOOD PRESSURE: 128 MMHG | RESPIRATION RATE: 18 BRPM | TEMPERATURE: 98.2 F | HEART RATE: 91 BPM | BODY MASS INDEX: 28.31 KG/M2

## 2023-10-18 PROBLEM — I70.0 ABDOMINAL AORTIC ATHEROSCLEROSIS (HCC): Status: RESOLVED | Noted: 2023-01-12 | Resolved: 2023-10-18

## 2023-10-18 LAB
ALBUMIN SERPL BCP-MCNC: 3.3 G/DL (ref 3.2–4.9)
ALBUMIN/GLOB SERPL: 1.2 G/DL
ALP SERPL-CCNC: 74 U/L (ref 30–99)
ALT SERPL-CCNC: 61 U/L (ref 2–50)
ANION GAP SERPL CALC-SCNC: 10 MMOL/L (ref 7–16)
AST SERPL-CCNC: 96 U/L (ref 12–45)
BILIRUB SERPL-MCNC: 0.8 MG/DL (ref 0.1–1.5)
BUN SERPL-MCNC: 5 MG/DL (ref 8–22)
CALCIUM ALBUM COR SERPL-MCNC: 9 MG/DL (ref 8.5–10.5)
CALCIUM SERPL-MCNC: 8.4 MG/DL (ref 8.4–10.2)
CHLORIDE SERPL-SCNC: 97 MMOL/L (ref 96–112)
CO2 SERPL-SCNC: 26 MMOL/L (ref 20–33)
CREAT SERPL-MCNC: 1.06 MG/DL (ref 0.5–1.4)
ERYTHROCYTE [DISTWIDTH] IN BLOOD BY AUTOMATED COUNT: 54.1 FL (ref 35.9–50)
GFR SERPLBLD CREATININE-BSD FMLA CKD-EPI: 82 ML/MIN/1.73 M 2
GLOBULIN SER CALC-MCNC: 2.8 G/DL (ref 1.9–3.5)
GLUCOSE SERPL-MCNC: 86 MG/DL (ref 65–99)
HCT VFR BLD AUTO: 47.4 % (ref 42–52)
HGB BLD-MCNC: 15.8 G/DL (ref 14–18)
MAGNESIUM SERPL-MCNC: 1.8 MG/DL (ref 1.5–2.5)
MCH RBC QN AUTO: 31.2 PG (ref 27–33)
MCHC RBC AUTO-ENTMCNC: 33.3 G/DL (ref 32.3–36.5)
MCV RBC AUTO: 93.5 FL (ref 81.4–97.8)
PHOSPHATE SERPL-MCNC: 2.3 MG/DL (ref 2.5–4.5)
PLATELET # BLD AUTO: 201 K/UL (ref 164–446)
PMV BLD AUTO: 8.9 FL (ref 9–12.9)
POTASSIUM SERPL-SCNC: 3.8 MMOL/L (ref 3.6–5.5)
PROT SERPL-MCNC: 6.1 G/DL (ref 6–8.2)
RBC # BLD AUTO: 5.07 M/UL (ref 4.7–6.1)
SODIUM SERPL-SCNC: 133 MMOL/L (ref 135–145)
WBC # BLD AUTO: 8.9 K/UL (ref 4.8–10.8)

## 2023-10-18 PROCEDURE — 80053 COMPREHEN METABOLIC PANEL: CPT

## 2023-10-18 PROCEDURE — 700102 HCHG RX REV CODE 250 W/ 637 OVERRIDE(OP): Performed by: HOSPITALIST

## 2023-10-18 PROCEDURE — 83735 ASSAY OF MAGNESIUM: CPT

## 2023-10-18 PROCEDURE — A9270 NON-COVERED ITEM OR SERVICE: HCPCS | Performed by: HOSPITALIST

## 2023-10-18 PROCEDURE — 700105 HCHG RX REV CODE 258: Performed by: INTERNAL MEDICINE

## 2023-10-18 PROCEDURE — 94760 N-INVAS EAR/PLS OXIMETRY 1: CPT

## 2023-10-18 PROCEDURE — 700111 HCHG RX REV CODE 636 W/ 250 OVERRIDE (IP): Performed by: INTERNAL MEDICINE

## 2023-10-18 PROCEDURE — 78452 HT MUSCLE IMAGE SPECT MULT: CPT

## 2023-10-18 PROCEDURE — 93018 CV STRESS TEST I&R ONLY: CPT | Performed by: INTERNAL MEDICINE

## 2023-10-18 PROCEDURE — 85027 COMPLETE CBC AUTOMATED: CPT

## 2023-10-18 PROCEDURE — 36415 COLL VENOUS BLD VENIPUNCTURE: CPT

## 2023-10-18 PROCEDURE — A9270 NON-COVERED ITEM OR SERVICE: HCPCS | Performed by: INTERNAL MEDICINE

## 2023-10-18 PROCEDURE — 700111 HCHG RX REV CODE 636 W/ 250 OVERRIDE (IP): Mod: JZ | Performed by: HOSPITALIST

## 2023-10-18 PROCEDURE — C9113 INJ PANTOPRAZOLE SODIUM, VIA: HCPCS | Performed by: INTERNAL MEDICINE

## 2023-10-18 PROCEDURE — 700102 HCHG RX REV CODE 250 W/ 637 OVERRIDE(OP): Performed by: INTERNAL MEDICINE

## 2023-10-18 PROCEDURE — 84100 ASSAY OF PHOSPHORUS: CPT

## 2023-10-18 PROCEDURE — 99239 HOSP IP/OBS DSCHRG MGMT >30: CPT | Performed by: INTERNAL MEDICINE

## 2023-10-18 PROCEDURE — 96376 TX/PRO/DX INJ SAME DRUG ADON: CPT

## 2023-10-18 PROCEDURE — 78452 HT MUSCLE IMAGE SPECT MULT: CPT | Mod: 26 | Performed by: INTERNAL MEDICINE

## 2023-10-18 PROCEDURE — 96375 TX/PRO/DX INJ NEW DRUG ADDON: CPT

## 2023-10-18 PROCEDURE — G0378 HOSPITAL OBSERVATION PER HR: HCPCS

## 2023-10-18 RX ORDER — AMINOPHYLLINE 25 MG/ML
100 INJECTION, SOLUTION INTRAVENOUS
Status: DISCONTINUED | OUTPATIENT
Start: 2023-10-18 | End: 2023-10-18 | Stop reason: HOSPADM

## 2023-10-18 RX ORDER — REGADENOSON 0.08 MG/ML
0.4 INJECTION, SOLUTION INTRAVENOUS ONCE
Status: COMPLETED | OUTPATIENT
Start: 2023-10-18 | End: 2023-10-18

## 2023-10-18 RX ADMIN — ONDANSETRON 4 MG: 2 INJECTION INTRAMUSCULAR; INTRAVENOUS at 04:45

## 2023-10-18 RX ADMIN — PANTOPRAZOLE SODIUM 40 MG: 40 INJECTION, POWDER, LYOPHILIZED, FOR SOLUTION INTRAVENOUS at 04:47

## 2023-10-18 RX ADMIN — FOLIC ACID 1 MG: 1 TABLET ORAL at 04:48

## 2023-10-18 RX ADMIN — THERA TABS 1 TABLET: TAB at 04:49

## 2023-10-18 RX ADMIN — SODIUM CHLORIDE, POTASSIUM CHLORIDE, SODIUM LACTATE AND CALCIUM CHLORIDE: 600; 310; 30; 20 INJECTION, SOLUTION INTRAVENOUS at 04:50

## 2023-10-18 RX ADMIN — HYDROMORPHONE HYDROCHLORIDE 0.5 MG: 1 INJECTION, SOLUTION INTRAMUSCULAR; INTRAVENOUS; SUBCUTANEOUS at 04:44

## 2023-10-18 RX ADMIN — THIAMINE HCL TAB 100 MG 100 MG: 100 TAB at 04:49

## 2023-10-18 RX ADMIN — ASPIRIN 81 MG: 81 TABLET, COATED ORAL at 04:48

## 2023-10-18 RX ADMIN — REGADENOSON 0.4 MG: 0.08 INJECTION, SOLUTION INTRAVENOUS at 09:57

## 2023-10-18 ASSESSMENT — LIFESTYLE VARIABLES
TOTAL SCORE: 2
VISUAL DISTURBANCES: NOT PRESENT
PAROXYSMAL SWEATS: NO SWEAT VISIBLE
VISUAL DISTURBANCES: NOT PRESENT
PAROXYSMAL SWEATS: NO SWEAT VISIBLE
ANXIETY: *
NAUSEA AND VOMITING: NO NAUSEA AND NO VOMITING
NAUSEA AND VOMITING: NO NAUSEA AND NO VOMITING
ANXIETY: NO ANXIETY (AT EASE)
HEADACHE, FULLNESS IN HEAD: NOT PRESENT
ANXIETY: NO ANXIETY (AT EASE)
HEADACHE, FULLNESS IN HEAD: NOT PRESENT
ORIENTATION AND CLOUDING OF SENSORIUM: ORIENTED AND CAN DO SERIAL ADDITIONS
TREMOR: TREMOR NOT VISIBLE BUT CAN BE FELT, FINGERTIP TO FINGERTIP
VISUAL DISTURBANCES: NOT PRESENT
ORIENTATION AND CLOUDING OF SENSORIUM: ORIENTED AND CAN DO SERIAL ADDITIONS
AUDITORY DISTURBANCES: NOT PRESENT
AGITATION: NORMAL ACTIVITY
NAUSEA AND VOMITING: NO NAUSEA AND NO VOMITING
HEADACHE, FULLNESS IN HEAD: NOT PRESENT
TREMOR: NO TREMOR
TOTAL SCORE: 1
AGITATION: NORMAL ACTIVITY
ORIENTATION AND CLOUDING OF SENSORIUM: ORIENTED AND CAN DO SERIAL ADDITIONS
AUDITORY DISTURBANCES: NOT PRESENT
TREMOR: TREMOR NOT VISIBLE BUT CAN BE FELT, FINGERTIP TO FINGERTIP
PAROXYSMAL SWEATS: NO SWEAT VISIBLE
TOTAL SCORE: 1
AUDITORY DISTURBANCES: NOT PRESENT
AGITATION: NORMAL ACTIVITY

## 2023-10-18 ASSESSMENT — PAIN DESCRIPTION - PAIN TYPE: TYPE: ACUTE PAIN

## 2023-10-18 NOTE — DISCHARGE SUMMARY
Discharge Summary    CHIEF COMPLAINT ON ADMISSION  Chief Complaint   Patient presents with    Chest Pain     Developed last night    Shortness of Breath     Started last night    N/V       Reason for Admission  Chest Pains, High Blood Presure, A*     Admission Date  10/16/2023    CODE STATUS  Full Code    HPI & HOSPITAL COURSE  Tom Quan is a 58 y.o. male admitted 10/16/2023 with multiple complaints including abdominal pain, shortness of breath, nausea, vomiting, and chest pain.  He has alcohol dependence, GERD, dyslipidemia, depression, and hypertension.      EKG showed sinus tachycardia (97), with 0.5 mm ST elevation in lateral leads in leads V4 to V6, flattening of T waves in leads III and aVF.  Initial troponin was negative. Stress test was ordered (unable to be done on 10/17/2023 due to alcohol withdrawal).Troponin has been negative x 3.     Lipase was 104 -> 177. CT chest, abdomen, pelvis from 10/17/2023 showed possible mild pancreatitis, and a 5.2 cm ascending thoracic aortic aneurysm.  IV pain medications and IV fluids were started.  IV Zofran was added for nausea    NM stress test was done today. There was no evidence of significant jeopardized viable myocardium or prior myocardial infarction was. Stress EKG was negative for ischemia.    Patient is insisting on going home, and does not wish to remain in the hospital for completion of alcohol withdrawal, or treatment of acute pancreatitis. He was advised that it is in his medical interest to complete his alcohol detox and treatment for acute pancreatitis.  He states that he needs to go home to address issues with his 17-year-old son.      Patient has not taken Librium or Ativan since yesterday.    Patient has been advised to follow-up with his primary care physician today, not to drink alcohol at all, not to take anything other than clear liquids until his abdominal pain has resolved, come to the hospital immediately if his symptoms do not  improve.    Therefore, he is discharged in guarded condition to home with close outpatient follow-up.        Discharge Date  10/18/2023    FOLLOW UP ITEMS POST DISCHARGE  Primary care physician within 1 to 2 days    DISCHARGE DIAGNOSES  Principal Problem:    Chest pain (POA: Yes)  Active Problems:    Essential (primary) hypertension (POA: Yes)    GERD (gastroesophageal reflux disease) (POA: Yes)    Major depressive disorder, recurrent episode, moderate (HCC) (POA: Yes)    Familial hypercholesteremia (POA: Yes)    Alcohol dependence (HCC) (POA: Yes)    Epigastric pain (POA: Yes)  Resolved Problems:    * No resolved hospital problems. *      FOLLOW UP  Future Appointments   Date Time Provider Department Center   1/17/2024 12:40 PM Mik Calles M.D. [facility] None     No follow-up provider specified.    MEDICATIONS ON DISCHARGE     Medication List        CHANGE how you take these medications        Instructions   Desvenlafaxine Succinate ER 25 MG Tb24  What changed: how much to take   Take 1 Tablet by mouth every day.  Dose: 1 Tablet     hydrOXYzine HCl 25 MG Tabs  What changed: when to take this  Commonly known as: Atarax   Take 1 Tablet by mouth 3 times a day as needed for Anxiety.  Dose: 25 mg     rosuvastatin 20 MG Tabs  What changed: when to take this  Commonly known as: Crestor   Take 1 Tablet by mouth every evening.  Dose: 20 mg            CONTINUE taking these medications        Instructions   acetaminophen 500 MG Tabs  Commonly known as: Tylenol   Take 1,000 mg by mouth every 6 hours as needed. Indications: Pain  Dose: 1,000 mg     Budesonide ER 9 MG Tb24   Take 9 mg by mouth as needed. Indications: Ulcerated Colon  Dose: 9 mg     EXCEDRIN PO   Take 2 Tablets by mouth 2 times a day as needed (For headache).  Dose: 2 Tablet     FISH OIL PO   Take 2 Capsules by mouth see administration instructions. Pt takes sporidially  Dose: 2 Capsule     ibuprofen 200 MG Tabs  Commonly known as: Motrin   Take 800 mg by  mouth every 6 hours as needed. Indications: Pain  Dose: 800 mg     multivitamin Tabs   Take 2 Tablets by mouth see administration instructions. Pt takes sporidially  Dose: 2 Tablet     omeprazole 20 MG delayed-release capsule  Commonly known as: PriLOSEC   Take 20-40 mg by mouth 1 time a day as needed (Heartburn).  Dose: 20-40 mg     ondansetron 4 MG Tbdp  Commonly known as: Zofran ODT   Take 1 Tablet by mouth every four hours as needed for Nausea/Vomiting (give PO if no IV route available).  Dose: 4 mg     rizatriptan 5 MG tablet  Commonly known as: Maxalt   Take 1-2 Tablets by mouth one time as needed for Migraine for up to 1 dose.  Dose: 5-10 mg     Stelara 90 MG/ML Sosy injection  Generic drug: ustekinumab   Inject 90 mg under the skin see administration instructions. Pt reports that he receives this medications every 57 days  Dose: 90 mg     testosterone cypionate 200 MG/ML injection  Commonly known as: Depo-Testosterone   Inject 200 mg into the shoulder, thigh, or buttocks every 10 days.  Dose: 200 mg     VITAMIN B COMPLEX PO   Take 1 Tablet by mouth see administration instructions. Pt takes sporidially  Dose: 1 Tablet     VITAMIN B-1 PO   Take 1 Tablet by mouth see administration instructions. Pt takes sporidially  Dose: 1 Tablet     vitamin D 2000 UNIT Tabs   Take 4,000 Units by mouth see administration instructions. Pt takes sporidially  Dose: 4,000 Units              Allergies  No Active Allergies    DIET  Orders Placed This Encounter   Procedures    Diet NPO Restrict to: Sips with Medications     Standing Status:   Standing     Number of Occurrences:   1     Order Specific Question:   Diet NPO Restrict to:     Answer:   Sips with Medications [3]       ACTIVITY  As tolerated.      CONSULTATIONS  None    PROCEDURES  NM stress test    LABORATORY  Lab Results   Component Value Date    SODIUM 133 (L) 10/18/2023    POTASSIUM 3.8 10/18/2023    CHLORIDE 97 10/18/2023    CO2 26 10/18/2023    GLUCOSE 86 10/18/2023     BUN 5 (L) 10/18/2023    CREATININE 1.06 10/18/2023        Lab Results   Component Value Date    WBC 8.9 10/18/2023    HEMOGLOBIN 15.8 10/18/2023    HEMATOCRIT 47.4 10/18/2023    PLATELETCT 201 10/18/2023        Total time of the discharge process exceeds 40 minutes.

## 2023-10-18 NOTE — PROGRESS NOTES
Charge Nurse Rounding Note    Bedside rounding completed to address quality of care and overall patient experience.    Patient Satisfaction addressed including staff responsiveness. Patient/family are aware of the POC and any questions answered. Thorough safety education completed including use of call light prior to all mobility throughout the entirety of the hospital stay.     Patient/family aware of time of next Hourly Round.    No further questions/concerns currently.     Additional Notes:   Pt states that he needs to be D/C as soon as possible due to family circumstances, will talk to PRN.

## 2023-10-18 NOTE — CARE PLAN
The patient is Stable - Low risk of patient condition declining or worsening    Shift Goals  Clinical Goals: pain management, stress test  Patient Goals: control pain, comfort    Progress made toward(s) clinical / shift goals:        Problem: Knowledge Deficit - Standard  Goal: Patient and family/care givers will demonstrate understanding of plan of care, disease process/condition, diagnostic tests and medications  Outcome: Progressing   Patient agrees with current plan of care including medications, labs, and stress test.   Problem: Pain - Standard  Goal: Alleviation of pain or a reduction in pain to the patient’s comfort goal  Outcome: Progressing   Patient reports decreased upper abdominal pain post interventions.    Patient is not progressing towards the following goals:

## 2023-10-18 NOTE — PROGRESS NOTES
Telemetry Shift Summary     Rhythm: SR/ST/SA  HR:   Ectopy: r PVC, r-o PAC    Measurements: 0.16/0.08/0.32    Normal Values  Rhythm: SR  HR:   Measurements: 0.12-0.20/0.08-0.10/0.30-0.52

## 2023-10-19 ENCOUNTER — TELEPHONE (OUTPATIENT)
Dept: HEALTH INFORMATION MANAGEMENT | Facility: OTHER | Age: 58
End: 2023-10-19

## 2023-10-20 ENCOUNTER — TELEPHONE (OUTPATIENT)
Dept: VASCULAR LAB | Facility: MEDICAL CENTER | Age: 58
End: 2023-10-20
Payer: COMMERCIAL

## 2023-10-20 NOTE — TELEPHONE ENCOUNTER
Leslie - please contact pt regarding the contents of my reply to his patient messaging - which it appears he had been sending while inpatient at [facility].  He is overdue for follow-up with vascular med clinic, so please schedule with next available provider.  He has been lost to f/u as he cancelled his 4/2023 appt with our clinic. We cannot handle discussions of his expanding aortic aneurysm and medical and/or surgical management through portal messaging.      Appears he was d/c 10/18 from [facility] after acute pancreatitis, HTN urgency, and etoh dependence.  He should also be directed to his PCP for post-d/c follow-up to address these discharge diagnoses as these diagnoses are of higher priority for him in the immediate future.       Thanks

## 2023-10-23 NOTE — TELEPHONE ENCOUNTER
Responded to pt via [email or web address] on scheduling f/u to discuss CT and review POC. Pt to schedule at Dr BENTON next available.

## 2023-11-02 DIAGNOSIS — F90.2 ADHD (ATTENTION DEFICIT HYPERACTIVITY DISORDER), COMBINED TYPE: ICD-10-CM

## 2023-11-02 RX ORDER — DEXTROAMPHETAMINE SACCHARATE, AMPHETAMINE ASPARTATE, DEXTROAMPHETAMINE SULFATE AND AMPHETAMINE SULFATE 2.5; 2.5; 2.5; 2.5 MG/1; MG/1; MG/1; MG/1
10 TABLET ORAL 2 TIMES DAILY
Qty: 60 TABLET | Refills: 0 | OUTPATIENT
Start: 2023-11-02 | End: 2023-12-02

## 2023-11-08 NOTE — TELEPHONE ENCOUNTER
FINAL PRIOR AUTHORIZATION STATUS:    1.  Name of Medication & Dose:  rizatriptan (MAXALT) 5 MG tablet    2. Prior Auth Status: Approved through 10/10/2026     3. Action Taken: Pharmacy Notified: no Patient Notified: no

## 2023-11-16 ENCOUNTER — TELEMEDICINE (OUTPATIENT)
Dept: BEHAVIORAL HEALTH | Facility: CLINIC | Age: 58
End: 2023-11-16
Payer: COMMERCIAL

## 2023-11-16 DIAGNOSIS — F90.2 ADHD (ATTENTION DEFICIT HYPERACTIVITY DISORDER), COMBINED TYPE: ICD-10-CM

## 2023-11-16 PROCEDURE — 99214 OFFICE O/P EST MOD 30 MIN: CPT | Mod: 95 | Performed by: PSYCHIATRY & NEUROLOGY

## 2023-11-16 RX ORDER — DEXTROAMPHETAMINE SACCHARATE, AMPHETAMINE ASPARTATE, DEXTROAMPHETAMINE SULFATE AND AMPHETAMINE SULFATE 2.5; 2.5; 2.5; 2.5 MG/1; MG/1; MG/1; MG/1
10 TABLET ORAL 3 TIMES DAILY
Qty: 90 TABLET | Refills: 0 | Status: SHIPPED | OUTPATIENT
Start: 2023-11-16 | End: 2023-12-27 | Stop reason: SDUPTHER

## 2023-11-16 NOTE — PROGRESS NOTES
[facility]   Follow Up Assessment    This visit was conducted via Zoom using secure and encrypted videoconferencing technology.  The patient was in a private location in the [facility].  The patient's identity was confirmed and verbal consent was obtained for this virtual visit.    This provider informed the patient their medical records are totally confidential except for the use by other providers involved in their care, or if the patient signs a release, or to report instances of child or elder abuse, or if it is determined they are an immediate risk to harm themselves or others.    Name: Tom Quan  MRN: [id number]  : 1965  Age: 58 y.o.  Date of assessment: 2023  PCP: Arvind Geiger M.D.    Subjective: Chart reviewed prior to the virtual visit in his vehicle.  He was last seen on .  He has had difficulty obtaining Vyvanse and prefers to resume Adderall 10 mg 3 times daily for his ADHD.  His heart is working well after the recent ablation.    Objective:  He is alert, oriented, and cooperative.  Relatedness is good.  Grooming is good.  Speech is normal rate.  Anxious.  Memory is good.  Insight and judgment are fairly good.  No indication of psychotic thinking.    Current Risk:       Suicidal: Not suicidal       Homicidal: Not homicidal       Self-Harm: No plan to harm self       Relapse(Low/Moderate/High):: Moderate       Crisis Safety Plan Reviewed: Discussed with patient    Diagnosis:   ADHD    Treatment Plan:  The current treatment plan consists of quarterly psychiatric sessions designed to evaluate his ADHD.    Duration will be for a minimum of 12 months and will be reviewed at each visit.    Goals: Improvement in ADHD symptoms in order to prevent relapse due to the chronic nature of his behavioral health problems and mental illness.  Discontinue Vyvanse and start Adderall 10 mg 3 times daily.  He agrees to contact my medical assistant if the dose needs to be  adjusted.        Tom Medina M.D.    This note was created using voice recognition software (Dragon). The accuracy of the dictation is limited by the abilities of the software. I have reviewed the note prior to signing, however some errors in grammar and context are still possible. If you have any questions related to this note please do not hesitate to contact our office.

## 2023-11-28 ENCOUNTER — OFFICE VISIT (OUTPATIENT)
Dept: MEDICAL GROUP | Facility: LAB | Age: 58
End: 2023-11-28
Payer: COMMERCIAL

## 2023-11-28 VITALS
OXYGEN SATURATION: 96 % | HEIGHT: 73 IN | TEMPERATURE: 97.8 F | WEIGHT: 218 LBS | SYSTOLIC BLOOD PRESSURE: 108 MMHG | BODY MASS INDEX: 28.89 KG/M2 | DIASTOLIC BLOOD PRESSURE: 72 MMHG | HEART RATE: 106 BPM | RESPIRATION RATE: 16 BRPM

## 2023-11-28 DIAGNOSIS — E29.1 TESTICULAR HYPOFUNCTION: ICD-10-CM

## 2023-11-28 DIAGNOSIS — Z98.52 HISTORY OF VASECTOMY: ICD-10-CM

## 2023-11-28 DIAGNOSIS — I71.21 ANEURYSM OF ASCENDING AORTA WITHOUT RUPTURE (HCC): ICD-10-CM

## 2023-11-28 DIAGNOSIS — F10.20 UNCOMPLICATED ALCOHOL DEPENDENCE (HCC): ICD-10-CM

## 2023-11-28 DIAGNOSIS — F33.1 MODERATE EPISODE OF RECURRENT MAJOR DEPRESSIVE DISORDER (HCC): ICD-10-CM

## 2023-11-28 PROBLEM — K70.10 ACUTE ALCOHOLIC HEPATITIS: Status: RESOLVED | Noted: 2023-04-02 | Resolved: 2023-11-28

## 2023-11-28 PROBLEM — F10.931 DELIRIUM, WITHDRAWAL, ALCOHOLIC (HCC): Status: RESOLVED | Noted: 2023-04-02 | Resolved: 2023-11-28

## 2023-11-28 PROBLEM — R10.13 EPIGASTRIC PAIN: Status: RESOLVED | Noted: 2023-10-16 | Resolved: 2023-11-28

## 2023-11-28 PROBLEM — K85.20 ACUTE ALCOHOLIC PANCREATITIS: Status: RESOLVED | Noted: 2023-05-19 | Resolved: 2023-11-28

## 2023-11-28 PROBLEM — R07.9 PAIN IN THE CHEST: Status: RESOLVED | Noted: 2023-04-02 | Resolved: 2023-11-28

## 2023-11-28 PROBLEM — F10.10 ALCOHOL ABUSE: Status: RESOLVED | Noted: 2023-05-23 | Resolved: 2023-11-28

## 2023-11-28 PROBLEM — R07.9 CHEST PAIN: Status: RESOLVED | Noted: 2023-10-16 | Resolved: 2023-11-28

## 2023-11-28 PROBLEM — E87.20 METABOLIC ACIDOSIS: Status: RESOLVED | Noted: 2023-04-02 | Resolved: 2023-11-28

## 2023-11-28 PROBLEM — E87.6 HYPOKALEMIA: Status: RESOLVED | Noted: 2023-04-03 | Resolved: 2023-11-28

## 2023-11-28 PROCEDURE — 3078F DIAST BP <80 MM HG: CPT | Performed by: FAMILY MEDICINE

## 2023-11-28 PROCEDURE — 3074F SYST BP LT 130 MM HG: CPT | Performed by: FAMILY MEDICINE

## 2023-11-28 PROCEDURE — 99214 OFFICE O/P EST MOD 30 MIN: CPT | Performed by: FAMILY MEDICINE

## 2023-11-28 RX ORDER — FINASTERIDE 5 MG/1
5 TABLET, FILM COATED ORAL DAILY
COMMUNITY
End: 2024-01-02

## 2023-11-28 ASSESSMENT — PATIENT HEALTH QUESTIONNAIRE - PHQ9
7. TROUBLE CONCENTRATING ON THINGS, SUCH AS READING THE NEWSPAPER OR WATCHING TELEVISION: NOT AT ALL
SUM OF ALL RESPONSES TO PHQ QUESTIONS 1-9: 0
9. THOUGHTS THAT YOU WOULD BE BETTER OFF DEAD, OR OF HURTING YOURSELF: NOT AT ALL
5. POOR APPETITE OR OVEREATING: NOT AT ALL
3. TROUBLE FALLING OR STAYING ASLEEP OR SLEEPING TOO MUCH: NOT AT ALL
6. FEELING BAD ABOUT YOURSELF - OR THAT YOU ARE A FAILURE OR HAVE LET YOURSELF OR YOUR FAMILY DOWN: NOT AL ALL
2. FEELING DOWN, DEPRESSED, IRRITABLE, OR HOPELESS: NOT AT ALL
8. MOVING OR SPEAKING SO SLOWLY THAT OTHER PEOPLE COULD HAVE NOTICED. OR THE OPPOSITE, BEING SO FIGETY OR RESTLESS THAT YOU HAVE BEEN MOVING AROUND A LOT MORE THAN USUAL: NOT AT ALL
4. FEELING TIRED OR HAVING LITTLE ENERGY: NOT AT ALL
SUM OF ALL RESPONSES TO PHQ9 QUESTIONS 1 AND 2: 0
1. LITTLE INTEREST OR PLEASURE IN DOING THINGS: NOT AT ALL

## 2023-11-28 ASSESSMENT — FIBROSIS 4 INDEX: FIB4 SCORE: 3.49

## 2023-11-28 NOTE — PROGRESS NOTES
"Subjective:     CC: Follow up, urology referral    HPI:   Tom a 58-year-old male with a history of alcohol dependence, ascending aortic aneurysm and ulcerative colitis who presents for follow-up.    Requesting referral needed to continue seeing urology for testosterone supplementation and postvasectomy care.    Had hospital stay last month for multiple concerns including chest and abdominal pain.  Had negative stress test but did have elevation of lipase consistent with alcoholic pancreatitis.  CT imaging did show possible growth of a sending aortic aneurysm from 4.8 to 5.2 cm    He reports no alcohol use since hospital stay in October and is feeling very well with this.  Mood much improved and many chronic symptoms improved.    Medications, past medical history, allergies, and social history have been reviewed and updated.      Objective:       Exam:  /72   Pulse (!) 106   Temp 36.6 °C (97.8 °F) (Temporal)   Resp 16   Ht 1.854 m (6' 1\")   Wt 98.9 kg (218 lb)   SpO2 96%   BMI 28.76 kg/m²  Body mass index is 28.76 kg/m².    Constitutional: Alert. Well appearing. No distress.  Skin: Warm, dry, good turgor, no visible rashes.  ENMT: Moist mucous membranes. Normal dentition.  Respiratory: Normal effort. Lungs are clear to auscultation bilaterally.  Cardiovascular: Regular, tachycardic.  Normal S1/S2. No murmurs, rubs or gallops.   Neuro: Moves all four extremities. No facial droop.  Psych: Answers questions appropriately. Normal affect and mood.    Assessment & Plan:     58 y.o. male with the following -     1. Aneurysm of ascending aorta without rupture (HCC)  CT imaging did show possible growth of ascending aortic aneurysm.  4.8 cm noted on CTA in June of this year with a 5.2 cm noted last month on CT chest abdomen pelvis.  He does have follow-up for this planned next month with vascular medicine.  Continue Crestor.    2. Testicular hypofunction  3. History of vasectomy  Continue management per urology " and referral was placed.  - Referral to Urology    4. Moderate episode of recurrent major depressive disorder (HCC)  He has noted that mood is significantly and proved with alcohol cessation and is tapering off of Pristiq.  Right now he is on 25 mg every other day and will continue this for total of 2 weeks before stopping.    5. Uncomplicated alcohol dependence (HCC)  No alcohol for 7 weeks he reports she is doing well and feeling very good.  Congratulated and encouraged to continue with complete cessation.  We did discuss having plan in place for upcoming events and triggers.    Please note that this note was created using voice recognition software.

## 2023-12-20 ENCOUNTER — TELEPHONE (OUTPATIENT)
Dept: SCHEDULING | Facility: IMAGING CENTER | Age: 58
End: 2023-12-20

## 2023-12-20 NOTE — TELEPHONE ENCOUNTER
TRUONG    Caller: Tom Quan     Topic/issue: Pt completed labs with [facility] on 12/18/2023 and wants to make sure you have them for this appt tomorrow 12/21/2023? He does not see the results and is concerned you won't have them. If not, please advise if he should keep this appt? He has had no luck with reaching out to [facility].    Callback Number: [phone] (home)      Thank you    Mady COLLAZO

## 2023-12-21 ENCOUNTER — PATIENT MESSAGE (OUTPATIENT)
Dept: MEDICAL GROUP | Facility: LAB | Age: 58
End: 2023-12-21
Payer: COMMERCIAL

## 2023-12-21 ENCOUNTER — OFFICE VISIT (OUTPATIENT)
Dept: VASCULAR LAB | Facility: MEDICAL CENTER | Age: 58
End: 2023-12-21
Attending: FAMILY MEDICINE
Payer: COMMERCIAL

## 2023-12-21 ENCOUNTER — TELEPHONE (OUTPATIENT)
Dept: VASCULAR LAB | Facility: MEDICAL CENTER | Age: 58
End: 2023-12-21
Payer: COMMERCIAL

## 2023-12-21 VITALS
HEART RATE: 112 BPM | DIASTOLIC BLOOD PRESSURE: 76 MMHG | WEIGHT: 220 LBS | HEIGHT: 73 IN | SYSTOLIC BLOOD PRESSURE: 116 MMHG | BODY MASS INDEX: 29.16 KG/M2

## 2023-12-21 DIAGNOSIS — E78.41 ELEVATED LIPOPROTEIN(A): Chronic | ICD-10-CM

## 2023-12-21 DIAGNOSIS — I48.92 ATRIAL FLUTTER WITH RAPID VENTRICULAR RESPONSE (HCC): ICD-10-CM

## 2023-12-21 DIAGNOSIS — E78.01 FAMILIAL HYPERCHOLESTEREMIA: ICD-10-CM

## 2023-12-21 DIAGNOSIS — F10.91 ALCOHOL USE DISORDER IN REMISSION: ICD-10-CM

## 2023-12-21 DIAGNOSIS — K51.00 ULCERATIVE PANCOLITIS WITHOUT COMPLICATION (HCC): ICD-10-CM

## 2023-12-21 DIAGNOSIS — I71.21 ANEURYSM OF ASCENDING AORTA WITHOUT RUPTURE (HCC): ICD-10-CM

## 2023-12-21 DIAGNOSIS — I10 PRIMARY HYPERTENSION: ICD-10-CM

## 2023-12-21 DIAGNOSIS — R00.0 TACHYCARDIA: ICD-10-CM

## 2023-12-21 DIAGNOSIS — K76.0 HEPATIC STEATOSIS: ICD-10-CM

## 2023-12-21 DIAGNOSIS — I70.0 ABDOMINAL AORTIC ATHEROSCLEROSIS (HCC): ICD-10-CM

## 2023-12-21 LAB
ALBUMIN SERPL-MCNC: 4.4 G/DL (ref 3.8–4.9)
ALBUMIN/CREAT UR: 2 MG/G CREAT (ref 0–29)
ALBUMIN/GLOB SERPL: 1.5 {RATIO} (ref 1.2–2.2)
ALP SERPL-CCNC: 81 IU/L (ref 44–121)
ALT SERPL-CCNC: 26 IU/L (ref 0–44)
APO B SERPL-MCNC: 114 MG/DL
APPEARANCE UR: CLEAR
AST SERPL-CCNC: 36 IU/L (ref 0–40)
BILIRUB SERPL-MCNC: 0.4 MG/DL (ref 0–1.2)
BILIRUB UR QL STRIP: NEGATIVE
BUN SERPL-MCNC: 13 MG/DL (ref 6–24)
BUN/CREAT SERPL: 10 (ref 9–20)
CALCIUM SERPL-MCNC: 9.9 MG/DL (ref 8.7–10.2)
CHLORIDE SERPL-SCNC: 101 MMOL/L (ref 96–106)
CHOLEST SERPL-MCNC: 206 MG/DL (ref 100–199)
CO2 SERPL-SCNC: 22 MMOL/L (ref 20–29)
COLOR UR: YELLOW
CREAT SERPL-MCNC: 1.31 MG/DL (ref 0.76–1.27)
CREAT UR-MCNC: 232.8 MG/DL
CRP SERPL HS-MCNC: 1.95 MG/L (ref 0–3)
EGFRCR SERPLBLD CKD-EPI 2021: 63 ML/MIN/1.73
GLOBULIN SER CALC-MCNC: 2.9 G/DL (ref 1.5–4.5)
GLUCOSE SERPL-MCNC: 98 MG/DL (ref 70–99)
GLUCOSE UR QL STRIP: NEGATIVE
HDLC SERPL-MCNC: 52 MG/DL
HGB UR QL STRIP: NEGATIVE
KETONES UR QL STRIP: NEGATIVE
LABORATORY COMMENT REPORT: ABNORMAL
LDLC SERPL CALC-MCNC: 129 MG/DL (ref 0–99)
LEUKOCYTE ESTERASE UR QL STRIP: NEGATIVE
LPA SERPL-SCNC: 86.9 NMOL/L
MICRO URNS: NORMAL
MICROALBUMIN UR-MCNC: 5.7 UG/ML
NITRITE UR QL STRIP: NEGATIVE
PH UR STRIP: 5.5 [PH] (ref 5–7.5)
POTASSIUM SERPL-SCNC: 4.6 MMOL/L (ref 3.5–5.2)
PROT SERPL-MCNC: 7.3 G/DL (ref 6–8.5)
PROT UR QL STRIP: NEGATIVE
SODIUM SERPL-SCNC: 139 MMOL/L (ref 134–144)
SP GR UR STRIP: 1.02 (ref 1–1.03)
TRIGL SERPL-MCNC: 143 MG/DL (ref 0–149)
TSH SERPL DL<=0.005 MIU/L-ACNC: 0.91 UIU/ML (ref 0.45–4.5)
UROBILINOGEN UR STRIP-MCNC: 0.2 MG/DL (ref 0.2–1)
VLDLC SERPL CALC-MCNC: 25 MG/DL (ref 5–40)

## 2023-12-21 PROCEDURE — 3078F DIAST BP <80 MM HG: CPT | Performed by: FAMILY MEDICINE

## 2023-12-21 PROCEDURE — 99215 OFFICE O/P EST HI 40 MIN: CPT | Performed by: FAMILY MEDICINE

## 2023-12-21 PROCEDURE — 99212 OFFICE O/P EST SF 10 MIN: CPT

## 2023-12-21 PROCEDURE — 3074F SYST BP LT 130 MM HG: CPT | Performed by: FAMILY MEDICINE

## 2023-12-21 RX ORDER — EZETIMIBE 10 MG/1
10 TABLET ORAL DAILY
Qty: 90 TABLET | Refills: 3 | Status: SHIPPED | OUTPATIENT
Start: 2023-12-21 | End: 2024-01-16 | Stop reason: SDUPTHER

## 2023-12-21 RX ORDER — OMEPRAZOLE 20 MG/1
20-40 CAPSULE, DELAYED RELEASE ORAL
Qty: 90 CAPSULE | Refills: 3 | Status: SHIPPED | OUTPATIENT
Start: 2023-12-21

## 2023-12-21 RX ORDER — METOPROLOL SUCCINATE 25 MG/1
25 TABLET, EXTENDED RELEASE ORAL DAILY
Qty: 90 TABLET | Refills: 3 | Status: SHIPPED | OUTPATIENT
Start: 2023-12-21

## 2023-12-21 ASSESSMENT — ENCOUNTER SYMPTOMS
SHORTNESS OF BREATH: 0
PALPITATIONS: 0
WHEEZING: 0
HEADACHES: 0
CHILLS: 0
ORTHOPNEA: 0
SEIZURES: 0
DIZZINESS: 0
CLAUDICATION: 0
WEAKNESS: 0
FOCAL WEAKNESS: 0
COUGH: 0
FEVER: 0
TREMORS: 0
HEMOPTYSIS: 0

## 2023-12-21 ASSESSMENT — FIBROSIS 4 INDEX: FIB4 SCORE: 2

## 2023-12-21 NOTE — PROGRESS NOTES
"[facility] - Follow-up  12/21/23     Tom Quan has been referred for evaluation and management of dyslipidemia  Est 1/2023     Referral Source: n/a     Subjective     Interval hx/concerns: last seen 1/2023, had cancellated short interval f/u and had been lost to f/u until today.  In the interim had extensive eval and tx for Afib.  Had negative NM cardiac stress test.  Had EP cardioversion and ablation (7/2023) with DR. Dickens (EP).  Report no indication for .  TTE and echo completed.  Had prior CTA Ao and more recent non-contrast CT c/a/p showing AsAA = 5.2cm     LIFESTYLE MGMT  Change in weight: Stable  Wt Readings from Last 3 Encounters:   12/21/23 99.8 kg (220 lb)   11/28/23 98.9 kg (218 lb)   10/16/23 96.9 kg (213 lb 10 oz)    Exercise habits: moderate regular exercise program  Dietary patterns: Med style diet in general   Etoh: Yes, Details: limited   Reported barriers to care: none     PERTINENT HLD PMHX  Age at Initial Diagnosis of Dyslipidemia: \"whole life\"  Secondary causes of dyslipidemia:  Medications: Testosterone   History of ASCVD: None  Previously Attempted Interventions for Lipids - including outcome  Statin: None   Outcome: N/A  Non-Statin: None  Outcome: N/A  Any Previous History of Statin Intolerance? No  Baseline Lipids Prior to Treatment:   Lab Results   Component Value Date/Time    CHOLSTRLTOT 328 (H) 12/14/2022 07:31 AM    HDL 81 12/14/2022 07:31 AM    TRIGLYCERIDE 121 12/14/2022 07:31 AM        Latest Reference Range & Units Most Recent   LDL Chol Calc ([facility]) 0 - 99 mg/dL 226 (H)  12/14/22 07:31     MEDICATION MGMT    Current Prescription Lipid Lowering Medications - including dose:   Statin: None  Non-Statin: None  Current Lipid Lowering and Related Supplements: None  Any Current Side Effects Potentially Related to Lipid Lowering therapy? No  Current Adherence to Lipid Lowering Therapies: Complete    Thoracic Aortic Aneurysm  Reports intermittent CP, last seen in ED 10/2023,  had " repeat CT showing size increase to 5.2cm  Had ordered invitae testing - has not completed to date   Pertinent pmhx:  Initially noted on Noted on CACS 12/2022     Hx of HTN: mild high   hx of anabolic steroid use - testosterone due to testicular hypofunction   Hx of tobacco:   reports that he has never smoked. He has never used smokeless tobacco.  No hx of Connective tissue disease such as Marfans, Joselito-Danlos, Loeys-Em  No hx of inflammatory/autoimmune vasculitis (Takayasu's arteritis, Giant Cell arteritis)  No hx of infective aortitis, HIV, syphilis   No hx of MVA, chest wall trauma, deceleration injuries  Pertinent fhx:  Son has Takayasu's arteritis age 15yo - dx at [place]   No fhx of connective tissue disease   No fhx of aneurysmal disease or known familial thoracic aortic aneurysm syndrome     Aflut with RVR, s/p ablation 7/2023  No current sx.  Had prior flecainide, eliquis.  Currently reports no rhythm control meds, OAC, or aware if needs cardiology f/u   Prior heavy etoh use but has stopped     Anticoagulation/antiplats: prior eliquis due to PAF, currently none     HTN:  Had been rx'd losartan at last visit -not currently taking   Home BP log: normal      Current Outpatient Medications:     ezetimibe, 10 mg, Oral, DAILY    metoprolol SR, 25 mg, Oral, DAILY    hyoscyamine, 125 mcg, Oral, Q6HRS PRN, Taking    finasteride, 5 mg, Oral, DAILY, Taking    Budesonide ER, 9 mg, Oral, PRN, Taking    Stelara, 90 mg, Subcutaneous, See Admin Instructions, Taking    Aspirin-Acetaminophen-Caffeine (EXCEDRIN PO), 2 Tablet, Oral, BID PRN, Taking    acetaminophen, 1,000 mg, Oral, Q6HRS PRN, Taking    ibuprofen, 800 mg, Oral, Q6HRS PRN, Taking    rizatriptan, 5-10 mg, Oral, Once PRN, Taking    Desvenlafaxine Succinate ER, 1 Tablet, Oral, DAILY, Taking    hydrOXYzine HCl, 25 mg, Oral, TID PRN, Taking    vitamin D, 4,000 Units, Oral, See Admin Instructions, Taking    multivitamin, 2 Tablet, Oral, See Admin Instructions,  "Taking    Thiamine HCl (VITAMIN B-1 PO), 1 Tablet, Oral, See Admin Instructions, Taking    B Complex Vitamins (VITAMIN B COMPLEX PO), 1 Tablet, Oral, See Admin Instructions, Taking    ondansetron, 4 mg, Oral, Q4HRS PRN, Taking    rosuvastatin, 20 mg, Oral, Q EVENING (Patient taking differently: 20 mg, Oral, DAILY), Taking    testosterone cypionate, 200 mg, Intramuscular, Q10 DAYS, Taking    Omega-3 Fatty Acids (FISH OIL PO), 2 Capsule, Oral, See Admin Instructions, Taking    omeprazole, 20-40 mg, Oral, QDAY PRN    ALLERGIES: Patient has no active allergies.    Social History     Tobacco Use    Smoking status: Never    Smokeless tobacco: Never   Vaping Use    Vaping Use: Former   Substance Use Topics    Alcohol use: Not Currently     Comment: quit 9/2023    Drug use: Yes     Comment: CBD       Review of Systems   Constitutional:  Negative for chills, fever and malaise/fatigue.   Respiratory:  Negative for cough, hemoptysis, shortness of breath and wheezing.    Cardiovascular:  Negative for chest pain, palpitations, orthopnea, claudication and leg swelling.   Neurological:  Negative for dizziness, tremors, focal weakness, seizures, weakness and headaches.         Objective    Vitals:    12/21/23 0959 12/21/23 1002   BP: 124/87 116/76   BP Location: Left arm Left arm   Patient Position: Sitting Sitting   BP Cuff Size: Large adult Large adult   Pulse: (!) 109 (!) 112   Weight: 99.8 kg (220 lb)    Height: 1.854 m (6' 1\")       BP Readings from Last 5 Encounters:   12/21/23 116/76   11/28/23 108/72   10/18/23 128/86   09/28/23 132/86   07/14/23 132/86     Physical Exam  Constitutional:       Appearance: Normal appearance. He is well-developed.   HENT:      Head: Normocephalic and atraumatic.   Eyes:      Conjunctiva/sclera: Conjunctivae normal.   Neck:      Thyroid: No thyromegaly.      Vascular: No JVD.   Cardiovascular:      Rate and Rhythm: Regular rhythm. Tachycardia present.      Heart sounds: Normal heart sounds. No " murmur heard.     No friction rub. No gallop.   Pulmonary:      Effort: Pulmonary effort is normal. No respiratory distress.      Breath sounds: Normal breath sounds.   Musculoskeletal:      Cervical back: Normal range of motion and neck supple.   Lymphadenopathy:      Cervical: No cervical adenopathy.   Skin:     General: Skin is warm and dry.   Neurological:      General: No focal deficit present.      Mental Status: He is alert and oriented to person, place, and time.      Cranial Nerves: No cranial nerve deficit.       DATA REVIEW:  Most Recent Lipid Panel:   Lab Results   Component Value Date    CHOLSTRLTOT 206 (H) 12/18/2023    CHOLSTRLTOT 227 (H) 10/17/2023    TRIGLYCERIDE 143 12/18/2023    TRIGLYCERIDE 118 10/17/2023    HDL 52 12/18/2023    HDL 75 10/17/2023     (H) 10/17/2023      Latest Reference Range & Units 12/14/22 07:31   Cholesterol,Tot 100 - 199 mg/dL 328 (H)   Triglycerides 0 - 149 mg/dL 121   HDL >39 mg/dL 81   LDL Chol Calc ([facility]) 0 - 99 mg/dL 226 (H)   VLDL Cholesterol Calc 5 - 40 mg/dL 21     Lab Results   Component Value Date/Time    LIPOPROTA 86.9 (H) 12/18/2023 05:28 AM       Latest Reference Range & Units 12/18/23 05:28   Apolipoprotein B <90 mg/dL 114 (H)     Other Pertinent Blood Work:   Lab Results   Component Value Date    SODIUM 139 12/18/2023    SODIUM 133 (L) 10/18/2023    POTASSIUM 4.6 12/18/2023    POTASSIUM 3.8 10/18/2023    CHLORIDE 101 12/18/2023    CHLORIDE 97 10/18/2023    CO2 22 12/18/2023    CO2 26 10/18/2023    ANION 10.0 10/18/2023    GLUCOSE 98 12/18/2023    GLUCOSE 86 10/18/2023    BUN 13 12/18/2023    BUN 5 (L) 10/18/2023    CREATININE 1.31 (H) 12/18/2023    CREATININE 1.06 10/18/2023    CALCIUM 9.9 12/18/2023    CALCIUM 8.4 10/18/2023    ASTSGOT 36 12/18/2023    ASTSGOT 96 (H) 10/18/2023    ALTSGPT 26 12/18/2023    ALTSGPT 61 (H) 10/18/2023    ALKPHOSPHAT 81 12/18/2023    ALKPHOSPHAT 74 10/18/2023    TBILIRUBIN 0.4 12/18/2023    TBILIRUBIN 0.8 10/18/2023     ALBUMIN 4.4 12/18/2023    ALBUMIN 3.3 10/18/2023    AGRATIO 1.5 12/18/2023    AGRATIO 1.2 10/18/2023    LIPOPROTA 86.9 (H) 12/18/2023    TSHULTRASEN 1.740 05/22/2023     VASCULAR IMAGING:       CT abd/pelvis 2021  Vasculature: The mineralization coils identified in the left retroperitoneal region extending to the left renal region. There are some calcified plaque in the aorta without aneurysm.  IMPRESSION:   1.  Hepatic steatosis.   2.  Aortic atherosclerosis without aneurysm.   3.  Appendix not visualized    CACS 12/2022  Coronary calcification:  LMA - 0.0  LCX - 0.0  LAD - 0.0  RCA - 0.0  PDA - 0.0   Total Calcium Score: 0.0   Percentile: Calcium score is below the 25th percentile for the patient's age and sex.   Other findings:  Heart: Normal size.  Lungs: Clear.  Mediastinum: Ascending thoracic aorta measures 4.8 cm in diameter.  Upper abdomen: There is hepatic steatosis.    Echo 5/2023   Normal left ventricular systolic function.  The left ventricular ejection fraction is visually estimated to be 55%.  Aorta  Normal aortic root for body surface area. The ascending aorta diameter   is 4.2  cm.    CTA complete Ao 6/2023   1. No evidence of aortic dissection .   2. Unchanged dilatation of the ascending aorta, measuring up to 4.8 cm   3. Redemonstration of aberrant origin of the right subclavian artery.    TTE 7/2023   Proimal aortic aneurysm 4.5cm.   Trace TI.   Trileaflet aortic valve 1+ AI.   Structurally normal mitral valve without significant stenosis or   regurgitation.   LATHA no clots or masses PW doppler 0.4m/s.   EF normal.    EP ablation 7/2023   Procedure(s) Performed:   Supraventricular tachycardia ablation (atrial flutter ablation)   Electroanatomical 3D mapping with CARTO  Transesophageal echocardiogram    CTA head 9/2023   1. No large vessel occlusion or aneurysm identified.     NM stress test 10/2023    NUCLEAR IMAGING INTERPRETATION   No evidence of significant jeopardized viable myocardium or prior  myocardial    infarction.   Normal left ventricular size, ejection fraction, and wall motion.   ECG INTERPRETATION   Negative stress ECG for ischemia.    CT c/a/p w/o contrast 10/2023   1.  Slight hazy peripancreatic fat stranding, could represent changes of mild pancreatitis, correlate with laboratory values.  2.  5.2 cm ascending thoracic aortic aneurysm, radiographic follow-up and surveillance recommended as clinically appropriate.  3.  Hepatomegaly  4.  Changes of hepatic steatosis  5.  Fat-containing left inguinal hernia  6.  Atherosclerosis          ASSESSMENT AND PLAN  1. Aneurysm of ascending aorta without rupture (HCC)  CT-CTA CHEST WITH & W/O-POST PROCESS    metoprolol SR (TOPROL XL) 25 MG TABLET SR 24 HR      2. Abdominal aortic atherosclerosis (HCC)        3. Familial hypercholesteremia  ezetimibe (ZETIA) 10 MG Tab    Comp Metabolic Panel    Lipid Profile      4. Primary hypertension  Basic Metabolic Panel      5. Hepatic steatosis        6. Alcohol use disorder in remission        7. Ulcerative pancolitis without complication (HCC)        8. Elevated lipoprotein(a)      baseline 89 nmol/L      9. Atrial flutter with rapid ventricular response (HCC)        10. Tachycardia          Patient Type, check all that apply: Primary Prevention    Established Cardiovascular Disease:     1) Ascending aortic aneurysm - no current sx    -Presumed cystic medial degeneration with sporadic development.   -No fhx or pmhx of connective tissue disease but son dx with Takayasu's and there is hx of other vascular disease in 2nd degree relatives  - no known HTAD bur recommended for genetic analysis due to strong fhx of vasculopathy and early age onset of large aneurysm   - No BAV on echo   - as reviewed, surgical repair is generally recommended at 5.5+cm or >0.5cm/yr expansion to reduce risk for rupture, dissection, death   - annual risk of rupture/dissection at 4.0-4.9cm is <2% and nearly 7% for >6.0cm  12/2022 CACS = 4.8cm  on most recent imaging, no symptoms.   This was non-contrast CT, so likely overestimated size, will need more definitive contrast CT in future for accurate sizing   6/2023 CTA Ao = 4.8cm   7/2023 TTE = 4.5cm (sizing discrepancy due to imaging modality)  10/2023 non- contrast CT chest = 5.2cm - may be overestimated due to lack of contrast  Plan:   - needs short-interval CTA chest to confirm sizing of AsAA  - if 5.0+cm, will ref to CT surg for further evaluation   - check aortopathy genotyping due to strong fhx of vasculopathy and early age for moderate ascending AA  ([facility] order # [id number]. ) - encouraged to complete - MA to assist   - recommend 1st degree relatives get screened with echo for TAA  - focus on ARB and BB for BP control  - reinforced activity restrictions including no extreme endurance activities, smoking, stimulants, and extreme weight lifting >20lbs - I have recommend light to moderate intensity activities and avoid heavy weight lifting and avoid valsalva maneuvers during lifting     As per 2022 ACC/AHA guideline for aortic disease regarding exercise:   In patients with aortic disease, limited data are available to guide recommendations regarding the forms of exercise that are safe and promote cardiovascular health versus those that pose an acute or  long-term risk of aortic growth or rupture. But evidence exists regarding the physiologic benefits of exercise and the hemodynamic consequences of various form of exercise and exertion in case series and relevant animal models.   There has been a uniform consensus among numerous expert committees on aortic disease that it is wise to avoid intense isometric exertion or exercises that require the Valsalva maneuver, given that heavy lifting with Valsalva can produce acute increases in SBP to >300 mm Hg. There is also a consensus that light weightlifting and low-intensity aerobic exercise are safe and likely improve both physical and mental health. No  uniform consensus exists about the safety of intermediate-level static and aerobic exercise. Recommendations for exercise intensity are best individualized informed by multiple factors that include underlying aortic pathology, aortic diameter and ASI, aortic growth rate, age, family history, and any other high-risk features (eg, uncontrolled hypertension).  Ongoing investigation is needed to better define the levels of resistance activities that would be considered low-risk for adverse aortic events, favoring greater exercise restrictions among patients at higher risk of dissection    2) Aflutter - s/p cardioversion and ablation (2023) - currently asymptomatic tachycardia   Low les-vasc, no prior CVA/TIA   Plan  - advised he needs f/u with cardiology as recommended by EP specialist and we are not cardiology office - MA staff to contact cardiology office to assist to schedule   - start BB     3) Aortic atherosclerosis - abdomen on prior CT, no symptoms or prior known associated clinical manifestations  -Serves as general indicator of systemic atherosclerosis with higher potential atherosclerosis is other vascular beds including coronaries, carotids, cerebral aa.   -Higher overall ASCVD risk, no complex features   Plan:  - no further imaging surveillance, continue med mgmt      Evidence of Heterozygous Familial Hypercholesterolemia (FH): Yes   LDL-C baseline = 226  Grandfather  MI age 51 (see fhx)   Sibs with high cholesterol on meds    FH genotyping:  yes , ordered [employer] # [id number].  Benefits/limitations/risks for dyslipidemic genetic testing reviewed.  Further resources for review available at familyheart.org site    Indications and rationale for dyslipidemia genetic testing:  -Strong clinical suspicion of a genetic dyslipidemia.  - assist with definitive diagnosis and aid in treatment decision-making  -Strong family history of dyslipidemia or its complications.  -Presence of related syndromic  features  -Evidence that testing might .  -Available and effective early interventions exist.  -Eligibility for new or investigational drugs.  -Patient preference.  -Family planning  - informing need for cascade screening of family members     ACC/AHA Indication for Statin Therapy, shukri all that apply:  Primary Severe HLD / FH (baseline LDL-C >190)    Calculated Risk for ASCVD, if applicable    The ASCVD Risk score (Tonya DK, et al., 2019) failed to calculate., baseline LDL-C >190 so inaccurate     Other Significant Risk Markers, if any, shukri all that apply   Other: CACS = 0 - confers less risk over short/long-term though does no preclude need for treatment based upon extreme high LDL-C >>190  - update apoB, lp(a), hsCRP for additional risk     Risk-enhancers: Famhx of premature ASCVD and Persistently elevated LDL-C >159    Goal LDL-C and nonHDL-C based on Clinic Protocol  reduce LDL-C >50%  and LDL-C <100 (consider non-HDL-C <130, apoB <90)  At goal? no    LIFESTYLE INTERVENTIONS:    SMOKING:    reports that he has never smoked. He has never used smokeless tobacco.   - continued complete avoidance of all tobacco products     PHYSICAL ACTIVITY: continue healthy activity to improve CV fitness.  In general, targeting >150min/week of moderate-level activity or as much as tolerated in light of functional status and co-morbidities     WEIGHT MANAGEMENT AND NUTRITION: Mediterranean style dietary approach  and Provide specific dietary approach handouts      LIPID LOWERING MEDICATION MANAGEMENT:     Statin Therapy Recommendations from Today’s Visit:   - continue rosuva 20mg daily     Non-Statin Medications Recommendations from Today’s Visit:   - start zetia 10mg daily     Indication for PCSK9 Inhibitor, if applicable: Not currently indicated    Supplements Recommended at this visit: None     RECOMMENDATIONS FOR OTHER CV RISK FACTORS:    1) BLOOD PRESSURE MANAGEMENT:  ACC/AHA (2017) goal <130/80  Home BP at  goal:  yes  Office BP at goal:  yes  24h ABPM: not ordered to date  Indicated medications for reduction of expansion rate of TAA:   -Beta-blocker (decreases pulsatile wall stress)   -Losartan (inhibits tissue growth factor beta, decreasing aneurysmal growth rate or rate of expansion)   Plan:   Monitoring:   - continue home BP monitoring, reviewed correct technique, provide BP log and instructions  - order 24h ABPM:  no  - monitor lytes/gfr routinely   - contact office if BP consistently >140/>90 for discussion of tx adjustments   Medications:  - no longer on losartan   - start metoprolol 25mg ER daily - anti-impulse therapy     2) Glycemic Status: Normal    ANTITHROMBOTIC THERAPY not indicated at this time, consider ASA 81mg in future     OTHER ISSUES:     # testicular hypofunction on TRT - potential bearing on current HLD and aortic status   - pt aware of benefits/risks and will continue TRT  - defer to prescribing MD     Studies Ordered at Todays Visit: CTA chest - now - RN to track - will call results   Blood Work Ordered At Today’s visit: as noted in assessment   Follow-Up: 2mo     Total time: 50min - chart review/prep, review of other providers' records, imaging/lab review, face-to-face time for history/examination, ordering, prescribing,  review of results/meds/ treatment plan with patient/family/caregiver, documentation in EMR, care coordination (as needed)     Noble Drummond M.D.  [facility], Board-certified clinical lipidologist   [facility]   [facility]   [phone]     CC:  Arvind Geiger M.D.

## 2023-12-21 NOTE — TELEPHONE ENCOUNTER
Received New start PA request via [employer]  for EZETIMIBE 10MG TABLETS. (Quantity:30, Day Supply:30)     Insurance: RAYMUNDOMARISA  Member ID:  [id number]  BIN: [id number]  PCN: [id number]  Group: [id number]     Ran Test claim via [employer] & medication Pays for a $0/30DS copay. Will outreach to patient to offer specialty pharmacy services and or release to preferred pharmacy    ARISTIDES Hayward, PhT  Pharmacy Liaison (Rx Coordinator)  P: [phone]  12/21/2023 11:38 AM

## 2023-12-22 ENCOUNTER — DOCUMENTATION (OUTPATIENT)
Dept: VASCULAR LAB | Facility: MEDICAL CENTER | Age: 58
End: 2023-12-22
Payer: COMMERCIAL

## 2023-12-22 ENCOUNTER — TELEPHONE (OUTPATIENT)
Dept: VASCULAR LAB | Facility: MEDICAL CENTER | Age: 58
End: 2023-12-22

## 2023-12-22 ENCOUNTER — TELEPHONE (OUTPATIENT)
Dept: VASCULAR LAB | Facility: MEDICAL CENTER | Age: 58
End: 2023-12-22
Payer: COMMERCIAL

## 2023-12-22 RX ORDER — ROSUVASTATIN CALCIUM 20 MG/1
20 TABLET, COATED ORAL EVERY EVENING
Qty: 90 TABLET | Refills: 0 | Status: SHIPPED | OUTPATIENT
Start: 2023-12-22 | End: 2024-01-22

## 2023-12-22 NOTE — TELEPHONE ENCOUNTER
Spoke with Cardiology, they advised they will call back pt in regards to f/u for ablation.    Hannah Chilel, [facility] Ass't  [facility]   Phone: [phone]   Fax: [phone]

## 2023-12-22 NOTE — PROGRESS NOTES
Left message for patient to call back and let us know if he was able to complete [employer] genetic testing. If not, Dr. Drummond would like him to complete it or pt can come into office to complete. Direct office line given on vm.      Mar Hilario, Medical Assistant   [facility]   Ph: [phone]  Fx: [phone]

## 2023-12-23 NOTE — TELEPHONE ENCOUNTER
Caller: Tom Quan     Topic/issue: Patient is returning Kaysi's call     Callback Number: [phone]    Thank You   Yanet TOVAR

## 2023-12-27 ENCOUNTER — PATIENT MESSAGE (OUTPATIENT)
Dept: BEHAVIORAL HEALTH | Facility: CLINIC | Age: 58
End: 2023-12-27
Payer: COMMERCIAL

## 2023-12-27 DIAGNOSIS — F90.2 ADHD (ATTENTION DEFICIT HYPERACTIVITY DISORDER), COMBINED TYPE: ICD-10-CM

## 2023-12-27 RX ORDER — DEXTROAMPHETAMINE SACCHARATE, AMPHETAMINE ASPARTATE, DEXTROAMPHETAMINE SULFATE AND AMPHETAMINE SULFATE 2.5; 2.5; 2.5; 2.5 MG/1; MG/1; MG/1; MG/1
10 TABLET ORAL 3 TIMES DAILY
Qty: 90 TABLET | Refills: 0 | Status: SHIPPED | OUTPATIENT
Start: 2023-12-27 | End: 2024-02-15 | Stop reason: SDUPTHER

## 2023-12-27 NOTE — PATIENT COMMUNICATION
's patient.      Received request via: Patient    Was the patient seen in the last year in this department? Yes    Does the patient have an active prescription (recently filled or refills available) for medication(s) requested? No    Does the patient have nursing home Plus and need 100 day supply (blood pressure, diabetes and cholesterol meds only)? Medication is not for cholesterol, blood pressure or diabetes and Patient does not have SCP

## 2023-12-28 ENCOUNTER — APPOINTMENT (OUTPATIENT)
Dept: SLEEP MEDICINE | Facility: MEDICAL CENTER | Age: 58
End: 2023-12-28
Attending: FAMILY MEDICINE
Payer: COMMERCIAL

## 2023-12-29 ENCOUNTER — HOSPITAL ENCOUNTER (OUTPATIENT)
Dept: RADIOLOGY | Facility: MEDICAL CENTER | Age: 58
End: 2023-12-29
Attending: FAMILY MEDICINE
Payer: COMMERCIAL

## 2023-12-29 DIAGNOSIS — I71.21 ANEURYSM OF ASCENDING AORTA WITHOUT RUPTURE (HCC): ICD-10-CM

## 2023-12-29 PROCEDURE — 700117 HCHG RX CONTRAST REV CODE 255: Performed by: FAMILY MEDICINE

## 2023-12-29 PROCEDURE — 71275 CT ANGIOGRAPHY CHEST: CPT

## 2023-12-29 RX ADMIN — IOHEXOL 75 ML: 350 INJECTION, SOLUTION INTRAVENOUS at 13:23

## 2023-12-31 ENCOUNTER — DOCUMENTATION (OUTPATIENT)
Dept: VASCULAR LAB | Facility: MEDICAL CENTER | Age: 58
End: 2023-12-31
Payer: COMMERCIAL

## 2023-12-31 NOTE — PROGRESS NOTES
Repeat CTA reviewed - shows stable size of AsAA = 4.6cm - similar to last contrasted CT   Pending f/u to review.   Anticipate repeat echo in 6mo for surveillance - RN to track.      Pt informed of results vis [employer] message.

## 2024-01-02 ENCOUNTER — OFFICE VISIT (OUTPATIENT)
Dept: SLEEP MEDICINE | Facility: MEDICAL CENTER | Age: 59
End: 2024-01-02
Attending: FAMILY MEDICINE
Payer: COMMERCIAL

## 2024-01-02 VITALS
HEIGHT: 73 IN | OXYGEN SATURATION: 98 % | SYSTOLIC BLOOD PRESSURE: 122 MMHG | WEIGHT: 220 LBS | DIASTOLIC BLOOD PRESSURE: 74 MMHG | BODY MASS INDEX: 29.16 KG/M2 | HEART RATE: 112 BPM

## 2024-01-02 DIAGNOSIS — G47.33 OSA (OBSTRUCTIVE SLEEP APNEA): ICD-10-CM

## 2024-01-02 DIAGNOSIS — G47.30 SLEEP DISORDER BREATHING: ICD-10-CM

## 2024-01-02 DIAGNOSIS — R06.83 SNORING: ICD-10-CM

## 2024-01-02 DIAGNOSIS — R06.81 WITNESSED EPISODE OF APNEA: ICD-10-CM

## 2024-01-02 PROCEDURE — 99214 OFFICE O/P EST MOD 30 MIN: CPT | Performed by: STUDENT IN AN ORGANIZED HEALTH CARE EDUCATION/TRAINING PROGRAM

## 2024-01-02 PROCEDURE — 3078F DIAST BP <80 MM HG: CPT | Performed by: STUDENT IN AN ORGANIZED HEALTH CARE EDUCATION/TRAINING PROGRAM

## 2024-01-02 PROCEDURE — 3074F SYST BP LT 130 MM HG: CPT | Performed by: STUDENT IN AN ORGANIZED HEALTH CARE EDUCATION/TRAINING PROGRAM

## 2024-01-02 PROCEDURE — 99213 OFFICE O/P EST LOW 20 MIN: CPT | Performed by: STUDENT IN AN ORGANIZED HEALTH CARE EDUCATION/TRAINING PROGRAM

## 2024-01-02 ASSESSMENT — PATIENT HEALTH QUESTIONNAIRE - PHQ9: CLINICAL INTERPRETATION OF PHQ2 SCORE: 0

## 2024-01-02 ASSESSMENT — FIBROSIS 4 INDEX: FIB4 SCORE: 2

## 2024-01-02 NOTE — PROGRESS NOTES
[facility] Consult Note     Date: 1/2/2024 / Time: 12:55 PM      Thank you for requesting a sleep medicine consultation on Tom Quan at the sleep center. Presents today with the chief complaints of snoring,  occasional excessive daytime sleepiness. He is referred by Arvind Geiger M.D.  [address] [address]  [place] [address] for evaluation and treatment of sleep disorder breathing .     HISTORY OF PRESENT ILLNESS:     Tom Quan is a 58 y.o. male with history of atrial fibrillation status post ablation, hypertension, ADHD, GERD, ascending aortic aneurysm, and snoring.  Presents to Sleep Clinic for evaluation of sleep.     He states when he initially made this appointment he was being told that he was waking up not breathing as well as he was feeling short of breath with awakening.  He found that with quitting alcohol about 3 months ago his sleep is improved.  He is also taking CBN and THC edible at night which has helped with his sleep.    His wife has told him that he has had pauses in his breathing.  He is still tired at times during the day.  He can have low energy and brain fog at times.  He is taking nicotine supplements and coffee during the day to help with focus.  He states he does have a prescription for Adderall but has not used it in several months.  He does occasionally wake with headaches.  He does not find his sleep restorative and he does grind his teeth.    He was diagnosed with atrial fibrillation this past summer.  He did undergo a cardioversion and ablation.    As per supplemental questionnaire to be scanned or imported into chart:    [facility] Sleepiness Score: 13    Sleep Schedule  Bedtime: Weekday & Weekend 10pm  Wake time: Weekday & Weekend 7am  Sleep-onset latency: 20-30 min   Awakenings from sleep: 1-2  Difficulty falling back asleep: No  Bedroom partner: Yes  Naps: Yes,     DAYTIME SYMPTOMS:   Excessive daytime sleepiness: Yes  Daytime fatigue:  "Yes  Difficulty concentrating: Yes hx of ADHD  Memory problems: Yes slight   Irritability:No   Work/school performance issues: No   Sleepiness with driving: No   Caffeine/stimulant use: Yes  Alcohol use:No     SLEEP RELATED SYMPTOMS  Snoring: Yes  Witnessed apnea or gasping/choking: Yes in the past with witnessed apneas   Dry mouth or mouth breathing: No   Sweating: No   Teeth grinding/biting: Yes  Morning headaches: Yes sometimes   Refreshed Upon Awakening: No      SLEEP RELATED BEHAVIORS:  Parasomnias (walking, talking, eating, violence): No   Leg kicking: No   Restless legs - \"urge to move\": No   Nightmares: No  Recurrent: No   Dream enactment: No      NARCOLEPSY:  Cataplexy: No   Sleep paralysis: No   Sleep attacks: No   Hypnagogic/hypnopompic hallucinations: No     MEDICAL HISTORY  Past Medical History:   Diagnosis Date    Arrhythmia     A-flutter    Ascending aortic aneurysm (HCC)     Atrial fibrillation (HCC)     Depression     High cholesterol     Hyperlipidemia     Ulcerative colitis (HCC)         SURGICAL HISTORY  Past Surgical History:   Procedure Laterality Date    HIP ARTHROPLASTY TOTAL Bilateral     R 2017, L 2018    OPEN REPAIR/RECONSTRUCTION PECTORALIS MAJOR Left     SHOULDER ARTHROSCOPY Right     TONSILLECTOMY N/A     As a child        FAMILY HISTORY  Family History   Problem Relation Age of Onset    Diabetes Mother     Other Mother         smoker+, unclear cause of death    Hyperlipidemia Sister     Hyperlipidemia Brother     Cancer Paternal Uncle         Prostate    Heart Attack Maternal Grandfather          55, tobacco+    Other Son         takayasu's arteritis    Abdominal aortic aneurysm Neg Hx     Arterial Aneurysm Neg Hx        SOCIAL HISTORY  Social History     Socioeconomic History    Marital status:    Tobacco Use    Smoking status: Never    Smokeless tobacco: Never   Vaping Use    Vaping Use: Former   Substance and Sexual Activity    Alcohol use: Not Currently     Comment: quit " 9/2023    Drug use: Yes     Comment: CBD        Occupation: Sales, 8am to 8pm     CURRENT MEDICATIONS  Current Outpatient Medications   Medication Sig Dispense Refill    amphetamine-dextroamphetamine (ADDERALL, 10MG,) 10 MG Tab Take 1 Tablet by mouth 3 times a day for 30 days. 90 Tablet 0    rosuvastatin (CRESTOR) 20 MG Tab Take 1 Tablet by mouth every evening. 90 Tablet 0    ezetimibe (ZETIA) 10 MG Tab Take 1 Tablet by mouth every day. 90 Tablet 3    omeprazole (PRILOSEC) 20 MG delayed-release capsule Take 1-2 Capsules by mouth 1 time a day as needed (Heartburn). 90 Capsule 3    metoprolol SR (TOPROL XL) 25 MG TABLET SR 24 HR Take 1 Tablet by mouth every day. 90 Tablet 3    hyoscyamine (LEVSIN) 0.125 MG tablet Take 1 Tablet by mouth every 6 hours as needed for Cramping. 30 Tablet 3    Budesonide ER 9 MG TABLET SR 24 HR Take 9 mg by mouth as needed. Indications: Ulcerated Colon      STELARA 90 MG/ML Solution Prefilled Syringe injection Inject 90 mg under the skin see administration instructions. Pt reports that he receives this medications every 57 days      Aspirin-Acetaminophen-Caffeine (EXCEDRIN PO) Take 2 Tablets by mouth 2 times a day as needed (For headache).      acetaminophen (TYLENOL) 500 MG Tab Take 1,000 mg by mouth every 6 hours as needed. Indications: Pain      ibuprofen (MOTRIN) 200 MG Tab Take 800 mg by mouth every 6 hours as needed. Indications: Pain      rizatriptan (MAXALT) 5 MG tablet Take 1-2 Tablets by mouth one time as needed for Migraine for up to 1 dose. 20 Tablet 3    hydrOXYzine HCl (ATARAX) 25 MG Tab Take 1 Tablet by mouth 3 times a day as needed for Anxiety. 30 Tablet 0    Cholecalciferol (VITAMIN D) 2000 UNIT Tab Take 4,000 Units by mouth see administration instructions. Pt takes sporidially      multivitamin Tab Take 2 Tablets by mouth see administration instructions. Pt takes sporidially      Thiamine HCl (VITAMIN B-1 PO) Take 1 Tablet by mouth see administration instructions. Pt takes  "sporidially      B Complex Vitamins (VITAMIN B COMPLEX PO) Take 1 Tablet by mouth see administration instructions. Pt takes sporidially      ondansetron (ZOFRAN ODT) 4 MG TABLET DISPERSIBLE Take 1 Tablet by mouth every four hours as needed for Nausea/Vomiting (give PO if no IV route available). 10 Tablet 0    testosterone cypionate (DEPO-TESTOSTERONE) 200 MG/ML Solution injection Inject 200 mg into the shoulder, thigh, or buttocks every 10 days.      Omega-3 Fatty Acids (FISH OIL PO) Take 2 Capsules by mouth see administration instructions. Pt takes sporidially       No current facility-administered medications for this visit.       REVIEW OF SYSTEMS  Constitutional: Denies fevers, Denies weight changes  Ears/Nose/Throat/Mouth: Denies nasal congestion or sore throat   Cardiovascular: Denies chest pain  Respiratory: Denies shortness of breath, Denies cough  Gastrointestinal/Hepatic: Denies nausea, vomiting  Sleep: see HPI    Physical Examination:  Vitals/ General Appearance:   Weight/BMI: Body mass index is 29.03 kg/m².  /74 (BP Location: Left arm, Patient Position: Sitting, BP Cuff Size: Adult)   Pulse (!) 112   Ht 1.854 m (6' 1\")   Wt 99.8 kg (220 lb)   SpO2 98%   Vitals:    01/02/24 1254   BP: 122/74   BP Location: Left arm   Patient Position: Sitting   BP Cuff Size: Adult   Pulse: (!) 112   SpO2: 98%   Weight: 99.8 kg (220 lb)   Height: 1.854 m (6' 1\")       Pt. is alert and oriented to time, place and person. Cooperative and in no apparent distress.     Constitutional: Alert, no distress, well-groomed.  Skin: No rashes in visible areas.  Eye: Round. Conjunctiva clear, lids normal. No icterus.   ENT EXAM  Nasal alae/valves collapsible: No   Nasal septum deviation: Yes  Nasal turbinate hypertrophy: Left: Grade 1   Right: Grade 1  Hard palate narrow: No   Hard palate high: No   Soft palate/uvula (Mallampati score): 4  Tongue Scalloping: Yes  Retrognathia: No   Micrognathia: No   Cardiovascular:no " murmus/gallops/rubs, normal S1 and S2 heart sounds, regular rate and rhythm  Pulmonary:Clear to auscultation, No wheezes, No crackles.  Neurologic:Awake, alert and oriented x 3, Normal age appropriate gait, No involuntary motions.  Extremities: No clubbing, cyanosis, or edema       ASSESSMENT AND PLAN   Tom Quan is a 58 y.o. male with history of atrial fibrillation status post ablation, hypertension, ADHD, GERD, ascending aortic aneurysm, and snoring.  Presents to Sleep Clinic for evaluation of sleep.     1. Tom Quan  has symptoms of Obstructive Sleep Apnea (DANIEL). Tom Quan has symptoms of snoring, witnessed apnea, dry mouth, morning headaches, unrefreshed upon awakening, excessive daytime sleepiness. These can interfere with activities of daily living.   ESS 13  Pt has risk factors for DANIEL include overweight, age, and crowded oropharynx.     The pathophysiology of DANIEL and the increased risk of cardiovascular morbidity from untreated DANIEL is discussed in detail with the patient. He  also has HTN, history of atrial fibrillation, GERD, ADHD, which can be worsened by DANIEL.      We have discussed diagnostic options including in-laboratory, attended polysomnography and home sleep testing. We have also discussed treatment options including airway pressurization, reconstructive otolaryngologic surgery, dental appliances and weight management.     Subsequently,treatment options will be discussed based on the diagnostic study. Meanwhile, He is urged to avoid supine sleep, weight gain and alcoholic beverages since all of these can worsen DANIEL. He is cautioned against drowsy driving. If He feels sleepy while driving, advised must pull over for a break/nap, rather than persist on the road, in the interest of Pt's own safety and that of others on the road.    Plan  -  He  will be scheduled for an overnight  HST to assess sleep related breathing disorder.  - Follow up 1-2 weeks after sleep study to discuss results and  treatment options moving forward   -Advised to reach out via MyChart or by phone with any questions or concerns.     2.  Regarding treatment of other past medical problems and general health maintenance,  Pt is urged to follow up with PCP.      Please note portions of this record was created using voice recognition software. I have made every reasonable attempt to correct obvious errors, but I expect that there are errors of grammar and possibly content I did not discover before finalizing the note.

## 2024-01-16 ENCOUNTER — TELEPHONE (OUTPATIENT)
Dept: VASCULAR LAB | Facility: MEDICAL CENTER | Age: 59
End: 2024-01-16
Payer: COMMERCIAL

## 2024-01-16 DIAGNOSIS — E78.01 FAMILIAL HYPERCHOLESTEREMIA: ICD-10-CM

## 2024-01-16 RX ORDER — EZETIMIBE 10 MG/1
10 TABLET ORAL DAILY
Qty: 90 TABLET | Refills: 3 | Status: SHIPPED | OUTPATIENT
Start: 2024-01-16

## 2024-01-17 ENCOUNTER — APPOINTMENT (OUTPATIENT)
Dept: SLEEP MEDICINE | Facility: MEDICAL CENTER | Age: 59
End: 2024-01-17
Attending: FAMILY MEDICINE
Payer: COMMERCIAL

## 2024-01-17 PROCEDURE — RXMED WILLOW AMBULATORY MEDICATION CHARGE: Performed by: FAMILY MEDICINE

## 2024-01-19 ENCOUNTER — APPOINTMENT (OUTPATIENT)
Dept: CARDIOLOGY | Facility: MEDICAL CENTER | Age: 59
End: 2024-01-19
Attending: INTERNAL MEDICINE
Payer: COMMERCIAL

## 2024-01-20 DIAGNOSIS — I71.21 ANEURYSM OF ASCENDING AORTA WITHOUT RUPTURE (HCC): ICD-10-CM

## 2024-01-20 DIAGNOSIS — E78.01 FAMILIAL HYPERCHOLESTEREMIA: ICD-10-CM

## 2024-01-22 RX ORDER — ROSUVASTATIN CALCIUM 20 MG/1
20 TABLET, COATED ORAL EVERY EVENING
Qty: 90 TABLET | Refills: 3 | Status: SHIPPED | OUTPATIENT
Start: 2024-01-22 | End: 2024-02-15

## 2024-01-29 ENCOUNTER — TELEPHONE (OUTPATIENT)
Dept: VASCULAR LAB | Facility: MEDICAL CENTER | Age: 59
End: 2024-01-29
Payer: COMMERCIAL

## 2024-01-30 NOTE — TELEPHONE ENCOUNTER
KO - received notice the sample submitted for [employer] did not meet quality metric - letter printed and placed on your desk.  Unclear what was done incorrectly.  I've asked [employer] to send another kit for him to complete.  Please contact him with this info.  Thanks

## 2024-02-02 NOTE — TELEPHONE ENCOUNTER
Spoke with patient, he received a new kit in the mail and will redo the sample.       Mar Hilario, Medical Assistant   [facility]   Ph: [phone]  Fx: [phone]

## 2024-02-05 NOTE — TELEPHONE ENCOUNTER
Left message for patient to let him know Dr. Drummond is ok with him taking his statin every other day until his follow up appt.      Mar Hilario, Medical Assistant   [facility]   Ph: [phone]  Fx: [phone]

## 2024-02-09 ENCOUNTER — APPOINTMENT (OUTPATIENT)
Dept: MEDICAL GROUP | Facility: LAB | Age: 59
End: 2024-02-09
Payer: COMMERCIAL

## 2024-02-13 LAB
ALBUMIN SERPL-MCNC: 4.3 G/DL (ref 3.8–4.9)
ALBUMIN/GLOB SERPL: 1.5 {RATIO} (ref 1.2–2.2)
ALP SERPL-CCNC: 90 IU/L (ref 44–121)
ALT SERPL-CCNC: 18 IU/L (ref 0–44)
AST SERPL-CCNC: 23 IU/L (ref 0–40)
BILIRUB SERPL-MCNC: 0.5 MG/DL (ref 0–1.2)
BUN SERPL-MCNC: 15 MG/DL (ref 6–24)
BUN/CREAT SERPL: 11 (ref 9–20)
CALCIUM SERPL-MCNC: 9.3 MG/DL (ref 8.7–10.2)
CHLORIDE SERPL-SCNC: 101 MMOL/L (ref 96–106)
CHOLEST SERPL-MCNC: 195 MG/DL (ref 100–199)
CO2 SERPL-SCNC: 22 MMOL/L (ref 20–29)
CREAT SERPL-MCNC: 1.4 MG/DL (ref 0.76–1.27)
EGFRCR SERPLBLD CKD-EPI 2021: 59 ML/MIN/1.73
GLOBULIN SER CALC-MCNC: 2.9 G/DL (ref 1.5–4.5)
GLUCOSE SERPL-MCNC: 89 MG/DL (ref 70–99)
HDLC SERPL-MCNC: 59 MG/DL
LABORATORY COMMENT REPORT: ABNORMAL
LDLC SERPL CALC-MCNC: 122 MG/DL (ref 0–99)
POTASSIUM SERPL-SCNC: 5 MMOL/L (ref 3.5–5.2)
PROT SERPL-MCNC: 7.2 G/DL (ref 6–8.5)
SODIUM SERPL-SCNC: 139 MMOL/L (ref 134–144)
TRIGL SERPL-MCNC: 74 MG/DL (ref 0–149)
VLDLC SERPL CALC-MCNC: 14 MG/DL (ref 5–40)

## 2024-02-15 ENCOUNTER — PATIENT MESSAGE (OUTPATIENT)
Dept: BEHAVIORAL HEALTH | Facility: CLINIC | Age: 59
End: 2024-02-15

## 2024-02-15 ENCOUNTER — OFFICE VISIT (OUTPATIENT)
Dept: MEDICAL GROUP | Facility: LAB | Age: 59
End: 2024-02-15
Payer: COMMERCIAL

## 2024-02-15 VITALS
SYSTOLIC BLOOD PRESSURE: 124 MMHG | RESPIRATION RATE: 12 BRPM | HEIGHT: 73 IN | DIASTOLIC BLOOD PRESSURE: 80 MMHG | OXYGEN SATURATION: 99 % | HEART RATE: 98 BPM | TEMPERATURE: 98 F | WEIGHT: 238 LBS | BODY MASS INDEX: 31.54 KG/M2

## 2024-02-15 DIAGNOSIS — F90.2 ADHD (ATTENTION DEFICIT HYPERACTIVITY DISORDER), COMBINED TYPE: ICD-10-CM

## 2024-02-15 DIAGNOSIS — M79.10 MUSCLE ACHE: ICD-10-CM

## 2024-02-15 DIAGNOSIS — K51.00 ULCERATIVE PANCOLITIS WITHOUT COMPLICATION (HCC): ICD-10-CM

## 2024-02-15 PROCEDURE — 3074F SYST BP LT 130 MM HG: CPT | Performed by: FAMILY MEDICINE

## 2024-02-15 PROCEDURE — 3079F DIAST BP 80-89 MM HG: CPT | Performed by: FAMILY MEDICINE

## 2024-02-15 PROCEDURE — 99214 OFFICE O/P EST MOD 30 MIN: CPT | Performed by: FAMILY MEDICINE

## 2024-02-15 RX ORDER — DEXTROAMPHETAMINE SACCHARATE, AMPHETAMINE ASPARTATE, DEXTROAMPHETAMINE SULFATE AND AMPHETAMINE SULFATE 2.5; 2.5; 2.5; 2.5 MG/1; MG/1; MG/1; MG/1
10 TABLET ORAL 3 TIMES DAILY
Qty: 90 TABLET | Refills: 0 | Status: SHIPPED | OUTPATIENT
Start: 2024-02-15 | End: 2024-03-16

## 2024-02-15 RX ORDER — PREDNISONE 10 MG/1
TABLET ORAL
Qty: 18 TABLET | Refills: 0 | Status: SHIPPED | OUTPATIENT
Start: 2024-02-15 | End: 2024-02-24

## 2024-02-15 ASSESSMENT — FIBROSIS 4 INDEX: FIB4 SCORE: 1.54

## 2024-02-15 NOTE — PROGRESS NOTES
"Subjective:     CC: Ulcerative colitis, muscle ache    HPI:   Tom is a 58-year-old male with a complex medical history including alcohol dependence, familial hypercholesterolemia, ascending aortic aneurysm, and ulcerative colitis who presents with concern for ulcerative colitis flare and muscle aches.  Had recent onset of diarrhea up to 10 times daily with blood in the stool that has since responded to 5-day course of 40 mg prednisone although he still having some fecal urgency and 4-5 bowel movements daily.  Blood in stool has stopped.  He had Rx for budesonide from gastroenterology but this was extremely expensive.  Does continue on Stelara, does not currently have follow-up scheduled with GI.  No fevers.  No abdominal pain.    Had also had diffuse muscle aches that he is concerned could be secondary to the statins.  Muscle aches all over including ankles, shoulders, glutes.  This does seem to improved after he stopped the statin recently, does have a follow-up with vascular medicine to discuss in 1 week.    Medications, past medical history, allergies, and social history have been reviewed and updated.      Objective:       Exam:  /80   Pulse 98   Temp 36.7 °C (98 °F) (Temporal)   Resp 12   Ht 1.854 m (6' 1\")   Wt 108 kg (238 lb)   SpO2 99%   BMI 31.40 kg/m²  Body mass index is 31.4 kg/m².    Constitutional: Alert. Well appearing. No distress.  Skin: Warm, dry, good turgor, no visible rashes.  ENMT: Moist mucous membranes. Normal dentition.  Respiratory: Normal effort. Lungs are clear to auscultation bilaterally.  Cardiovascular: Regular rate and rhythm. Normal S1/S2. No murmurs, rubs or gallops.   Abdomen: Soft, nontender, nondistended.  Neuro: Moves all four extremities. No facial droop.  Psych: Answers questions appropriately. Normal affect and mood.    Assessment & Plan:     58 y.o. male with the following -     1. Ulcerative pancolitis without complication (HCC)  Recent flare improved with " prednisone, blood in stool has resolved.  Still with some persistent urgency and diarrhea 4-5 times a day.  Will continue with prednisone taper as budesonide has been cost prohibitive.  Encouraged follow-up with GI, continues on Stelara.  Check labs as below.  - CBC WITH DIFFERENTIAL; Future  - Comp Metabolic Panel; Future  - Sed Rate; Future  - CRP QUANTITIVE (NON-CARDIAC); Future  - predniSONE (DELTASONE) 10 MG Tab; Take 3 Tablets by mouth every day for 3 days, THEN 2 Tablets every day for 3 days, THEN 1 Tablet every day for 3 days.  Dispense: 18 Tablet; Refill: 0    2. Muscle ache  Diffuse muscle aches have improved after stopping statin.  Secondary to statin versus inflammation with UC flare.  Will check CK.  Hold statin until he discusses further with vascular medicine, he is on Zetia.  - CREATINE KINASE; Future      Please note that this note was created using voice recognition software.

## 2024-02-15 NOTE — PATIENT COMMUNICATION
Received request via: Patient    Was the patient seen in the last year in this department? Yes    Does the patient have an active prescription (recently filled or refills available) for medication(s) requested? No    Pharmacy Name: [facility]    Does the patient have [facility] and need 100 day supply (blood pressure, diabetes and cholesterol meds only)? Medication is not for cholesterol, blood pressure or diabetes and Patient does not have SCP

## 2024-02-16 ENCOUNTER — TELEPHONE (OUTPATIENT)
Dept: VASCULAR LAB | Facility: MEDICAL CENTER | Age: 59
End: 2024-02-16
Payer: COMMERCIAL

## 2024-02-16 NOTE — TELEPHONE ENCOUNTER
Established patient  Chart prep for upcoming appointment with Dr. Drummond    Any pending/incomplete orders from last visit? No, all orders completed.  Were any records requested?  No  Correct appointment type (New/Established/virtual)? Yes  Referral up to date? Yes renewal ordered and sent to provider to co-sign   Referral attached to appointment (renewals and New patients only)?     Yes renewal ordered and sent to provider to co-sign

## 2024-02-16 NOTE — TELEPHONE ENCOUNTER
Contact:  Phone number:[phone] (mobile)    Name of person spoken with and relationship to patient: SELF   Patient’s Adherence:  How patient is doing on medication: Very Well    How many missed doses and reason: 0 N/A    Any new medications: no    Any new conditions: no    Any new allergies: no    Any new side effects: no    Any new diagnoses: no    How many doses remaining: N/A    Did patient want to speak with pharmacist: No   Delivery:  Delivery date and method: 2/20/2024 via     Needs by Date: 03/22/2024    Signature required: No     Any additional details for :  LEAVE AT FRONT DOOR    Teach Appointment Date:  N/A   Shipping Address:  [facility] [address]   [place] [address]   Medication(name,strength and dose):  EZETIMIBE 10MG TABLETS   Copay:  $0   Payment Method:  n/a $0 copay   Supplies:  N/A   Additional Information:  Called and spoke to Pt to initiate medication refill for Ezetimibe. Per Pt, he states that he has couple tablets remaining on hand, and consented to fill another one this time. Pt agreed to receive his medication via  on 2/20. Pt denies any missed doses, and has no questions or concerns at this time. Updated pt's next refill call.        ARISTIDES Hayward, PhT  Vascular Pharmacy Liaison (Rx Coordinator)  P: [phone]  2/16/2024 2:20 PM

## 2024-02-21 ENCOUNTER — APPOINTMENT (OUTPATIENT)
Dept: VASCULAR LAB | Facility: MEDICAL CENTER | Age: 59
End: 2024-02-21
Attending: FAMILY MEDICINE
Payer: COMMERCIAL

## 2024-02-21 ENCOUNTER — DOCUMENTATION (OUTPATIENT)
Dept: VASCULAR LAB | Facility: MEDICAL CENTER | Age: 59
End: 2024-02-21

## 2024-02-21 NOTE — PROGRESS NOTES
Tom Quan     Same day cancellation today.  Reports acute illness.  Apparently did not want to transition to virtual visit.     Scheduling staff will contact to reschedule and remind patient to complete labs and of the importance of maintaining appointments as scheduled or to contact our office at least 24 hours in advance if they need to reschedule.  We also offer virtual visits if requested.

## 2024-02-23 ENCOUNTER — PHARMACY VISIT (OUTPATIENT)
Dept: PHARMACY | Facility: MEDICAL CENTER | Age: 59
End: 2024-02-23
Payer: COMMERCIAL

## 2024-02-26 ENCOUNTER — OFFICE VISIT (OUTPATIENT)
Dept: VASCULAR LAB | Facility: MEDICAL CENTER | Age: 59
End: 2024-02-26
Attending: FAMILY MEDICINE
Payer: COMMERCIAL

## 2024-02-26 ENCOUNTER — TELEPHONE (OUTPATIENT)
Dept: VASCULAR LAB | Facility: MEDICAL CENTER | Age: 59
End: 2024-02-26

## 2024-02-26 VITALS
DIASTOLIC BLOOD PRESSURE: 77 MMHG | SYSTOLIC BLOOD PRESSURE: 117 MMHG | WEIGHT: 236 LBS | HEIGHT: 73 IN | HEART RATE: 118 BPM | BODY MASS INDEX: 31.28 KG/M2

## 2024-02-26 DIAGNOSIS — F10.91 ALCOHOL USE DISORDER IN REMISSION: ICD-10-CM

## 2024-02-26 DIAGNOSIS — I10 PRIMARY HYPERTENSION: ICD-10-CM

## 2024-02-26 DIAGNOSIS — Z79.890 LONG-TERM CURRENT USE OF TESTOSTERONE REPLACEMENT THERAPY: ICD-10-CM

## 2024-02-26 DIAGNOSIS — E29.1 TESTICULAR HYPOFUNCTION: ICD-10-CM

## 2024-02-26 DIAGNOSIS — E78.01 FAMILIAL HYPERCHOLESTEREMIA: ICD-10-CM

## 2024-02-26 DIAGNOSIS — E78.41 ELEVATED LIPOPROTEIN(A): ICD-10-CM

## 2024-02-26 DIAGNOSIS — K76.0 HEPATIC STEATOSIS: ICD-10-CM

## 2024-02-26 DIAGNOSIS — I70.0 ABDOMINAL AORTIC ATHEROSCLEROSIS (HCC): ICD-10-CM

## 2024-02-26 DIAGNOSIS — I71.21 ANEURYSM OF ASCENDING AORTA WITHOUT RUPTURE (HCC): ICD-10-CM

## 2024-02-26 DIAGNOSIS — I48.92 ATRIAL FLUTTER WITH RAPID VENTRICULAR RESPONSE (HCC): ICD-10-CM

## 2024-02-26 DIAGNOSIS — K51.00 ULCERATIVE PANCOLITIS WITHOUT COMPLICATION (HCC): ICD-10-CM

## 2024-02-26 DIAGNOSIS — Z78.9 STATIN INTOLERANCE: Chronic | ICD-10-CM

## 2024-02-26 PROCEDURE — 99214 OFFICE O/P EST MOD 30 MIN: CPT | Performed by: FAMILY MEDICINE

## 2024-02-26 PROCEDURE — 99212 OFFICE O/P EST SF 10 MIN: CPT

## 2024-02-26 PROCEDURE — 3078F DIAST BP <80 MM HG: CPT | Performed by: FAMILY MEDICINE

## 2024-02-26 PROCEDURE — 3074F SYST BP LT 130 MM HG: CPT | Performed by: FAMILY MEDICINE

## 2024-02-26 ASSESSMENT — ENCOUNTER SYMPTOMS
SHORTNESS OF BREATH: 0
HEADACHES: 0
CHILLS: 0
CLAUDICATION: 0
COUGH: 0
WEAKNESS: 0
ORTHOPNEA: 0
FEVER: 0
DIZZINESS: 0
TREMORS: 0
FOCAL WEAKNESS: 0
PALPITATIONS: 0
SEIZURES: 0
HEMOPTYSIS: 0
WHEEZING: 0

## 2024-02-26 ASSESSMENT — FIBROSIS 4 INDEX: FIB4 SCORE: 1.54

## 2024-02-26 NOTE — TELEPHONE ENCOUNTER
Prior Authorization for Repatha SureClick 140 mg/ml Sol Auto-inj (Quantity: 6mls, Days: 84) has been submitted via Cover My Meds: Key ([id number])    Insurance: [employer]    Will follow up in 24-48 business hours.

## 2024-02-26 NOTE — PROGRESS NOTES
"[facility] - Follow-up  02/26/24      Tom Quan has been referred for evaluation and management of dyslipidemia  Est 1/2023     Referral Source: n/a     Subjective     Interval hx/concerns: last seen 12/2023, cxl'd last appt, here for ongoing f/u.  Had trial of statin and reports intolerance.  Still on zetia, would like to consider other options.  Reports had flare-up of UC on statin and improved after cessation.    Completed invitae testing but had bad sample, so has resubmitted 2/12/24 and pending results     2023 hx:   had cancellated short interval f/u and had been lost to f/u until today.  In the interim had extensive eval and tx for Afib.  Had negative NM cardiac stress test.  Had EP cardioversion and ablation (7/2023) with DR. Dickens (EP).  Report no indication for .  TTE and echo completed.  Had prior CTA Ao and more recent non-contrast CT c/a/p showing AsAA = 5.2cm     LIFESTYLE MGMT  Change in weight: Stable  Wt Readings from Last 3 Encounters:   02/26/24 107 kg (236 lb)   02/15/24 108 kg (238 lb)   01/02/24 99.8 kg (220 lb)    Exercise habits: moderate regular exercise program  Dietary patterns: Med style diet in general   Etoh: Yes, Details: limited   Reported barriers to care: none     MEDICATION MGMT    Current Prescription Lipid Lowering Medications - including dose:   Statin: rosuvastatin - has stopped due to connective tissue d/o  Non-Statin: zetia 10mg  Current Lipid Lowering and Related Supplements: None  Any Current Side Effects Potentially Related to Lipid Lowering therapy? YES - AS NOTED   Current Adherence to Lipid Lowering Therapies: 100% but off statin     PERTINENT HLD PMHX  Age at Initial Diagnosis of Dyslipidemia: \"whole life\"  Secondary causes of dyslipidemia:  Medications: Testosterone   History of ASCVD: None  Previously Attempted Interventions for Lipids - including outcome  Statin: atorvastatin     Outcome: myalgia   Non-Statin: None   Outcome: N/A  Any Previous History of " Statin Intolerance? No  Baseline Lipids Prior to Treatment:   Lab Results   Component Value Date/Time    CHOLSTRLTOT 328 (H) 12/14/2022 07:31 AM    HDL 81 12/14/2022 07:31 AM    TRIGLYCERIDE 121 12/14/2022 07:31 AM        Latest Reference Range & Units Most Recent   LDL Chol Calc ([facility]) 0 - 99 mg/dL 226 (H)  12/14/22 07:31     Thoracic Aortic Aneurysm  Reports intermittent CP, last seen in ED 10/2023,  had repeat CT showing size increase to 5.2cm  Had ordered invitae testing - has not completed to date   Pertinent pmhx:  Initially noted on Noted on CACS 12/2022     Hx of HTN: mild high   hx of anabolic steroid use - testosterone due to testicular hypofunction   Hx of tobacco:   reports that he has never smoked. He has never used smokeless tobacco.  No hx of Connective tissue disease such as Marfans, Joselito-Danlos, Loeys-Em  No hx of inflammatory/autoimmune vasculitis (Takayasu's arteritis, Giant Cell arteritis)  No hx of infective aortitis, HIV, syphilis   No hx of MVA, chest wall trauma, deceleration injuries  Pertinent fhx:  Son has Takayasu's arteritis age 15yo - dx at [facility]   No fhx of connective tissue disease   No fhx of aneurysmal disease or known familial thoracic aortic aneurysm syndrome     Aflut with RVR, s/p ablation 7/2023:  No current sx.  Seeing cardiology   Had prior flecainide, eliquis.  Currently reports no rhythm control meds, OAC, or aware if needs cardiology f/u   Prior heavy etoh use but now remitted     Anticoagulation/antiplats: prior eliquis due to PAF, currently none     HTN:  Had been rx'd losartan at last visit -not currently taking , Home BP log: normal      Current Outpatient Medications:     Evolocumab, 140 mg, Subcutaneous, Q14 DAYS    amphetamine-dextroamphetamine, 10 mg, Oral, TID, Taking    ezetimibe, 10 mg, Oral, DAILY, Taking    omeprazole, 20-40 mg, Oral, QDAY PRN, Taking    metoprolol SR, 25 mg, Oral, DAILY, Taking    hyoscyamine, 125 mcg, Oral, Q6HRS PRN, Taking     "Stelara, 90 mg, Subcutaneous, See Admin Instructions, Taking    Aspirin-Acetaminophen-Caffeine (EXCEDRIN PO), 2 Tablet, Oral, BID PRN, Taking    acetaminophen, 1,000 mg, Oral, Q6HRS PRN, Taking    ibuprofen, 800 mg, Oral, Q6HRS PRN, Taking    rizatriptan, 5-10 mg, Oral, Once PRN, Taking    hydrOXYzine HCl, 25 mg, Oral, TID PRN, Taking    vitamin D, 4,000 Units, Oral, See Admin Instructions, Taking    multivitamin, 2 Tablet, Oral, See Admin Instructions, Taking    Thiamine HCl (VITAMIN B-1 PO), 1 Tablet, Oral, See Admin Instructions, Taking    B Complex Vitamins (VITAMIN B COMPLEX PO), 1 Tablet, Oral, See Admin Instructions, Taking    ondansetron, 4 mg, Oral, Q4HRS PRN, Taking    testosterone cypionate, 200 mg, Intramuscular, Q10 DAYS, Taking    Omega-3 Fatty Acids (FISH OIL PO), 2 Capsule, Oral, See Admin Instructions, Taking    Budesonide ER, 9 mg, Oral, PRN    ALLERGIES: Atorvastatin and Rosuvastatin    Social History     Tobacco Use    Smoking status: Never    Smokeless tobacco: Never   Vaping Use    Vaping Use: Former   Substance Use Topics    Alcohol use: Not Currently     Comment: quit 9/2023    Drug use: Yes     Comment: CBD     Review of Systems   Constitutional:  Negative for chills, fever and malaise/fatigue.   Respiratory:  Negative for cough, hemoptysis, shortness of breath and wheezing.    Cardiovascular:  Negative for chest pain, palpitations, orthopnea, claudication and leg swelling.   Neurological:  Negative for dizziness, tremors, focal weakness, seizures, weakness and headaches.       Objective    Vitals:    02/26/24 0900   BP: 117/77   BP Location: Left arm   Patient Position: Sitting   BP Cuff Size: Large adult   Pulse: (!) 118   Weight: 107 kg (236 lb)   Height: 1.854 m (6' 1\")     BP Readings from Last 5 Encounters:   02/26/24 117/77   02/15/24 124/80   01/02/24 122/74   12/21/23 116/76   11/28/23 108/72     Physical Exam  Constitutional:       Appearance: Normal appearance. He is " well-developed.   HENT:      Head: Normocephalic and atraumatic.   Eyes:      Conjunctiva/sclera: Conjunctivae normal.   Neck:      Thyroid: No thyromegaly.      Vascular: No JVD.   Cardiovascular:      Rate and Rhythm: Regular rhythm. Tachycardia present.      Heart sounds: Normal heart sounds. No murmur heard.     No friction rub. No gallop.   Pulmonary:      Effort: Pulmonary effort is normal. No respiratory distress.      Breath sounds: Normal breath sounds.   Musculoskeletal:      Cervical back: Normal range of motion and neck supple.   Lymphadenopathy:      Cervical: No cervical adenopathy.   Skin:     General: Skin is warm and dry.   Neurological:      General: No focal deficit present.      Mental Status: He is alert and oriented to person, place, and time.      Cranial Nerves: No cranial nerve deficit.       DATA REVIEW:  Most Recent Lipid Panel:   Lab Results   Component Value Date    CHOLSTRLTOT 195 02/12/2024    CHOLSTRLTOT 227 (H) 10/17/2023    TRIGLYCERIDE 74 02/12/2024    TRIGLYCERIDE 118 10/17/2023    HDL 59 02/12/2024    HDL 75 10/17/2023     (H) 10/17/2023      Latest Reference Range & Units 12/14/22 07:31   Cholesterol,Tot 100 - 199 mg/dL 328 (H)   Triglycerides 0 - 149 mg/dL 121   HDL >39 mg/dL 81   LDL Chol Calc ([facility]) 0 - 99 mg/dL 226 (H)   VLDL Cholesterol Calc 5 - 40 mg/dL 21     Lab Results   Component Value Date/Time    LIPOPROTA 86.9 (H) 12/18/2023 05:28 AM       Latest Reference Range & Units 12/18/23 05:28   Apolipoprotein B <90 mg/dL 114 (H)     Other Pertinent Blood Work:   Lab Results   Component Value Date    SODIUM 139 02/12/2024    SODIUM 133 (L) 10/18/2023    POTASSIUM 5.0 02/12/2024    POTASSIUM 3.8 10/18/2023    CHLORIDE 101 02/12/2024    CHLORIDE 97 10/18/2023    CO2 22 02/12/2024    CO2 26 10/18/2023    ANION 10.0 10/18/2023    GLUCOSE 89 02/12/2024    GLUCOSE 86 10/18/2023    BUN 15 02/12/2024    BUN 5 (L) 10/18/2023    CREATININE 1.40 (H) 02/12/2024    CREATININE 1.06  10/18/2023    CALCIUM 9.3 02/12/2024    CALCIUM 8.4 10/18/2023    ASTSGOT 23 02/12/2024    ASTSGOT 96 (H) 10/18/2023    ALTSGPT 18 02/12/2024    ALTSGPT 61 (H) 10/18/2023    ALKPHOSPHAT 90 02/12/2024    ALKPHOSPHAT 74 10/18/2023    TBILIRUBIN 0.5 02/12/2024    TBILIRUBIN 0.8 10/18/2023    ALBUMIN 4.3 02/12/2024    ALBUMIN 3.3 10/18/2023    AGRATIO 1.5 02/12/2024    AGRATIO 1.2 10/18/2023    LIPOPROTA 86.9 (H) 12/18/2023    TSHULTRASEN 1.740 05/22/2023     VASCULAR IMAGING:       CT abd/pelvis 2021  Vasculature: The mineralization coils identified in the left retroperitoneal region extending to the left renal region. There are some calcified plaque in the aorta without aneurysm.  IMPRESSION:   1.  Hepatic steatosis.   2.  Aortic atherosclerosis without aneurysm.   3.  Appendix not visualized    CACS 12/2022  Coronary calcification:  LMA - 0.0  LCX - 0.0  LAD - 0.0  RCA - 0.0  PDA - 0.0   Total Calcium Score: 0.0   Percentile: Calcium score is below the 25th percentile for the patient's age and sex.   Other findings:  Heart: Normal size.  Lungs: Clear.  Mediastinum: Ascending thoracic aorta measures 4.8 cm in diameter.  Upper abdomen: There is hepatic steatosis.    Echo 5/2023   Normal left ventricular systolic function.  The left ventricular ejection fraction is visually estimated to be 55%.  Aorta  Normal aortic root for body surface area. The ascending aorta diameter   is 4.2  cm.    CTA complete Ao 6/2023   1. No evidence of aortic dissection .   2. Unchanged dilatation of the ascending aorta, measuring up to 4.8 cm   3. Redemonstration of aberrant origin of the right subclavian artery.    TTE 7/2023   Proimal aortic aneurysm 4.5cm.   Trace TI.   Trileaflet aortic valve 1+ AI.   Structurally normal mitral valve without significant stenosis or   regurgitation.   LATHA no clots or masses PW doppler 0.4m/s.   EF normal.    EP ablation 7/2023   Procedure(s) Performed:   Supraventricular tachycardia ablation (atrial  flutter ablation)   Electroanatomical 3D mapping with CARTO  Transesophageal echocardiogram    CTA head 9/2023   1. No large vessel occlusion or aneurysm identified.     NM stress test 10/2023    NUCLEAR IMAGING INTERPRETATION   No evidence of significant jeopardized viable myocardium or prior myocardial    infarction.   Normal left ventricular size, ejection fraction, and wall motion.   ECG INTERPRETATION   Negative stress ECG for ischemia.    CT c/a/p w/o contrast 10/2023   1.  Slight hazy peripancreatic fat stranding, could represent changes of mild pancreatitis, correlate with laboratory values.  2.  5.2 cm ascending thoracic aortic aneurysm, radiographic follow-up and surveillance recommended as clinically appropriate.  3.  Hepatomegaly  4.  Changes of hepatic steatosis  5.  Fat-containing left inguinal hernia  6.  Atherosclerosis    CTA chest 12/2023   1.  Dilated ascending aorta measuring 4.6 cm.   2.  No evidence of aortic dissection.   3.  No airspace consolidation or pleural effusions        ASSESSMENT AND PLAN  1. Familial hypercholesteremia  Evolocumab (REPATHA) 140 MG/ML Solution Auto-injector SubQ injection pen    Lipid Profile    CRP HIGH SENSITIVE (CARDIAC)    Lipoprotein (a)      2. Aneurysm of ascending aorta without rupture (HCC)        3. Abdominal aortic atherosclerosis (HCC)  CRP HIGH SENSITIVE (CARDIAC)      4. Primary hypertension        5. Hepatic steatosis        6. Alcohol use disorder in remission        7. Ulcerative pancolitis without complication (HCC)        8. Elevated lipoprotein(a)  Evolocumab (REPATHA) 140 MG/ML Solution Auto-injector SubQ injection pen    Lipoprotein (a)      9. Atrial flutter with rapid ventricular response (HCC)        10. Testicular hypofunction        11. Long-term current use of testosterone replacement therapy        12. Statin intolerance  Evolocumab (REPATHA) 140 MG/ML Solution Auto-injector SubQ injection pen    COMPLETE        Patient Type, check all  that apply: Primary Prevention    Established Cardiovascular Disease:     1) Ascending aortic aneurysm - no current sx    -Presumed cystic medial degeneration with sporadic development.   -No fhx or pmhx of connective tissue disease but son dx with Takayasu's and there is hx of other vascular disease in 2nd degree relatives  - no known HTAD bur recommended for genetic analysis due to strong fhx of vasculopathy and early age onset of large aneurysm   - No BAV on echo   - as reviewed, surgical repair is generally recommended at 5.5+cm or >0.5cm/yr expansion to reduce risk for rupture, dissection, death   - annual risk of rupture/dissection at 4.0-4.9cm is <2% and nearly 7% for >6.0cm  12/2022 CACS = 4.8cm on most recent imaging, no symptoms.   This was non-contrast CT, so likely overestimated size, will need more definitive contrast CT in future for accurate sizing   6/2023 CTA Ao = 4.8cm   7/2023 TTE = 4.5cm (sizing discrepancy due to imaging modality)  10/2023 non- contrast CT chest = 5.2cm - may be overestimated due to lack of contrast  12/2023 CTA chest = 4.6cm - stable, no growth compared to prior similar CTAs   Plan:   - annual surveillance CTA chest (12/2024) for now, if stable/no growth, then will start annual echo with Q3yr CTA chest   - if 5.0+cm, will ref to CT surg for further evaluation   - check aortopathy genotyping due to strong fhx of vasculopathy and early age for moderate ascending AA  ([facility]ae order # [id number]. ) - encouraged to complete - did not complete  - recommend 1st degree relatives get screened with echo for TAA  - focus on ARB and BB for BP control  - reinforced activity restrictions including no extreme endurance activities, smoking, stimulants, and extreme weight lifting >20lbs - I have recommend light to moderate intensity activities and avoid heavy weight lifting and avoid valsalva maneuvers during lifting     As per 2022 ACC/AHA guideline for aortic disease regarding exercise:   In  patients with aortic disease, limited data are available to guide recommendations regarding the forms of exercise that are safe and promote cardiovascular health versus those that pose an acute or  long-term risk of aortic growth or rupture. But evidence exists regarding the physiologic benefits of exercise and the hemodynamic consequences of various form of exercise and exertion in case series and relevant animal models.   There has been a uniform consensus among numerous expert committees on aortic disease that it is wise to avoid intense isometric exertion or exercises that require the Valsalva maneuver, given that heavy lifting with Valsalva can produce acute increases in SBP to >300 mm Hg. There is also a consensus that light weightlifting and low-intensity aerobic exercise are safe and likely improve both physical and mental health. No uniform consensus exists about the safety of intermediate-level static and aerobic exercise. Recommendations for exercise intensity are best individualized informed by multiple factors that include underlying aortic pathology, aortic diameter and ASI, aortic growth rate, age, family history, and any other high-risk features (eg, uncontrolled hypertension).  Ongoing investigation is needed to better define the levels of resistance activities that would be considered low-risk for adverse aortic events, favoring greater exercise restrictions among patients at higher risk of dissection    2) Aflutter - s/p cardioversion and ablation (7/2023) - currently asymptomatic tachycardia   Low les-vasc, no prior CVA/TIA   Plan  - advised he needs f/u with cardiology as recommended by EP specialist and we are not cardiology office - MA staff to contact cardiology office to assist to schedule   - recommend f/u with cardiology due to ongoing tachy  - continue metoprolol      3) Aortic atherosclerosis - abdomen on prior CT, no symptoms or prior known associated clinical manifestations  -Serves  as general indicator of systemic atherosclerosis with higher potential atherosclerosis is other vascular beds including coronaries, carotids, cerebral aa.   -Higher overall ASCVD risk, no complex features   Plan:  - no further imaging surveillance, continue med mgmt      Evidence of Heterozygous Familial Hypercholesterolemia (FH): Yes   LDL-C baseline = 226  Grandfather  MI age 51 (see fhx)   Sibs with high cholesterol on meds    FH genotyping:  yes , ordered [facility] # [id number]. - completed, pending results   Benefits/limitations/risks for dyslipidemic genetic testing reviewed.  Further resources for review available at familyheart.org site    Indications and rationale for dyslipidemia genetic testing:  -Strong clinical suspicion of a genetic dyslipidemia.  - assist with definitive diagnosis and aid in treatment decision-making  -Strong family history of dyslipidemia or its complications.  -Presence of related syndromic features  -Evidence that testing might .  -Available and effective early interventions exist.  -Eligibility for new or investigational drugs.  -Patient preference.  -Family planning  - informing need for cascade screening of family members     ACC/AHA Indication for Statin Therapy, hsukri all that apply:  Primary Severe HLD / FH (baseline LDL-C >190)    Calculated Risk for ASCVD, if applicable    The ASCVD Risk score (Tonya ORTEZ, et al., 2019) failed to calculate., baseline LDL-C >190 so inaccurate     Other Significant Risk Markers, if any, shukri all that apply   Other: CACS = 0 - confers less risk over short/long-term though does no preclude need for treatment based upon extreme high LDL-C >>190  - minimal high lp(a) 86 nmol/L - not considered risk-enhancing     Risk-enhancers: Famhx of premature ASCVD and Persistently elevated LDL-C >159    Goal LDL-C and nonHDL-C based on Clinic Protocol  reduce LDL-C >50%  and LDL-C <100 (consider non-HDL-C <130, apoB <90)  At goal? No,  2/2024    LIFESTYLE INTERVENTIONS:    SMOKING:    reports that he has never smoked. He has never used smokeless tobacco.   - continued complete avoidance of all tobacco products     PHYSICAL ACTIVITY: continue healthy activity to improve CV fitness.  In general, targeting >150min/week of moderate-level activity or as much as tolerated in light of functional status and co-morbidities     WEIGHT MANAGEMENT AND NUTRITION: Mediterranean style dietary approach  and Provide specific dietary approach handouts      LIPID LOWERING MEDICATION MANAGEMENT:     Statin Therapy Recommendations from Today’s Visit:   - stopped rosuvastatin due to intolerance  - prior failed atorvastatin  - no further rechallenges     Non-Statin Medications Recommendations from Today’s Visit:   - continue zetia 10mg daily   - start repatha 140mg Q2wk - reviewed use, cost, ADRs - will submit prior auth     Indication for PCSK9 Inhibitor, if applicable:   PSCK9i indicated per 2018 ACC/AHA guidelines in this patient with clinical diagnosis of familial hypercholesterolemia (FH) phenotype whose LDL-C remains above threshold of 100 mg/dl despite maximally tolerated statin therapy.     Supplements Recommended at this visit: None     RECOMMENDATIONS FOR OTHER CV RISK FACTORS:    1) BLOOD PRESSURE MANAGEMENT:  ACC/AHA (2017) goal <130/80  Home BP at goal:  yes  Office BP at goal:  yes  24h ABPM: not ordered to date  Indicated medications for reduction of expansion rate of TAA:   -Beta-blocker (decreases pulsatile wall stress)   -Losartan (inhibits tissue growth factor beta, decreasing aneurysmal growth rate or rate of expansion)   Plan:   Monitoring:   - continue home BP monitoring, reviewed correct technique, provide BP log and instructions  - order 24h ABPM:  no  - monitor lytes/gfr routinely   - contact office if BP consistently >140/>90 for discussion of tx adjustments   Medications:  - no longer on losartan   - continue metoprolol 25mg ER daily -  anti-impulse therapy     2) Glycemic Status: Normal    ANTITHROMBOTIC THERAPY not indicated at this time, consider ASA 81mg in future     OTHER ISSUES:     # testicular hypofunction on TRT - potential bearing on current HLD and aortic status   - pt aware of benefits/risks and will continue TRT  - defer to prescribing MD     Studies Ordered at Todays Visit: CTA chest 12/2024 - RN to track   Blood Work Ordered At Today’s visit: as noted in assessment   Follow-Up: 3mo    Noble Drummond M.D.  HERRERA, Board-certified clinical lipidologist   Vascular Medicine [facility]   [facility]   [phone]

## 2024-02-26 NOTE — TELEPHONE ENCOUNTER
Prior Authorization for Repatha SureClick 140 mg/ml [employer] has been approved for a quantity of 6 each , day supply 84    Insurance-[employer]    Prior Authorization Reference#-[id number]    Dates in effect, from 02/26/24 through 02/25/25    Co-pay: Unknown- Insurance down- Putting re-visit date in TRX to re-run test claim once insurance is up    Pharmacy and phone number   [id number] - [place]   [address], [place] [address]  Phone: [phone]  Fax: [phone]    Is patient eligible to fill with [facility]? Unknown     Next Steps:  Routing approval to liaison(s)    Approval Letter

## 2024-03-05 ENCOUNTER — TELEPHONE (OUTPATIENT)
Dept: VASCULAR LAB | Facility: MEDICAL CENTER | Age: 59
End: 2024-03-05
Payer: COMMERCIAL

## 2024-03-05 ENCOUNTER — DOCUMENTATION (OUTPATIENT)
Dept: VASCULAR LAB | Facility: MEDICAL CENTER | Age: 59
End: 2024-03-05
Payer: COMMERCIAL

## 2024-03-05 NOTE — TELEPHONE ENCOUNTER
Received letter from [email or web address] indicating 2nd sample failure from specimen submitted 2/9/24.     Mar , please contact Mr. Quan and report above. I would suggest we still consider completing testing but complete a blood sample instead.   These genetic testing will help to more accurately rule in or rule out various conditions that could predispose hime to aortic disease and/or abnormal cholesterol disorders and can assist with treatment.  If positive for any genetic variants would be helpful to share with his family, especially children, so I do believe it is still worth pursuing.     If he wishes to pursue, please arrange for a blood draw collection kit and/or mobile phlebotomy services if available in our area through [employer] [employer].      Thanks

## 2024-03-06 NOTE — PROGRESS NOTES
Left message for patient to let him know we have received another letter from [employer] stating his saliva sample did not meet quality metrics. Let patient know Dr. Drummond suggests we do a blood sample. Asked patient to call back and discuss.         Mar Hilario, Medical Assistant   [facility]   Ph: [phone]  Fx: [phone]

## 2024-03-06 NOTE — TELEPHONE ENCOUNTER
Ran Test claim via [facility] for [place] Approval since insurance is now in working order      RTS 05/12/24

## 2024-03-11 ENCOUNTER — PATIENT MESSAGE (OUTPATIENT)
Dept: VASCULAR LAB | Facility: MEDICAL CENTER | Age: 59
End: 2024-03-11

## 2024-03-29 ENCOUNTER — TELEPHONE (OUTPATIENT)
Dept: PHARMACY | Facility: MEDICAL CENTER | Age: 59
End: 2024-03-29
Payer: COMMERCIAL

## 2024-03-29 NOTE — TELEPHONE ENCOUNTER
Spoke to Tom regarding a refill of Ezetimibe. He has declined a refill as he has about a month on hand. He reports taking once daily as directed but possibly missing some doses. Scheduled a follow up call on 04/24.

## 2024-05-14 ENCOUNTER — DOCUMENTATION (OUTPATIENT)
Dept: VASCULAR LAB | Facility: MEDICAL CENTER | Age: 59
End: 2024-05-14
Payer: COMMERCIAL

## 2024-05-14 NOTE — PROGRESS NOTES
Tom Diaz showed to follow-up with the vascular medicine clinic.     Scheduling staff will contact to reschedule WITH ANY AVAILABLE APRN and remind patient of the importance of maintaining appointments as scheduled or to contact our office at least 24 hours in advance if they need to reschedule.       Patient should be aware of the potential for worsening conditions without appropriate follow-up and monitoring.      Vascular Medicine Clinic   [facility]   [phone]

## 2024-08-20 ENCOUNTER — TELEMEDICINE (OUTPATIENT)
Dept: BEHAVIORAL HEALTH | Facility: CLINIC | Age: 59
End: 2024-08-20
Payer: COMMERCIAL

## 2024-08-20 DIAGNOSIS — F90.2 ADHD (ATTENTION DEFICIT HYPERACTIVITY DISORDER), COMBINED TYPE: ICD-10-CM

## 2024-08-20 PROCEDURE — 99213 OFFICE O/P EST LOW 20 MIN: CPT | Mod: 95 | Performed by: PSYCHIATRY & NEUROLOGY

## 2024-08-20 RX ORDER — DEXTROAMPHETAMINE SACCHARATE, AMPHETAMINE ASPARTATE, DEXTROAMPHETAMINE SULFATE AND AMPHETAMINE SULFATE 2.5; 2.5; 2.5; 2.5 MG/1; MG/1; MG/1; MG/1
10 TABLET ORAL 3 TIMES DAILY
Qty: 90 TABLET | Refills: 0 | Status: SHIPPED | OUTPATIENT
Start: 2024-08-20 | End: 2024-09-19

## 2024-08-20 NOTE — PROGRESS NOTES
[facility]   Follow Up Assessment  This evaluation was conducted via Teams using secure and encrypted videoconferencing technology. The patient was in their home in the [facility].    The patient's identity was confirmed and verbal consent was obtained for this virtual visit.    This visit was conducted via Zoom using secure and encrypted videoconferencing technology.  The patient was in a private location in the [facility].  The patient's identity was confirmed and verbal consent was obtained for this virtual visit.    This provider informed the patient their medical records are totally confidential except for the use by other providers involved in their care, or if the patient signs a release, or to report instances of child or elder abuse, or if it is determined they are an immediate risk to harm themselves or others.    Name: Tom Quan  MRN: [id number]  : 1965  Age: 58 y.o.  Date of assessment: 2024  PCP: Arvind Geiger M.D.    Subjective:  Chart reviewed prior to seeing him at home.  He was last seen on  and was supposed to schedule again in 3 months.  He does indicate that Adderall 10 mg 3 times daily is working well for his ADHD symptoms.  His heart is working well after the ablation.    Objective:  He is alert, oriented, and cooperative.  Relatedness is good.  Responses to questions are brief but appropriate.  Grooming is good.  Speech is normal rate.  Memory is good.  Insight and judgment are fairly good.  No indication of psychotic thinking.    Current Risk:       Suicidal: Not suicidal       Homicidal: Not homicidal       Self-Harm: No plan to harm self       Relapse(Low/Moderate/High):: Moderate       Crisis Safety Plan Reviewed: Discussed with patient    Diagnosis:   ADHD    Treatment Plan:  The current treatment plan consists of quarterly psychiatric sessions designed to evaluate his ADHD.    Duration will be for a minimum of 12 months and will  be reviewed at each visit.    Goals: Improvement in ADHD symptoms in order to prevent relapse due to the chronic nature of his behavioral health problems and mental illness.  Continue Adderall 10 mg 3 times daily.      Tom Medina M.D.    This note was created using voice recognition software (Dragon). The accuracy of the dictation is limited by the abilities of the software. I have reviewed the note prior to signing, however some errors in grammar and context are still possible. If you have any questions related to this note please do not hesitate to contact our office.

## 2024-09-26 ENCOUNTER — TELEMEDICINE (OUTPATIENT)
Dept: MEDICAL GROUP | Facility: LAB | Age: 59
End: 2024-09-26
Payer: COMMERCIAL

## 2024-09-26 DIAGNOSIS — U07.1 COVID-19: ICD-10-CM

## 2024-09-26 NOTE — PROGRESS NOTES
Virtual Visit: Established Patient   This visit was conducted via Teams using secure and encrypted videoconferencing technology.   The patient was in their home in the [facility].    The patient's identity was confirmed and verbal consent was obtained for this virtual visit.    Subjective:   CC:   Chief Complaint   Patient presents with    Covid Home Monitoring     Tom Quan is a 58 y.o. male presenting for evaluation and management of Covid 19.    Symptoms began 5 days ago and did have more severe symptoms for 3 days including severe fatigue.  Feeling much better today.  Still with mild cough but much improved fatigue.  No fever, no shortness of breath.  Reports 2 vaccines in 2021, may have had undiagnosed COVID prior to this.    Has questions regarding Paxlovid.      ROS   See HPI    Current medicines (including changes today)  Current Outpatient Medications   Medication Sig Dispense Refill    Evolocumab (REPATHA) 140 MG/ML Solution Auto-injector SubQ injection pen Inject 1 mL under the skin every 14 days. To lower cholesterol and heart disease risk 6 Each 3    ezetimibe (ZETIA) 10 MG Tab Take 1 Tablet by mouth every day. 90 Tablet 3    omeprazole (PRILOSEC) 20 MG delayed-release capsule Take 1-2 Capsules by mouth 1 time a day as needed (Heartburn). 90 Capsule 3    metoprolol SR (TOPROL XL) 25 MG TABLET SR 24 HR Take 1 Tablet by mouth every day. 90 Tablet 3    hyoscyamine (LEVSIN) 0.125 MG tablet Take 1 Tablet by mouth every 6 hours as needed for Cramping. 30 Tablet 3    STELARA 90 MG/ML Solution Prefilled Syringe injection Inject 90 mg under the skin see administration instructions. Pt reports that he receives this medications every 57 days      Aspirin-Acetaminophen-Caffeine (EXCEDRIN PO) Take 2 Tablets by mouth 2 times a day as needed (For headache).      acetaminophen (TYLENOL) 500 MG Tab Take 1,000 mg by mouth every 6 hours as needed. Indications: Pain      ibuprofen (MOTRIN) 200 MG Tab Take 800 mg  by mouth every 6 hours as needed. Indications: Pain      rizatriptan (MAXALT) 5 MG tablet Take 1-2 Tablets by mouth one time as needed for Migraine for up to 1 dose. 20 Tablet 3    hydrOXYzine HCl (ATARAX) 25 MG Tab Take 1 Tablet by mouth 3 times a day as needed for Anxiety. 30 Tablet 0    Cholecalciferol (VITAMIN D) 2000 UNIT Tab Take 4,000 Units by mouth see administration instructions. Pt takes sporidially      multivitamin Tab Take 2 Tablets by mouth see administration instructions. Pt takes sporidially      Thiamine HCl (VITAMIN B-1 PO) Take 1 Tablet by mouth see administration instructions. Pt takes sporidially      B Complex Vitamins (VITAMIN B COMPLEX PO) Take 1 Tablet by mouth see administration instructions. Pt takes sporidially      ondansetron (ZOFRAN ODT) 4 MG TABLET DISPERSIBLE Take 1 Tablet by mouth every four hours as needed for Nausea/Vomiting (give PO if no IV route available). 10 Tablet 0    testosterone cypionate (DEPO-TESTOSTERONE) 200 MG/ML Solution injection Inject 200 mg into the shoulder, thigh, or buttocks every 10 days.      Omega-3 Fatty Acids (FISH OIL PO) Take 2 Capsules by mouth see administration instructions. Pt takes sporidially       No current facility-administered medications for this visit.       Patient Active Problem List    Diagnosis Date Noted    Alcohol dependence (HCC) 10/16/2023    Mixed hyperlipidemia 06/21/2023    Atrial flutter with rapid ventricular response (HCC) 05/19/2023    Alcohol withdrawal syndrome without complication (HCC) 04/02/2023    Abdominal aortic atherosclerosis (HCC) 01/12/2023    Hepatic steatosis 01/12/2023    Familial hypercholesteremia 01/12/2023    Aneurysm of ascending aorta without rupture (HCC) 01/12/2023    Testicular hypofunction 01/12/2023    Long-term current use of testosterone replacement therapy 01/12/2023    Persistent depressive disorder 09/30/2022    ADHD (attention deficit hyperactivity disorder), combined type 04/27/2022    Major  depressive disorder, recurrent episode, moderate (HCC) 04/27/2022    Avascular necrosis of bones of both hips (HCC) 08/05/2019    Essential (primary) hypertension 08/05/2019    GERD (gastroesophageal reflux disease) 08/05/2019    Bilateral primary osteoarthritis of hip 07/03/2018    Brachial neuritis or radiculitis 04/23/2014    Displacement of cervical intervertebral disc without myelopathy 04/23/2014    Ulcerative colitis ([facility]) 04/08/2014        Objective:   There were no vitals taken for this visit.    Physical Exam:  Constitutional: Alert, no distress, well-groomed.  Skin: No rashes in visible areas.  Eye: Round. Conjunctiva clear, lids normal. No icterus.   ENMT: Lips pink without lesions, good dentition, moist mucous membranes. Phonation normal.  Neck: No masses, no thyromegaly. Moves freely without pain.  Respiratory: Unlabored respiratory effort, no cough or audible wheeze  Psych: Alert and oriented x3, normal affect and mood.     Assessment and Plan:   The following treatment plan was discussed:     1. COVID-19  Symptoms began 5 days ago initially with more severe symptoms including fatigue but much improved today.  No fevers, improving fatigue, no current trouble breathing.  Has questions regarding Paxlovid.  Discussed that this could be indicated given his slightly increased risk with ulcerative colitis.  However, would need updated renal function as he did have decreased GFR on last labs almost 1 year ago.  Also may be little utility given it is day 5 and he has already significantly improved.  We discussed that we will hold off on Paxlovid for now.  He will continue with supportive measures.  Follow-up if not improving.    Follow-up: No follow-ups on file.

## 2024-10-04 DIAGNOSIS — I71.21 ANEURYSM OF ASCENDING AORTA WITHOUT RUPTURE (HCC): ICD-10-CM

## 2024-12-11 ENCOUNTER — APPOINTMENT (OUTPATIENT)
Dept: MEDICAL GROUP | Facility: LAB | Age: 59
End: 2024-12-11
Payer: COMMERCIAL

## 2024-12-11 VITALS
HEIGHT: 74 IN | HEART RATE: 88 BPM | DIASTOLIC BLOOD PRESSURE: 76 MMHG | OXYGEN SATURATION: 96 % | BODY MASS INDEX: 28.88 KG/M2 | SYSTOLIC BLOOD PRESSURE: 108 MMHG | WEIGHT: 225 LBS | TEMPERATURE: 97.8 F | RESPIRATION RATE: 16 BRPM

## 2024-12-11 DIAGNOSIS — Z00.00 WELL ADULT EXAM: ICD-10-CM

## 2024-12-11 PROCEDURE — 99396 PREV VISIT EST AGE 40-64: CPT | Performed by: FAMILY MEDICINE

## 2024-12-11 PROCEDURE — 3078F DIAST BP <80 MM HG: CPT | Performed by: FAMILY MEDICINE

## 2024-12-11 PROCEDURE — 3074F SYST BP LT 130 MM HG: CPT | Performed by: FAMILY MEDICINE

## 2024-12-11 ASSESSMENT — FIBROSIS 4 INDEX: FIB4 SCORE: 1.56

## 2024-12-11 NOTE — PROGRESS NOTES
Subjective:     CC:   Chief Complaint   Patient presents with    Annual Exam       HPI:   Tom Quan is a 59 y.o. male who presents for an annual exam. He is feeling well and has no complaints.    Last colonoscopy: 2023 -May be due for repeat secondary to ulcerative colitis.  Last Tdap:  - declines   Qualify for abdominal us screen:no  Qualify for hep c screen:completed  Regular exercise: yes  Diet: healthy  Minimal alcohol    He  has a past medical history of Arrhythmia, Ascending aortic aneurysm (HCC), Atrial fibrillation (HCC), Depression, High cholesterol, Hyperlipidemia, and Ulcerative colitis (HCC).  He  has a past surgical history that includes hip arthroplasty total (Bilateral); shoulder arthroscopy (Right); tonsillectomy (N/A); and open repair/reconstruction pectoralis major (Left).  Family History   Problem Relation Age of Onset    Diabetes Mother     Other Mother         smoker+, unclear cause of death    Hyperlipidemia Sister     Hyperlipidemia Brother     Cancer Paternal Uncle         Prostate    Heart Attack Maternal Grandfather          55, tobacco+    Other Son         takayasu's arteritis    Abdominal aortic aneurysm Neg Hx     Arterial Aneurysm Neg Hx      Social History     Tobacco Use    Smoking status: Never    Smokeless tobacco: Never   Vaping Use    Vaping status: Former   Substance Use Topics    Alcohol use: Not Currently     Comment: quit 2023    Drug use: Yes     Comment: CBD       Patient Active Problem List    Diagnosis Date Noted    Alcohol dependence (HCC) 10/16/2023    Mixed hyperlipidemia 2023    Atrial flutter with rapid ventricular response (HCC) 2023    Alcohol withdrawal syndrome without complication (HCC) 2023    Abdominal aortic atherosclerosis (HCC) 2023    Hepatic steatosis 2023    Familial hypercholesteremia 2023    Aneurysm of ascending aorta without rupture (HCC) 2023    Testicular hypofunction 2023    Long-term  current use of testosterone replacement therapy 01/12/2023    Persistent depressive disorder 09/30/2022    ADHD (attention deficit hyperactivity disorder), combined type 04/27/2022    Major depressive disorder, recurrent episode, moderate ([facility]) 04/27/2022    Avascular necrosis of bones of both hips ([facility]) 08/05/2019    Essential (primary) hypertension 08/05/2019    GERD (gastroesophageal reflux disease) 08/05/2019    Bilateral primary osteoarthritis of hip 07/03/2018    Brachial neuritis or radiculitis 04/23/2014    Displacement of cervical intervertebral disc without myelopathy 04/23/2014    Ulcerative colitis ([facility]) 04/08/2014       Current Outpatient Medications   Medication Sig Dispense Refill    Evolocumab (REPATHA) 140 MG/ML Solution Auto-injector SubQ injection pen Inject 1 mL under the skin every 14 days. To lower cholesterol and heart disease risk 6 Each 3    omeprazole (PRILOSEC) 20 MG delayed-release capsule Take 1-2 Capsules by mouth 1 time a day as needed (Heartburn). 90 Capsule 3    metoprolol SR (TOPROL XL) 25 MG TABLET SR 24 HR Take 1 Tablet by mouth every day. 90 Tablet 3    hyoscyamine (LEVSIN) 0.125 MG tablet Take 1 Tablet by mouth every 6 hours as needed for Cramping. 30 Tablet 3    Aspirin-Acetaminophen-Caffeine (EXCEDRIN PO) Take 2 Tablets by mouth 2 times a day as needed (For headache).      acetaminophen (TYLENOL) 500 MG Tab Take 1,000 mg by mouth every 6 hours as needed. Indications: Pain      ibuprofen (MOTRIN) 200 MG Tab Take 800 mg by mouth every 6 hours as needed. Indications: Pain      rizatriptan (MAXALT) 5 MG tablet Take 1-2 Tablets by mouth one time as needed for Migraine for up to 1 dose. 20 Tablet 3    Cholecalciferol (VITAMIN D) 2000 UNIT Tab Take 4,000 Units by mouth see administration instructions. Pt takes sporidially      B Complex Vitamins (VITAMIN B COMPLEX PO) Take 1 Tablet by mouth see administration instructions. Pt takes sporidially      ondansetron (ZOFRAN ODT) 4 MG  "TABLET DISPERSIBLE Take 1 Tablet by mouth every four hours as needed for Nausea/Vomiting (give PO if no IV route available). 10 Tablet 0    testosterone cypionate (DEPO-TESTOSTERONE) 200 MG/ML Solution injection Inject 200 mg into the shoulder, thigh, or buttocks every 10 days.      Omega-3 Fatty Acids (FISH OIL PO) Take 2 Capsules by mouth see administration instructions. Pt takes sporidially      hydrOXYzine HCl (ATARAX) 25 MG Tab Take 1 Tablet by mouth 3 times a day as needed for Anxiety. 30 Tablet 0     No current facility-administered medications for this visit.    (including changes today)  Allergies: Atorvastatin and Rosuvastatin    Review of Systems   Constitutional: Negative for fever, chills and malaise/fatigue.   HENT: Negative for congestion.    Eyes: Negative for pain.   Respiratory: Negative for cough and shortness of breath.    Cardiovascular: Negative for leg swelling.   Gastrointestinal: Negative for nausea, vomiting, abdominal pain and diarrhea.   Genitourinary: Negative for dysuria and hematuria.   Skin: Negative for rash.   Neurological: Negative for dizziness, focal weakness and headaches.   Endo/Heme/Allergies: Does not bruise/bleed easily.   Psychiatric/Behavioral: Negative for depression.  The patient is not nervous/anxious.      Objective:     /76   Pulse 88   Temp 36.6 °C (97.8 °F) (Temporal)   Resp 16   Ht 1.87 m (6' 1.62\")   Wt 102 kg (225 lb)   SpO2 96%   BMI 29.19 kg/m²   Body mass index is 29.19 kg/m².  Wt Readings from Last 4 Encounters:   02/26/24 107 kg (236 lb)   02/15/24 108 kg (238 lb)   01/02/24 99.8 kg (220 lb)   12/21/23 99.8 kg (220 lb)       Physical Exam:  Constitutional: Well-developed and well-nourished. Not diaphoretic. No distress.   Skin: Skin is warm and dry. No rash noted.  Head: Atraumatic without lesions.  Eyes: Conjunctivae and extraocular motions are normal. Pupils are equal, round, and reactive to light. No scleral icterus.   Ears:  External ears " unremarkable.   Nose: Nares patent. Septum midline. Turbinates with erythema and mild clear rhinorrhea.  Mouth/Throat: Dentition is normal. Tongue normal. Oropharynx is clear and moist. Posterior pharynx without erythema or exudates.  Neck: Supple, trachea midline.   Cardiovascular: Regular rate and rhythm, S1 and S2 without murmur, rubs, or gallops.    Chest: Effort normal. Clear to auscultation throughout.  Extremities: No cyanosis, clubbing, erythema, nor edema. Distal pulses intact and symmetric.   Musculoskeletal: All major joints AROM full in all directions without pain.  Neurological: Moves all 4 extremities.  No facial droop.  Psychiatric:  Behavior, mood, and affect are appropriate.    Assessment and Plan:     1. Well adult exam  Health maintenance reviewed and updated.  Age-appropriate anticipatory guidance provided.  Encouraged to draw labs that were ordered at previous visits.  Declines vaccines.  Encouraged GI follow-up for ulcerative colitis and may be due for repeat colonoscopy.    Please note that this note was created using voice recognition software.

## 2024-12-31 ENCOUNTER — HOSPITAL ENCOUNTER (OUTPATIENT)
Dept: RADIOLOGY | Facility: MEDICAL CENTER | Age: 59
End: 2024-12-31
Attending: FAMILY MEDICINE
Payer: COMMERCIAL

## 2024-12-31 DIAGNOSIS — I71.21 ANEURYSM OF ASCENDING AORTA WITHOUT RUPTURE (HCC): ICD-10-CM

## 2024-12-31 PROCEDURE — 71275 CT ANGIOGRAPHY CHEST: CPT

## 2024-12-31 PROCEDURE — 700117 HCHG RX CONTRAST REV CODE 255: Performed by: FAMILY MEDICINE

## 2024-12-31 RX ADMIN — IOHEXOL 100 ML: 350 INJECTION, SOLUTION INTRAVENOUS at 13:24

## 2025-01-02 ENCOUNTER — TELEPHONE (OUTPATIENT)
Dept: VASCULAR LAB | Facility: MEDICAL CENTER | Age: 60
End: 2025-01-02
Payer: COMMERCIAL

## 2025-01-02 NOTE — TELEPHONE ENCOUNTER
CTA shows 4.8cm ascending AA w/o indications of major growth over 2 yrs.   Overdue for f/u appt - scheduling staff to contact to schedule with next available provider.   Plan repeat CTA chest in 1yr (12/2025) - RN to track

## 2025-01-03 NOTE — TELEPHONE ENCOUNTER
Left message for patient to call back to schedule follow up with APRN. Next available ok.           Mar Priest, Medical Assistant   [facility]   Ph: [phone]  Fx: [phone]

## 2025-01-11 DIAGNOSIS — I71.21 ANEURYSM OF ASCENDING AORTA WITHOUT RUPTURE (HCC): ICD-10-CM

## 2025-01-14 ENCOUNTER — TELEPHONE (OUTPATIENT)
Dept: VASCULAR LAB | Facility: MEDICAL CENTER | Age: 60
End: 2025-01-14
Payer: COMMERCIAL

## 2025-01-14 ENCOUNTER — TELEMEDICINE (OUTPATIENT)
Dept: BEHAVIORAL HEALTH | Facility: CLINIC | Age: 60
End: 2025-01-14
Payer: COMMERCIAL

## 2025-01-14 DIAGNOSIS — F90.2 ADHD (ATTENTION DEFICIT HYPERACTIVITY DISORDER), COMBINED TYPE: ICD-10-CM

## 2025-01-14 PROCEDURE — 99214 OFFICE O/P EST MOD 30 MIN: CPT | Mod: 95 | Performed by: PSYCHIATRY & NEUROLOGY

## 2025-01-14 RX ORDER — DEXTROAMPHETAMINE SACCHARATE, AMPHETAMINE ASPARTATE, DEXTROAMPHETAMINE SULFATE AND AMPHETAMINE SULFATE 3.75; 3.75; 3.75; 3.75 MG/1; MG/1; MG/1; MG/1
15 TABLET ORAL 3 TIMES DAILY
Qty: 90 TABLET | Refills: 0 | Status: SHIPPED | OUTPATIENT
Start: 2025-01-14 | End: 2025-01-30

## 2025-01-14 RX ORDER — METOPROLOL SUCCINATE 25 MG/1
25 TABLET, EXTENDED RELEASE ORAL DAILY
Qty: 90 TABLET | Refills: 0 | Status: SHIPPED | OUTPATIENT
Start: 2025-01-14

## 2025-01-14 NOTE — TELEPHONE ENCOUNTER
Called and s/w patient.  Patient overdue for f/u. Patient did not want to schedule at this time.  He will callback.  Please schedule next available with Vascular APRN.    FREDERICK Weiner. to Vascular Medicine [facility] Regarding result: metoprolol SR (TOPROL XL) 25 MG TABLET SR 24 HR   FM      1/14/25  9:10 AM  Overdue for vascular follow up, needs appt for further refills.     Luisana Dodd, Med Ass't  [facility]  Ph. [phone]  Fx. [phone]

## 2025-01-14 NOTE — PROGRESS NOTES
[facility]   Follow Up Assessment  This evaluation was conducted via Teams using secure and encrypted videoconferencing technology. The patient was in their home in the [facility].    The patient's identity was confirmed and verbal consent was obtained for this virtual visit.    This visit was conducted via Zoom using secure and encrypted videoconferencing technology.  The patient was in a private location in the [facility].  The patient's identity was confirmed and verbal consent was obtained for this virtual visit.    This provider informed the patient their medical records are totally confidential except for the use by other providers involved in their care, or if the patient signs a release, or to report instances of child or elder abuse, or if it is determined they are an immediate risk to harm themselves or others.    Name: Tom Quan  MRN: [id number]  : 1965  Age: 59 y.o.  Date of assessment: 2025  PCP: Arvind Geiger M.D.    Subjective:  Chart reviewed prior to the virtual visit in his home.  He was last seen on .  He is followed by his cardiologist after the cardiac ablation last year.  His blood pressure is good.  He indicates that his use of Adderall is acceptable by his cardiologist.  He would like to resume Adderall at a higher dose than 10 mg 3 times daily.  He is agreeable to increasing to 15 3 times daily.  We talked briefly about Adderall XR.    Objective:  He is alert, oriented, and cooperative.  Relatedness is good.  Grooming is good.  Speech is normal rate.  Anxious.  Memory is good.  Insight and judgment are fairly good.  No indication of psychotic thinking.    Current Risk:       Suicidal: Not suicidal       Homicidal: Not homicidal       Self-Harm: No plan to harm self       Relapse(Low/Moderate/High):: Moderate       Crisis Safety Plan Reviewed: Discussed with patient    Diagnosis:   ADHD    Treatment Plan:  The current treatment plan  consists of a follow-up visit in 1 month and then quarterly psychiatric sessions designed to evaluate his ADHD.    Duration will be for a minimum of 12 months and will be reviewed at each visit.    Goals: Improvement in ADHD symptoms in order to prevent relapse due to the chronic nature of her behavioral health problems and mental illness.  Increase Adderall to 15 mg 3 times daily.      Tom Medina M.D.    This note was created using voice recognition software (Dragon). The accuracy of the dictation is limited by the abilities of the software. I have reviewed the note prior to signing, however some errors in grammar and context are still possible. If you have any questions related to this note please do not hesitate to contact our office.

## 2025-01-24 ENCOUNTER — PATIENT MESSAGE (OUTPATIENT)
Dept: BEHAVIORAL HEALTH | Facility: CLINIC | Age: 60
End: 2025-01-24
Payer: COMMERCIAL

## 2025-01-24 DIAGNOSIS — F90.2 ADHD (ATTENTION DEFICIT HYPERACTIVITY DISORDER), COMBINED TYPE: ICD-10-CM

## 2025-01-29 ENCOUNTER — TELEPHONE (OUTPATIENT)
Dept: VASCULAR LAB | Facility: MEDICAL CENTER | Age: 60
End: 2025-01-29
Payer: COMMERCIAL

## 2025-01-29 NOTE — TELEPHONE ENCOUNTER
Prior Authorization for Repatha SureClick 140mg/mL SOAJ has been approved for a quantity of 2mL , day supply 28    Prior Authorization reference number: [id number]  Insurance: [employer]  Effective dates: 1/29/25--1/29/26  Copay: $542.23/28ds MAX     Is patient eligible to fill with [facility]? Yes    Next Steps: The patient's copay exceeds $5.00. Proceed with contacting patient to offer financial assistance.    Trudy Tejeda  Pharmacy Liaison  [facility]  [phone]  1/29/2025 3:09 PM

## 2025-01-29 NOTE — TELEPHONE ENCOUNTER
(VA1) PA Medication: Repatha SureClick Solution Auto-injector 140 MG/ML-[employer]++DEVIN / [employer] ([employer]) - Old PA expires 02/25/2025 - renewal PA submitted via CMM (Key: [id number]) PA Case ID #: [id number] awaiting response TAT 24-72 hrs. - [email or web address]

## 2025-01-29 NOTE — TELEPHONE ENCOUNTER
Attempted to contact patient at [phone] to discuss [facility] and services/benefits offered. No answer, left voicemail.    Trudy Tejeda  Pharmacy Liaison  [facility]  [phone]  1/29/2025 3:14 PM

## 2025-01-30 RX ORDER — DEXTROAMPHETAMINE SACCHARATE, AMPHETAMINE ASPARTATE, DEXTROAMPHETAMINE SULFATE AND AMPHETAMINE SULFATE 2.5; 2.5; 2.5; 2.5 MG/1; MG/1; MG/1; MG/1
10 TABLET ORAL 3 TIMES DAILY
Qty: 90 TABLET | Refills: 0 | Status: SHIPPED | OUTPATIENT
Start: 2025-01-30 | End: 2025-03-01

## 2025-03-05 DIAGNOSIS — E78.01 FAMILIAL HYPERCHOLESTEREMIA: ICD-10-CM

## 2025-03-05 DIAGNOSIS — E78.41 ELEVATED LIPOPROTEIN(A): ICD-10-CM

## 2025-03-05 DIAGNOSIS — Z78.9 STATIN INTOLERANCE: Chronic | ICD-10-CM

## 2025-03-06 RX ORDER — EVOLOCUMAB 140 MG/ML
140 INJECTION, SOLUTION SUBCUTANEOUS
Qty: 2 EACH | Refills: 0 | Status: SHIPPED | OUTPATIENT
Start: 2025-03-06

## 2025-04-24 ENCOUNTER — TELEMEDICINE (OUTPATIENT)
Dept: BEHAVIORAL HEALTH | Facility: CLINIC | Age: 60
End: 2025-04-24
Payer: COMMERCIAL

## 2025-04-24 DIAGNOSIS — F90.2 ADHD (ATTENTION DEFICIT HYPERACTIVITY DISORDER), COMBINED TYPE: ICD-10-CM

## 2025-04-24 PROCEDURE — 99214 OFFICE O/P EST MOD 30 MIN: CPT | Mod: 95 | Performed by: PSYCHIATRY & NEUROLOGY

## 2025-04-24 RX ORDER — DEXTROAMPHETAMINE SACCHARATE, AMPHETAMINE ASPARTATE, DEXTROAMPHETAMINE SULFATE AND AMPHETAMINE SULFATE 2.5; 2.5; 2.5; 2.5 MG/1; MG/1; MG/1; MG/1
10 TABLET ORAL 4 TIMES DAILY
Qty: 120 TABLET | Refills: 0 | Status: SHIPPED | OUTPATIENT
Start: 2025-04-24 | End: 2025-05-24

## 2025-04-24 NOTE — PROGRESS NOTES
[facility]   Follow Up Assessment   This evaluation was conducted via Teams using secure and encrypted videoconferencing technology. The patient was in their home in the [facility].    The patient's identity was confirmed and verbal consent was obtained for this virtual visit.    This provider informed the patient their medical records are totally confidential except for the use by other providers involved in their care, or if the patient signs a release, or to report instances of child or elder abuse, or if it is determined they are an immediate risk to harm themselves or others.    Name: Tom Quan  MRN: [id number]  : 1965  Age: 59 y.o.  Date of assessment: 2025  PCP: Arvind Geiger M.D.      Subjective:  Chart reviewed prior to the virtual visit in his home.  He was last seen on .  He was not able to obtain supplies of Adderall 15 mg 3 times daily which was ordered on .  He is able to get Adderall 10's which he would like to take 4 times per day.  We again talked about the nationwide shortage of Adderall which has been going on for more than 2 years.   His cardiologist is not opposed to his taking Adderall 10 mg tablets.    Objective:  He is alert, oriented, and cooperative.  Relatedness is good.  Grooming is good.  Speech is normal rate.  Anxious.  Memory is good.  Insight and judgment are good.  No indication of psychotic thinking.    Current Risk:       Suicidal: Not suicidal       Homicidal: Not homicidal       Self-Harm: No plan to harm self       Relapse: (Low/Moderate/High): Moderate       Crisis Safety Plan Reviewed: Discussed with patient    Diagnosis:   ADHD    Treatment Plan:  The current treatment plan consists of quarterly psychiatric sessions designed to evaluate his ADHD.    Duration will be for a minimum of 12 months and will be reviewed at each visit.    Goals: Improvement in ADHD symptoms in order to prevent relapse due to the chronic  nature of his behavioral health problems and mental illness.  Change Adderall to 10 mg 4 times per day.    Tom Medina M.D.      This note was created using voice recognition software (Dragon). The accuracy of the dictation is limited by the abilities of the software. I have reviewed the note prior to signing, however some errors in grammar and context are still possible. If you have any questions related to this note please do not hesitate to contact our office.

## 2025-05-11 DIAGNOSIS — Z78.9 STATIN INTOLERANCE: Chronic | ICD-10-CM

## 2025-05-11 DIAGNOSIS — E78.41 ELEVATED LIPOPROTEIN(A): ICD-10-CM

## 2025-05-11 DIAGNOSIS — E78.01 FAMILIAL HYPERCHOLESTEREMIA: ICD-10-CM

## 2025-05-12 RX ORDER — EVOLOCUMAB 140 MG/ML
140 INJECTION, SOLUTION SUBCUTANEOUS
OUTPATIENT
Start: 2025-05-12

## 2025-07-18 ENCOUNTER — TELEPHONE (OUTPATIENT)
Dept: VASCULAR LAB | Facility: MEDICAL CENTER | Age: 60
End: 2025-07-18
Payer: COMMERCIAL

## 2025-07-18 DIAGNOSIS — I70.0 ABDOMINAL AORTIC ATHEROSCLEROSIS (HCC): ICD-10-CM

## 2025-07-18 DIAGNOSIS — Z78.9 STATIN INTOLERANCE: ICD-10-CM

## 2025-07-18 DIAGNOSIS — I71.21 ANEURYSM OF ASCENDING AORTA WITHOUT RUPTURE (HCC): Primary | ICD-10-CM

## 2025-07-18 DIAGNOSIS — I10 PRIMARY HYPERTENSION: ICD-10-CM

## 2025-07-18 DIAGNOSIS — E78.41 ELEVATED LIPOPROTEIN(A): ICD-10-CM

## 2025-07-24 ENCOUNTER — OFFICE VISIT (OUTPATIENT)
Dept: VASCULAR LAB | Facility: MEDICAL CENTER | Age: 60
End: 2025-07-24
Attending: FAMILY MEDICINE
Payer: COMMERCIAL

## 2025-07-24 VITALS
DIASTOLIC BLOOD PRESSURE: 85 MMHG | HEIGHT: 74 IN | BODY MASS INDEX: 29.49 KG/M2 | HEART RATE: 101 BPM | WEIGHT: 229.8 LBS | SYSTOLIC BLOOD PRESSURE: 117 MMHG

## 2025-07-24 DIAGNOSIS — E78.01 FAMILIAL HYPERCHOLESTEREMIA: Chronic | ICD-10-CM

## 2025-07-24 DIAGNOSIS — I70.0 AORTIC ATHEROSCLEROSIS (HCC): Chronic | ICD-10-CM

## 2025-07-24 DIAGNOSIS — Z82.49 FAMILY HISTORY OF VASCULITIS: Chronic | ICD-10-CM

## 2025-07-24 DIAGNOSIS — I71.21 ANEURYSM OF ASCENDING AORTA WITHOUT RUPTURE (HCC): Primary | Chronic | ICD-10-CM

## 2025-07-24 DIAGNOSIS — Z78.9 STATIN INTOLERANCE: Chronic | ICD-10-CM

## 2025-07-24 DIAGNOSIS — E78.41 ELEVATED LIPOPROTEIN(A): Chronic | ICD-10-CM

## 2025-07-24 DIAGNOSIS — F10.91 ALCOHOL USE DISORDER IN REMISSION: Chronic | ICD-10-CM

## 2025-07-24 DIAGNOSIS — K76.0 METABOLIC DYSFUNCTION-ASSOCIATED STEATOTIC LIVER DISEASE (MASLD): Chronic | ICD-10-CM

## 2025-07-24 PROCEDURE — 99214 OFFICE O/P EST MOD 30 MIN: CPT | Performed by: FAMILY MEDICINE

## 2025-07-24 PROCEDURE — 3079F DIAST BP 80-89 MM HG: CPT | Performed by: FAMILY MEDICINE

## 2025-07-24 PROCEDURE — 3074F SYST BP LT 130 MM HG: CPT | Performed by: FAMILY MEDICINE

## 2025-07-24 RX ORDER — TRIAMCINOLONE ACETONIDE 1 MG/G
CREAM TOPICAL
COMMUNITY
Start: 2024-12-26

## 2025-07-24 RX ORDER — EVOLOCUMAB 140 MG/ML
140 INJECTION, SOLUTION SUBCUTANEOUS
Qty: 6 EACH | Refills: 3 | Status: SHIPPED | OUTPATIENT
Start: 2025-07-24

## 2025-07-24 RX ORDER — METOPROLOL SUCCINATE 25 MG/1
25 TABLET, EXTENDED RELEASE ORAL DAILY
Qty: 100 TABLET | Refills: 3 | Status: SHIPPED | OUTPATIENT
Start: 2025-07-24

## 2025-07-24 ASSESSMENT — FIBROSIS 4 INDEX: FIB4 SCORE: 1.56

## 2025-07-24 ASSESSMENT — ENCOUNTER SYMPTOMS
CLAUDICATION: 0
FOCAL WEAKNESS: 0
SHORTNESS OF BREATH: 0
SEIZURES: 0
DIZZINESS: 0
PALPITATIONS: 0
HEMOPTYSIS: 0
FEVER: 0
TREMORS: 0
CHILLS: 0
WEAKNESS: 0
COUGH: 0
HEADACHES: 0
ORTHOPNEA: 0
WHEEZING: 0

## 2025-07-24 NOTE — PROGRESS NOTES
"FOLLOW-UP [facility]  07/24/25     Tom Quan, here for f/u on FH and ascending AA surveillance, est 2023     Subjective   Barriers to care / SDOH:  lost to f/u, missed last appt,     Interval hx/concerns: last seen 2/2024, here to reestablish care, no recent labs, last CTA 12/2024 with AsAA 4.8cm with mild growth from prior.    Ran out of repatha but has been tolerating.    No recent lab tests.   Still very active, no other new sx.     DYSLIPIDEMIA:  LIFESTYLE MGMT  Change in weight: Stable   Exercise habits: moderate regular exercise program  Dietary patterns: Med style diet in general   Etoh: Yes, Details: limited   Reported barriers to care: none     MEDICATION MGMT    Statin: none   Non-Statin: repatha 140mg   Supplements: OTC O3FAs   Side Effects? no  Current Adherence: yes, but ran out of refills      PERTINENT HLD PMHX  Age at Initial dx: \"whole life\"  Secondary causes of dyslipidemia: Medications: Testosterone   History of ASCVD: None  Previously Attempted Interventions for Lipids - including outcome  Statin: atorvastatin, rosuva    Outcome: myalgia   Non-Statin: zetia   Outcome: ineffective  Baseline Lipids Prior to Treatment:    Latest Reference Range & Units 12/14/22 07:31   Cholesterol,Tot 100 - 199 mg/dL 328 (H)   Triglycerides 0 - 149 mg/dL 121   HDL >39 mg/dL 81   LDL Chol Calc (NIH) 0 - 99 mg/dL 226 (H)     ASCENDING AO ANEURYSM: stable, no new sx   Pertinent pmhx:  Had ordered invitae testing - has not completed to date   Initially noted on Noted on CACS 12/2022     Hx of HTN: mild high   hx of anabolic steroid use - testosterone due to testicular hypofunction   Hx of tobacco:   reports that he has never smoked. He has never used smokeless tobacco.  No hx of Connective tissue disease such as Marfans, Joselito-Danlos, Loeys-Em  No hx of inflammatory/autoimmune vasculitis (Takayasu's arteritis, Giant Cell arteritis)  No hx of infective aortitis, HIV, syphilis   No hx of MVA, chest wall " "trauma, deceleration injuries  Pertinent fhx:  Son has Takayasu's arteritis age 15yo - dx at [place]   No fhx of connective tissue disease   No fhx of aneurysmal disease or known familial thoracic aortic aneurysm syndrome     Aflut with RVR, s/p ablation 7/2023:  No current sx.  Last seen with EP 2023     ANTITHROMBOTIC TX: none - no hx of major/minor bleeding   Prior on eliquis due to AFL    HTN:  no longer taking metoprolol, Home BP log: normal    Medications Ordered Prior to Encounter[1]   Allergies[2]     Social History     Tobacco Use    Smoking status: Never    Smokeless tobacco: Never   Vaping Use    Vaping status: Former   Substance Use Topics    Alcohol use: Not Currently     Comment: quit 9/2023    Drug use: Yes     Comment: CBD     Review of Systems   Constitutional:  Negative for chills, fever and malaise/fatigue.   Respiratory:  Negative for cough, hemoptysis, shortness of breath and wheezing.    Cardiovascular:  Negative for chest pain, palpitations, orthopnea, claudication and leg swelling.   Neurological:  Negative for dizziness, tremors, focal weakness, seizures, weakness and headaches.       Objective    Vitals:    07/24/25 1350 07/24/25 1353   BP: 118/82 117/85   BP Location: Left arm Left arm   Patient Position: Sitting Sitting   BP Cuff Size: Adult Adult   Pulse: (!) 108 (!) 101   Weight: 104 kg (229 lb 12.8 oz)    Height: 1.867 m (6' 1.5\")      BP Readings from Last 5 Encounters:   07/24/25 117/85   12/11/24 108/76   02/26/24 117/77   02/15/24 124/80   01/02/24 122/74     Physical Exam  Constitutional:       Appearance: Normal appearance. He is well-developed.   HENT:      Head: Normocephalic and atraumatic.   Eyes:      Conjunctiva/sclera: Conjunctivae normal.   Neck:      Thyroid: No thyromegaly.      Vascular: No JVD.   Cardiovascular:      Rate and Rhythm: Regular rhythm. Tachycardia present.      Heart sounds: Normal heart sounds. No murmur heard.     No friction rub. No gallop. " "  Pulmonary:      Effort: Pulmonary effort is normal. No respiratory distress.      Breath sounds: Normal breath sounds.   Musculoskeletal:      Cervical back: Normal range of motion and neck supple.   Lymphadenopathy:      Cervical: No cervical adenopathy.   Skin:     General: Skin is warm and dry.   Neurological:      General: No focal deficit present.      Mental Status: He is alert and oriented to person, place, and time.      Cranial Nerves: No cranial nerve deficit.       DATA REVIEW:  Lab Results   Component Value Date/Time    CHOLSTRLTOT 195 02/12/2024 05:38 AM    CHOLSTRLTOT 227 (H) 10/17/2023 02:17 AM     (H) 10/17/2023 02:17 AM    HDL 59 02/12/2024 05:38 AM    HDL 75 10/17/2023 02:17 AM    TRIGLYCERIDE 74 02/12/2024 05:38 AM    TRIGLYCERIDE 118 10/17/2023 02:17 AM       Lab Results   Component Value Date/Time     (H) 10/17/2023 02:17 AM     (H) 05/20/2023 03:27 AM       Lab Results   Component Value Date/Time    LIPOPROTA 86.9 (H) 12/18/2023 05:28 AM      No results found for: \"APOB\"   No results found for: \"CRPHIGHSEN\"    Lab Results   Component Value Date/Time    SODIUM 139 02/12/2024 05:38 AM    SODIUM 133 (L) 10/18/2023 04:47 AM    POTASSIUM 5.0 02/12/2024 05:38 AM    POTASSIUM 3.8 10/18/2023 04:47 AM    CHLORIDE 101 02/12/2024 05:38 AM    CHLORIDE 97 10/18/2023 04:47 AM    CO2 22 02/12/2024 05:38 AM    CO2 26 10/18/2023 04:47 AM    GLUCOSE 89 02/12/2024 05:38 AM    GLUCOSE 86 10/18/2023 04:47 AM    BUN 15 02/12/2024 05:38 AM    BUN 5 (L) 10/18/2023 04:47 AM    CREATININE 1.40 (H) 02/12/2024 05:38 AM    CREATININE 1.06 10/18/2023 04:47 AM    GFRCKD 82 10/18/2023 04:47 AM    BUNCREATRAT 11 02/12/2024 05:38 AM     Lab Results   Component Value Date/Time    ALKPHOSPHAT 90 02/12/2024 05:38 AM    ALKPHOSPHAT 74 10/18/2023 04:47 AM    ASTSGOT 23 02/12/2024 05:38 AM    ASTSGOT 96 (H) 10/18/2023 04:47 AM    ALTSGPT 18 02/12/2024 05:38 AM    ALTSGPT 61 (H) 10/18/2023 04:47 AM    TBILIRUBIN " 0.5 02/12/2024 05:38 AM    TBILIRUBIN 0.8 10/18/2023 04:47 AM       Lab Results   Component Value Date/Time    HBA1C 5.1 07/03/2018 02:38 PM       Lab Results   Component Value Date/Time    MICRALB 5.7 12/18/2023 05:28 AM      Lab Results   Component Value Date    WBC 8.9 10/18/2023    RBC 5.07 10/18/2023    HEMOGLOBIN 15.8 10/18/2023    HEMATOCRIT 47.4 10/18/2023    MCV 93.5 10/18/2023    MCH 31.2 10/18/2023    MCHC 33.3 10/18/2023    RDW 54.1 (H) 10/18/2023    PLATELETCT 201 10/18/2023    MPV 8.9 (L) 10/18/2023         IMAGING:      CT abd/pelvis 2021  Vasculature: The mineralization coils identified in the left retroperitoneal region extending to the left renal region. There are some calcified plaque in the aorta without aneurysm.  IMPRESSION:   1.  Hepatic steatosis.   2.  Aortic atherosclerosis without aneurysm.   3.  Appendix not visualized    CACS 12/2022  Coronary calcification:  LMA - 0.0  LCX - 0.0  LAD - 0.0  RCA - 0.0  PDA - 0.0   Total Calcium Score: 0.0   Percentile: Calcium score is below the 25th percentile for the patient's age and sex.   Other findings:  Heart: Normal size.  Lungs: Clear.  Mediastinum: Ascending thoracic aorta measures 4.8 cm in diameter.  Upper abdomen: There is hepatic steatosis.    Echo 5/2023   Normal left ventricular systolic function.  The left ventricular ejection fraction is visually estimated to be 55%.  Aorta  Normal aortic root for body surface area. The ascending aorta diameter   is 4.2  cm.    CTA complete Ao 6/2023   1. No evidence of aortic dissection .   2. Unchanged dilatation of the ascending aorta, measuring up to 4.8 cm   3. Redemonstration of aberrant origin of the right subclavian artery.    TTE 7/2023   Proimal aortic aneurysm 4.5cm.   Trace TI.   Trileaflet aortic valve 1+ AI.   Structurally normal mitral valve without significant stenosis or   regurgitation.   LATHA no clots or masses PW doppler 0.4m/s.   EF normal.    CTA head 9/2023   1. No large vessel  occlusion or aneurysm identified.     NM stress test 10/2023    NUCLEAR IMAGING INTERPRETATION   No evidence of significant jeopardized viable myocardium or prior myocardial    infarction.   Normal left ventricular size, ejection fraction, and wall motion.   ECG INTERPRETATION   Negative stress ECG for ischemia.    CT c/a/p w/o contrast 10/2023   1.  Slight hazy peripancreatic fat stranding, could represent changes of mild pancreatitis, correlate with laboratory values.  2.  5.2 cm ascending thoracic aortic aneurysm, radiographic follow-up and surveillance recommended as clinically appropriate.  3.  Hepatomegaly  4.  Changes of hepatic steatosis  5.  Fat-containing left inguinal hernia  6.  Atherosclerosis    CTA chest 12/2023   1.  Dilated ascending aorta measuring 4.6 cm.   2.  No evidence of aortic dissection.   3.  No airspace consolidation or pleural effusions    12/2024 CTA chest   1. 4.8 cm fusiform ascending thoracic aortic aneurysm.  2. No acute process.        PROCEDURES:    EP ablation 7/2023   Supraventricular tachycardia ablation (atrial flutter ablation)   Electroanatomical 3D mapping with CARTO  Transesophageal echocardiogram        ASSESSMENT AND PLAN  1. Aneurysm of ascending aorta without rupture (HCC)  metoprolol SR (TOPROL XL) 25 MG TABLET SR 24 HR    CT-CTA CHEST WITH & W/O-POST PROCESS      2. Aortic atherosclerosis (HCC)        3. Familial hypercholesteremia  Evolocumab (REPATHA SURECLICK) 140 MG/ML Solution Auto-injector SubQ injection pen    APOLIPOPROTEIN B    Comp Metabolic Panel    Lipid Profile      4. Elevated lipoprotein(a)  Evolocumab (REPATHA SURECLICK) 140 MG/ML Solution Auto-injector SubQ injection pen    APOLIPOPROTEIN B      5. Statin intolerance  Evolocumab (REPATHA SURECLICK) 140 MG/ML Solution Auto-injector SubQ injection pen    COMPLETE      6. Family history of vasculitis      son with Takayasu's arteritis      7. Alcohol use disorder in remission        8. Metabolic  dysfunction-associated steatotic liver disease (MASLD)            Patient Type, check all that apply: Primary Prevention    Established Cardiovascular Disease:     1) ASCENDING AO ANEURYSM  - no current sx    -Presumed medial degeneration with sporadic development.   -No fhx or pmhx of connective tissue disease but son dx with Takayasu's and there is hx of other vascular disease in 2nd degree relatives  - no known HTAD bur recommended for genetic analysis due to strong fhx of vasculopathy and early age onset of large aneurysm   - No BAV on echo    SURVEILLANCE:  12/2022 CACS = 4.8cm on most recent imaging, no symptoms.   This was non-contrast CT, so likely overestimated size, will need more definitive contrast CT in future for accurate sizing   6/2023 CTA Ao = 4.8cm   7/2023 TTE = 4.5cm (sizing discrepancy due to imaging modality)  10/2023 non- contrast CT chest = 5.2cm - may be overestimated due to lack of contrast  12/2023 CTA = 4.6cm   12/2024 CTA = 4.8cm - mild growth   Plan:   - as reviewed, surgical repair is generally recommended at 5.5+cm or >0.5cm/yr expansion to reduce risk for rupture, dissection, death   - annual risk of rupture/dissection at 4.0-4.9cm is <2% and nearly 7% for >6.0cm  - annual surveillance CTA chest (qDec)  - if 5.0+cm, will ref to CT surg for further evaluation   - check aortopathy genotyping due to strong fhx of vasculopathy and early age for moderate ascending AA  ([employer] order # [id number]. ) - encouraged to complete - did not complete  - recommend 1st degree relatives get screened with echo for TAA  - focus on ARB and BB for BP control  - reinforced activity restrictions including no extreme endurance activities, smoking, stimulants, and extreme weight lifting >20lbs - I have recommend light to moderate intensity activities and avoid heavy weight lifting and avoid valsalva maneuvers during lifting     2) AFL - s/p cardioversion and ablation (7/2023) - currently asymptomatic  tachycardia   Low les-vasc, no prior CVA/TIA   Plan  - advised he needs f/u with cardiology as recommended by EP specialist and we are not cardiology office - MA staff to contact cardiology office to assist to schedule   - recommend f/u with cardiology due to ongoing tachy  - continue metoprolol      3) Aortic atherosclerosis - abdomen on prior CT, no symptoms or prior known associated clinical manifestations  -Serves as general indicator of systemic atherosclerosis with higher potential atherosclerosis is other vascular beds including coronaries, carotids, cerebral aa.   -Higher overall ASCVD risk, no complex features   Plan:  - no further imaging surveillance, continue med mgmt      GENETIC HLD : Yes   LDL-C baseline = 226  Grandfather  MI age 51 (see fhx)   Sibs with high cholesterol on meds    FH genotyping:  yes , ordered [employer] # [id number]. - completed, pending results   Benefits/limitations/risks for dyslipidemic genetic testing reviewed.  Further resources for review available at [email or web address] site    Indications and rationale for dyslipidemia genetic testing:  -Strong clinical suspicion of a genetic dyslipidemia.  - assist with definitive diagnosis and aid in treatment decision-making  -Strong family history of dyslipidemia or its complications.  -Presence of related syndromic features  -Evidence that testing might .  -Available and effective early interventions exist.  -Eligibility for new or investigational drugs.  -Patient preference.  -Family planning  - informing need for cascade screening of family members     ACC/AHA Indication for Statin Therapy, shukri all that apply:  Primary Severe HLD / FH (baseline LDL-C >190)    Calculated Risk for ASCVD, if applicable    The ASCVD Risk score (Tonya ORTEZ, et al., 2019) failed to calculate., baseline LDL-C >190 so inaccurate     Other Significant Risk Markers  Other: CACS = 0 - confers less risk over short/long-term though does no preclude  need for treatment based upon extreme high LDL-C >>190  Lp(a):  86 nmol/L = intermediate risk (30-49 mg/dl or  nmol/L)     Risk-enhancers: Famhx of premature ASCVD and Persistently elevated LDL-C >159    LIPID TARGETS / GOALS:  As of 7/2025 ?  LDL-C <100 mg/dl  Pending labs    apoB <90 mg/dl  Pending labs       LIFESTYLE INTERVENTIONS:    SMOKING:    reports that he has never smoked. He has never used smokeless tobacco.   - continued complete avoidance of all tobacco products     WEIGHT MANAGEMENT AND NUTRITION: Mediterranean style dietary approach  and Provide specific dietary approach handouts      PHYSICAL ACTIVITY:   As per 2022 ACC/AHA guideline for aortic disease regarding exercise:   In patients with aortic disease, limited data are available to guide recommendations regarding the forms of exercise that are safe and promote cardiovascular health versus those that pose an acute or  long-term risk of aortic growth or rupture. But evidence exists regarding the physiologic benefits of exercise and the hemodynamic consequences of various form of exercise and exertion in case series and relevant animal models.   There has been a uniform consensus among numerous expert committees on aortic disease that it is wise to avoid intense isometric exertion or exercises that require the Valsalva maneuver, given that heavy lifting with Valsalva can produce acute increases in SBP to >300 mm Hg. There is also a consensus that light weightlifting and low-intensity aerobic exercise are safe and likely improve both physical and mental health. No uniform consensus exists about the safety of intermediate-level static and aerobic exercise. Recommendations for exercise intensity are best individualized informed by multiple factors that include underlying aortic pathology, aortic diameter and ASI, aortic growth rate, age, family history, and any other high-risk features (eg, uncontrolled hypertension).  Ongoing investigation is  needed to better define the levels of resistance activities that would be considered low-risk for adverse aortic events, favoring greater exercise restrictions among patients at higher risk of dissection    ETOH: complete abstinence recommended in light of hx of AUD    LIPID LOWERING MEDICATION MANAGEMENT:     Statin Therapy:   - stopped rosuvastatin due to intolerance  - prior failed atorvastatin  - no further rechallenges     Non-Statin Medications:   - continue zetia 10mg daily   - start repatha 140mg Q2wk - reviewed use, cost, ADRs - will submit prior auth     Indication for PCSK9 Inhibitor, if applicable:   PSCK9i indicated per 2018 ACC/AHA guidelines in this patient with clinical diagnosis of familial hypercholesterolemia (FH) phenotype whose LDL-C remains above threshold of 100 mg/dl despite maximally tolerated statin therapy.     BLOOD PRESSURE MANAGEMENT:  Office BP goal per ACC/AHA <130/80   Home BP at goal:  yes  Office BP at goal:  yes  24h ABPM: not ordered to date  BP meds recommended for reduction of expansion rate of TAA:   - Beta-blocker (anti-impulse therapy to decrease pulsatile wall stress)   - ARB (inhibits TGF-beta activity in aortic wall,, most widely studied is losartan)   Plan:   - continue home BP monitoring, reviewed correct technique, provide BP log and instructions  - order 24h ABPM:  no  Medications:  - no longer on losartan   - continue metoprolol 25mg ER daily - anti-impulse therapy    Glycemic Status: Normal    ANTITHROMBOTIC TX: none - no hx of major/minor bleeding   Prior on eliquis due to AFL, has stopped after ablation per cardiology     OTHER    # testicular hypofunction on TRT   - reviewed latest CV safety from TRAVERSE trial indicating likely CV safety with use of topical testosterone gel 1.62% if maintain total T between 350-750 ng/dl, acknowledging there was a 61% treatment discontinuation rate in both treatment and placebo groups and 21% trial withdrawal rate that could  potentially bias study results/conclusions. Additionally, advised pt these findings cannot be extrapolated to men using other testosterone formulations or whom exceed >750 ng/dl.  Statistically significantly higher risk for AF, PE, and ONOFRE remained in treatment group compared to placebo group  Plan  - defer mgmt and surveillance to prescriber, and highly encouraged to review the above data and trial with prescriber for continued informed shared decision-making on benefits/risks for TRT      STUDIES:  12/2025 CTA chest - ordered, RN to track   FOLLOW-UP: 5 months     Noble Drummond M.D.  HERRERA, Board-certified clinical lipidologist   [facility]   [facility]   [phone]            [1]   Current Outpatient Medications on File Prior to Visit   Medication Sig Dispense Refill    CLOMID 50 MG tablet Take 0.5 tablets every day by oral route for 90 days.      omeprazole (PRILOSEC) 20 MG delayed-release capsule Take 1-2 Capsules by mouth 1 time a day as needed (Heartburn). 90 Capsule 3    hyoscyamine (LEVSIN) 0.125 MG tablet Take 1 Tablet by mouth every 6 hours as needed for Cramping. 30 Tablet 3    Aspirin-Acetaminophen-Caffeine (EXCEDRIN PO) Take 2 Tablets by mouth 2 times a day as needed (For headache).      acetaminophen (TYLENOL) 500 MG Tab Take 1,000 mg by mouth every 6 hours as needed. Indications: Pain      ibuprofen (MOTRIN) 200 MG Tab Take 800 mg by mouth every 6 hours as needed. Indications: Pain      rizatriptan (MAXALT) 5 MG tablet Take 1-2 Tablets by mouth one time as needed for Migraine for up to 1 dose. 20 Tablet 3    Cholecalciferol (VITAMIN D) 2000 UNIT Tab Take 4,000 Units by mouth see administration instructions. Pt takes sporidially      B Complex Vitamins (VITAMIN B COMPLEX PO) Take 1 Tablet by mouth see administration instructions. Pt takes sporidially      ondansetron (ZOFRAN ODT) 4 MG TABLET DISPERSIBLE Take 1 Tablet by mouth every four hours as needed  for Nausea/Vomiting (give PO if no IV route available). 10 Tablet 0    testosterone cypionate (DEPO-TESTOSTERONE) 200 MG/ML Solution injection Inject 200 mg into the shoulder, thigh, or buttocks every 10 days.      Omega-3 Fatty Acids (FISH OIL PO) Take 2 Capsules by mouth see administration instructions. Pt takes sporidially       No current facility-administered medications on file prior to visit.   [2]   Allergies  Allergen Reactions    Atorvastatin Myalgia    Rosuvastatin Myalgia